# Patient Record
Sex: FEMALE | Race: WHITE | NOT HISPANIC OR LATINO | Employment: FULL TIME
[De-identification: names, ages, dates, MRNs, and addresses within clinical notes are randomized per-mention and may not be internally consistent; named-entity substitution may affect disease eponyms.]

---

## 2017-09-10 ENCOUNTER — HEALTH MAINTENANCE LETTER (OUTPATIENT)
Age: 42
End: 2017-09-10

## 2017-09-18 ENCOUNTER — RADIANT APPOINTMENT (OUTPATIENT)
Dept: MAMMOGRAPHY | Facility: CLINIC | Age: 42
End: 2017-09-18
Attending: FAMILY MEDICINE
Payer: COMMERCIAL

## 2017-09-18 DIAGNOSIS — Z12.31 VISIT FOR SCREENING MAMMOGRAM: ICD-10-CM

## 2017-09-18 PROCEDURE — 77063 BREAST TOMOSYNTHESIS BI: CPT | Performed by: STUDENT IN AN ORGANIZED HEALTH CARE EDUCATION/TRAINING PROGRAM

## 2017-09-18 PROCEDURE — G0202 SCR MAMMO BI INCL CAD: HCPCS | Performed by: STUDENT IN AN ORGANIZED HEALTH CARE EDUCATION/TRAINING PROGRAM

## 2017-10-11 ENCOUNTER — OFFICE VISIT (OUTPATIENT)
Dept: PEDIATRICS | Facility: CLINIC | Age: 42
End: 2017-10-11
Payer: COMMERCIAL

## 2017-10-11 VITALS
DIASTOLIC BLOOD PRESSURE: 60 MMHG | SYSTOLIC BLOOD PRESSURE: 94 MMHG | HEIGHT: 64 IN | HEART RATE: 70 BPM | OXYGEN SATURATION: 97 % | WEIGHT: 137 LBS | BODY MASS INDEX: 23.39 KG/M2 | TEMPERATURE: 97.9 F

## 2017-10-11 DIAGNOSIS — Z23 NEEDS FLU SHOT: ICD-10-CM

## 2017-10-11 DIAGNOSIS — Z00.00 ROUTINE GENERAL MEDICAL EXAMINATION AT A HEALTH CARE FACILITY: Primary | ICD-10-CM

## 2017-10-11 DIAGNOSIS — Z13.220 LIPID SCREENING: ICD-10-CM

## 2017-10-11 DIAGNOSIS — Z13.1 SCREENING FOR DIABETES MELLITUS: ICD-10-CM

## 2017-10-11 PROCEDURE — 90686 IIV4 VACC NO PRSV 0.5 ML IM: CPT | Performed by: INTERNAL MEDICINE

## 2017-10-11 PROCEDURE — 99396 PREV VISIT EST AGE 40-64: CPT | Mod: 25 | Performed by: INTERNAL MEDICINE

## 2017-10-11 PROCEDURE — 90471 IMMUNIZATION ADMIN: CPT | Performed by: INTERNAL MEDICINE

## 2017-10-11 NOTE — MR AVS SNAPSHOT
After Visit Summary   10/11/2017    Betty Castelan    MRN: [id number]           Patient Information     Date Of Birth          1975        Visit Information        Provider Department      10/11/2017 7:50 AM Diana Luevano MD PhD [facility]        Today's Diagnoses     Routine general medical examination at a health care facility    -  1    Needs flu shot        Lipid screening        Screening for diabetes mellitus          Care Instructions    Make appointment(s) for:   -- fasting lab appointment.           Preventive Health Recommendations  Female Ages 40 to 49    Yearly exam:     See your health care provider every year in order to  1. Review health changes.   2. Discuss preventive care.    3. Review your medicines if your doctor prescribed any.      Get a Pap test every three years (unless you have an abnormal result and your provider advises testing more often).      If you get Pap tests with HPV test, you only need to test every 5 years, unless you have an abnormal result. You do not need a Pap test if your uterus was removed (hysterectomy) and you have not had cancer.      You should be tested each year for STDs (sexually transmitted diseases), if you're at risk.       Ask your doctor if you should have a mammogram.      Have a colonoscopy (test for colon cancer) if someone in your family has had colon cancer or polyps before age 50.       Have a cholesterol test every 5 years.       Have a diabetes test (fasting glucose) after age 45. If you are at risk for diabetes, you should have this test every 3 years.    Shots: Get a flu shot each year. Get a tetanus shot every 10 years.     Nutrition:     Eat at least 5 servings of fruits and vegetables each day.    Eat whole-grain bread, whole-wheat pasta and brown rice instead of white grains and rice.    Talk to your provider about Calcium and Vitamin D.     Lifestyle    Exercise at least 150 minutes a week (an average of 30 minutes  a day, 5 days a week). This will help you control your weight and prevent disease.    Limit alcohol to one drink per day.    No smoking.     Wear sunscreen to prevent skin cancer.    See your dentist every six months for an exam and cleaning.          Follow-ups after your visit        Future tests that were ordered for you today     Open Future Orders        Priority Expected Expires Ordered    Lipid panel reflex to direct LDL Routine  10/31/2017 10/11/2017    Glucose Routine  10/31/2017 10/11/2017            Who to contact     If you have questions or need follow up information about today's clinic visit or your schedule please contact [facility] directly at [phone].  Normal or non-critical lab and imaging results will be communicated to you by [employer], letter or phone within 4 business days after the clinic has received the results. If you do not hear from us within 7 days, please contact the clinic through [employer] or phone. If you have a critical or abnormal lab result, we will notify you by phone as soon as possible.  Submit refill requests through [facility] or call your pharmacy and they will forward the refill request to us. Please allow 3 business days for your refill to be completed.          Additional Information About Your Visit        [employer] Information     [facility] gives you secure access to your electronic health record. If you see a primary care provider, you can also send messages to your care team and make appointments. If you have questions, please call your primary care clinic.  If you do not have a primary care provider, please call [phone] and they will assist you.      [facility] is an electronic gateway that provides easy, online access to your medical records. With [facility], you can request a clinic appointment, read your test results, renew a prescription or communicate with your care team.     To access your existing account, please contact your [facility]  "[facility] [facility] or call [phone] for assistance.        Care EveryWhere ID     This is your Care EveryWhere ID. This could be used by other organizations to access your [place] medical records  [id number]        Your Vitals Were     Pulse Temperature Height Last Period Pulse Oximetry BMI (Body Mass Index)    70 97.9  F (36.6  C) (Temporal) 5' 3.5\" (1.613 m) 09/27/2017 97% 23.89 kg/m2       Blood Pressure from Last 3 Encounters:   10/11/17 94/60   09/30/16 117/82   05/20/16 110/70    Weight from Last 3 Encounters:   10/11/17 137 lb (62.1 kg)   09/30/16 137 lb 6.4 oz (62.3 kg)   05/20/16 138 lb 14.4 oz (63 kg)              We Performed the Following     ADMIN 1st VACCINE     HC FLU VAC PRESRV FREE QUAD SPLIT VIR 3+YRS IM        Primary Care Provider Office Phone # Fax #    Regino Flowers [phone] [address]  [facility] [address]        Equal Access to Services     [facility]: Hadii aad ku hadasho [place], waaxda luqadaha, qaybta kaalmada adeanjel, vanesa johnson . So [facility] [phone].    ATENCIÓN: Si habla español, tiene a kern disposición servicios gratuitos de asistencia lingüística. Llame al [phone].    We comply with applicable federal civil rights laws and [place] laws. We do not discriminate on the basis of race, color, national origin, age, disability, sex, sexual orientation, or gender identity.            Thank you!     Thank you for choosing [facility]  for your care. Our goal is always to provide you with excellent care. Hearing back from our patients is one way we can continue to improve our services. Please take a few minutes to complete the written survey that you may receive in the mail after your visit with us. Thank you!             Your Updated Medication List - Protect others around you: Learn how to safely use, store and throw away your medicines at www.disposemymeds.org.      Notice  As of " 10/11/2017  8:18 AM    You have not been prescribed any medications.

## 2017-10-11 NOTE — PATIENT INSTRUCTIONS
Make appointment(s) for:   -- fasting lab appointment.           Preventive Health Recommendations  Female Ages 40 to 49    Yearly exam:     See your health care provider every year in order to  1. Review health changes.   2. Discuss preventive care.    3. Review your medicines if your doctor prescribed any.      Get a Pap test every three years (unless you have an abnormal result and your provider advises testing more often).      If you get Pap tests with HPV test, you only need to test every 5 years, unless you have an abnormal result. You do not need a Pap test if your uterus was removed (hysterectomy) and you have not had cancer.      You should be tested each year for STDs (sexually transmitted diseases), if you're at risk.       Ask your doctor if you should have a mammogram.      Have a colonoscopy (test for colon cancer) if someone in your family has had colon cancer or polyps before age 50.       Have a cholesterol test every 5 years.       Have a diabetes test (fasting glucose) after age 45. If you are at risk for diabetes, you should have this test every 3 years.    Shots: Get a flu shot each year. Get a tetanus shot every 10 years.     Nutrition:     Eat at least 5 servings of fruits and vegetables each day.    Eat whole-grain bread, whole-wheat pasta and brown rice instead of white grains and rice.    Talk to your provider about Calcium and Vitamin D.     Lifestyle    Exercise at least 150 minutes a week (an average of 30 minutes a day, 5 days a week). This will help you control your weight and prevent disease.    Limit alcohol to one drink per day.    No smoking.     Wear sunscreen to prevent skin cancer.    See your dentist every six months for an exam and cleaning.

## 2017-10-11 NOTE — NURSING NOTE
Injectable Influenza Immunization Documentation    1.  Is the person to be vaccinated sick today?  No    2. Does the person to be vaccinated have an allergy to eggs or to a component of the vaccine?  No    3. Has the person to be vaccinated today ever had a serious reaction to influenza vaccine in the past?  No    4. Has the person to be vaccinated ever had Guillain-Leland syndrome?  No     Form completed by Valentine NATHAN

## 2017-10-11 NOTE — PROGRESS NOTES
SUBJECTIVE:   CC: Betty Castelan is an 42 year old woman who presents for preventive health visit.     Healthy Habits:    Do you get at least three servings of calcium containing foods daily (dairy, green leafy vegetables, etc.)? yes    Amount of exercise or daily activities, outside of work: 5 day(s) per week    Problems taking medications regularly No    Medication side effects: No    Have you had an eye exam in the past two years? yes    Do you see a dentist twice per year? yes    Do you have sleep apnea, excessive snoring or daytime drowsiness?no          -------------------------------------    Today's PHQ-2 Score: PHQ-2 ( 1999 Pfizer) 5/20/2016 5/1/2015   Q1: Little interest or pleasure in doing things 0 0   Q2: Feeling down, depressed or hopeless 0 0   PHQ-2 Score 0 0       Abuse: Current or Past(Physical, Sexual or Emotional)- No  Do you feel safe in your environment - Yes    Social History   Substance Use Topics     Smoking status: Never Smoker     Smokeless tobacco: Never Used     Alcohol use Yes     The patient does not drink >3 drinks per day nor >7 drinks per week.    Reviewed orders with patient.  Reviewed health maintenance and updated orders accordingly - Yes  Labs reviewed in EPIC    Patient under age 50, mutual decision reflected in health maintenance.        Pertinent mammograms are reviewed under the imaging tab.  History of abnormal Pap smear: NO - age 30- 65 PAP every 3 years recommended    Reviewed and updated as needed this visit by clinical staffTobacco  Allergies  Meds  Med Hx  Surg Hx  Fam Hx  Soc Hx        Reviewed and updated as needed this visit by Provider              ROS:  C: NEGATIVE for fever, chills, change in weight  I: NEGATIVE for worrisome rashes, moles or lesions  E: NEGATIVE for vision changes or irritation  ENT: NEGATIVE for ear, mouth and throat problems  R: NEGATIVE for significant cough or SOB  B: NEGATIVE for masses, tenderness or discharge  CV: NEGATIVE for  "chest pain, palpitations or peripheral edema  GI: NEGATIVE for nausea, abdominal pain, heartburn, or change in bowel habits  : NEGATIVE for unusual urinary or vaginal symptoms. Periods are regular.  M: NEGATIVE for significant arthralgias or myalgia  N: NEGATIVE for weakness, dizziness or paresthesias  P: NEGATIVE for changes in mood or affect    OBJECTIVE:   BP 94/60  Pulse 70  Temp 97.9  F (36.6  C) (Temporal)  Ht 5' 3.5\" (1.613 m)  Wt 137 lb (62.1 kg)  LMP 09/27/2017  SpO2 97%  BMI 23.89 kg/m2  EXAM:  GENERAL: healthy, alert and no distress  EYES: Eyes grossly normal to inspection, PERRL and conjunctivae and sclerae normal  HENT: ear canals and TM's normal, nose and mouth without ulcers or lesions  NECK: no adenopathy, no asymmetry, masses, or scars and thyroid normal to palpation  RESP: lungs clear to auscultation - no rales, rhonchi or wheezes  BREAST: normal without masses, tenderness or nipple discharge and no palpable axillary masses or adenopathy  CV: regular rate and rhythm, normal S1 S2, no S3 or S4, no murmur, click or rub, no peripheral edema and peripheral pulses strong  ABDOMEN: soft, nontender, no hepatosplenomegaly, no masses and bowel sounds normal  MS: no gross musculoskeletal defects noted, no edema  SKIN: no suspicious lesions or rashes  NEURO: Normal strength and tone, mentation intact and speech normal  PSYCH: mentation appears normal, affect normal/bright    ASSESSMENT/PLAN:       ICD-10-CM    1. Routine general medical examination at a health care facility Z00.00    2. Needs flu shot Z23 HC FLU VAC PRESRV FREE QUAD SPLIT VIR 3+YRS IM     ADMIN 1st VACCINE   3. Lipid screening Z13.220 Lipid panel reflex to direct LDL   4. Screening for diabetes mellitus Z13.1 Glucose     -- return for fasting labs for biometrics required by work.     COUNSELING:   Reviewed preventive health counseling, as reflected in patient instructions         reports that she has never smoked. She has never used " "smokeless tobacco.    Estimated body mass index is 23.89 kg/(m^2) as calculated from the following:    Height as of this encounter: 5' 3.5\" (1.613 m).    Weight as of this encounter: 137 lb (62.1 kg).         Counseling Resources:  ATP IV Guidelines  Pooled Cohorts Equation Calculator  Breast Cancer Risk Calculator  FRAX Risk Assessment  ICSI Preventive Guidelines  Dietary Guidelines for Americans, 2010  USDA's MyPlate  ASA Prophylaxis  Lung CA Screening    Diana Luevano MD PhD  [facility]  "

## 2017-10-11 NOTE — NURSING NOTE
"Chief Complaint   Patient presents with     Physical       Initial BP 94/60  Pulse 70  Temp 97.9  F (36.6  C) (Temporal)  Ht 5' 3.5\" (1.613 m)  Wt 137 lb (62.1 kg)  LMP 09/27/2017  SpO2 97%  BMI 23.89 kg/m2 Estimated body mass index is 23.89 kg/(m^2) as calculated from the following:    Height as of this encounter: 5' 3.5\" (1.613 m).    Weight as of this encounter: 137 lb (62.1 kg).  Medication Reconciliation: complete    "

## 2017-10-18 ENCOUNTER — NURSE TRIAGE (OUTPATIENT)
Dept: NURSING | Facility: CLINIC | Age: 42
End: 2017-10-18

## 2017-10-18 NOTE — TELEPHONE ENCOUNTER
Reason for Disposition    Wheezing is present    Additional Information    Negative: Severe difficulty breathing (e.g., struggling for each breath, speaks in single words)    Negative: Bluish lips, tongue, or face now    Negative: [1] Difficulty breathing AND [2] exposure to flames, smoke, or fumes    Negative: [1] Stridor AND [2] difficulty breathing    Negative: Sounds like a life-threatening emergency to the triager    Negative: [1] Previous asthma attacks AND [2] this feels like asthma attack    Negative: Dry (non-productive) cough (i.e., no sputum or minimal clear sputum)    Negative: Chest pain  (Exception: MILD central chest pain, present only when coughing)    Negative: Difficulty breathing    Negative: Patient sounds very sick or weak to the triager    Negative: [1] Coughed up blood AND [2] > 1 tablespoon (15 ml) (Exception: blood-tinged sputum)    Negative: Fever > 103 F (39.4 C)    Negative: [1] Fever > 101 F (38.3 C) AND [2] age > 60    Negative: [1] Fever > 101 F (38.3 C) AND [2] bedridden (e.g., nursing home patient, CVA, chronic illness, recovering from surgery)    Negative: [1] Fever > 100.5 F (38.1 C) AND [2] diabetes mellitus or weak immune system (e.g., HIV positive, cancer chemo, splenectomy, organ transplant, chronic steroids)    Protocols used: COUGH - ACUTE PRODUCTIVE-ADULT-AH

## 2017-10-23 DIAGNOSIS — Z13.1 SCREENING FOR DIABETES MELLITUS: ICD-10-CM

## 2017-10-23 DIAGNOSIS — Z13.220 LIPID SCREENING: ICD-10-CM

## 2017-10-23 LAB
CHOLEST SERPL-MCNC: 175 MG/DL
GLUCOSE SERPL-MCNC: 87 MG/DL (ref 70–99)
HDLC SERPL-MCNC: 57 MG/DL
LDLC SERPL CALC-MCNC: 98 MG/DL
NONHDLC SERPL-MCNC: 118 MG/DL
TRIGL SERPL-MCNC: 101 MG/DL

## 2017-10-23 PROCEDURE — 82947 ASSAY GLUCOSE BLOOD QUANT: CPT | Performed by: INTERNAL MEDICINE

## 2017-10-23 PROCEDURE — 36415 COLL VENOUS BLD VENIPUNCTURE: CPT | Performed by: INTERNAL MEDICINE

## 2017-10-23 PROCEDURE — 80061 LIPID PANEL: CPT | Performed by: INTERNAL MEDICINE

## 2017-10-23 NOTE — PROGRESS NOTES
Dear Betty,   Here are your recent results which are within the expected range.  Please continue with your current plan of care.     Please call or Mychart to our office if you have further questions.     Diana Luevano MD-PhD

## 2017-12-18 ENCOUNTER — MYC MEDICAL ADVICE (OUTPATIENT)
Dept: PEDIATRICS | Facility: CLINIC | Age: 42
End: 2017-12-18

## 2017-12-18 NOTE — TELEPHONE ENCOUNTER
Routing to Dr. Luevano to advise.          To: [facility] PRIMARY [facility]ARE      From: Betty Castelan      Created: 12/18/2017 1:32 PM        *-*-*This message has not been handled.*-*-*    Hello! I believe this reached the doctor and was taken care of, as hoped. Thank you!                ----- Message -----  From: Office of Diana Luevano MD PhD  Sent: 12/18/2017 12:29 PM CST  To: Betty Castelan  Subject: RE: Question about an upcoming visit    Betty,    We appreciate your interest in using [employer] features.      When addressing a question about a family member, you will need to send the message from their [employer] System[employer]art, because the information needs to be recorded directly into their medical record.     Your family member must have a [employer] account, and you must have proxy access to it.  It this hasn't been established, go to [email or web address] for signup and proxy information, or contact [employer] services at [phone] .  He can also sign up for [employer] access at an office visit.    I am not able to find Rogre's chart.  What is his birthdate?    Thank you,  Melody SERNA RN,   [facility]              ----- Message -----     From: Betty Castelan     Sent: 12/18/2017  8:11 AM CST       To: Diana Luevano MD PhD  Subject: Question about an upcoming visit    Please find attached biometric form for Roger Castelan's labwork. Please submit on his behalf. Thank you!

## 2017-12-18 NOTE — TELEPHONE ENCOUNTER
Noted patient sent information from another person in her chart.  [employer] message sent asking patient to send in patients chart.    Please find attached biometric form for Roger Annelise's labwork. Please submit on his behalf. Thank you!    Melody Adame RN,   [facility]

## 2018-07-02 ENCOUNTER — HEALTH MAINTENANCE LETTER (OUTPATIENT)
Age: 43
End: 2018-07-02

## 2018-09-28 ENCOUNTER — RADIANT APPOINTMENT (OUTPATIENT)
Dept: MAMMOGRAPHY | Facility: CLINIC | Age: 43
End: 2018-09-28
Attending: FAMILY MEDICINE
Payer: COMMERCIAL

## 2018-09-28 DIAGNOSIS — Z12.39 SCREENING BREAST EXAMINATION: ICD-10-CM

## 2018-09-28 PROCEDURE — 77067 SCR MAMMO BI INCL CAD: CPT | Performed by: RADIOLOGY

## 2018-09-28 PROCEDURE — 77063 BREAST TOMOSYNTHESIS BI: CPT | Performed by: RADIOLOGY

## 2018-11-28 ENCOUNTER — DOCUMENTATION ONLY (OUTPATIENT)
Dept: LAB | Facility: CLINIC | Age: 43
End: 2018-11-28

## 2018-11-28 DIAGNOSIS — Z11.4 SCREENING FOR HUMAN IMMUNODEFICIENCY VIRUS: ICD-10-CM

## 2018-11-28 DIAGNOSIS — Z13.6 CARDIOVASCULAR SCREENING; LDL GOAL LESS THAN 160: Primary | ICD-10-CM

## 2018-11-28 DIAGNOSIS — Z13.1 SCREENING FOR DIABETES MELLITUS: ICD-10-CM

## 2018-11-28 NOTE — PROGRESS NOTES
Attempt 1    LM with patient to return clinics call to schedule a physical with Dr. Flowers.     Kinga Bass RN

## 2018-11-28 NOTE — PROGRESS NOTES
Pending lab orders for 11/30/18. Please review, sign, and close encounter. Thank you. Kinga Bass RN

## 2018-11-30 NOTE — PROGRESS NOTES
Patient scheduled a physical with Dr. Luevano on 12/28/18.    Routing to Dr. Luevano to please review.    Bri Harris RN, [facility]

## 2018-12-28 ENCOUNTER — OFFICE VISIT (OUTPATIENT)
Dept: PEDIATRICS | Facility: CLINIC | Age: 43
End: 2018-12-28
Payer: COMMERCIAL

## 2018-12-28 VITALS
BODY MASS INDEX: 25.52 KG/M2 | WEIGHT: 144 LBS | HEART RATE: 70 BPM | DIASTOLIC BLOOD PRESSURE: 69 MMHG | SYSTOLIC BLOOD PRESSURE: 113 MMHG | TEMPERATURE: 97.5 F | OXYGEN SATURATION: 99 % | HEIGHT: 63 IN

## 2018-12-28 DIAGNOSIS — Z00.00 ROUTINE HISTORY AND PHYSICAL EXAMINATION OF ADULT: Primary | ICD-10-CM

## 2018-12-28 DIAGNOSIS — Z23 FLU VACCINE NEED: ICD-10-CM

## 2018-12-28 DIAGNOSIS — Z80.3 FAMILY HISTORY OF BREAST CANCER: ICD-10-CM

## 2018-12-28 DIAGNOSIS — E66.3 OVERWEIGHT (BMI 25.0-29.9): ICD-10-CM

## 2018-12-28 DIAGNOSIS — Z12.4 SCREENING FOR MALIGNANT NEOPLASM OF CERVIX: ICD-10-CM

## 2018-12-28 DIAGNOSIS — Z11.4 SCREENING FOR HUMAN IMMUNODEFICIENCY VIRUS: ICD-10-CM

## 2018-12-28 DIAGNOSIS — Z11.4 ENCOUNTER FOR SCREENING FOR HIV: ICD-10-CM

## 2018-12-28 DIAGNOSIS — Z13.6 CARDIOVASCULAR SCREENING; LDL GOAL LESS THAN 160: ICD-10-CM

## 2018-12-28 LAB
CHOLEST SERPL-MCNC: 184 MG/DL
GLUCOSE SERPL-MCNC: 92 MG/DL (ref 70–99)
HDLC SERPL-MCNC: 72 MG/DL
LDLC SERPL CALC-MCNC: 101 MG/DL
NONHDLC SERPL-MCNC: 112 MG/DL
TRIGL SERPL-MCNC: 53 MG/DL

## 2018-12-28 PROCEDURE — 87389 HIV-1 AG W/HIV-1&-2 AB AG IA: CPT | Performed by: INTERNAL MEDICINE

## 2018-12-28 PROCEDURE — 99396 PREV VISIT EST AGE 40-64: CPT | Mod: 25 | Performed by: INTERNAL MEDICINE

## 2018-12-28 PROCEDURE — 87624 HPV HI-RISK TYP POOLED RSLT: CPT | Performed by: INTERNAL MEDICINE

## 2018-12-28 PROCEDURE — 90686 IIV4 VACC NO PRSV 0.5 ML IM: CPT | Performed by: INTERNAL MEDICINE

## 2018-12-28 PROCEDURE — 90471 IMMUNIZATION ADMIN: CPT | Performed by: INTERNAL MEDICINE

## 2018-12-28 PROCEDURE — G0145 SCR C/V CYTO,THINLAYER,RESCR: HCPCS | Performed by: INTERNAL MEDICINE

## 2018-12-28 PROCEDURE — 80061 LIPID PANEL: CPT | Performed by: INTERNAL MEDICINE

## 2018-12-28 PROCEDURE — 36415 COLL VENOUS BLD VENIPUNCTURE: CPT | Performed by: INTERNAL MEDICINE

## 2018-12-28 PROCEDURE — 82947 ASSAY GLUCOSE BLOOD QUANT: CPT | Performed by: INTERNAL MEDICINE

## 2018-12-28 ASSESSMENT — MIFFLIN-ST. JEOR: SCORE: 1271.27

## 2018-12-28 NOTE — PROGRESS NOTES
SUBJECTIVE:   CC: Betty Castelan is an 43 year old woman who presents for preventive health visit.     Healthy Habits:    Do you get at least three servings of calcium containing foods daily (dairy, green leafy vegetables, etc.)? yes    Amount of exercise or daily activities, outside of work: 3-4 day(s) per week    Problems taking medications regularly No    Medication side effects: No    Have you had an eye exam in the past two years? yes    Do you see a dentist twice per year? no    Do you have sleep apnea, excessive snoring or daytime drowsiness?no    HPI:  Went on a whole foods diet.  The diet did not limited protein intake.  She has used more [place], red meat and but her.    Mother and aunt had breast cancer. Pt on annual mammogram.     Today's PHQ-2 Score:   PHQ-2 ( 1999 Pfizer) 12/28/2018 5/20/2016   Q1: Little interest or pleasure in doing things 0 0   Q2: Feeling down, depressed or hopeless 0 0   PHQ-2 Score 0 0       Abuse: Current or Past(Physical, Sexual or Emotional)- No  Do you feel safe in your environment? Yes    Social History     Tobacco Use     Smoking status: Never Smoker     Smokeless tobacco: Never Used   Substance Use Topics     Alcohol use: Yes     If you drink alcohol do you typically have >3 drinks per day or >7 drinks per week? No                     Reviewed orders with patient.  Reviewed health maintenance and updated orders accordingly - Yes  Labs reviewed in [facility]    Mammogram Screening: Patient under age 50, mutual decision reflected in health maintenance.      Pertinent mammograms are reviewed under the imaging tab.  History of abnormal Pap smear: NO - age 30-65 PAP every 5 years with negative HPV co-testing recommended  PAP / HPV 5/1/2015 1/3/2012   PAP NIL NIL     Reviewed and updated as needed this visit by clinical staff  Tobacco  Allergies  Meds  Med Hx  Surg Hx  Fam Hx  Soc Hx        Reviewed and updated as needed this visit by Provider            ROS:  CONSTITUTIONAL:  "NEGATIVE for fever, chills, change in weight  INTEGUMENTARU/SKIN: NEGATIVE for worrisome rashes, moles or lesions  EYES: NEGATIVE for vision changes or irritation  ENT: NEGATIVE for ear, mouth and throat problems  RESP: NEGATIVE for significant cough or SOB  BREAST: NEGATIVE for masses, tenderness or discharge  CV: NEGATIVE for chest pain, palpitations or peripheral edema  GI: NEGATIVE for nausea, abdominal pain, heartburn, or change in bowel habits  : NEGATIVE for unusual urinary or vaginal symptoms. Periods are regular.  MUSCULOSKELETAL: NEGATIVE for significant arthralgias or myalgia  NEURO: NEGATIVE for weakness, dizziness or paresthesias  PSYCHIATRIC: NEGATIVE for changes in mood or affect    OBJECTIVE:   /69   Pulse 70   Temp 97.5  F (36.4  C) (Temporal)   Ht 1.607 m (5' 3.25\")   Wt 65.3 kg (144 lb)   SpO2 99%   BMI 25.31 kg/m    EXAM:  GENERAL: healthy, alert and no distress  EYES: Eyes grossly normal to inspection, PERRL and conjunctivae and sclerae normal  HENT: ear canals and TM's normal, nose and mouth without ulcers or lesions  NECK: no adenopathy, no asymmetry, masses, or scars and thyroid normal to palpation  RESP: lungs clear to auscultation - no rales, rhonchi or wheezes  BREAST: normal without masses, tenderness or nipple discharge and no palpable axillary masses or adenopathy  CV: regular rate and rhythm, normal S1 S2, no S3 or S4, no murmur, click or rub, no peripheral edema and peripheral pulses strong  ABDOMEN: soft, nontender, no hepatosplenomegaly, no masses and bowel sounds normal   (female): normal female external genitalia, normal urethral meatus, vaginal mucosa pink, moist, well rugated, and normal cervix/adnexa/uterus without masses or discharge  MS: no gross musculoskeletal defects noted, no edema  SKIN: no suspicious lesions or rashes  NEURO: Normal strength and tone, mentation intact and speech normal  PSYCH: mentation appears normal, affect normal/bright    Diagnostic " "Test Results:  Results for orders placed or performed in visit on 12/28/18 (from the past 24 hour(s))   Glucose   Result Value Ref Range    Glucose 92 70 - 99 mg/dL   Lipid panel reflex to direct LDL Fasting   Result Value Ref Range    Cholesterol 184 <200 mg/dL    Triglycerides 53 <150 mg/dL    HDL Cholesterol 72 >49 mg/dL    LDL Cholesterol Calculated 101 (H) <100 mg/dL    Non HDL Cholesterol 112 <130 mg/dL       ASSESSMENT/PLAN:       ICD-10-CM    1. Routine history and physical examination of adult Z00.00 Pap imaged thin layer screen with HPV - recommended age 30 - 65 years (select HPV order below)     HPV High Risk Types DNA Cervical   2. Encounter for screening for HIV Z11.4 HIV Antigen Antibody Combo   3. Flu vaccine need Z23 HC FLU VAC PRESRV FREE QUAD SPLIT VIR 3+YRS IM   4. Family history of breast cancer- mother Z80.3 CANCER RISK MGMT/CANCER GENETIC COUNSELING REFERRAL   5. Screening for malignant neoplasm of cervix Z12.4 Pap imaged thin layer screen with HPV - recommended age 30 - 65 years (select HPV order below)     HPV High Risk Types DNA Cervical   6. Overweight (BMI 25.0-29.9) E66.3      -- referred to genetics due to family history of breast cancer.   -- pt will work on weight loss.     COUNSELING:   Reviewed preventive health counseling, as reflected in patient instructions    BP Readings from Last 1 Encounters:   12/28/18 113/69     Estimated body mass index is 25.31 kg/m  as calculated from the following:    Height as of this encounter: 1.607 m (5' 3.25\").    Weight as of this encounter: 65.3 kg (144 lb).      Weight management plan: Patient is committed to increase exercise, low fat low cholesterol diet.      reports that  has never smoked. she has never used smokeless tobacco.      Counseling Resources:  ATP IV Guidelines  Pooled Cohorts Equation Calculator  Breast Cancer Risk Calculator  FRAX Risk Assessment  ICSI Preventive Guidelines  Dietary Guidelines for Americans, 2010  USDA's " MyPlate  ASA Prophylaxis  Lung CA Screening    Diana Luevano MD PhD  [facility]

## 2018-12-28 NOTE — PATIENT INSTRUCTIONS
Make appointment(s) for:   -- genetics.         Medication(s) prescribed today:    Orders Placed This Encounter   Medications     calcium carbonate-vitamin D (OS-ABEBA) 600-400 MG-UNIT chewable tablet     Sig: Take 1 chew tab by mouth 2 times daily           Preventive Health Recommendations  Female Ages 40 to 49    Yearly exam:     See your health care provider every year in order to  1. Review health changes.   2. Discuss preventive care.    3. Review your medicines if your doctor prescribed any.      Get a Pap test every three years (unless you have an abnormal result and your provider advises testing more often).      If you get Pap tests with HPV test, you only need to test every 5 years, unless you have an abnormal result. You do not need a Pap test if your uterus was removed (hysterectomy) and you have not had cancer.      You should be tested each year for STDs (sexually transmitted diseases), if you're at risk.     Ask your doctor if you should have a mammogram.      Have a colonoscopy (test for colon cancer) if someone in your family has had colon cancer or polyps before age 50.       Have a cholesterol test every 5 years.       Have a diabetes test (fasting glucose) after age 45. If you are at risk for diabetes, you should have this test every 3 years.    Shots: Get a flu shot each year. Get a tetanus shot every 10 years.     Nutrition:     Eat at least 5 servings of fruits and vegetables each day.    Eat whole-grain bread, whole-wheat pasta and brown rice instead of white grains and rice.    Get adequate Calcium and Vitamin D.      Lifestyle    Exercise at least 150 minutes a week (an average of 30 minutes a day, 5 days a week). This will help you control your weight and prevent disease.    Limit alcohol to one drink per day.    No smoking.     Wear sunscreen to prevent skin cancer.    See your dentist every six months for an exam and cleaning.

## 2018-12-29 NOTE — RESULT ENCOUNTER NOTE
Please mail result letter with the following comment:    Dear Betty,   Here is a copy of your test results that we reviewed at your recent clinic visit. Please continue the plan of care during the visit and follow up as planned.     Please call or make an appointment if you have further questions.     Diana Luevano MD-PhD

## 2018-12-31 LAB — HIV 1+2 AB+HIV1 P24 AG SERPL QL IA: NONREACTIVE

## 2019-01-02 NOTE — RESULT ENCOUNTER NOTE
Terrell Castelan,    Attached are your test results.  -HIV test is normal.   Please contact us if you have any questions.    Antoinette Oseguera, CNP

## 2019-01-08 ENCOUNTER — TELEPHONE (OUTPATIENT)
Dept: PEDIATRICS | Facility: CLINIC | Age: 44
End: 2019-01-08

## 2019-10-25 ENCOUNTER — ANCILLARY PROCEDURE (OUTPATIENT)
Dept: MAMMOGRAPHY | Facility: CLINIC | Age: 44
End: 2019-10-25
Attending: FAMILY MEDICINE
Payer: COMMERCIAL

## 2019-10-25 DIAGNOSIS — Z12.31 VISIT FOR SCREENING MAMMOGRAM: ICD-10-CM

## 2019-10-25 PROCEDURE — 77067 SCR MAMMO BI INCL CAD: CPT | Performed by: RADIOLOGY

## 2019-10-25 PROCEDURE — 77063 BREAST TOMOSYNTHESIS BI: CPT | Performed by: RADIOLOGY

## 2019-10-30 NOTE — RESULT ENCOUNTER NOTE
Dear Betty,.  Your mammogram is negative and reassuring. We will see you for your physical this December, you can call the clinic to make the appointments.   Let me know if you have any questions. Take care.  Regino Flowers MD

## 2019-11-08 ENCOUNTER — HEALTH MAINTENANCE LETTER (OUTPATIENT)
Age: 44
End: 2019-11-08

## 2019-12-16 ENCOUNTER — TELEPHONE (OUTPATIENT)
Dept: PEDIATRICS | Facility: CLINIC | Age: 44
End: 2019-12-16

## 2019-12-16 DIAGNOSIS — Z13.220 LIPID SCREENING: Primary | ICD-10-CM

## 2019-12-16 DIAGNOSIS — Z13.1 SCREENING FOR DIABETES MELLITUS: ICD-10-CM

## 2019-12-16 NOTE — TELEPHONE ENCOUNTER
[facility] Call Center    Phone Message    May a detailed message be left on voicemail: yes    Reason for Call: Order(s): Other:   Reason for requested: Fasting labs  Date needed: before the end of the year  Provider name: Ho.  Patient does not have her yearly scheduled yet, but for insurance reasons would like to have them ordered and completed by the end of this year.  Pt asking for lab order to be added in chart.  Call pt back.         Action Taken: Message routed to:  Primary Care p [phone]

## 2019-12-16 NOTE — TELEPHONE ENCOUNTER
Message routed to provider for review. Please advise Pt would like to schedule for her yearly fasting labs. Does not have appointment scheduled. Chelsea SUTTON CMA

## 2019-12-18 NOTE — TELEPHONE ENCOUNTER
I have not seen this patient since May 2016  I believe she switched care to Dr. Luevano  Please update PCP name in chart and route messages accordignly.

## 2019-12-24 DIAGNOSIS — Z13.1 SCREENING FOR DIABETES MELLITUS: ICD-10-CM

## 2019-12-24 DIAGNOSIS — Z13.220 LIPID SCREENING: ICD-10-CM

## 2019-12-24 LAB
CHOLEST SERPL-MCNC: 213 MG/DL
GLUCOSE SERPL-MCNC: 88 MG/DL (ref 70–99)
HDLC SERPL-MCNC: 78 MG/DL
LDLC SERPL CALC-MCNC: 119 MG/DL
NONHDLC SERPL-MCNC: 135 MG/DL
TRIGL SERPL-MCNC: 82 MG/DL

## 2019-12-24 PROCEDURE — 80061 LIPID PANEL: CPT | Performed by: INTERNAL MEDICINE

## 2019-12-24 PROCEDURE — 82947 ASSAY GLUCOSE BLOOD QUANT: CPT | Performed by: INTERNAL MEDICINE

## 2019-12-24 PROCEDURE — 36415 COLL VENOUS BLD VENIPUNCTURE: CPT | Performed by: INTERNAL MEDICINE

## 2019-12-26 ENCOUNTER — TELEPHONE (OUTPATIENT)
Dept: PEDIATRICS | Facility: CLINIC | Age: 44
End: 2019-12-26

## 2019-12-26 NOTE — TELEPHONE ENCOUNTER
----- Message from Regino Flowers MD sent at 12/24/2019  4:24 PM CST -----  Please inform Betty to schedule for physical with Dr. Luevano.  Last visit- 2018, is due for physical

## 2019-12-27 NOTE — TELEPHONE ENCOUNTER
Called pt Left message of provider message for pt to call and schedule a physical. Chelsea SUTTON,[facility]

## 2019-12-31 ENCOUNTER — OFFICE VISIT (OUTPATIENT)
Dept: PEDIATRICS | Facility: CLINIC | Age: 44
End: 2019-12-31
Payer: COMMERCIAL

## 2019-12-31 ENCOUNTER — TELEPHONE (OUTPATIENT)
Dept: PEDIATRICS | Facility: CLINIC | Age: 44
End: 2019-12-31

## 2019-12-31 VITALS
HEIGHT: 63 IN | WEIGHT: 144.8 LBS | BODY MASS INDEX: 25.66 KG/M2 | OXYGEN SATURATION: 100 % | HEART RATE: 73 BPM | TEMPERATURE: 98 F | DIASTOLIC BLOOD PRESSURE: 78 MMHG | SYSTOLIC BLOOD PRESSURE: 118 MMHG

## 2019-12-31 DIAGNOSIS — Z13.9 ENCOUNTER FOR HEALTH-RELATED SCREENING: Primary | ICD-10-CM

## 2019-12-31 PROCEDURE — 99213 OFFICE O/P EST LOW 20 MIN: CPT | Performed by: INTERNAL MEDICINE

## 2019-12-31 ASSESSMENT — MIFFLIN-ST. JEOR: SCORE: 1269.9

## 2019-12-31 NOTE — TELEPHONE ENCOUNTER
Pt just making sure the nurse understood that form had to be faxed today for insurance. Thank you!!

## 2019-12-31 NOTE — PROGRESS NOTES
"Subjective     Betty Castelan is a 44 year old female who presents to clinic today for the following health issues:    HPI     Form completion and review labs done today    Patient here today to consult with provider regarding signing a [employer] Results form for her employer.  Patient had labs drawn today that were ordered by Dr. Diana Luevano and Vitals taken.  Form completed and faxed to [employer] fax# [phone]. Patient advised to schedule a yearly physical with her primary provider.  Patient stated understanding.     Patient had lab performed earlier on 24th.        Reviewed and updated as needed this visit by Provider  Tobacco  Allergies  Meds  Problems  Med Hx  Surg Hx  Fam Hx         Review of Systems         Objective    /78 (BP Location: Right arm, Patient Position: Sitting, Cuff Size: Adult Regular)   Pulse 73   Temp 98  F (36.7  C) (Temporal)   Ht 1.607 m (5' 3.25\")   Wt 65.7 kg (144 lb 12.8 oz)   LMP 12/17/2019 (Approximate)   SpO2 100%   BMI 25.45 kg/m    Body mass index is 25.45 kg/m .  Physical Exam   GENERAL: healthy, alert and no distress    Diagnostic Test Results:  Labs reviewed in Epic        Assessment & Plan     1.  Encounter for health-related screening and completion of a work health assessment form.  The form required her biometrics lipid and fasting blood sugar results.  The form was completed and signed.    Patient advised to make appointment for physical examination with Dr. Luevano.     BMI:   Estimated body mass index is 25.45 kg/m  as calculated from the following:    Height as of this encounter: 1.607 m (5' 3.25\").    Weight as of this encounter: 65.7 kg (144 lb 12.8 oz).             No follow-ups on file.    Prasanna Couch MD  [facility]      "

## 2020-02-23 ENCOUNTER — HEALTH MAINTENANCE LETTER (OUTPATIENT)
Age: 45
End: 2020-02-23

## 2020-02-28 ENCOUNTER — MYC MEDICAL ADVICE (OUTPATIENT)
Dept: PEDIATRICS | Facility: CLINIC | Age: 45
End: 2020-02-28

## 2020-02-28 ENCOUNTER — TELEPHONE (OUTPATIENT)
Dept: PEDIATRICS | Facility: CLINIC | Age: 45
End: 2020-02-28

## 2020-02-28 ENCOUNTER — APPOINTMENT (OUTPATIENT)
Dept: LAB | Facility: CLINIC | Age: 45
End: 2020-02-28
Payer: COMMERCIAL

## 2020-02-28 DIAGNOSIS — E78.5 HYPERLIPIDEMIA LDL GOAL <160: Primary | ICD-10-CM

## 2020-02-28 PROCEDURE — 80061 LIPID PANEL: CPT | Performed by: INTERNAL MEDICINE

## 2020-02-28 PROCEDURE — 36415 COLL VENOUS BLD VENIPUNCTURE: CPT | Performed by: INTERNAL MEDICINE

## 2020-02-28 NOTE — TELEPHONE ENCOUNTER
----- Message from Oanh Cheng sent at 2/28/2020  8:17 AM CST -----  Regarding: LAB  Hi,     Patient was here at the lab to get her labs drawn for her appointment on the 6th of march, there was no orders in the chart. I had drawn tubes on the PT so if you can put orders in we can get those done.     Thanks Oanh Mejia

## 2020-02-28 NOTE — TELEPHONE ENCOUNTER
Pended lipids.  Patient wants cholesterol done again to see if her cholesterol improved since December. She checked the cost online for lipids and it looks to be around $55 if insurance doesn't cover it and she is willing to pay for it.  Nunu Lyles RN

## 2020-03-03 DIAGNOSIS — E78.5 HYPERLIPIDEMIA LDL GOAL <160: ICD-10-CM

## 2020-03-03 LAB
CHOLEST SERPL-MCNC: 163 MG/DL
HDLC SERPL-MCNC: 63 MG/DL
LDLC SERPL CALC-MCNC: 89 MG/DL
NONHDLC SERPL-MCNC: 100 MG/DL
TRIGL SERPL-MCNC: 56 MG/DL

## 2020-03-04 NOTE — RESULT ENCOUNTER NOTE
Dear Betty,   Your recent lab results showed the following:  -- lipid panel is now normal. This is great!    Please call or Mychart to our office if you have further questions.     Diana Luevano MD-PhD

## 2020-09-24 ENCOUNTER — OFFICE VISIT (OUTPATIENT)
Dept: PEDIATRICS | Facility: CLINIC | Age: 45
End: 2020-09-24
Payer: COMMERCIAL

## 2020-09-24 VITALS
HEART RATE: 67 BPM | WEIGHT: 140.9 LBS | TEMPERATURE: 98.3 F | BODY MASS INDEX: 24.96 KG/M2 | HEIGHT: 63 IN | DIASTOLIC BLOOD PRESSURE: 64 MMHG | SYSTOLIC BLOOD PRESSURE: 108 MMHG | OXYGEN SATURATION: 98 %

## 2020-09-24 DIAGNOSIS — Z80.3 FAMILY HISTORY OF BREAST CANCER: ICD-10-CM

## 2020-09-24 DIAGNOSIS — Z12.31 ENCOUNTER FOR SCREENING MAMMOGRAM FOR BREAST CANCER: ICD-10-CM

## 2020-09-24 DIAGNOSIS — Z23 FLU VACCINE NEED: ICD-10-CM

## 2020-09-24 DIAGNOSIS — Z00.00 ROUTINE GENERAL MEDICAL EXAMINATION AT A HEALTH CARE FACILITY: Primary | ICD-10-CM

## 2020-09-24 DIAGNOSIS — Z30.011 ENCOUNTER FOR INITIAL PRESCRIPTION OF CONTRACEPTIVE PILLS: ICD-10-CM

## 2020-09-24 PROCEDURE — 90471 IMMUNIZATION ADMIN: CPT | Performed by: INTERNAL MEDICINE

## 2020-09-24 PROCEDURE — 99396 PREV VISIT EST AGE 40-64: CPT | Mod: 25 | Performed by: INTERNAL MEDICINE

## 2020-09-24 PROCEDURE — 90686 IIV4 VACC NO PRSV 0.5 ML IM: CPT | Performed by: INTERNAL MEDICINE

## 2020-09-24 RX ORDER — NORGESTIMATE AND ETHINYL ESTRADIOL 7DAYSX3 28
1 KIT ORAL DAILY
Qty: 84 TABLET | Refills: 3 | Status: SHIPPED | OUTPATIENT
Start: 2020-09-24 | End: 2021-12-21

## 2020-09-24 ASSESSMENT — MIFFLIN-ST. JEOR: SCORE: 1247.21

## 2020-09-24 NOTE — PROGRESS NOTES
SUBJECTIVE:   CC: Betty Castelan is an 45 year old woman who presents for preventive health visit.     HPI:  Going through divorce. She moved out in July. Relationship has not been the best for sometime. Her  made up his mind and filed for divorce. Relationship has been amicable. She feels she is doing fine with this. Has good support. Her kids are doing well with this news.  She doesn't feel depressed although still gets emotional if she talks with others about this.     She would like to get a prescription of her birth control pill. Although not currently in relationship but wants to be prepared. Previously on ortho tri cyclen. Ok to stay with this.       Patient has been advised of split billing requirements and indicates understanding: Yes  Healthy Habits:    Do you get at least three servings of calcium containing foods daily (dairy, green leafy vegetables, etc.)? yes    Amount of exercise or daily activities, outside of work: 4 day(s) per week    Problems taking medications regularly not applicable    Medication side effects: No    Have you had an eye exam in the past two years? yes    Do you see a dentist twice per year? yes    Do you have sleep apnea, excessive snoring or daytime drowsiness?no      -------------------------------------    Today's PHQ-2 Score:   PHQ-2 ( 1999 [employer]) 9/24/2020 3/12/2020   Q1: Little interest or pleasure in doing things 0 0   Q2: Feeling down, depressed or hopeless 0 0   PHQ-2 Score 0 0   Q1: Little interest or pleasure in doing things - Not at all   Q2: Feeling down, depressed or hopeless - Not at all   PHQ-2 Score - 0       Abuse: Current or Past(Physical, Sexual or Emotional)- No  Do you feel safe in your environment? Yes        Social History     Tobacco Use     Smoking status: Never Smoker     Smokeless tobacco: Never Used   Substance Use Topics     Alcohol use: Yes     If you drink alcohol do you typically have >3 drinks per day or >7 drinks per week? No           "           Reviewed orders with patient.  Reviewed health maintenance and updated orders accordingly - Yes  Labs reviewed in EPIC    Mammogram Screening: Patient under age 50, mutual decision reflected in health maintenance.      Pertinent mammograms are reviewed under the imaging tab.  History of abnormal Pap smear: NO - age 30-65 PAP every 5 years with negative HPV co-testing recommended  PAP / HPV Latest Ref Rng & Units 12/28/2018 5/1/2015 1/3/2012   PAP - NIL NIL NIL   HPV 16 DNA NEG:Negative Negative - -   HPV 18 DNA NEG:Negative Negative - -   OTHER HR HPV NEG:Negative Negative - -     Reviewed and updated as needed this visit by clinical staff  Tobacco  Allergies  Meds  Med Hx  Surg Hx  Fam Hx  Soc Hx        Reviewed and updated as needed this visit by Provider            ROS:  Constitutional, HEENT, cardiovascular, pulmonary, gi and gu systems are negative, except as otherwise noted.     OBJECTIVE:   /64 (BP Location: Right arm, Patient Position: Sitting, Cuff Size: Adult Regular)   Pulse 67   Temp 98.3  F (36.8  C) (Temporal)   Ht 1.607 m (5' 3.25\")   Wt 63.9 kg (140 lb 14.4 oz)   LMP 09/14/2020 (Exact Date)   SpO2 98%   BMI 24.76 kg/m    EXAM:  GENERAL: healthy, alert and no distress  EYES: Eyes grossly normal to inspection, PERRL and conjunctivae and sclerae normal  HENT: ear canals and TM's normal, nose and mouth without ulcers or lesions  NECK: no adenopathy, no asymmetry, masses, or scars and thyroid normal to palpation  RESP: lungs clear to auscultation - no rales, rhonchi or wheezes  BREAST: normal without masses, tenderness or nipple discharge and no palpable axillary masses or adenopathy  CV: regular rate and rhythm, normal S1 S2, no S3 or S4, no murmur, click or rub, no peripheral edema and peripheral pulses strong  ABDOMEN: soft, nontender, no hepatosplenomegaly, no masses and bowel sounds normal  MS: no gross musculoskeletal defects noted, no edema  SKIN: no suspicious lesions " "or rashes  NEURO: Normal strength and tone, mentation intact and speech normal  PSYCH: mentation appears normal, affect normal/bright    Diagnostic Test Results:    Orders Only on 03/03/2020   Component Date Value Ref Range Status     Cholesterol 02/28/2020 163  <200 mg/dL Final     Triglycerides 02/28/2020 56  <150 mg/dL Final     HDL Cholesterol 02/28/2020 63  >49 mg/dL Final     LDL Cholesterol Calculated 02/28/2020 89  <100 mg/dL Final    Desirable:       <100 mg/dl     Non HDL Cholesterol 02/28/2020 100  <130 mg/dL Final          ASSESSMENT/PLAN:       ICD-10-CM    1. Routine general medical examination at a health care facility  Z00.00    2. Encounter for screening mammogram for breast cancer  Z12.31 MA Screening Digital Bilateral   3. Encounter for initial prescription of contraceptive pills  Z30.011 norgestim-eth estrad triphasic (ORTHO TRI-CYCLEN, 28,) 0.18/0.215/0.25 MG-35 MCG tablet   4. Flu vaccine need  Z23 INFLUENZA VACCINE IM > 6 MONTHS VALENT IIV4 [72298]     ADMIN 1st VACCINE   5. Family history of breast cancer- mother  Z80.3 MA Screening Digital Bilateral       Patient has been advised of split billing requirements and indicates understanding: No  COUNSELING:   Reviewed preventive health counseling, as reflected in patient instructions    Estimated body mass index is 24.76 kg/m  as calculated from the following:    Height as of this encounter: 1.607 m (5' 3.25\").    Weight as of this encounter: 63.9 kg (140 lb 14.4 oz).        She reports that she has never smoked. She has never used smokeless tobacco.      Counseling Resources:  ATP IV Guidelines  Pooled Cohorts Equation Calculator  Breast Cancer Risk Calculator  BRCA-Related Cancer Risk Assessment: FHS-7 Tool  FRAX Risk Assessment  ICSI Preventive Guidelines  Dietary Guidelines for Americans, 2010  [employer]'s MyPlate  ASA Prophylaxis  Lung CA Screening    Diana Luevano MD PhD  [facility]  "

## 2020-09-24 NOTE — PATIENT INSTRUCTIONS
Make appointment(s) for:   -- mammogram.         Medication(s) prescribed today:    No orders of the defined types were placed in this encounter.            Preventive Health Recommendations  Female Ages 40 to 49    Yearly exam:     See your health care provider every year in order to  1. Review health changes.   2. Discuss preventive care.    3. Review your medicines if your doctor prescribed any.      Get a Pap test every three years (unless you have an abnormal result and your provider advises testing more often).      If you get Pap tests with HPV test, you only need to test every 5 years, unless you have an abnormal result. You do not need a Pap test if your uterus was removed (hysterectomy) and you have not had cancer.      You should be tested each year for STDs (sexually transmitted diseases), if you're at risk.     Ask your doctor if you should have a mammogram.      Have a colonoscopy (test for colon cancer) if someone in your family has had colon cancer or polyps before age 50.       Have a cholesterol test every 5 years.       Have a diabetes test (fasting glucose) after age 45. If you are at risk for diabetes, you should have this test every 3 years.    Shots: Get a flu shot each year. Get a tetanus shot every 10 years.     Nutrition:     Eat at least 5 servings of fruits and vegetables each day.    Eat whole-grain bread, whole-wheat pasta and brown rice instead of white grains and rice.    Get adequate Calcium and Vitamin D.      Lifestyle    Exercise at least 150 minutes a week (an average of 30 minutes a day, 5 days a week). This will help you control your weight and prevent disease.    Limit alcohol to one drink per day.    No smoking.     Wear sunscreen to prevent skin cancer.    See your dentist every six months for an exam and cleaning.

## 2020-11-23 NOTE — NURSING NOTE
Injectable Influenza Immunization Documentation    1.  Is the person to be vaccinated sick today?  No    2. Does the person to be vaccinated have an allergy to eggs or to a component of the vaccine?  No    3. Has the person to be vaccinated today ever had a serious reaction to influenza vaccine in the past?  No    4. Has the person to be vaccinated ever had Guillain-[place] syndrome?  No     Form completed by Valentine NATHAN     [place] called requesting a refill of the below medication which has been pended for you:     Requested Prescriptions     Pending Prescriptions Disp Refills    LORazepam (ATIVAN) 0.5 MG tablet 15 tablet 0     Sig: Take 1 tablet by mouth every 12 hours as needed for Anxiety for up to 15 doses. Last Appointment Date: 10/24/2020  Next Appointment Date: Visit date not found    Allergies   Allergen Reactions    Atorvastatin      Other reaction(s): Muscle Pain    Codeine     Diltiazem Hcl Hives    Levofloxacin      Other reaction(s):  Intolerance-unknown    Pregabalin      lyrica    Simvastatin      Other reaction(s): Muscle Pain         Per [facility], last filled 10/26/2020, #15/8 days

## 2020-12-16 ENCOUNTER — ANCILLARY PROCEDURE (OUTPATIENT)
Dept: MAMMOGRAPHY | Facility: CLINIC | Age: 45
End: 2020-12-16
Attending: INTERNAL MEDICINE
Payer: COMMERCIAL

## 2020-12-16 DIAGNOSIS — Z12.31 VISIT FOR SCREENING MAMMOGRAM: ICD-10-CM

## 2020-12-16 PROCEDURE — 77067 SCR MAMMO BI INCL CAD: CPT | Performed by: STUDENT IN AN ORGANIZED HEALTH CARE EDUCATION/TRAINING PROGRAM

## 2020-12-16 PROCEDURE — 77063 BREAST TOMOSYNTHESIS BI: CPT | Performed by: STUDENT IN AN ORGANIZED HEALTH CARE EDUCATION/TRAINING PROGRAM

## 2020-12-22 ENCOUNTER — MYC MEDICAL ADVICE (OUTPATIENT)
Dept: PEDIATRICS | Facility: CLINIC | Age: 45
End: 2020-12-22

## 2020-12-22 DIAGNOSIS — Z11.59 ENCOUNTER FOR HEPATITIS C SCREENING TEST FOR LOW RISK PATIENT: ICD-10-CM

## 2020-12-22 DIAGNOSIS — Z11.3 SCREEN FOR STD (SEXUALLY TRANSMITTED DISEASE): Primary | ICD-10-CM

## 2020-12-22 NOTE — TELEPHONE ENCOUNTER
Routing to provider to please advise on STD panel. Patient last seen in clinic on: 09/24/2020.    Upon chart review no STD screen in labs.     Mayela Mcneil RN  [facility]

## 2020-12-29 ENCOUNTER — MYC MEDICAL ADVICE (OUTPATIENT)
Dept: PEDIATRICS | Facility: CLINIC | Age: 45
End: 2020-12-29

## 2020-12-30 NOTE — TELEPHONE ENCOUNTER
Routing to provider to please advise. Please also see 24/7 Card message from: 12/22/2020.      Mayela Mcneil RN  [facility]

## 2021-01-04 DIAGNOSIS — Z11.59 ENCOUNTER FOR HEPATITIS C SCREENING TEST FOR LOW RISK PATIENT: ICD-10-CM

## 2021-01-04 DIAGNOSIS — Z11.3 SCREEN FOR STD (SEXUALLY TRANSMITTED DISEASE): ICD-10-CM

## 2021-01-04 PROCEDURE — 86696 HERPES SIMPLEX TYPE 2 TEST: CPT | Performed by: INTERNAL MEDICINE

## 2021-01-04 PROCEDURE — 86695 HERPES SIMPLEX TYPE 1 TEST: CPT | Performed by: INTERNAL MEDICINE

## 2021-01-04 PROCEDURE — 86803 HEPATITIS C AB TEST: CPT | Performed by: INTERNAL MEDICINE

## 2021-01-04 PROCEDURE — 99000 SPECIMEN HANDLING OFFICE-LAB: CPT | Performed by: INTERNAL MEDICINE

## 2021-01-04 PROCEDURE — 36415 COLL VENOUS BLD VENIPUNCTURE: CPT | Performed by: INTERNAL MEDICINE

## 2021-01-04 PROCEDURE — 87389 HIV-1 AG W/HIV-1&-2 AB AG IA: CPT | Performed by: INTERNAL MEDICINE

## 2021-01-04 PROCEDURE — 87491 CHLMYD TRACH DNA AMP PROBE: CPT | Performed by: INTERNAL MEDICINE

## 2021-01-04 PROCEDURE — 87340 HEPATITIS B SURFACE AG IA: CPT | Performed by: INTERNAL MEDICINE

## 2021-01-04 PROCEDURE — 87591 N.GONORRHOEAE DNA AMP PROB: CPT | Performed by: INTERNAL MEDICINE

## 2021-01-04 PROCEDURE — 86780 TREPONEMA PALLIDUM: CPT | Mod: 90 | Performed by: INTERNAL MEDICINE

## 2021-01-05 ENCOUNTER — MYC MEDICAL ADVICE (OUTPATIENT)
Dept: PEDIATRICS | Facility: CLINIC | Age: 46
End: 2021-01-05

## 2021-01-05 LAB
C TRACH DNA SPEC QL NAA+PROBE: NEGATIVE
HBV SURFACE AG SERPL QL IA: NONREACTIVE
HCV AB SERPL QL IA: NONREACTIVE
HIV 1+2 AB+HIV1 P24 AG SERPL QL IA: NONREACTIVE
HSV1 IGG SERPL QL IA: <0.2 AI (ref 0–0.8)
HSV2 IGG SERPL QL IA: <0.2 AI (ref 0–0.8)
N GONORRHOEA DNA SPEC QL NAA+PROBE: NEGATIVE
SPECIMEN SOURCE: NORMAL
SPECIMEN SOURCE: NORMAL
T PALLIDUM AB SER QL: NONREACTIVE

## 2021-01-07 NOTE — RESULT ENCOUNTER NOTE
Dear Betty,   Your recent test result are within acceptable range or at baseline. Please continue with your current plan of care.       Please call or Mychart to our office if you have further questions.     Diana Luevano MD-PhD

## 2021-02-22 ENCOUNTER — OFFICE VISIT (OUTPATIENT)
Dept: URGENT CARE | Facility: URGENT CARE | Age: 46
End: 2021-02-22
Attending: NURSE PRACTITIONER
Payer: COMMERCIAL

## 2021-02-22 DIAGNOSIS — Z20.822 SUSPECTED COVID-19 VIRUS INFECTION: ICD-10-CM

## 2021-02-22 DIAGNOSIS — J02.9 SORE THROAT: ICD-10-CM

## 2021-02-22 LAB
DEPRECATED S PYO AG THROAT QL EIA: NEGATIVE
SPECIMEN SOURCE: NORMAL
SPECIMEN SOURCE: NORMAL
STREP GROUP A PCR: NOT DETECTED

## 2021-02-22 PROCEDURE — 99N1174 PR STATISTIC STREP A RAPID: Performed by: NURSE PRACTITIONER

## 2021-02-22 PROCEDURE — 87651 STREP A DNA AMP PROBE: CPT | Performed by: NURSE PRACTITIONER

## 2021-02-22 PROCEDURE — U0005 INFEC AGEN DETEC AMPLI PROBE: HCPCS | Performed by: NURSE PRACTITIONER

## 2021-02-22 PROCEDURE — U0003 INFECTIOUS AGENT DETECTION BY NUCLEIC ACID (DNA OR RNA); SEVERE ACUTE RESPIRATORY SYNDROME CORONAVIRUS 2 (SARS-COV-2) (CORONAVIRUS DISEASE [COVID-19]), AMPLIFIED PROBE TECHNIQUE, MAKING USE OF HIGH THROUGHPUT TECHNOLOGIES AS DESCRIBED BY CMS-2020-01-R: HCPCS | Performed by: NURSE PRACTITIONER

## 2021-02-22 PROCEDURE — 99421 OL DIG E/M SVC 5-10 MIN: CPT | Performed by: INTERNAL MEDICINE

## 2021-02-22 NOTE — PROGRESS NOTES
Patient son is being tested and daughter positive for strep so will place orders for her as well to get tested

## 2021-02-22 NOTE — RESULT ENCOUNTER NOTE
Terrell Castelan,    Attached are your test results.  Rapid strep is negative    Please contact us if you have any questions.    Antoinette Oseguera, CNP

## 2021-02-23 ENCOUNTER — NURSE TRIAGE (OUTPATIENT)
Dept: NURSING | Facility: CLINIC | Age: 46
End: 2021-02-23

## 2021-02-23 LAB
SARS-COV-2 RNA RESP QL NAA+PROBE: NOT DETECTED
SPECIMEN SOURCE: NORMAL

## 2021-02-23 NOTE — TELEPHONE ENCOUNTER
Coronavirus (COVID-19) Notification     Reason for call  Patient requesting results     Lab Result    Lab test 2019-nCoV rRt-PCR in process        RN Recommendations/Instructions per [facility]  Continue quarantee and following instructions until you receive the results     Please Contact your PCP clinic or return to the Emergency department if your:    Symptoms worsen or other concerning symptom's.     Patient informed that if test for COVID19 is POSITIVE,  you will receive a call typically within 48 hours from the test date (date lab collected).  If NEGATIVE result, you will receive a letter in the mail or [employer].      Yu Arriola RN      Additional Information    Negative: [1] Caller is not with the adult (patient) AND [2] reporting urgent symptoms    Negative: Lab result questions    Negative: Medication questions    Negative: Caller can't be reached by phone    Negative: Caller has already spoken to PCP or another triager    Negative: RN needs further essential information from caller in order to complete triage    Negative: Requesting regular office appointment    Negative: [1] Caller requesting NON-URGENT health information AND [2] PCP's office is the best resource    Health Information question, no triage required and triager able to answer question    Protocols used: INFORMATION ONLY CALL-A-

## 2021-02-23 NOTE — RESULT ENCOUNTER NOTE
Terrell Castelan,    Attached are your test results.  Covid is negative    Please contact us if you have any questions.    Antoinette Oseguera, CNP

## 2021-04-26 ENCOUNTER — MYC MEDICAL ADVICE (OUTPATIENT)
Dept: FAMILY MEDICINE | Facility: CLINIC | Age: 46
End: 2021-04-26

## 2021-04-26 ENCOUNTER — E-VISIT (OUTPATIENT)
Dept: FAMILY MEDICINE | Facility: CLINIC | Age: 46
End: 2021-04-26
Payer: COMMERCIAL

## 2021-04-26 DIAGNOSIS — Z71.9 ENCOUNTER FOR COUNSELING: Primary | ICD-10-CM

## 2021-04-26 PROCEDURE — 99421 OL DIG E/M SVC 5-10 MIN: CPT | Performed by: INTERNAL MEDICINE

## 2021-09-25 ENCOUNTER — HEALTH MAINTENANCE LETTER (OUTPATIENT)
Age: 46
End: 2021-09-25

## 2021-11-20 ENCOUNTER — HEALTH MAINTENANCE LETTER (OUTPATIENT)
Age: 46
End: 2021-11-20

## 2022-03-12 ENCOUNTER — HEALTH MAINTENANCE LETTER (OUTPATIENT)
Age: 47
End: 2022-03-12

## 2022-03-17 ENCOUNTER — MYC REFILL (OUTPATIENT)
Dept: FAMILY MEDICINE | Facility: CLINIC | Age: 47
End: 2022-03-17
Payer: COMMERCIAL

## 2022-03-17 DIAGNOSIS — Z30.011 ENCOUNTER FOR INITIAL PRESCRIPTION OF CONTRACEPTIVE PILLS: ICD-10-CM

## 2022-03-17 RX ORDER — NORGESTIMATE AND ETHINYL ESTRADIOL 7DAYSX3 28
1 KIT ORAL DAILY
Qty: 84 TABLET | Refills: 0 | Status: CANCELLED | OUTPATIENT
Start: 2022-03-17

## 2022-05-22 ASSESSMENT — ENCOUNTER SYMPTOMS
HEMATOCHEZIA: 0
SHORTNESS OF BREATH: 0
NAUSEA: 0
CHILLS: 0
HEARTBURN: 0
CONSTIPATION: 0
COUGH: 0
MYALGIAS: 0
BREAST MASS: 0
FREQUENCY: 0
SORE THROAT: 0
PARESTHESIAS: 0
HEADACHES: 0
ABDOMINAL PAIN: 0
EYE PAIN: 0
WEAKNESS: 0
DIZZINESS: 0
JOINT SWELLING: 0
PALPITATIONS: 0
NERVOUS/ANXIOUS: 0
HEMATURIA: 0
FEVER: 0
ARTHRALGIAS: 0
DIARRHEA: 0
DYSURIA: 0

## 2022-05-23 ENCOUNTER — ANCILLARY PROCEDURE (OUTPATIENT)
Dept: MAMMOGRAPHY | Facility: CLINIC | Age: 47
End: 2022-05-23
Attending: INTERNAL MEDICINE
Payer: COMMERCIAL

## 2022-05-23 DIAGNOSIS — Z12.31 VISIT FOR SCREENING MAMMOGRAM: ICD-10-CM

## 2022-05-23 PROCEDURE — 77067 SCR MAMMO BI INCL CAD: CPT | Mod: GC | Performed by: STUDENT IN AN ORGANIZED HEALTH CARE EDUCATION/TRAINING PROGRAM

## 2022-05-23 PROCEDURE — 77063 BREAST TOMOSYNTHESIS BI: CPT | Mod: GC | Performed by: STUDENT IN AN ORGANIZED HEALTH CARE EDUCATION/TRAINING PROGRAM

## 2022-05-25 ENCOUNTER — OFFICE VISIT (OUTPATIENT)
Dept: FAMILY MEDICINE | Facility: CLINIC | Age: 47
End: 2022-05-25
Payer: COMMERCIAL

## 2022-05-25 VITALS
DIASTOLIC BLOOD PRESSURE: 78 MMHG | HEIGHT: 63 IN | HEART RATE: 62 BPM | TEMPERATURE: 98.9 F | OXYGEN SATURATION: 99 % | WEIGHT: 136.9 LBS | RESPIRATION RATE: 18 BRPM | BODY MASS INDEX: 24.26 KG/M2 | SYSTOLIC BLOOD PRESSURE: 112 MMHG

## 2022-05-25 DIAGNOSIS — R92.8 ABNORMAL MAMMOGRAM: ICD-10-CM

## 2022-05-25 DIAGNOSIS — Z12.11 SCREEN FOR COLON CANCER: ICD-10-CM

## 2022-05-25 DIAGNOSIS — Z00.00 ROUTINE GENERAL MEDICAL EXAMINATION AT A HEALTH CARE FACILITY: Primary | ICD-10-CM

## 2022-05-25 PROCEDURE — 99213 OFFICE O/P EST LOW 20 MIN: CPT | Mod: 25 | Performed by: INTERNAL MEDICINE

## 2022-05-25 PROCEDURE — 99396 PREV VISIT EST AGE 40-64: CPT | Performed by: INTERNAL MEDICINE

## 2022-05-25 ASSESSMENT — ENCOUNTER SYMPTOMS
SHORTNESS OF BREATH: 0
FEVER: 0
CHILLS: 0
HEARTBURN: 0
JOINT SWELLING: 0
SORE THROAT: 0
HEADACHES: 0
NAUSEA: 0
CONSTIPATION: 0
DIZZINESS: 0
NERVOUS/ANXIOUS: 0
HEMATURIA: 0
DIARRHEA: 0
COUGH: 0
ARTHRALGIAS: 0
PARESTHESIAS: 0
DYSURIA: 0
WEAKNESS: 0
MYALGIAS: 0
EYE PAIN: 0
FREQUENCY: 0
PALPITATIONS: 0
ABDOMINAL PAIN: 0
BREAST MASS: 0
HEMATOCHEZIA: 0

## 2022-05-25 ASSESSMENT — PAIN SCALES - GENERAL: PAINLEVEL: NO PAIN (0)

## 2022-05-25 NOTE — PROGRESS NOTES
SUBJECTIVE:   CC: Betty Rogers is an 47 year old woman who presents for preventive health visit.       Patient has been advised of split billing requirements and indicates understanding: Yes  Pt had mammogram done yesterday. Told that she would need additional views but has not been contacted for further imaging.     Reviewed mammogram done on 5/23/2022. Extremely dense breast tissue. architectural distortion at 12 o'clock on the left breast. Per chart, diagnostic mammogram and US were ordered but pending on MD sign off.     Healthy Habits:     Getting at least 3 servings of Calcium per day:  Yes    Bi-annual eye exam:  Yes    Dental care twice a year:  Yes    Sleep apnea or symptoms of sleep apnea:  None    Diet:  Regular (no restrictions)    Frequency of exercise:  6-7 days/week    Duration of exercise:  30-45 minutes    Taking medications regularly:  No    Barriers to taking medications:  None    Medication side effects:  None    PHQ-2 Total Score: 0    Additional concerns today:  No        Today's PHQ-2 Score:   PHQ-2 ( 1999 [employer]) 5/22/2022   Q1: Little interest or pleasure in doing things 0   Q2: Feeling down, depressed or hopeless 0   PHQ-2 Score 0   PHQ-2 Total Score (12-17 Years)- Positive if 3 or more points; Administer PHQ-A if positive -   Q1: Little interest or pleasure in doing things Not at all   Q2: Feeling down, depressed or hopeless Not at all   PHQ-2 Score 0       Abuse: Current or Past (Physical, Sexual or Emotional) - No  Do you feel safe in your environment? Yes    Have you ever done Advance Care Planning? (For example, a Health Directive, POLST, or a discussion with a medical provider or your loved ones about your wishes): Yes, patient states has an Advance Care Planning document and will bring a copy to the clinic.    Social History     Tobacco Use     Smoking status: Never Smoker     Smokeless tobacco: Never Used   Substance Use Topics     Alcohol use: Yes         Alcohol Use  5/22/2022   Prescreen: >3 drinks/day or >7 drinks/week? No   Prescreen: >3 drinks/day or >7 drinks/week? -       Reviewed orders with patient.  Reviewed health maintenance and updated orders accordingly - Yes      Breast Cancer Screening:    FHS-7:   Breast CA Risk Assessment (FHS-7) 5/23/2022   Did any of your first-degree relatives have breast or ovarian cancer? No   Did any of your relatives have bilateral breast cancer? No   Did any man in your family have breast cancer? No   Did any woman in your family have breast and ovarian cancer? No   Did any woman in your family have breast cancer before age 50 y? No   Do you have 2 or more relatives with breast and/or ovarian cancer? No   Do you have 2 or more relatives with breast and/or bowel cancer? No       Mammogram Screening: Recommended annual mammography  Pertinent mammograms are reviewed under the imaging tab.    History of abnormal Pap smear: NO - age 30-65 PAP every 5 years with negative HPV co-testing recommended  PAP / HPV Latest Ref Rng & Units 12/28/2018 5/1/2015 1/3/2012   PAP (Historical) - NIL NIL NIL   HPV16 NEG:Negative Negative - -   HPV18 NEG:Negative Negative - -   HRHPV NEG:Negative Negative - -     Reviewed and updated as needed this visit by clinical staff   Tobacco  Allergies  Meds                Reviewed and updated as needed this visit by Provider                       Review of Systems   Constitutional: Negative for chills and fever.   HENT: Negative for congestion, ear pain, hearing loss and sore throat.    Eyes: Negative for pain and visual disturbance.   Respiratory: Negative for cough and shortness of breath.    Cardiovascular: Negative for chest pain, palpitations and peripheral edema.   Gastrointestinal: Negative for abdominal pain, constipation, diarrhea, heartburn, hematochezia and nausea.   Breasts:  Negative for tenderness, breast mass and discharge.   Genitourinary: Negative for dysuria, frequency, genital sores, hematuria,  "pelvic pain, urgency, vaginal bleeding and vaginal discharge.   Musculoskeletal: Negative for arthralgias, joint swelling and myalgias.   Skin: Negative for rash.   Neurological: Negative for dizziness, weakness, headaches and paresthesias.   Psychiatric/Behavioral: Negative for mood changes. The patient is not nervous/anxious.           OBJECTIVE:   /78 (BP Location: Right arm, Patient Position: Sitting, Cuff Size: Adult Regular)   Pulse 62   Temp 98.9  F (37.2  C) (Oral)   Resp 18   Ht 1.6 m (5' 2.99\")   Wt 62.1 kg (136 lb 14.4 oz)   LMP 05/16/2022   SpO2 99%   BMI 24.26 kg/m    Physical Exam  GENERAL: healthy, alert and no distress  EYES: Eyes grossly normal to inspection, PERRL and conjunctivae and sclerae normal  HENT: ear canals and TM's normal, nose and mouth without ulcers or lesions  NECK: no adenopathy, no asymmetry, masses, or scars and thyroid normal to palpation  RESP: lungs clear to auscultation - no rales, rhonchi or wheezes  BREAST: normal without masses, tenderness or nipple discharge and fibrocystic changes bilateral. The right side is lateral outer quadrants, left side at 12 o'clock position  CV: regular rate and rhythm, normal S1 S2, no S3 or S4, no murmur, click or rub, no peripheral edema and peripheral pulses strong  ABDOMEN: soft, nontender, no hepatosplenomegaly, no masses and bowel sounds normal  MS: no gross musculoskeletal defects noted, no edema  SKIN: no suspicious lesions or rashes  NEURO: Normal strength and tone, mentation intact and speech normal  PSYCH: mentation appears normal, affect normal/bright    Recent Results (from the past 744 hour(s))   MA Screen Bilateral w/Jose Luis    Narrative    BILATERAL FULL FIELD DIGITAL SCREENING MAMMOGRAM WITH TOMOSYNTHESIS    Performed on: 5/23/22    Compared to: 12/16/2020, 10/25/2019, 09/28/2018, 09/18/2017, 07/01/2016,   and 05/18/2015    Technique:  This study was evaluated with the assistance of Computer-Aided   Detection.  Breast " Tomosynthesis was used in interpretation.    Findings: The breasts are extremely dense, which lowers the sensitivity of   mammography.    Possible left breast architectural distortion near 12:00 position mid   depth.  There is no suspicious finding of the right breast.    IMPRESSION: ACR BI-RADS Category 0: Need Additional Imaging Evaluation    RECOMMENDED FOLLOW-UP: Additional mammographic images and possible   ultrasound of the left breast.    The results and recommendations of this examination will be communicated   to the patient and the imaging center will attempt to contact the patient   within 2-3 business days to schedule follow-up imaging.            ASSESSMENT/PLAN:   Betty was seen today for physical.    Diagnoses and all orders for this visit:    Routine general medical examination at a health care facility    Screen for colon cancer  -     Adult Gastro Ref - Procedure Only; Future    Abnormal mammogram  Comments:  pt will be called to get diagnostic mammogram and US done in the near future. discontinue OCP.     Other orders  -     REVIEW OF HEALTH MAINTENANCE PROTOCOL ORDERS      Discussed discontinuing oral contraception. She is near menopausal age. Although OCP is not known to cause breast cancer, with abnormal mammogram, her age, it is best to go off estrogen. She is agreeable with this. Pt reported currently not in relationships. If she becomes active,  contraception will be through rhythm method or condom use.     Discussed menopausal definition is absent of menses for 12 months. She may have irregular period after going off OCP and developing hot flashes. These can be managed through non hormonal means.     Patient has been advised of split billing requirements and indicates understanding: Yes    COUNSELING:  Reviewed preventive health counseling, as reflected in patient instructions    Estimated body mass index is 24.26 kg/m  as calculated from the following:    Height as of this encounter: 1.6  "m (5' 2.99\").    Weight as of this encounter: 62.1 kg (136 lb 14.4 oz).        She reports that she has never smoked. She has never used smokeless tobacco.      Counseling Resources:  ATP IV Guidelines  Pooled Cohorts Equation Calculator  Breast Cancer Risk Calculator  BRCA-Related Cancer Risk Assessment: FHS-7 Tool  FRAX Risk Assessment  ICSI Preventive Guidelines  Dietary Guidelines for Americans, 2010  USDA's MyPlate  ASA Prophylaxis  Lung CA Screening    Diana Luevano MD PhD  [facility]"

## 2022-06-01 ENCOUNTER — ANCILLARY PROCEDURE (OUTPATIENT)
Dept: ULTRASOUND IMAGING | Facility: CLINIC | Age: 47
End: 2022-06-01
Attending: INTERNAL MEDICINE
Payer: COMMERCIAL

## 2022-06-01 DIAGNOSIS — R92.8 ABNORMAL MAMMOGRAM: ICD-10-CM

## 2022-06-01 PROCEDURE — 76642 ULTRASOUND BREAST LIMITED: CPT | Mod: LT

## 2022-06-02 ENCOUNTER — ANCILLARY PROCEDURE (OUTPATIENT)
Dept: MAMMOGRAPHY | Facility: CLINIC | Age: 47
End: 2022-06-02
Attending: INTERNAL MEDICINE
Payer: COMMERCIAL

## 2022-06-02 DIAGNOSIS — R92.8 ABNORMAL MAMMOGRAM: ICD-10-CM

## 2022-06-02 DIAGNOSIS — N63.0 BREAST MASS: ICD-10-CM

## 2022-06-02 PROCEDURE — 77066 DX MAMMO INCL CAD BI: CPT | Performed by: RADIOLOGY

## 2022-06-02 PROCEDURE — 88377 M/PHMTRC ALYS ISHQUANT/SEMIQ: CPT | Mod: 26 | Performed by: MEDICAL GENETICS

## 2022-06-02 PROCEDURE — 19081 BX BREAST 1ST LESION STRTCTC: CPT | Mod: LT | Performed by: RADIOLOGY

## 2022-06-02 PROCEDURE — 19083 BX BREAST 1ST LESION US IMAG: CPT | Mod: LT | Performed by: RADIOLOGY

## 2022-06-02 PROCEDURE — 88305 TISSUE EXAM BY PATHOLOGIST: CPT | Mod: 26 | Performed by: PATHOLOGY

## 2022-06-02 PROCEDURE — 88377 M/PHMTRC ALYS ISHQUANT/SEMIQ: CPT | Performed by: INTERNAL MEDICINE

## 2022-06-02 PROCEDURE — G0279 TOMOSYNTHESIS, MAMMO: HCPCS | Performed by: RADIOLOGY

## 2022-06-02 PROCEDURE — 88360 TUMOR IMMUNOHISTOCHEM/MANUAL: CPT | Mod: 26 | Performed by: PATHOLOGY

## 2022-06-02 PROCEDURE — 88305 TISSUE EXAM BY PATHOLOGIST: CPT | Mod: TC | Performed by: INTERNAL MEDICINE

## 2022-06-02 RX ORDER — IOPAMIDOL 612 MG/ML
100 INJECTION, SOLUTION INTRAVASCULAR ONCE
Status: COMPLETED | OUTPATIENT
Start: 2022-06-02 | End: 2022-06-02

## 2022-06-02 RX ADMIN — LIDOCAINE HYDROCHLORIDE AND EPINEPHRINE 10 ML: 10; 10 INJECTION, SOLUTION INFILTRATION; PERINEURAL at 14:29

## 2022-06-02 RX ADMIN — IOPAMIDOL 100 ML: 612 INJECTION, SOLUTION INTRAVASCULAR at 13:12

## 2022-06-06 LAB
PATH REPORT.COMMENTS IMP SPEC: ABNORMAL
PATH REPORT.COMMENTS IMP SPEC: ABNORMAL
PATH REPORT.COMMENTS IMP SPEC: YES
PATH REPORT.FINAL DX SPEC: ABNORMAL
PATH REPORT.GROSS SPEC: ABNORMAL
PATH REPORT.MICROSCOPIC SPEC OTHER STN: ABNORMAL
PATH REPORT.RELEVANT HX SPEC: ABNORMAL
PATHOLOGY SYNOPTIC REPORT: ABNORMAL
PHOTO IMAGE: ABNORMAL

## 2022-06-07 ENCOUNTER — PATIENT OUTREACH (OUTPATIENT)
Dept: ONCOLOGY | Facility: CLINIC | Age: 47
End: 2022-06-07
Payer: COMMERCIAL

## 2022-06-07 ENCOUNTER — TELEPHONE (OUTPATIENT)
Dept: MAMMOGRAPHY | Facility: CLINIC | Age: 47
End: 2022-06-07
Payer: COMMERCIAL

## 2022-06-07 DIAGNOSIS — C50.919 INVASIVE DUCTAL CARCINOMA OF BREAST (H): Primary | ICD-10-CM

## 2022-06-07 DIAGNOSIS — D05.12 DUCTAL CARCINOMA IN SITU (DCIS) OF LEFT BREAST: ICD-10-CM

## 2022-06-07 NOTE — PROGRESS NOTES
New Patient Oncology Nurse Navigator Note     Referring provider: Diana Luevano MD PhD      Referring Clinic/Organization: [facility]     Referred to (specialty:) Medical Oncology and Cancer Surgery      Date Referral Received: June 7, 2022     Evaluation for:  Breast cancer     Clinical History (per Nurse review of records provided):      Patient had a bilateral screening mammogram on 5/23/22 and a possible left breast architectural distortion near 12:00 position mid depth was identified. A left breast diagnostic mammogram and left breast ultrasound followed on 6/1/22.  Further mammogram evaluation of possible architectural distortion in the left breast at the 12:00 position middle depth. Additional views confirm architectural distortion at the 12:00 position measuring up to at least 5 cm.  Left breast ultrasound was performed demonstrating in irregular hypoechoic mass with ill-defined margins in the left breast measuring at least 3.7 x 3.2 x 2.4 cm. Possible surrounding ill-defined hypoechogenicity/shadowing. In the left axilla there is a lymph node with a lobulated thickened cortex measuring 1.1 x 1.2 x 0.6 cm. Contrast enhanced mammogram also took place on 6/1. Standard Mammogram Findings: Again noted is a mostly obscured mass 12:00 position mid breast subtle calcifications posteromedially on the left. Contrast Findings: The solid mass at approximately the 12:00 position has a longest diameter of 3.5 cm. There is a small focus equivocal enhancement in the region of the suspicious calcifications medially and posteriorly left.      6/2/22 -   Final Diagnosis  A. 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 LEFT breast, 9:00, posterior, calcifications, stereotactic core biopsy:  -Ductal carcinoma in situ (DCIS), nuclear grade 3, cribriform and solid type(s), with comedonecrosis  -Calcifications associated with DCIS  -Invasive carcinoma is estrogen receptor positive and progesterone receptor positive by immunohistochemistry     This subsequently reported as HER2 NEGATIVE by FISH.    Records Location: See Bookmarked material     Records Needed: Breast imaging for past 5 years     Telephoned and spoke with Betty to assist in scheduling medical oncology and cancer surgery consultations.Writer received referral, reviewed for appropriate plan, and sent to New Patient Scheduling for completion. Call transferred to  schedulers to schedule with Dr. South at that location. Melody Seaman RN

## 2022-06-07 NOTE — TELEPHONE ENCOUNTER
Spoke to Betty about her new diagnosis of Invasive Ductal Carcinoma and Ductal Carcinoma In Situ found in two areas of her left breast.  We discussed the Radiologist's recommendation of surgical and oncology consultation.  A referral has been placed and someone from their offices will reach out to help coordinate the appointments.  Betty verbalized understanding and all questions and concerns were answered at this time.

## 2022-06-09 LAB — INTERPRETATION: NORMAL

## 2022-06-10 ENCOUNTER — TELEPHONE (OUTPATIENT)
Dept: GASTROENTEROLOGY | Facility: CLINIC | Age: 47
End: 2022-06-10
Payer: COMMERCIAL

## 2022-06-10 ENCOUNTER — HOSPITAL ENCOUNTER (OUTPATIENT)
Facility: AMBULATORY SURGERY CENTER | Age: 47
End: 2022-06-10
Attending: SURGERY | Admitting: SURGERY
Payer: COMMERCIAL

## 2022-06-10 NOTE — TELEPHONE ENCOUNTER
Screening Questions  BlueKIND OF PREP RedLOCATION [review exclusion criteria] GreenSEDATION TYPE      1. Are you able to give consent for your medical care? Do you have a legal guardian or medical Power of ?  y (Sedation review/consideration needed)    2. Have you had a positive covid test in the last 90 days? n  a. If yes, what date?n    3. Are you active on mychart? y    4. What insurance is in the chart? BXBS     3.   Ordering/Referring Provider: Ho    4. BMI 23.5 [BMI OVER 40-EXTENDED PREP]  If greater than 40 review exclusion criteria [PAC APPT IF @ [facility]]        5.  Respiratory Screening :  [If yes to any of the following HOSPITAL setting only]     Do you use daily home oxygen? n    Do you have mod to severe Obstructive Sleep Ap[facility]? n  [facility]]   Do you have Pulmonary Hypertension? n     Do you have UNCONTROLLED asthma? n        6.   Have you had a heart or lung transplant? n      7.   Are you currently on dialysis? n [ If yes, G-PREP & HOSPITAL setting only]     8.   Do you have chronic kidney disease? n [ If yes, G-PREP ]    9.   Have you had a stroke or Transient ischemic attack (TIA - aka  mini stroke ) within 6 months?  n (If yes, please review exclusion criteria)    10.   In the past 6 months, have you had any heart related issues including cardiomyopathy or heart attack? n           If yes, did it require cardiac stenting or other implantable device? n      11.   Do you have any implantable devices in your body (pacemaker, defib, LVAD)? n (If yes, please review exclusion criteria)    12.   Do you take nitroglycerin? n           If yes, how often? n  (if yes, HOSPITAL setting ONLY)    13.   Are you currently taking any blood thinners? n           [IF YES, INFORM PATIENT TO FOLLOW UP W/ ORDERING PROVIDER FOR BRIDGING INSTRUCTIONS]     14.   Do you have a diagnosis of diabetes? n   [ If yes, G-PREP ]    15.   [FEMALES] Are you currently pregnant? n    If yes, how many weeks?      16.   Are you taking any prescription pain medications on a routine schedule?  n  [ If yes, EXTENDED PREP.] [If yes, MAC]    17.   Do you have any chemical dependencies such as alcohol, street drugs, or methadone?  n [If yes, MAC]    18.   Do you have any history of post-traumatic stress syndrome, severe anxiety or history of psychosis?  n  [If yes, MAC]    19.   Do you transfer independently?  y    20.  On a regular basis do you go 3-5 days between bowel movements? n   [ If yes, EXTENDED PREP.]    21.   Preferred LOCAL Pharmacy for Pre Prescription      [id number] [address]  [place] [address] [place] [facility] [address] [facility] [address]      Scheduling Details      Caller : .name  (Please ask for phone number if not scheduled by patient)    Type of Procedure Scheduled: Colon  Which Colonoscopy Prep was Sent?: Mprep  DAVID CF PATIENTS & GROEN'S PATIENTS REQUIRE EXTENDED PREP  Surgeon: Nataliya  Date of Procedure: 6/27  Location:       Sedation Type: CS  Conscious Sedation- Needs  for 6 hours after the procedure  MAC/General-Needs  for 24 hours after procedure    Pre-op Required at [facility] and OR for MAC sedation: n  (advise patient they will need a pre-op prior to procedure -)      Informed patient they will need an adult  y  Cannot take any type of public or medical transportation alone    [facility]-Pro[facility] or Externally: y    Confirmed Nurse will call to complete assessment y    Additional comments:

## 2022-06-14 ENCOUNTER — OFFICE VISIT (OUTPATIENT)
Dept: SURGERY | Facility: CLINIC | Age: 47
End: 2022-06-14
Payer: COMMERCIAL

## 2022-06-14 ENCOUNTER — LAB (OUTPATIENT)
Dept: LAB | Facility: CLINIC | Age: 47
End: 2022-06-14
Payer: COMMERCIAL

## 2022-06-14 VITALS — DIASTOLIC BLOOD PRESSURE: 80 MMHG | SYSTOLIC BLOOD PRESSURE: 126 MMHG | HEART RATE: 100 BPM

## 2022-06-14 DIAGNOSIS — C50.212 MALIGNANT NEOPLASM OF UPPER-INNER QUADRANT OF LEFT BREAST IN FEMALE, ESTROGEN RECEPTOR POSITIVE (H): ICD-10-CM

## 2022-06-14 DIAGNOSIS — Z17.0 MALIGNANT NEOPLASM OF UPPER-INNER QUADRANT OF LEFT BREAST IN FEMALE, ESTROGEN RECEPTOR POSITIVE (H): Primary | ICD-10-CM

## 2022-06-14 DIAGNOSIS — C50.212 MALIGNANT NEOPLASM OF UPPER-INNER QUADRANT OF LEFT BREAST IN FEMALE, ESTROGEN RECEPTOR POSITIVE (H): Primary | ICD-10-CM

## 2022-06-14 DIAGNOSIS — Z17.0 MALIGNANT NEOPLASM OF UPPER-INNER QUADRANT OF LEFT BREAST IN FEMALE, ESTROGEN RECEPTOR POSITIVE (H): ICD-10-CM

## 2022-06-14 LAB
INTERPRETATION: NORMAL
LAB PDF RESULT: NORMAL
SIGNIFICANT RESULTS: NORMAL
SPECIMEN DESCRIPTION: NORMAL
TEST DETAILS, MDL: NORMAL

## 2022-06-14 PROCEDURE — 99205 OFFICE O/P NEW HI 60 MIN: CPT | Performed by: SURGERY

## 2022-06-14 PROCEDURE — 36415 COLL VENOUS BLD VENIPUNCTURE: CPT

## 2022-06-14 RX ORDER — DIAZEPAM 5 MG
5 TABLET ORAL PRN
Qty: 2 TABLET | Refills: 0 | Status: SHIPPED | OUTPATIENT
Start: 2022-06-14 | End: 2022-07-18

## 2022-06-14 NOTE — PATIENT INSTRUCTIONS
Your breast MRI is scheduled for 6/16/22 6:45pm at [facility], your ultrasound guided L ax LN biopsy is scheduled for 6/17/22 9:00am at the [facility] [place].  Your consultation with Dr Zamorano is scheduled for 6/27/22 8:00am

## 2022-06-14 NOTE — NURSING NOTE
Breast Patients    BREAST PATIENTS (ALL)    1-Do you have any of the following symptoms? Lump(s) or Mass(es)  2-In which breast are you having the symptoms? left  3-Have you had a Mammogram? Other Location:  [facility]    -  Date:  5/23/22  4-Have you ever had a breast cyst drained? No  5-Have you ever had a breast biopsy? Yes:  Left   -   Date:  6/2/22  6-Have you ever had a Breast Cancer? No   7-Is there a history of Breast Cancer in your family? Yes   Relationship to you:    ?  8-Have you ever had Ovarian Cancer? No  9-Is there a history of Ovarian Cancer in your family? Yes   Relationship to you:    ?  10-Summarize your caffeine intake (i.e. coffee, tea, chocolate, soda etc.): Coffee daily     BREAST PATIENTS (FEMALE)    11-What age did your periods begin? 12?  12-Date your last menstrual period began? 6/1/22  13-Number of full-term pregnancies: 2  14-Your age when your first child was born? 32  15-Did you nurse your children? Yes  16-Are you pregnant now? No  17-Have you begun menopause? No  18-Have you had either ovary removed?No  19-Do you have breast implants? No   20-Do you use hormone replacement therapy?  No  21-Have you taken oral contraceptive pills?  Yes, For how many years?  ?  22-Have you had an intrauterine device (IUD) placed?  No  23-What is your current bra size?  34E    Sonam August MA

## 2022-06-14 NOTE — NURSING NOTE
Breast Nurse Care Coordination:  I introduced self to patient, and her friend- Meagan, and explained my role of breast nurse coordinator. I accompanied Betty to her surgical consultation on 6/14/22 with Dr. Janie South at the [facility].      Betty was given the new breast cancer patient packet which includes educational material and support resources such as [employer], American Cancer Society,  Fighting Cancer through Diet and Lifestyle, [employer], [employer], Pathways  and the [facility] Breast Cancer Support Group.  I also enclosed a copy of her Left breast biopsy(X2)  pathology report(06/02/2022).     A Breast Action Panel was ordered at the visit today and patient had her blood drawn at the [facility]. Informed patient the BAP results will take about 3 weeks and either Dr. South or myself will call her with results.    Patient is scheduled for breast MRI at [facility] on 6/16/22 @ 6:15pm.  Informed patient Dr. South will call her with results within 24 hours.    Betty is also scheduled for US guided Lymph Node biopsy at [facility] on 6/17/22 @ 0900, Medical Oncology Consult with Dr. Franco on 6/21/22 @ 11:00a.m., and  Plastics consult with Dr. Zamorano on 6/27/22 @ 0800.     I gave patient Lashay educational materials regarding Exercises After Breast Surgery, Cement Lymph Node Biopsy, and Mastectomy.  Informed patient for mastectomy surgery, she will want to have two good supportive post mastectomy garments that open in the front to wear the 2 weeks following her surgery. I gave patient information on different options to purchase post mastectomy garments.     I answered her questions and encouraged patient to call me back with any future questions or concerns.  Betty has my contact information, and knows to contact me in the future with any questions or concerns.      Iraida Kingston, RN, BSN  Breast Care Nurse Coordinator  [facility] [place] [place]  [facility] [place]  [phone]

## 2022-06-14 NOTE — PROGRESS NOTES
[facility] Breast Surgery Consultation    HPI:   Betty Rogers is a 47 year old female who is seen in consultation at the request of Dr. Luevano for evaluation of newly diagnosed left breast invasive ductal carcinoma, grade 2, ER/KS positive (95%), HER 2 negative at 12:00, 6cm FN measuring at least 3.5cm with associated DCIS biopsied at 9:00, posterior.     She had a screening mammogram on 5/23/2022 which revealed a left breast architectural distortion at 12:00, middle depth. Diagnostic imaging revealed a 3.7cm irregular hypoechoic mass in the left breast. By mammography the mass is at least 5cm in size. She had a contrast enhanced mammogram which revealed the solid mass at 12:00, measuring 3.5cm as well as equivocal enhancement in the region of suspicious calcifications medially on the left breast. She had biopsy of the mass and the calcifications at 9:00. Axillary ultrasound revealed an abnormal left axillary lymph node.     She reports no breast concerns prior to her mammogram. She denies breast pain. She has not had any prior breast biopsies or surgeries. She is otherwise very healthy. She recently stopped her OCP after her diagnosis.     Hormonal history:  menarche 12, 2 children, 1st at age 32,  Pre-menopausal,  1yr OCP use, no HRT, no fertility treatment.     Family history of breast cancer: Yes - mother had DCIS at 39yo, maternal aunt with IDC  Family history of ovarian cancer:  No  Family history of colon cancer: No  Family history of prostate cancer: No      Past Medical History:   has a past medical history of No active medical problems.      Current Outpatient Medications:      calcium carbonate-vitamin D (OS-ABEBA) 600-400 MG-UNIT chewable tablet, Take 1 chew tab by mouth 2 times daily, Disp: , Rfl:     Past Surgical History:  Past Surgical History:   Procedure Laterality Date     C/SECTION, LOW TRANSVERSE      x2           Allergies   Allergen Reactions     Nuts      Other [Seasonal Allergies]       Tree Nuts-Lips swell and breathing problems        Social History:  Social History     Socioeconomic History     Marital status:      Spouse name: Not on file     Number of children: Not on file     Years of education: Not on file     Highest education level: Not on file   Occupational History     Not on file   Tobacco Use     Smoking status: Never Smoker     Smokeless tobacco: Never Used   Substance and Sexual Activity     Alcohol use: Yes     Drug use: No     Sexual activity: Not Currently     Partners: Male     Birth control/protection: Pill, None     Comment:  HAD VASECTOMY   Other Topics Concern     Parent/sibling w/ CABG, MI or angioplasty before 65F 55M? No      Service No     Blood Transfusions No     Caffeine Concern No     Occupational Exposure No     Hobby Hazards No     Sleep Concern No     Stress Concern No     Weight Concern No     Special Diet No     Back Care No     Exercise Yes     Bike Helmet No     Seat Belt Yes     Self-Exams No   Social History Narrative     Not on file     Social Determinants of Health     Financial Resource Strain: Not on file   Food Insecurity: Not on file   Transportation Needs: Not on file   Physical Activity: Not on file   Stress: Not on file   Social Connections: Not on file   Intimate Partner Violence: Not on file   Housing Stability: Not on file        ROS:  The 10 point review of systems is negative other than noted in the HPI and above.    PE:  Vitals: LMP 05/16/2022   General appearance: well-nourished, sitting comfortably, no apparent distress  Psych: normal affect, pleasant  HEENT:  Head normocephalic and atraumatic, pupils equal and round, conjunctivae clear, mucous membranes moist, external ears and nose normal  Neck: Supple without thyromegaly or masses  Lungs: Respirations unlabored  Lymphatic: No cervical, or supraclavicular lymphadenopathy  Extremities: Without edema  Musculoskeletal:  Normal station and gait  Neurologic: nonfocal,  grossly intact times four extremities, alert and oriented times three  Psychiatric: Mood and affect are appropriate  Skin: Without lesions or rashes    Breast:  A bilateral breast exam was performed in the supine position.. Bilateral breasts were palpated in a circumferential clockwise fashion including the supraclavicular and axillary areas.   Breasts are symmetric. There is mild ecchymosis on the left upper breast. Nipples are normal and everted bilaterally. In the left upper breast at 12:00 6cm FN is a 2-3cm firm area which is not a discrete mass and may represent hematoma. There are no additional areas of asymmetry or masses in either breast. Tissue is very dense.     Lymph:       No supraclavicular/infraclavicular adenopathy.   Axillary adenopathy: there is a palpable node in the mid axilla bilaterally. Both are mobile and rubbery.     Assessment:   left breast invasive ductal carcinoma, grade 2, ER/VT positive (95%), HER 2 negative at 12:00, 6cm FN measuring at least 3.5cm with associated DCIS biopsied at 9:00, posterior.     Plan:   Betty Rogers is a 47 year old female has newly diagnosed left breast cancer.  I reviewed the imaging and pathology reports with her and her friend and explained the findings.  We talked about the fact that this is invasive ductal carcinoma  that is moderate in size and was found on screening mammogram.   We discussed the receptor status. We discussed that breast cancer is treated in a multidisciplinary fashion and she will also meet with oncology as well as radiation oncology pending surgical decision making and final pathology results. I would recommend a breast MRI to further evaluate given her dense tissue. I would also recommend an US of the left axilla with biopsy of the axillary node as if this is positive it may change our plan for surgery up front versus neoadjuvant therapy. She is scheduled to meet with Dr. Franco next week. I will send this note to him to ensure we  are on the same page.     We next discussed the surgical options for treatment.  I described the procedures for lumpectomy with sentinel lymph node biopsy and mastectomy with sentinel lymph node biopsy, possible axillary node dissection including the details of the procedures, the risks, anesthesia and expected recovery.  If her MRI does not reveal any additional disease, Betty may be a candidate for two RFID tag localized partial mastectomy; however, if the tissue between the IDC and the DCIS enhances, I would then recommend mastectomy as this would encompass a large volume of breast tissue and be very deforming.     I reviewed the data regarding lumpectomy and radiation vs mastectomy that shows that the local recurrence risk is slightly higher for lumpectomy and radiation vs mastectomy (3-5% vs. 1-2%), but the survival at 20 years is the same.  I advised that lymph node biopsy is recommended whenever we are dealing with invasive breast cancer and described the procedure for sentinel lymph node biopsy.  We talked about the risk for lymphedema which is small with removal of only a few nodes, but certainly not zero.  We discussed if her lymph node is positive on biopsy this may change the option for SLNB; however, it will depend also on her MRI as if there are only 1-2 concerning nodes she would still be a candidate for SLNB at time of mastectomy.     We talked about post-lumpectomy radiation, the course and usual side effects. We discussed that with lumpectomy, radiation is typically recommended to decrease risk of recurrence. It may be necessary following mastectomy depending on final pathology and if alberto involvement is present.     We discussed the risk of margin positivity following partial mastectomy surgery and need for possible return to the operating room for additional procedures if margins are positive.     We also talked about post-mastectomy reconstruction and the stages involved. We also discussed the  various types of mastectomy, including total, skin-sparing, and nipple-sparing mastectomy.  We reviewed that the nipple-sparing technique is cosmetic; sensation and contractility will likely be lost.  Betty  is a candidate for nipple-sparing mastectomy from an oncologic perspective;however with her large breast size the risk of nipple necrosis would be higher. I would recommend a skin sparing option.  The option of having immediate versus delayed reconstruction was also discussed.   We reviewed that the advantages of immediate reconstruction includes superior cosmetics, as the skin is preserved.  She is interested in reconstruction and we will help her schedule with plastic surgery.     In addition, I have recommended genetic counseling.  She would be a candidate in that situation based on her new cancer diagnosis and young age with family history.  The natural history of BRCA mutations and breast cancer were discussed with the patient. Should a deleterious mutation be identified, she would no longer be a good candidate for breast conservation.  We also reviewed the risk reduction benefits of a prophylactic mastectomy in this situation. The stat panel was ordered as this will change our surgical plan if she does have a genetic predisposition to increased lifetime risk of breast cancer.     We discussed the role of oncotype for hormone positive, HER 2 negative cancers with negative sentinel nodes or 1-3 positive nodes. We did discuss that we may consider checking an oncotype on her biopsy or a Ki 67 (would defer to Dr. Franco) to determine if neoadjuvant chemotherapy would be beneficial pending MRI and US axillary node biopsy.     Plan:   Breast MRI  US left axilla with node biopsy  Plastic surgery referral  Oncology appt    60 minutes total time spent on the date of this encounter doing: chart review, review of test results, patient visit, physical exam, education, counseling, developing plan of care, and  documenting.    Janie South MD      Please route or send letter to:  Primary Care Provider (PCP) and Referring Provider

## 2022-06-15 ENCOUNTER — TELEPHONE (OUTPATIENT)
Dept: GASTROENTEROLOGY | Facility: CLINIC | Age: 47
End: 2022-06-15
Payer: COMMERCIAL

## 2022-06-15 LAB — INTERPRETATION: NORMAL

## 2022-06-15 NOTE — TELEPHONE ENCOUNTER
Caller: STACY     Procedure: COLON    Date, Location, and Surgeon of Procedure Cancelled: 6/27, [facility]     Ordering Provider:    Reason for cancel (please be detailed, any staff messages or encounters to note?): PATIENT JUST GOT DIAGNOSED AND HAS ALL THESE NEW APPT         Rescheduled: YES     If rescheduled:    Date: 7/25   Location:    Prep Resent: Y(changes to prep?)   Covid Test Rescheduled: Y   Note any change or update to original order/sedation: Y

## 2022-06-16 ENCOUNTER — HOSPITAL ENCOUNTER (OUTPATIENT)
Dept: MRI IMAGING | Facility: CLINIC | Age: 47
Discharge: HOME | End: 2022-06-16
Attending: SURGERY | Admitting: SURGERY
Payer: COMMERCIAL

## 2022-06-16 DIAGNOSIS — C50.212 MALIGNANT NEOPLASM OF UPPER-INNER QUADRANT OF LEFT BREAST IN FEMALE, ESTROGEN RECEPTOR POSITIVE (H): ICD-10-CM

## 2022-06-16 DIAGNOSIS — Z17.0 MALIGNANT NEOPLASM OF UPPER-INNER QUADRANT OF LEFT BREAST IN FEMALE, ESTROGEN RECEPTOR POSITIVE (H): ICD-10-CM

## 2022-06-16 PROCEDURE — A9585 GADOBUTROL INJECTION: HCPCS | Performed by: SURGERY

## 2022-06-16 PROCEDURE — 77049 MRI BREAST C-+ W/CAD BI: CPT

## 2022-06-16 PROCEDURE — 255N000002 HC RX 255 OP 636: Performed by: SURGERY

## 2022-06-16 RX ORDER — GADOBUTROL 604.72 MG/ML
6 INJECTION INTRAVENOUS ONCE
Status: COMPLETED | OUTPATIENT
Start: 2022-06-16 | End: 2022-06-16

## 2022-06-16 RX ADMIN — GADOBUTROL 6 ML: 604.72 INJECTION INTRAVENOUS at 19:44

## 2022-06-16 NOTE — PROGRESS NOTES
RECORDS STATUS - BREAST    RECORDS REQUESTED FROM:EP[facility]   DATE REQUESTED: 6/21/2022   NOTES DETAILS STATUS   OFFICE NOTE from referring provider [facility]    referral, referred by Diana Luevano MD PhD   OFFICE NOTE from surgeon Complete See Breast Biopsy in [facility]   OPERATIVE REPORT Complete See Breast Biopsy in [facility]   MEDICATION LIST Complete [facility]   CLINICAL TRIAL TREATMENTS TO DATE     LABS     REQUEST BLOCKS FOR ALL BREAST CANCER PTS     PATHOLOGY REPORTS  (Tissue diagnosis, Stage, ER/MT percentage positive and intensity of staining, HER2 IHC, FISH, and all biopsies from breast and any distant metastasis)                 Complete 6/2/2022   Breast Biopsy   A. LEFT breast, 12:00, 6 cm from nipple, mass, ultrasound guided core biopsy:  -INVASIVE BREAST CARCINOMA   GENONOMIC TESTING     TYPE:   (Next Generation Sequencing, including Foundation One testing, and Oncotype score) Complete 6/2/2022 Her 2 Autumn    IMAGING (NEED IMAGES & REPORT)     CT SCANS     MRI Complete MRI Breast (Scheduled)    MAMMO Complete 6/2/2022 more in PACS   ULTRASOUND Complete US Breast (Scheduled)     US Breast more in PACS   PET     BONE SCAN     BRAIN MRI

## 2022-06-17 ENCOUNTER — HOSPITAL ENCOUNTER (OUTPATIENT)
Dept: MAMMOGRAPHY | Facility: CLINIC | Age: 47
Discharge: HOME | End: 2022-06-17
Attending: SURGERY
Payer: COMMERCIAL

## 2022-06-17 ENCOUNTER — DOCUMENTATION ONLY (OUTPATIENT)
Dept: MAMMOGRAPHY | Facility: CLINIC | Age: 47
End: 2022-06-17
Payer: COMMERCIAL

## 2022-06-17 DIAGNOSIS — Z17.0 MALIGNANT NEOPLASM OF UPPER-INNER QUADRANT OF LEFT BREAST IN FEMALE, ESTROGEN RECEPTOR POSITIVE (H): ICD-10-CM

## 2022-06-17 DIAGNOSIS — C50.212 MALIGNANT NEOPLASM OF UPPER-INNER QUADRANT OF LEFT BREAST IN FEMALE, ESTROGEN RECEPTOR POSITIVE (H): ICD-10-CM

## 2022-06-17 PROCEDURE — 88377 M/PHMTRC ALYS ISHQUANT/SEMIQ: CPT | Performed by: SURGERY

## 2022-06-17 PROCEDURE — 250N000009 HC RX 250: Performed by: SURGERY

## 2022-06-17 PROCEDURE — 999N000065 MA POST PROCEDURE LEFT

## 2022-06-17 PROCEDURE — 99207 HER 2 NEU FISH: CPT | Performed by: MEDICAL GENETICS

## 2022-06-17 PROCEDURE — 88360 TUMOR IMMUNOHISTOCHEM/MANUAL: CPT | Mod: 26 | Performed by: PATHOLOGY

## 2022-06-17 PROCEDURE — 88305 TISSUE EXAM BY PATHOLOGIST: CPT | Mod: 26 | Performed by: PATHOLOGY

## 2022-06-17 PROCEDURE — 88305 TISSUE EXAM BY PATHOLOGIST: CPT | Mod: TC | Performed by: SURGERY

## 2022-06-17 PROCEDURE — 88377 M/PHMTRC ALYS ISHQUANT/SEMIQ: CPT | Mod: 26 | Performed by: MEDICAL GENETICS

## 2022-06-17 PROCEDURE — 38505 NEEDLE BIOPSY LYMPH NODES: CPT

## 2022-06-17 RX ADMIN — LIDOCAINE HYDROCHLORIDE 5 ML: 10 INJECTION, SOLUTION INFILTRATION; PERINEURAL at 09:35

## 2022-06-17 NOTE — DISCHARGE INSTRUCTIONS

## 2022-06-17 NOTE — TELEPHONE ENCOUNTER
Bilateral Breast MRI (6/16/22) reviewed with Dr. Janie South.  Our Breast Radiologist- Dr. Lenore Lopez and I reviewed the MRI results with patient prior to her having US guided Left Lymph Node Biopsy today.    Surgical plan to proceed with a Left Breast Mastectomy.  Patient states she is undecided if she would have Right Breast Mastectomy as well.  Patient has upcoming appointments with the Medical Oncologist and Plastic Surgeon.      Iraida Kingston RN BSN  Breast Nurse Care Coordinator  [facility]  [phone]      Study Result    Narrative & Impression   Exam: Breast MRI, with and without Contrast, and with color encoding      History/Indication: Newly diagnosed left breast cancer at the 12:00  and 9:00 positions     Comparison: Mammograms dated 6/2/2022, 6/1/2022, 5/23/2022; left  breast ultrasound on 6/1/2022     Findings:  Right Breast: There is extreme fibroglandular tissue. There is minimal  background enhancement. No suspicious mass or enhancement. There are a  few scattered benign cysts. No axillary adenopathy.     Left Breast: There is extreme fibroglandular tissue. There is minimal  background enhancement. Biopsy proven malignancy at the 12:00 position  corresponds with a 4.6 x 2.4 x 2.4 cm irregularly-shaped enhancing  mass with washout kinetics. There with nonmass enhancement associated  nonmass enhancement extending from the mass with multiple small  subcentimeter satellite masses. In total the index lesion and  surrounding abnormal enhancement and satellite masses measures  approximately 5.1 x 3.7 x 4.6 cm in AP by transverse by craniocaudal  dimensions. Biopsy-proven DCIS at the 9:00 position is identified by  the biopsy marking clip. There is minimal nonmass enhancement at this  site measuring up to 2 cm (CAD stream axial images 75-82/144). In the  central breast at the 6:00 position there is a suspicious 0.8  cm  enhancing round mass with indistinct margins and washout kinetics (CAD  stream image 100/144). There is an additional suspicious mass at the  10:00 position, middle depth measuring 0.5 cm which could represent a  more distant satellite mass (CAD stream image 67/144). Regional  enhancement in the upper outer left breast is most consistent with  benign parenchymal enhancement. There are a couple prominent level 1  axillary lymph nodes which are equivocal at the upper limits of  normal. No internal mammary chain adenopathy.                                                                       Impression: BI-RADS CATEGORY: 6 - Known Biopsy-Proven  Malignancy-Appropriate Action Should Be Taken.     1. Findings are consistent with multicentric malignancy. Specifically,  index lesion at 12:00 with multiple small satellite masses and  surrounding nonmass enhancement measuring up to 5.1 cm. In addition,  there are suspicious subcentimeter masses at the 6:00 and 10:00  positions. Minimal nonmass enhancement at the site of biopsy-proven  DCIS at 9:00.  2. No MRI evidence for malignancy on the right.     TIERA SWEET MD

## 2022-06-21 ENCOUNTER — ONCOLOGY VISIT (OUTPATIENT)
Dept: ONCOLOGY | Facility: CLINIC | Age: 47
End: 2022-06-21
Attending: INTERNAL MEDICINE
Payer: COMMERCIAL

## 2022-06-21 ENCOUNTER — PREP FOR PROCEDURE (OUTPATIENT)
Dept: SURGERY | Facility: CLINIC | Age: 47
End: 2022-06-21

## 2022-06-21 ENCOUNTER — PRE VISIT (OUTPATIENT)
Dept: ONCOLOGY | Facility: CLINIC | Age: 47
End: 2022-06-21

## 2022-06-21 ENCOUNTER — MYC MEDICAL ADVICE (OUTPATIENT)
Dept: ONCOLOGY | Facility: CLINIC | Age: 47
End: 2022-06-21

## 2022-06-21 ENCOUNTER — TELEPHONE (OUTPATIENT)
Dept: SURGERY | Facility: CLINIC | Age: 47
End: 2022-06-21

## 2022-06-21 ENCOUNTER — PATIENT OUTREACH (OUTPATIENT)
Dept: ONCOLOGY | Facility: CLINIC | Age: 47
End: 2022-06-21

## 2022-06-21 ENCOUNTER — CARE COORDINATION (OUTPATIENT)
Dept: ONCOLOGY | Facility: CLINIC | Age: 47
End: 2022-06-21

## 2022-06-21 VITALS
HEART RATE: 68 BPM | HEIGHT: 63 IN | OXYGEN SATURATION: 100 % | SYSTOLIC BLOOD PRESSURE: 125 MMHG | WEIGHT: 134.9 LBS | DIASTOLIC BLOOD PRESSURE: 77 MMHG | TEMPERATURE: 99.2 F | BODY MASS INDEX: 23.9 KG/M2

## 2022-06-21 DIAGNOSIS — Z17.0 MALIGNANT NEOPLASM OF UPPER-OUTER QUADRANT OF LEFT BREAST IN FEMALE, ESTROGEN RECEPTOR POSITIVE (H): Primary | ICD-10-CM

## 2022-06-21 DIAGNOSIS — C50.912 INFILTRATING DUCTAL CARCINOMA OF LEFT BREAST (H): ICD-10-CM

## 2022-06-21 DIAGNOSIS — D05.12 DUCTAL CARCINOMA IN SITU (DCIS) OF LEFT BREAST: ICD-10-CM

## 2022-06-21 DIAGNOSIS — C50.412 MALIGNANT NEOPLASM OF UPPER-OUTER QUADRANT OF LEFT BREAST IN FEMALE, ESTROGEN RECEPTOR POSITIVE (H): Primary | ICD-10-CM

## 2022-06-21 PROCEDURE — 99214 OFFICE O/P EST MOD 30 MIN: CPT | Performed by: INTERNAL MEDICINE

## 2022-06-21 ASSESSMENT — PAIN SCALES - GENERAL: PAINLEVEL: NO PAIN (0)

## 2022-06-21 NOTE — TELEPHONE ENCOUNTER
Routing to Dr Franco to clarify Valium dose prior to PET, and how far before scan to take it.    Marly Stoner RN on 6/21/2022 at 4:23 PM

## 2022-06-21 NOTE — TELEPHONE ENCOUNTER
I called Betty and discussed her lymph node biopsy did reveal evidence of metastatic carcinoma. She is meeting with Dr. Franco later this morning and I did discuss her care with him as well. If we decide on surgery first I would recommend a mastectomy with seed localized node excision with SLNB on the left. She is interested in reconstruction and meeting with Dr. Zamorano next week. We can work on coordinating as surgery date prior to this to hold time in the OR. Surgery could be cancelled if oncotype results from biopsy are high and she is deemed a better candidate for neoadjuvant chemotherapy.     Janie South MD  [facility]  [phone]

## 2022-06-21 NOTE — LETTER
2022         RE: Betty Rogers  [address]  [facility] [address]        Dear Colleague,    Thank you for referring your patient, Betty Rogers, to the [facility]. Please see a copy of my visit note below.    [facility] Hematology / Oncology  Initial Visit / Consultation Note 2022  Name: Betty Rogers  :  1975  MRN:  [id number]    --------------------    Assessment / Plan:  Multifocal, locally advanced, hormone positive, HER2 negative left breast ductal carcinoma:    Betty and I reviewed her evaluation to date.  She clearly has multifocal disease with node positive disease as well.  The lymph node seen on her breast MRI are all at the upper limit of normal.  I agree with Dr. South that she could consider a neoadjuvant Oncotype Dx testing to best delineate the role of systemic therapy.  She is at the age of 47 and closing in on menopause.  Her disease is also strongly hormone positive.  On the off chance that we get a low Oncotype score, she may be best suited for mastectomy with sentinel lymph node biopsy followed by axillary radiation and aggressive antiestrogen therapy.  We reviewed that we would likely embark on some form of ovarian suppression or bilateral salpingo-oophorectomy combined with an aromatase inhibitor.  Depending upon a number of pathologic features, she could be eligible for a CDK 4/6 inhibitor as well in the adjuvant setting.  Oncotype came back sufficiently elevated, we agreed to embark on neoadjuvant chemotherapy with Taxol weekly x12 weeks followed by AC dose dense for 4 cycles every 2 weeks.  We reviewed the logistics of chemotherapy administration and the need for vascular access with a port.  We reviewed the potential for alopecia, nausea, vomiting, infection, fatigue, bleeding, cardiomyopathy, peripheral neuropathy, organ toxicities, early menopause, infertility among others.  Ultimately I think we just need  to get the Oncotype score and finalize a good plan and has been discussed, sequences of the issue.  Due for recall in the genetics.  The node positive disease, would also like to see her get a PET scan for completion staging.  Case discussed with Dr. South for continuity of care.      Thank you kindly for this consultation.  Hoang Franco MD    --------------------    Interval History:  Betty present for evaluation of breast cancer.    Screening mammogram May 2022 showed extremely dense breast tissue but possible left breast architectural distortion in the 12 o'clock position, middle depth.  There were no suspicious findings in the right breast.    Diagnostic mammogram with breast ultrasound June 2022 confirmed the possible architectural distortion on the left breast at the 12 o'clock position, middle depth with additional views measuring the distortion up to at least 5 cm; ultrasound revealed an irregular hypoechoic mass with ill-defined margins measuring at least 37 x 32 x 24 mm with possible surrounding ill-defined hypoechogenicity/shadowing and a lymph node with a lobulated thickened cortex measuring 11 x 12 x 6 mm.    Contrast-enhanced mammogram June 2022 showed a solid mass at the 12 o'clock position with the longest diameter of 35 mm as well as a small focus of equivocal enhancement in the region of the suspicious calcifications medially and posteriorly left.    Breast biopsy performed June 2022 at 12 o'clock position 6 cm from the nipple revealed invasive breast carcinoma of no special type (ductal), grade 2, estrogen receptor positive, progesterone receptor positive, HER2 negative.  Breast biopsy at the 9 o'clock position, posterior depth revealed ductal carcinoma in situ, grade 3, cribriform and solid types with comedonecrosis and associated calcifications.    Breast MRI June 2022 revealed extreme fibroglandular tissue in both breasts with minimal background enhancement.  The biopsy-proven  malignancy in the left breast at the 12 o'clock position corresponds with a 46 x 24 x 24 mm irregular shaped enhancing mass with washout kinetics.  There is non-mass-like enhancement associated extending from the mass with multiple small subcentimeter satellite masses.  In total of the index lesion plus surrounding enhancement measures approximately 51 x 37 x 46 mm.  Biopsy-proven DCIS at the 9 o'clock position showed minimal non-mass enhancement measuring up to 2 cm.  In the central breast at the 6 o'clock position there is a suspicious 8 mm mass with indistinct margins and washout kinetics.  There is an additional suspicious mass at the 10 o'clock position middle depth measuring 5 mm.  Regional enhancement in the upper outer left breast most consistent with benign parenchymal enhancement.  There are a couple prominent level 1 axillary nodes which are equivocal at the upper limits of normal.  No internal mammary chain adenopathy.  No suspicious mass or enhancement in the right breast was noted.    Ultrasound-guided lymph node biopsy June 2022 confirmed metastatic breast carcinoma.    Betty is doing well.  She denies any breast complaints.  She has been on birth control.  No prior breast biopsies.  Her mother had DCIS treated with surgery.  No other ovarian, pancreatic, prostate, colorectal, uterine malignancies.      --------------------    Review of Systems:  10 point ROS negative except for that above.    Past Medical / Surgical History:  Past Medical History:   Diagnosis Date     No active medical problems      Past Surgical History:   Procedure Laterality Date     C/SECTION, LOW TRANSVERSE      x2       Family History:  Family History   Problem Relation Age of Onset     Breast Cancer Mother 40        Form of cancer     Heart Disease Maternal Grandfather         Heart Issues     Unknown/Adopted Brother         Sucide     Breast Cancer Maternal Aunt 50       Social History:  Social History     Socioeconomic  "History     Marital status:      Spouse name: None     Number of children: None     Years of education: None     Highest education level: None   Tobacco Use     Smoking status: Never Smoker     Smokeless tobacco: Never Used   Substance and Sexual Activity     Alcohol use: Yes     Drug use: No     Sexual activity: Not Currently     Partners: Male     Birth control/protection: Pill, None     Comment:  HAD VASECTOMY   Other Topics Concern     Parent/sibling w/ CABG, MI or angioplasty before 65F 55M? No      Service No     Blood Transfusions No     Caffeine Concern No     Occupational Exposure No     Hobby Hazards No     Sleep Concern No     Stress Concern No     Weight Concern No     Special Diet No     Back Care No     Exercise Yes     Bike Helmet No     Seat Belt Yes     Self-Exams No       Medications / Allergies:  Reviewed in EMR.    --------------------    Physical Exam:  VS: /77 (BP Location: Right arm, Patient Position: Sitting)   Pulse 68   Temp 99.2  F (37.3  C) (Oral)   Ht 1.6 m (5' 3\")   Wt 61.2 kg (134 lb 14.4 oz)   LMP 05/16/2022   SpO2 100%   BMI 23.90 kg/m    GEN: Well appearing.    Labs / Imaging / Path:  We personally reviewed her imaging to date (see above) as well as her pathology reports.      Again, thank you for allowing me to participate in the care of your patient.        Sincerely,        Hoang Franco MD    "

## 2022-06-21 NOTE — PROGRESS NOTES
"Visited with patient and friend, , after consult with Dr Franco today.    Patient is teary-eyed during visit.  Yet able to take notes regarding important information (ex. 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  Thus has flexibility in her job for appointments.  States financially she is doing well at this time.    Listened to patient talk about her concerns (ex. Possibility of a life-limiting disease) and provided patient with information to help get through the next week (ex. Oncology clinic contact numbers and how to prepare for a PET scan.)    Encouraged patient to call or send [place] with message with questions/concerns that arise.    Escorted patient to  to schedule soonest available PET scan.    Care coordinator, Royce, submitted Oncotype request online.    Marly Stoner RN on 6/21/2022 at 1:05 PM      "

## 2022-06-21 NOTE — NURSING NOTE
"Oncology Rooming Note    June 21, 2022 11:14 AM   Betty Rogers is a 47 year old female who presents for:    Chief Complaint   Patient presents with     Oncology Clinic Visit     Establish care     Initial Vitals: /77 (BP Location: Right arm, Patient Position: Sitting)   Pulse 68   Temp 99.2  F (37.3  C) (Oral)   Ht 1.6 m (5' 3\")   Wt 61.2 kg (134 lb 14.4 oz)   LMP 05/16/2022   SpO2 100%   BMI 23.90 kg/m   Estimated body mass index is 23.9 kg/m  as calculated from the following:    Height as of this encounter: 1.6 m (5' 3\").    Weight as of this encounter: 61.2 kg (134 lb 14.4 oz). Body surface area is 1.65 meters squared.  No Pain (0) Comment: Data Unavailable   Patient's last menstrual period was 05/16/2022.  Allergies reviewed: Yes  Medications reviewed: Yes    Medications: Medication refills not needed today.  Pharmacy name entered into [facility]:    [id number] - [facility] [address] [address] [address] [place] [facility] [id number] [place] [address] [place]    Clinical concerns: establish care      Destiny Murphy LPN              "

## 2022-06-21 NOTE — PROGRESS NOTES
[facility]:  Care Coordination Note    Order for Oncotype Dx testing was submitted via the [email or web address] Online portal today, as requested by Dr. Franco.  Testing was requested for surgical pathology case number [id number], collected on 06/02/2022 (specimen site: Breast, left, biopsy, specimen A).  A copy of patient's pathology results report, face sheet, and copies of insurance cards were faxed to [email or web address] at [phone].      [email or web address] Order Number: [id number].     Royce Tello, RN, BSN, OCN  RN Care Coordinator - Oncology  [facility]

## 2022-06-21 NOTE — PROGRESS NOTES
Malignant Path:  Pathology report reviewed with our breast radiologist Dr. Hoang Sanches, who confirmed the recent breast imaging is concordant with the final surgical pathology results below.    Dr. South and Dr. Franco informed Betty of Ultrasound Guided Left Axillary Lymph Node Biopsy results showing Metastatic Carcinoma, consistent with breast primary malignancy.    Recommended follow up is Continued Surgical and Medical Oncology management.      Patient is seeing Dr. Franco 6/21/22 to discuss known left breast IDC and metastatic lymph node, and the plan to treat.    Iraida Kingston RN, BSN  Breast Care Nurse Coordinator  [facility]- [facility] [place]  [phone]        KneBetty [id number]  F, 1975  Surgical Pathology Report (Final result) [id number]  Authorizing Provider: Janie South MD Ordering Provider: Janie South MD  Ordering Location: [facility]  Collected: 06/17/2022 09:49 AM  Pathologist: Gab Richards MD Received: 06/17/2022 11:03 AM  .  Specimens  A Lymph Node(s), Axillary, Left  .  .  Final Diagnosis  A. Left axillary lymph node biopsy:  - Metastatic carcinoma consistent with breast primary malignancy with macrometastasis (see description)  Electronically signed by Gab Richards MD on 6/20/2022 at 6:13 PM

## 2022-06-22 ENCOUNTER — MYC MEDICAL ADVICE (OUTPATIENT)
Dept: SURGERY | Facility: CLINIC | Age: 47
End: 2022-06-22

## 2022-06-23 ENCOUNTER — LAB (OUTPATIENT)
Dept: LAB | Facility: CLINIC | Age: 47
End: 2022-06-23
Payer: COMMERCIAL

## 2022-06-23 DIAGNOSIS — C50.212 MALIGNANT NEOPLASM OF UPPER-INNER QUADRANT OF LEFT BREAST IN FEMALE, ESTROGEN RECEPTOR POSITIVE (H): Primary | ICD-10-CM

## 2022-06-23 DIAGNOSIS — Z17.0 MALIGNANT NEOPLASM OF UPPER-INNER QUADRANT OF LEFT BREAST IN FEMALE, ESTROGEN RECEPTOR POSITIVE (H): Primary | ICD-10-CM

## 2022-06-23 PROCEDURE — 81162 BRCA1&2 GEN FULL SEQ DUP/DEL: CPT | Performed by: SURGERY

## 2022-06-23 PROCEDURE — G0452 MOLECULAR PATHOLOGY INTERPR: HCPCS | Mod: 26 | Performed by: PATHOLOGY

## 2022-06-25 ENCOUNTER — HOSPITAL ENCOUNTER (OUTPATIENT)
Dept: PET IMAGING | Facility: CLINIC | Age: 47
Discharge: HOME | End: 2022-06-25
Attending: INTERNAL MEDICINE | Admitting: INTERNAL MEDICINE
Payer: COMMERCIAL

## 2022-06-25 DIAGNOSIS — C50.912 INFILTRATING DUCTAL CARCINOMA OF LEFT BREAST (H): ICD-10-CM

## 2022-06-25 PROCEDURE — 78815 PET IMAGE W/CT SKULL-THIGH: CPT | Mod: PI

## 2022-06-25 PROCEDURE — A9552 F18 FDG: HCPCS | Performed by: INTERNAL MEDICINE

## 2022-06-25 PROCEDURE — 78815 PET IMAGE W/CT SKULL-THIGH: CPT | Mod: 26 | Performed by: STUDENT IN AN ORGANIZED HEALTH CARE EDUCATION/TRAINING PROGRAM

## 2022-06-25 PROCEDURE — 343N000001 HC RX 343: Performed by: INTERNAL MEDICINE

## 2022-06-25 RX ADMIN — FLUDEOXYGLUCOSE F-18 13.01 MCI.: 500 INJECTION, SOLUTION INTRAVENOUS at 08:40

## 2022-06-27 ENCOUNTER — MYC MEDICAL ADVICE (OUTPATIENT)
Dept: ONCOLOGY | Facility: CLINIC | Age: 47
End: 2022-06-27

## 2022-06-28 ENCOUNTER — TELEPHONE (OUTPATIENT)
Dept: ONCOLOGY | Facility: CLINIC | Age: 47
End: 2022-06-28

## 2022-06-28 NOTE — TELEPHONE ENCOUNTER
Received fax from [employer] regarding need for provider's office to initiate a prior authorization for Oncotype DX.    Oncotype Breast Cancer Assay Criteria Verification form completed, signed and faxed to [phone].    Requested acces to [employer] Provider Portal, to do prior auth online.  Waiting to be granted access to this portal to complete the auth process.    Marly Stoner RN on 6/28/2022 at 4:02 PM

## 2022-06-29 ENCOUNTER — TELEPHONE (OUTPATIENT)
Dept: MAMMOGRAPHY | Facility: CLINIC | Age: 47
End: 2022-06-29

## 2022-06-29 NOTE — TELEPHONE ENCOUNTER
I called Betty per Dr. Janie South's request to inform of negative Breast Action Panel/ Genetic testing results below.    We discussed the pending Oncotype results and the next steps as to whether we should proceed with surgery first or consider doing neoadjuvent chemotherapy.      Informed patient to call me back with any other questions or concerns.  Patient verbalizes understanding and agrees with the plan of care.    Iraida Kingston RN BSN  Breast Nurse Care Coordinator  [facility] [place]  [phone]          Significant Results    TEST REQUESTED:  Hereditary Cancer - BRCA1/BRCA2.  Next generation sequencing and copy number variation analysis of: BRCA1 and BRCA2     RESULTS:                                        NEGATIVE  Pathogenic/Likely Pathogenic Variant(s):                None Detected  Variant(s) of Uncertain Significance:                       None Detected     Significant Results    TEST REQUESTED:  Hereditary Cancer - Breast Actionable Panel.  Next generation sequencing and copy number variation analysis of genes listed in 'Background' section below     RESULTS:                                        NEGATIVE  Pathogenic/Likely Pathogenic Variant(s):                None Detected  Variant(s) of Uncertain Significance:                       None Detected

## 2022-07-01 ENCOUNTER — TELEPHONE (OUTPATIENT)
Dept: ONCOLOGY | Facility: CLINIC | Age: 47
End: 2022-07-01

## 2022-07-01 LAB
PATH REPORT.ADDENDUM SPEC: NORMAL
PATH REPORT.ADDENDUM SPEC: NORMAL
PATH REPORT.COMMENTS IMP SPEC: NORMAL
PATH REPORT.FINAL DX SPEC: NORMAL
PATH REPORT.GROSS SPEC: NORMAL
PATH REPORT.MICROSCOPIC SPEC OTHER STN: NORMAL
PATH REPORT.RELEVANT HX SPEC: NORMAL
PHOTO IMAGE: NORMAL

## 2022-07-01 NOTE — TELEPHONE ENCOUNTER
Per [facility] provider portal, patient's Oncotype results should be complete on 7/13/22.    Patient is scheduled for breast surgery on 7/25/22, if neoadjuvant chemo isn't indicated.    Patient does not have an appointment with oncology, Dr Franco, during this timeframe.    Routing to Dr Franco to see if patient can be double booked, to discuss Oncotype results after 7/13/22.    Marly Stoner RN on 7/1/2022 at 1:26 PM

## 2022-07-01 NOTE — TELEPHONE ENCOUNTER
Routing to schedulers to double book patient on 7/14/22.    Marly Stoner RN on 7/1/2022 at 4:03 PM

## 2022-07-01 NOTE — TELEPHONE ENCOUNTER
Called [facility] to obtain prior auth for [employer]'s Oncotype DX Assay.    Informed that collection date of specimen is 6/2/22.    Informed that DOS for Oncotype is 7/1/22.    [facility] states this request will be reviewed by [facility] doctors, and a determination will be 7/6/22.    Routing to care coordination [place], to follow up on 7/6/22, to see if additional information is needed for prior auth.    Marly Stoner RN on 7/1/2022 at 3:22 PM

## 2022-07-06 NOTE — TELEPHONE ENCOUNTER
[facility]:  Cancer Care Note    Writer placed telephone call to [employer] this morning, to follow up on the status of patient's prior authorization for Oncotype Dx testing.  Writer was informed per [employer] representative that the prior authorization is currently still in process with [employer], and they expect an answer later today.  Once the prior authorization is approved, and once the results are available, this will be updated in the online portal.    hi5 message was sent to patient to provide this update.     Royce Tello, RN, BSN, OCN  RN Care Coordinator - Oncology  [facility]

## 2022-07-07 ENCOUNTER — TELEPHONE (OUTPATIENT)
Dept: GASTROENTEROLOGY | Facility: CLINIC | Age: 47
End: 2022-07-07

## 2022-07-07 ENCOUNTER — PREP FOR PROCEDURE (OUTPATIENT)
Dept: SURGERY | Facility: CLINIC | Age: 47
End: 2022-07-07

## 2022-07-07 ENCOUNTER — MYC MEDICAL ADVICE (OUTPATIENT)
Dept: FAMILY MEDICINE | Facility: CLINIC | Age: 47
End: 2022-07-07

## 2022-07-07 DIAGNOSIS — Z12.11 COLON CANCER SCREENING: Primary | ICD-10-CM

## 2022-07-07 DIAGNOSIS — C77.3 CARCINOMA OF BREAST METASTATIC TO AXILLARY LYMPH NODE, UNSPECIFIED LATERALITY (H): ICD-10-CM

## 2022-07-07 DIAGNOSIS — C77.3 CARCINOMA OF LEFT BREAST METASTATIC TO AXILLARY LYMPH NODE (H): ICD-10-CM

## 2022-07-07 DIAGNOSIS — Z20.822 ENCOUNTER FOR LABORATORY TESTING FOR COVID-19 VIRUS: Primary | ICD-10-CM

## 2022-07-07 DIAGNOSIS — C50.919 CARCINOMA OF BREAST METASTATIC TO AXILLARY LYMPH NODE, UNSPECIFIED LATERALITY (H): ICD-10-CM

## 2022-07-07 DIAGNOSIS — C50.912 CARCINOMA OF LEFT BREAST METASTATIC TO AXILLARY LYMPH NODE (H): ICD-10-CM

## 2022-07-07 NOTE — TELEPHONE ENCOUNTER
Caller: STACY     Procedure: COLONOSCOPY     Date, Location, and Surgeon of Procedure Cancelled: 7/25MG RAMANSWAY    Ordering Provider:    Reason for cancel (please be detailed, any staff messages or encounters to note?): PATIENT WILL BE HAVING A MASTECTOMY THAT DAY INSTEAD WILL CALL BACK WHEN SHE IS HEALED         Rescheduled:      If rescheduled:    Date:    Location:    Prep Resent: (changes to prep?)   Covid Test Rescheduled:    Note any change or update to original order/sedation:

## 2022-07-07 NOTE — TELEPHONE ENCOUNTER
"[facility]:  Cancer Care Note    Patient was notified via [employer] of Dr. Franco's recommendations, based on insurance denial of Oncotype Dx testing at this time:    \"Proceed w/ surgery and plan Oncotype accordingly post-op.  2) Appeal, but potentially wait 30 days for an answer (that could still be a denial).  I would lean towards number 1 if she is in agreement.  Ccing Dr. South to be aware.\"     Patient replied to [employer] message with the following follow-up questions for Dr. Franco:    Is the thought that if we get the results and it shows chemo is needed, that we wouldn t lose any ground if we do that post-op?     I guess I am wondering if a third option is should we proactively do chemo to be on the safe side?      How much is this test if we said we d pay, get the results  and hope  to get it reimbursed?     Also, the test is run right? So if I say I ll pay (depending on how much) , how quickly could we get the results (if you think having it now vs later is impactful).     Message was forwarded to Dr. Franco, with request to advise on patient's questions.  Writer did let patient know that the Oncotype Dx test costs approximately $4,000 if not paid for by insurance.    Royce Tello, RN, BSN, OCN  RN Care Coordinator - Oncology  [facility]    "

## 2022-07-07 NOTE — TELEPHONE ENCOUNTER
This is unfortunate and a poorly thought out decision by insurance.    We will plan to send the Oncotype regardless whether pre-op or post-op.    We have 2 options:    1) Proceed w/ surgery and plan Oncotype accordingly post-op.  2) Appeal, but potentially wait 30 days for an answer (that could still be a denial).    I would lean towards number 1 if she is in agreement.  Ccing Dr. South to be aware.    Hoang Franco MD.

## 2022-07-07 NOTE — TELEPHONE ENCOUNTER
[facility]:  Cancer Care Note    Received fax from [facility] regarding patient's prior authorization for Oncotype Dx testing.  Prior authorization for Oncotype Dx testing has been denied at this time, with the following rationale:         Per letter from [facility], there is an option to file an appeal, using the following process:        Note routed to Dr. Franco to provide update, and to advise on next steps.  Patient is currently scheduled to undergo surgery on 07/25/22.    Royce Tello, RN, BSN, OCN  RN Care Coordinator - Oncology  [facility]

## 2022-07-08 DIAGNOSIS — C50.412 MALIGNANT NEOPLASM OF UPPER-OUTER QUADRANT OF LEFT BREAST IN FEMALE, ESTROGEN RECEPTOR POSITIVE (H): Primary | ICD-10-CM

## 2022-07-08 DIAGNOSIS — Z17.0 MALIGNANT NEOPLASM OF UPPER-OUTER QUADRANT OF LEFT BREAST IN FEMALE, ESTROGEN RECEPTOR POSITIVE (H): Primary | ICD-10-CM

## 2022-07-08 LAB
CYTOGENETICS ADDENDUM: NORMAL
INTERPRETATION: NORMAL

## 2022-07-08 NOTE — TELEPHONE ENCOUNTER
"[facility]:  Cancer Care Note    Received the following response from Dr. Franco today, after he reviewed patient's questions regarding the Oncotype testing:    \"Reviewed with Betty.  Planning surgery and planning to send adjuvant Oncotype (if applies).  BJT MG 3-4 weeks post-op (7/25 I believe surgery) w/ Oncotype.  Is there anyway to put an automated reminder early August for me to review path?  Hoang Franco MD.\"    Writer will continue to follow chart, and will plan to notify Dr. Franco once the final surgical pathology results come in.  Request was sent to the scheduling pool this afternoon to assist patient with setting up her follow-up visit with Dr. Franco.    Royce Tello, RN, BSN, OCN  RN Care Coordinator - Oncology  [facility]    "

## 2022-07-13 ENCOUNTER — DOCUMENTATION ONLY (OUTPATIENT)
Dept: MAMMOGRAPHY | Facility: CLINIC | Age: 47
End: 2022-07-13

## 2022-07-13 ENCOUNTER — MEDICAL CORRESPONDENCE (OUTPATIENT)
Dept: MAMMOGRAPHY | Facility: CLINIC | Age: 47
End: 2022-07-13

## 2022-07-13 ENCOUNTER — PREP FOR PROCEDURE (OUTPATIENT)
Dept: SURGERY | Facility: CLINIC | Age: 47
End: 2022-07-13

## 2022-07-13 ENCOUNTER — PATIENT OUTREACH (OUTPATIENT)
Dept: ONCOLOGY | Facility: CLINIC | Age: 47
End: 2022-07-13

## 2022-07-13 ENCOUNTER — TELEPHONE (OUTPATIENT)
Dept: SURGERY | Facility: CLINIC | Age: 47
End: 2022-07-13

## 2022-07-13 DIAGNOSIS — C50.911 MALIGNANT NEOPLASM OF RIGHT BREAST IN FEMALE, ESTROGEN RECEPTOR POSITIVE, UNSPECIFIED SITE OF BREAST (H): Primary | ICD-10-CM

## 2022-07-13 DIAGNOSIS — Z17.0 MALIGNANT NEOPLASM OF RIGHT BREAST IN FEMALE, ESTROGEN RECEPTOR POSITIVE, UNSPECIFIED SITE OF BREAST (H): Primary | ICD-10-CM

## 2022-07-13 NOTE — TELEPHONE ENCOUNTER
[employer] Leave and Disability form completed, signed by Dr. South, and faxed to [phone].  I will also email a copy to Betty for her records.      [facility] into patient's epic chart.    Iraida Kingston RN BSN  Breast Nurse Care Coordinator  [facility]- [facility] [place]  [phone]

## 2022-07-13 NOTE — TELEPHONE ENCOUNTER
Rx: Post Mastectomy garment sent to [facility] in [place].  Patient aware she needs to call them to schedule a bra fitting the week prior to her surgery date on 7/25/22.      Informed patient to call me back with any questions or concerns.  Patient verbalized understanding and agrees with the plan of care.    Iraida Kingston RN BSN  Breast Nurse Care Coordinator  [facility] [place] [place]  [phone]

## 2022-07-13 NOTE — PROGRESS NOTES
[facility]:  Cancer Care Note    Received final Oncotype Dx results report via fax this afternoon, with a score of 28.  This was despite us receiving a prior authorization denial from patient's insurance company.  Writer placed telephone call to [employer]illing Department, who informed writer that they are aware of the insurance denial, and will work on patient's behalf to continue appealing with her insurance company.  Writer was informed that despite insurance denial, [employer] still releases the result report since it was already available and in the lab.  Per [employer] representative, if insurance still denies the appeal for coverage despite their efforts, their team would then work with the patient to get financial assistance to help with covering the cost of the testing.    These updates were verbally provided to Dr. Franco, who was also provided with a copy of patient's Oncotype results.  Dr. Franco states he will call patient to discuss the results and a plan.     Royce Tello, RN, BSN, OCN  RN Care Coordinator - Oncology  [facility]

## 2022-07-13 NOTE — TELEPHONE ENCOUNTER
Type of surgery: Bilateral mastectomy with RFID localized left axillary node excision, left sentinel lymph node biopsy, possible left axillary node dissection  Location of surgery: [facility]  Date and time of surgery: 7/25/22 at 7:30am  Surgeon: Dr. Janie South  Pre-Op Appt Date: patient to schedule  Post-Op Appt Date: patient to schedule   Packet sent out: Yes  Pre-cert/Authorization completed:  Not Applicable  Date: 7/13/22

## 2022-07-14 ENCOUNTER — MYC MEDICAL ADVICE (OUTPATIENT)
Dept: ONCOLOGY | Facility: CLINIC | Age: 47
End: 2022-07-14

## 2022-07-14 LAB — SPECIMEN STATUS: NORMAL

## 2022-07-15 ENCOUNTER — MYC MEDICAL ADVICE (OUTPATIENT)
Dept: ONCOLOGY | Facility: CLINIC | Age: 47
End: 2022-07-15

## 2022-07-15 ENCOUNTER — PATIENT OUTREACH (OUTPATIENT)
Dept: ONCOLOGY | Facility: CLINIC | Age: 47
End: 2022-07-15

## 2022-07-15 ENCOUNTER — TELEPHONE (OUTPATIENT)
Dept: MAMMOGRAPHY | Facility: CLINIC | Age: 47
End: 2022-07-15

## 2022-07-15 PROBLEM — C50.919 BREAST CANCER METASTASIZED TO AXILLARY LYMPH NODE (H): Status: ACTIVE | Noted: 2022-07-15

## 2022-07-15 PROBLEM — C77.3 BREAST CANCER METASTASIZED TO AXILLARY LYMPH NODE (H): Status: ACTIVE | Noted: 2022-07-15

## 2022-07-15 RX ORDER — ONDANSETRON 2 MG/ML
8 INJECTION INTRAMUSCULAR; INTRAVENOUS ONCE
Status: CANCELLED | OUTPATIENT
Start: 2022-08-02

## 2022-07-15 RX ORDER — HEPARIN SODIUM (PORCINE) LOCK FLUSH IV SOLN 100 UNIT/ML 100 UNIT/ML
5 SOLUTION INTRAVENOUS
Status: CANCELLED | OUTPATIENT
Start: 2022-10-03

## 2022-07-15 RX ORDER — HEPARIN SODIUM (PORCINE) LOCK FLUSH IV SOLN 100 UNIT/ML 100 UNIT/ML
5 SOLUTION INTRAVENOUS
Status: CANCELLED | OUTPATIENT
Start: 2022-09-26

## 2022-07-15 RX ORDER — ALBUTEROL SULFATE 0.83 MG/ML
2.5 SOLUTION RESPIRATORY (INHALATION)
Status: CANCELLED | OUTPATIENT
Start: 2022-09-12

## 2022-07-15 RX ORDER — DIPHENHYDRAMINE HYDROCHLORIDE 50 MG/ML
50 INJECTION INTRAMUSCULAR; INTRAVENOUS
Status: CANCELLED
Start: 2022-10-03

## 2022-07-15 RX ORDER — HEPARIN SODIUM,PORCINE 10 UNIT/ML
5 VIAL (ML) INTRAVENOUS
Status: CANCELLED | OUTPATIENT
Start: 2022-08-15

## 2022-07-15 RX ORDER — HEPARIN SODIUM (PORCINE) LOCK FLUSH IV SOLN 100 UNIT/ML 100 UNIT/ML
5 SOLUTION INTRAVENOUS
Status: CANCELLED | OUTPATIENT
Start: 2022-09-19

## 2022-07-15 RX ORDER — HEPARIN SODIUM,PORCINE 10 UNIT/ML
5 VIAL (ML) INTRAVENOUS
Status: CANCELLED | OUTPATIENT
Start: 2022-08-08

## 2022-07-15 RX ORDER — HEPARIN SODIUM,PORCINE 10 UNIT/ML
5 VIAL (ML) INTRAVENOUS
Status: CANCELLED | OUTPATIENT
Start: 2022-09-12

## 2022-07-15 RX ORDER — DIPHENHYDRAMINE HCL 25 MG
50 CAPSULE ORAL
Status: CANCELLED
Start: 2022-10-03

## 2022-07-15 RX ORDER — LORAZEPAM 2 MG/ML
0.5 INJECTION INTRAMUSCULAR EVERY 4 HOURS PRN
Status: CANCELLED | OUTPATIENT
Start: 2022-09-26

## 2022-07-15 RX ORDER — DIPHENHYDRAMINE HCL 25 MG
50 CAPSULE ORAL
Status: CANCELLED
Start: 2022-08-30

## 2022-07-15 RX ORDER — DIPHENHYDRAMINE HCL 25 MG
50 CAPSULE ORAL
Status: CANCELLED
Start: 2022-09-26

## 2022-07-15 RX ORDER — HEPARIN SODIUM,PORCINE 10 UNIT/ML
5 VIAL (ML) INTRAVENOUS
Status: CANCELLED | OUTPATIENT
Start: 2022-08-30

## 2022-07-15 RX ORDER — ALBUTEROL SULFATE 90 UG/1
1-2 AEROSOL, METERED RESPIRATORY (INHALATION)
Status: CANCELLED
Start: 2022-09-26

## 2022-07-15 RX ORDER — EPINEPHRINE 1 MG/ML
0.3 INJECTION, SOLUTION, CONCENTRATE INTRAVENOUS EVERY 5 MIN PRN
Status: CANCELLED | OUTPATIENT
Start: 2022-10-03

## 2022-07-15 RX ORDER — HEPARIN SODIUM (PORCINE) LOCK FLUSH IV SOLN 100 UNIT/ML 100 UNIT/ML
5 SOLUTION INTRAVENOUS
Status: CANCELLED | OUTPATIENT
Start: 2022-07-27

## 2022-07-15 RX ORDER — MEPERIDINE HYDROCHLORIDE 25 MG/ML
25 INJECTION INTRAMUSCULAR; INTRAVENOUS; SUBCUTANEOUS EVERY 30 MIN PRN
Status: CANCELLED | OUTPATIENT
Start: 2022-07-27

## 2022-07-15 RX ORDER — ALBUTEROL SULFATE 90 UG/1
1-2 AEROSOL, METERED RESPIRATORY (INHALATION)
Status: CANCELLED
Start: 2022-08-30

## 2022-07-15 RX ORDER — ALBUTEROL SULFATE 0.83 MG/ML
2.5 SOLUTION RESPIRATORY (INHALATION)
Status: CANCELLED | OUTPATIENT
Start: 2022-09-06

## 2022-07-15 RX ORDER — ALBUTEROL SULFATE 90 UG/1
1-2 AEROSOL, METERED RESPIRATORY (INHALATION)
Status: CANCELLED
Start: 2022-09-12

## 2022-07-15 RX ORDER — DIPHENHYDRAMINE HYDROCHLORIDE 50 MG/ML
50 INJECTION INTRAMUSCULAR; INTRAVENOUS
Status: CANCELLED
Start: 2022-09-19

## 2022-07-15 RX ORDER — ALBUTEROL SULFATE 90 UG/1
1-2 AEROSOL, METERED RESPIRATORY (INHALATION)
Status: CANCELLED
Start: 2022-09-06

## 2022-07-15 RX ORDER — ALBUTEROL SULFATE 90 UG/1
1-2 AEROSOL, METERED RESPIRATORY (INHALATION)
Status: CANCELLED
Start: 2022-08-08

## 2022-07-15 RX ORDER — ONDANSETRON 2 MG/ML
8 INJECTION INTRAMUSCULAR; INTRAVENOUS ONCE
Status: CANCELLED | OUTPATIENT
Start: 2022-10-03

## 2022-07-15 RX ORDER — HEPARIN SODIUM,PORCINE 10 UNIT/ML
5 VIAL (ML) INTRAVENOUS
Status: CANCELLED | OUTPATIENT
Start: 2022-09-19

## 2022-07-15 RX ORDER — ONDANSETRON 2 MG/ML
8 INJECTION INTRAMUSCULAR; INTRAVENOUS ONCE
Status: CANCELLED | OUTPATIENT
Start: 2022-08-30

## 2022-07-15 RX ORDER — ALBUTEROL SULFATE 0.83 MG/ML
2.5 SOLUTION RESPIRATORY (INHALATION)
Status: CANCELLED | OUTPATIENT
Start: 2022-08-15

## 2022-07-15 RX ORDER — DIPHENHYDRAMINE HCL 25 MG
50 CAPSULE ORAL
Status: CANCELLED
Start: 2022-09-06

## 2022-07-15 RX ORDER — MEPERIDINE HYDROCHLORIDE 25 MG/ML
25 INJECTION INTRAMUSCULAR; INTRAVENOUS; SUBCUTANEOUS EVERY 30 MIN PRN
Status: CANCELLED | OUTPATIENT
Start: 2022-08-30

## 2022-07-15 RX ORDER — ALBUTEROL SULFATE 90 UG/1
1-2 AEROSOL, METERED RESPIRATORY (INHALATION)
Status: CANCELLED
Start: 2022-07-20

## 2022-07-15 RX ORDER — ALBUTEROL SULFATE 90 UG/1
1-2 AEROSOL, METERED RESPIRATORY (INHALATION)
Status: CANCELLED
Start: 2022-10-03

## 2022-07-15 RX ORDER — HEPARIN SODIUM,PORCINE 10 UNIT/ML
5 VIAL (ML) INTRAVENOUS
Status: CANCELLED | OUTPATIENT
Start: 2022-07-27

## 2022-07-15 RX ORDER — EPINEPHRINE 1 MG/ML
0.3 INJECTION, SOLUTION, CONCENTRATE INTRAVENOUS EVERY 5 MIN PRN
Status: CANCELLED | OUTPATIENT
Start: 2022-09-06

## 2022-07-15 RX ORDER — EPINEPHRINE 1 MG/ML
0.3 INJECTION, SOLUTION, CONCENTRATE INTRAVENOUS EVERY 5 MIN PRN
Status: CANCELLED | OUTPATIENT
Start: 2022-08-30

## 2022-07-15 RX ORDER — ALBUTEROL SULFATE 0.83 MG/ML
2.5 SOLUTION RESPIRATORY (INHALATION)
Status: CANCELLED | OUTPATIENT
Start: 2022-09-19

## 2022-07-15 RX ORDER — MEPERIDINE HYDROCHLORIDE 25 MG/ML
25 INJECTION INTRAMUSCULAR; INTRAVENOUS; SUBCUTANEOUS EVERY 30 MIN PRN
Status: CANCELLED | OUTPATIENT
Start: 2022-10-03

## 2022-07-15 RX ORDER — HEPARIN SODIUM (PORCINE) LOCK FLUSH IV SOLN 100 UNIT/ML 100 UNIT/ML
5 SOLUTION INTRAVENOUS
Status: CANCELLED | OUTPATIENT
Start: 2022-09-12

## 2022-07-15 RX ORDER — DIPHENHYDRAMINE HCL 50 MG
50 CAPSULE ORAL ONCE
Status: CANCELLED
Start: 2022-07-27

## 2022-07-15 RX ORDER — DIPHENHYDRAMINE HYDROCHLORIDE 50 MG/ML
50 INJECTION INTRAMUSCULAR; INTRAVENOUS
Status: CANCELLED
Start: 2022-09-06

## 2022-07-15 RX ORDER — LORAZEPAM 2 MG/ML
0.5 INJECTION INTRAMUSCULAR EVERY 4 HOURS PRN
Status: CANCELLED | OUTPATIENT
Start: 2022-07-27

## 2022-07-15 RX ORDER — MEPERIDINE HYDROCHLORIDE 25 MG/ML
25 INJECTION INTRAMUSCULAR; INTRAVENOUS; SUBCUTANEOUS EVERY 30 MIN PRN
Status: CANCELLED | OUTPATIENT
Start: 2022-08-23

## 2022-07-15 RX ORDER — EPINEPHRINE 1 MG/ML
0.3 INJECTION, SOLUTION, CONCENTRATE INTRAVENOUS EVERY 5 MIN PRN
Status: CANCELLED | OUTPATIENT
Start: 2022-09-19

## 2022-07-15 RX ORDER — ALBUTEROL SULFATE 90 UG/1
1-2 AEROSOL, METERED RESPIRATORY (INHALATION)
Status: CANCELLED
Start: 2022-08-15

## 2022-07-15 RX ORDER — DIPHENHYDRAMINE HCL 25 MG
50 CAPSULE ORAL
Status: CANCELLED
Start: 2022-08-08

## 2022-07-15 RX ORDER — ALBUTEROL SULFATE 90 UG/1
1-2 AEROSOL, METERED RESPIRATORY (INHALATION)
Status: CANCELLED
Start: 2022-08-23

## 2022-07-15 RX ORDER — ONDANSETRON 2 MG/ML
8 INJECTION INTRAMUSCULAR; INTRAVENOUS ONCE
Status: CANCELLED | OUTPATIENT
Start: 2022-09-26

## 2022-07-15 RX ORDER — DIPHENHYDRAMINE HYDROCHLORIDE 50 MG/ML
50 INJECTION INTRAMUSCULAR; INTRAVENOUS
Status: CANCELLED
Start: 2022-09-26

## 2022-07-15 RX ORDER — ONDANSETRON 2 MG/ML
8 INJECTION INTRAMUSCULAR; INTRAVENOUS ONCE
Status: CANCELLED | OUTPATIENT
Start: 2022-08-08

## 2022-07-15 RX ORDER — EPINEPHRINE 1 MG/ML
0.3 INJECTION, SOLUTION, CONCENTRATE INTRAVENOUS EVERY 5 MIN PRN
Status: CANCELLED | OUTPATIENT
Start: 2022-08-08

## 2022-07-15 RX ORDER — ALBUTEROL SULFATE 0.83 MG/ML
2.5 SOLUTION RESPIRATORY (INHALATION)
Status: CANCELLED | OUTPATIENT
Start: 2022-10-03

## 2022-07-15 RX ORDER — ONDANSETRON 8 MG/1
8 TABLET, FILM COATED ORAL EVERY 8 HOURS PRN
Qty: 30 TABLET | Refills: 3 | Status: SHIPPED | OUTPATIENT
Start: 2022-07-15 | End: 2022-12-06

## 2022-07-15 RX ORDER — LORAZEPAM 2 MG/ML
0.5 INJECTION INTRAMUSCULAR EVERY 4 HOURS PRN
Status: CANCELLED | OUTPATIENT
Start: 2022-08-02

## 2022-07-15 RX ORDER — LORAZEPAM 2 MG/ML
0.5 INJECTION INTRAMUSCULAR EVERY 4 HOURS PRN
Status: CANCELLED | OUTPATIENT
Start: 2022-07-20

## 2022-07-15 RX ORDER — MEPERIDINE HYDROCHLORIDE 25 MG/ML
25 INJECTION INTRAMUSCULAR; INTRAVENOUS; SUBCUTANEOUS EVERY 30 MIN PRN
Status: CANCELLED | OUTPATIENT
Start: 2022-08-15

## 2022-07-15 RX ORDER — DIPHENHYDRAMINE HYDROCHLORIDE 50 MG/ML
50 INJECTION INTRAMUSCULAR; INTRAVENOUS
Status: CANCELLED
Start: 2022-08-02

## 2022-07-15 RX ORDER — ALBUTEROL SULFATE 0.83 MG/ML
2.5 SOLUTION RESPIRATORY (INHALATION)
Status: CANCELLED | OUTPATIENT
Start: 2022-07-20

## 2022-07-15 RX ORDER — ONDANSETRON 2 MG/ML
8 INJECTION INTRAMUSCULAR; INTRAVENOUS ONCE
Status: CANCELLED | OUTPATIENT
Start: 2022-08-15

## 2022-07-15 RX ORDER — ALBUTEROL SULFATE 0.83 MG/ML
2.5 SOLUTION RESPIRATORY (INHALATION)
Status: CANCELLED | OUTPATIENT
Start: 2022-08-02

## 2022-07-15 RX ORDER — ONDANSETRON 2 MG/ML
8 INJECTION INTRAMUSCULAR; INTRAVENOUS ONCE
Status: CANCELLED | OUTPATIENT
Start: 2022-08-23

## 2022-07-15 RX ORDER — LORAZEPAM 2 MG/ML
0.5 INJECTION INTRAMUSCULAR EVERY 4 HOURS PRN
Status: CANCELLED | OUTPATIENT
Start: 2022-08-15

## 2022-07-15 RX ORDER — DIPHENHYDRAMINE HCL 25 MG
50 CAPSULE ORAL
Status: CANCELLED
Start: 2022-08-02

## 2022-07-15 RX ORDER — MEPERIDINE HYDROCHLORIDE 25 MG/ML
25 INJECTION INTRAMUSCULAR; INTRAVENOUS; SUBCUTANEOUS EVERY 30 MIN PRN
Status: CANCELLED | OUTPATIENT
Start: 2022-07-20

## 2022-07-15 RX ORDER — ONDANSETRON 2 MG/ML
8 INJECTION INTRAMUSCULAR; INTRAVENOUS ONCE
Status: CANCELLED | OUTPATIENT
Start: 2022-09-19

## 2022-07-15 RX ORDER — MEPERIDINE HYDROCHLORIDE 25 MG/ML
25 INJECTION INTRAMUSCULAR; INTRAVENOUS; SUBCUTANEOUS EVERY 30 MIN PRN
Status: CANCELLED | OUTPATIENT
Start: 2022-09-19

## 2022-07-15 RX ORDER — LORAZEPAM 2 MG/ML
0.5 INJECTION INTRAMUSCULAR EVERY 4 HOURS PRN
Status: CANCELLED | OUTPATIENT
Start: 2022-08-08

## 2022-07-15 RX ORDER — HEPARIN SODIUM (PORCINE) LOCK FLUSH IV SOLN 100 UNIT/ML 100 UNIT/ML
5 SOLUTION INTRAVENOUS
Status: CANCELLED | OUTPATIENT
Start: 2022-08-02

## 2022-07-15 RX ORDER — HEPARIN SODIUM (PORCINE) LOCK FLUSH IV SOLN 100 UNIT/ML 100 UNIT/ML
5 SOLUTION INTRAVENOUS
Status: CANCELLED | OUTPATIENT
Start: 2022-08-23

## 2022-07-15 RX ORDER — EPINEPHRINE 1 MG/ML
0.3 INJECTION, SOLUTION, CONCENTRATE INTRAVENOUS EVERY 5 MIN PRN
Status: CANCELLED | OUTPATIENT
Start: 2022-08-02

## 2022-07-15 RX ORDER — LORAZEPAM 2 MG/ML
0.5 INJECTION INTRAMUSCULAR EVERY 4 HOURS PRN
Status: CANCELLED | OUTPATIENT
Start: 2022-08-30

## 2022-07-15 RX ORDER — DIPHENHYDRAMINE HCL 25 MG
50 CAPSULE ORAL
Status: CANCELLED
Start: 2022-09-19

## 2022-07-15 RX ORDER — ALBUTEROL SULFATE 0.83 MG/ML
2.5 SOLUTION RESPIRATORY (INHALATION)
Status: CANCELLED | OUTPATIENT
Start: 2022-08-08

## 2022-07-15 RX ORDER — DIPHENHYDRAMINE HCL 25 MG
50 CAPSULE ORAL
Status: CANCELLED
Start: 2022-08-23

## 2022-07-15 RX ORDER — HEPARIN SODIUM (PORCINE) LOCK FLUSH IV SOLN 100 UNIT/ML 100 UNIT/ML
5 SOLUTION INTRAVENOUS
Status: CANCELLED | OUTPATIENT
Start: 2022-09-06

## 2022-07-15 RX ORDER — LORAZEPAM 2 MG/ML
0.5 INJECTION INTRAMUSCULAR EVERY 4 HOURS PRN
Status: CANCELLED | OUTPATIENT
Start: 2022-08-23

## 2022-07-15 RX ORDER — DIPHENHYDRAMINE HYDROCHLORIDE 50 MG/ML
50 INJECTION INTRAMUSCULAR; INTRAVENOUS
Status: CANCELLED
Start: 2022-08-15

## 2022-07-15 RX ORDER — EPINEPHRINE 1 MG/ML
0.3 INJECTION, SOLUTION, CONCENTRATE INTRAVENOUS EVERY 5 MIN PRN
Status: CANCELLED | OUTPATIENT
Start: 2022-08-23

## 2022-07-15 RX ORDER — HEPARIN SODIUM,PORCINE 10 UNIT/ML
5 VIAL (ML) INTRAVENOUS
Status: CANCELLED | OUTPATIENT
Start: 2022-07-20

## 2022-07-15 RX ORDER — MEPERIDINE HYDROCHLORIDE 25 MG/ML
25 INJECTION INTRAMUSCULAR; INTRAVENOUS; SUBCUTANEOUS EVERY 30 MIN PRN
Status: CANCELLED | OUTPATIENT
Start: 2022-08-02

## 2022-07-15 RX ORDER — EPINEPHRINE 1 MG/ML
0.3 INJECTION, SOLUTION, CONCENTRATE INTRAVENOUS EVERY 5 MIN PRN
Status: CANCELLED | OUTPATIENT
Start: 2022-09-26

## 2022-07-15 RX ORDER — HEPARIN SODIUM (PORCINE) LOCK FLUSH IV SOLN 100 UNIT/ML 100 UNIT/ML
5 SOLUTION INTRAVENOUS
Status: CANCELLED | OUTPATIENT
Start: 2022-08-08

## 2022-07-15 RX ORDER — ALBUTEROL SULFATE 0.83 MG/ML
2.5 SOLUTION RESPIRATORY (INHALATION)
Status: CANCELLED | OUTPATIENT
Start: 2022-07-27

## 2022-07-15 RX ORDER — ONDANSETRON 2 MG/ML
8 INJECTION INTRAMUSCULAR; INTRAVENOUS ONCE
Status: CANCELLED | OUTPATIENT
Start: 2022-09-12

## 2022-07-15 RX ORDER — MEPERIDINE HYDROCHLORIDE 25 MG/ML
25 INJECTION INTRAMUSCULAR; INTRAVENOUS; SUBCUTANEOUS EVERY 30 MIN PRN
Status: CANCELLED | OUTPATIENT
Start: 2022-09-26

## 2022-07-15 RX ORDER — MEPERIDINE HYDROCHLORIDE 25 MG/ML
25 INJECTION INTRAMUSCULAR; INTRAVENOUS; SUBCUTANEOUS EVERY 30 MIN PRN
Status: CANCELLED | OUTPATIENT
Start: 2022-09-06

## 2022-07-15 RX ORDER — HEPARIN SODIUM (PORCINE) LOCK FLUSH IV SOLN 100 UNIT/ML 100 UNIT/ML
5 SOLUTION INTRAVENOUS
Status: CANCELLED | OUTPATIENT
Start: 2022-07-20

## 2022-07-15 RX ORDER — DIPHENHYDRAMINE HYDROCHLORIDE 50 MG/ML
50 INJECTION INTRAMUSCULAR; INTRAVENOUS
Status: CANCELLED
Start: 2022-08-30

## 2022-07-15 RX ORDER — HEPARIN SODIUM (PORCINE) LOCK FLUSH IV SOLN 100 UNIT/ML 100 UNIT/ML
5 SOLUTION INTRAVENOUS
Status: CANCELLED | OUTPATIENT
Start: 2022-08-30

## 2022-07-15 RX ORDER — ALBUTEROL SULFATE 0.83 MG/ML
2.5 SOLUTION RESPIRATORY (INHALATION)
Status: CANCELLED | OUTPATIENT
Start: 2022-09-26

## 2022-07-15 RX ORDER — LORAZEPAM 2 MG/ML
0.5 INJECTION INTRAMUSCULAR EVERY 4 HOURS PRN
Status: CANCELLED | OUTPATIENT
Start: 2022-09-19

## 2022-07-15 RX ORDER — ALBUTEROL SULFATE 90 UG/1
1-2 AEROSOL, METERED RESPIRATORY (INHALATION)
Status: CANCELLED
Start: 2022-09-19

## 2022-07-15 RX ORDER — HEPARIN SODIUM,PORCINE 10 UNIT/ML
5 VIAL (ML) INTRAVENOUS
Status: CANCELLED | OUTPATIENT
Start: 2022-08-23

## 2022-07-15 RX ORDER — DIPHENHYDRAMINE HYDROCHLORIDE 50 MG/ML
50 INJECTION INTRAMUSCULAR; INTRAVENOUS
Status: CANCELLED
Start: 2022-08-08

## 2022-07-15 RX ORDER — ONDANSETRON 2 MG/ML
8 INJECTION INTRAMUSCULAR; INTRAVENOUS ONCE
Status: CANCELLED | OUTPATIENT
Start: 2022-09-06

## 2022-07-15 RX ORDER — MEPERIDINE HYDROCHLORIDE 25 MG/ML
25 INJECTION INTRAMUSCULAR; INTRAVENOUS; SUBCUTANEOUS EVERY 30 MIN PRN
Status: CANCELLED | OUTPATIENT
Start: 2022-08-08

## 2022-07-15 RX ORDER — DIPHENHYDRAMINE HYDROCHLORIDE 50 MG/ML
50 INJECTION INTRAMUSCULAR; INTRAVENOUS
Status: CANCELLED
Start: 2022-08-23

## 2022-07-15 RX ORDER — DIPHENHYDRAMINE HCL 25 MG
50 CAPSULE ORAL
Status: CANCELLED
Start: 2022-08-15

## 2022-07-15 RX ORDER — LORAZEPAM 2 MG/ML
0.5 INJECTION INTRAMUSCULAR EVERY 4 HOURS PRN
Status: CANCELLED | OUTPATIENT
Start: 2022-09-06

## 2022-07-15 RX ORDER — EPINEPHRINE 1 MG/ML
0.3 INJECTION, SOLUTION, CONCENTRATE INTRAVENOUS EVERY 5 MIN PRN
Status: CANCELLED | OUTPATIENT
Start: 2022-07-20

## 2022-07-15 RX ORDER — DIPHENHYDRAMINE HCL 50 MG
50 CAPSULE ORAL ONCE
Status: CANCELLED
Start: 2022-07-20

## 2022-07-15 RX ORDER — HEPARIN SODIUM (PORCINE) LOCK FLUSH IV SOLN 100 UNIT/ML 100 UNIT/ML
5 SOLUTION INTRAVENOUS
Status: CANCELLED | OUTPATIENT
Start: 2022-08-15

## 2022-07-15 RX ORDER — MEPERIDINE HYDROCHLORIDE 25 MG/ML
25 INJECTION INTRAMUSCULAR; INTRAVENOUS; SUBCUTANEOUS EVERY 30 MIN PRN
Status: CANCELLED | OUTPATIENT
Start: 2022-09-12

## 2022-07-15 RX ORDER — EPINEPHRINE 1 MG/ML
0.3 INJECTION, SOLUTION, CONCENTRATE INTRAVENOUS EVERY 5 MIN PRN
Status: CANCELLED | OUTPATIENT
Start: 2022-08-15

## 2022-07-15 RX ORDER — ALBUTEROL SULFATE 90 UG/1
1-2 AEROSOL, METERED RESPIRATORY (INHALATION)
Status: CANCELLED
Start: 2022-08-02

## 2022-07-15 RX ORDER — ALBUTEROL SULFATE 0.83 MG/ML
2.5 SOLUTION RESPIRATORY (INHALATION)
Status: CANCELLED | OUTPATIENT
Start: 2022-08-30

## 2022-07-15 RX ORDER — DIPHENHYDRAMINE HCL 25 MG
50 CAPSULE ORAL
Status: CANCELLED
Start: 2022-09-12

## 2022-07-15 RX ORDER — LORAZEPAM 2 MG/ML
0.5 INJECTION INTRAMUSCULAR EVERY 4 HOURS PRN
Status: CANCELLED | OUTPATIENT
Start: 2022-09-12

## 2022-07-15 RX ORDER — DIPHENHYDRAMINE HYDROCHLORIDE 50 MG/ML
50 INJECTION INTRAMUSCULAR; INTRAVENOUS
Status: CANCELLED
Start: 2022-09-12

## 2022-07-15 RX ORDER — DIPHENHYDRAMINE HYDROCHLORIDE 50 MG/ML
50 INJECTION INTRAMUSCULAR; INTRAVENOUS
Status: CANCELLED
Start: 2022-07-20

## 2022-07-15 RX ORDER — ALBUTEROL SULFATE 90 UG/1
1-2 AEROSOL, METERED RESPIRATORY (INHALATION)
Status: CANCELLED
Start: 2022-07-27

## 2022-07-15 RX ORDER — DIPHENHYDRAMINE HYDROCHLORIDE 50 MG/ML
50 INJECTION INTRAMUSCULAR; INTRAVENOUS
Status: CANCELLED
Start: 2022-07-27

## 2022-07-15 RX ORDER — EPINEPHRINE 1 MG/ML
0.3 INJECTION, SOLUTION, CONCENTRATE INTRAVENOUS EVERY 5 MIN PRN
Status: CANCELLED | OUTPATIENT
Start: 2022-07-27

## 2022-07-15 RX ORDER — ALBUTEROL SULFATE 0.83 MG/ML
2.5 SOLUTION RESPIRATORY (INHALATION)
Status: CANCELLED | OUTPATIENT
Start: 2022-08-23

## 2022-07-15 RX ORDER — HEPARIN SODIUM,PORCINE 10 UNIT/ML
5 VIAL (ML) INTRAVENOUS
Status: CANCELLED | OUTPATIENT
Start: 2022-08-02

## 2022-07-15 RX ORDER — EPINEPHRINE 1 MG/ML
0.3 INJECTION, SOLUTION, CONCENTRATE INTRAVENOUS EVERY 5 MIN PRN
Status: CANCELLED | OUTPATIENT
Start: 2022-09-12

## 2022-07-15 RX ORDER — HEPARIN SODIUM,PORCINE 10 UNIT/ML
5 VIAL (ML) INTRAVENOUS
Status: CANCELLED | OUTPATIENT
Start: 2022-09-06

## 2022-07-15 RX ORDER — LORAZEPAM 2 MG/ML
0.5 INJECTION INTRAMUSCULAR EVERY 4 HOURS PRN
Status: CANCELLED | OUTPATIENT
Start: 2022-10-03

## 2022-07-15 RX ORDER — HEPARIN SODIUM,PORCINE 10 UNIT/ML
5 VIAL (ML) INTRAVENOUS
Status: CANCELLED | OUTPATIENT
Start: 2022-09-26

## 2022-07-15 RX ORDER — HEPARIN SODIUM,PORCINE 10 UNIT/ML
5 VIAL (ML) INTRAVENOUS
Status: CANCELLED | OUTPATIENT
Start: 2022-10-03

## 2022-07-15 NOTE — PROGRESS NOTES
Called patient to do chemotherapy teaching.     Patient states she received the handouts sent via [employer]    Patient states she has two main questions:  1) Are visitors allowed in the infusion department?  2) When is chemo scheduled for?    Informed patient:  1) No visitors allowed at this time due to Covid precautions  2) Our schedulers and charge nurse are working on fitting patient into the infusion schedule and will call patient back early next week with chemo appt times.    Reviewed the following with the patient:      Treatment Goal/Regimen/Duration: rationale for strict adherence, specific medication names and medication delivery methods; possible chemotherapy side effects and management of side effects, including: anemia, neutropenia, thrombocytopenia, diarrhea/constipation, nausea/vomiting, hair loss, appetite changes, peripheral neuropathy, and fatigue.  Infection prevention, and monitoring of lab values,also discussed.     Also reviewed signs/symptoms that should be reported to care team or on-call provider immediately, including: temperature of 100.4 degrees or higher, shortness of breath, chest pain, unusual bruising, bleeding symptoms, extreme fatigue, >4-6 episodes of diarrhea in 24 hours, uncontrolled nausea/vomiting, symptoms of dehydration (severe dry mouth/severe thirst, rapid heart beat, dizziness, dark or decreased urination, and lethargy), signs of an allergic reaction, or symptoms of DVT (sudden onset redness, swelling, tenderness, and warmth of extremity).       General Chemotherapy Information, included when to call the provider.  Importance of Central line care (port-a-cath) or IV site care. Discussed port placement procedure and rationale for port placement.     Written Information: Provided patient with staff's contact information.     No barriers to learning identified. Patient verbalized understanding of all written and verbal information. All questions answered patient s satisfaction.   Learning barriers and method preference are documented in the patient education flowsheet.      Patient instructed to call, or send a [employer] message, with further questions or concerns.  Patient states understanding and is in agreement with this plan.      Patient states she review the MyChart handouts more this weekend and call back with questions that arise.    Marly Stoner RN on 7/15/2022 at 4:20 PM

## 2022-07-15 NOTE — TELEPHONE ENCOUNTER
Questions answered during today's telephone call.  See separate encounter.    Marly Stoner RN on 7/15/2022 at 4:16 PM

## 2022-07-15 NOTE — TELEPHONE ENCOUNTER
I spoke with patient this morning to confirm she is aware of the treatment plan to start with neoadjuvent chemotherapy.    Patient was recently diagnosed with Locally advanced Left Breast Cancer.    Patient verbalized understanding that we will proceed with cancelling her breast surgery at this time and Shai South's surgery scheduler will be reaching out to her to get her set up for port placement.      Patient verbalized understanding and agrees with the plan of care.  Advised patient to call us back with any questions or concerns.  Iraida Kingston RN BSN  Breast Nurse Care Coordinator  [facility] [place]- [facility] [place]  [phone]

## 2022-07-15 NOTE — TELEPHONE ENCOUNTER
Cancer center schedulin) Dr. South will place PORT.  2) Taxol w/ labs x 12 weeks.  3) Provider weeks /7/10 taxol.    Hoang Franco MD.

## 2022-07-15 NOTE — TELEPHONE ENCOUNTER
Routing to Dr Franco to address questions regarding prognosis.    Marly Stoner RN on 7/15/2022 at 8:22 AM

## 2022-07-18 ENCOUNTER — OFFICE VISIT (OUTPATIENT)
Dept: FAMILY MEDICINE | Facility: CLINIC | Age: 47
End: 2022-07-18
Payer: COMMERCIAL

## 2022-07-18 VITALS
HEIGHT: 63 IN | TEMPERATURE: 99 F | BODY MASS INDEX: 23.57 KG/M2 | WEIGHT: 133 LBS | DIASTOLIC BLOOD PRESSURE: 60 MMHG | HEART RATE: 82 BPM | OXYGEN SATURATION: 100 % | SYSTOLIC BLOOD PRESSURE: 124 MMHG

## 2022-07-18 DIAGNOSIS — Z01.818 PREOP GENERAL PHYSICAL EXAM: Primary | ICD-10-CM

## 2022-07-18 DIAGNOSIS — C50.912 CARCINOMA OF LEFT BREAST METASTATIC TO AXILLARY LYMPH NODE (H): ICD-10-CM

## 2022-07-18 DIAGNOSIS — C77.3 CARCINOMA OF LEFT BREAST METASTATIC TO AXILLARY LYMPH NODE (H): ICD-10-CM

## 2022-07-18 PROCEDURE — 99214 OFFICE O/P EST MOD 30 MIN: CPT | Performed by: PHYSICIAN ASSISTANT

## 2022-07-18 RX ORDER — CEFAZOLIN SODIUM/WATER 2 G/20 ML
2 SYRINGE (ML) INTRAVENOUS SEE ADMIN INSTRUCTIONS
Status: CANCELLED | OUTPATIENT
Start: 2022-07-18

## 2022-07-18 RX ORDER — CEFAZOLIN SODIUM/WATER 2 G/20 ML
2 SYRINGE (ML) INTRAVENOUS
Status: CANCELLED | OUTPATIENT
Start: 2022-07-18

## 2022-07-18 NOTE — PROGRESS NOTES
[facility]  [address] [place] [address]  [place] [address]  Phone: [phone]  Primary Provider: Diana Luevano  Pre-op Performing Provider: RIK THAKUR      PREOPERATIVE EVALUATION:  Today's date: 7/18/2022    Betty Rogers is a 47 year old female who presents for a preoperative evaluation.    Surgical Information:  Surgery/Procedure: Port Placement  Surgery Location: [facility]  Surgeon: Dr. Janie South  Surgery Date: 07/25/2022  Time of Surgery: 1:20pm  Where patient plans to recover: At home with family  Fax number for surgical facility: Note does not need to be faxed, will be available electronically in Epic.    Type of Anesthesia Anticipated: General    Assessment & Plan     The proposed surgical procedure is considered INTERMEDIATE risk.    Preop general physical exam  Carcinoma of left breast metastatic to axillary lymph node (H)      Risks and Recommendations:  The patient has the following additional risks and recommendations for perioperative complications:   - No identified additional risk factors other than previously addressed    Medication Instructions:  Patient is on no chronic medications    RECOMMENDATION:  APPROVAL GIVEN to proceed with proposed procedure, without further diagnostic evaluation.                      Subjective     HPI related to upcoming procedure: Patient diagnosed with breast cancer June 2022. Will undergo port placement for chemo.     Preop Questions 7/11/2022   1. Have you ever had a heart attack or stroke? No   2. Have you ever had surgery on your heart or blood vessels, such as a stent placement, a coronary artery bypass, or surgery on an artery in your head, neck, heart, or legs? No   3. Do you have chest pain with activity? No   4. Do you have a history of  heart failure? No   5. Do you currently have a cold, bronchitis or symptoms of other infection? No   6. Do you have a cough, shortness of breath, or wheezing? No   7. Do you or anyone  in your family have previous history of blood clots? No   8. Do you or does anyone in your family have a serious bleeding problem such as prolonged bleeding following surgeries or cuts? No   9. Have you ever had problems with anemia or been told to take iron pills? No   10. Have you had any abnormal blood loss such as black, tarry or bloody stools, or abnormal vaginal bleeding? No   11. Have you ever had a blood transfusion? No   12. Are you willing to have a blood transfusion if it is medically needed before, during, or after your surgery? Yes   13. Have you or any of your relatives ever had problems with anesthesia? No   14. Do you have sleep apnea, excessive snoring or daytime drowsiness? No   15. Do you have any artifical heart valves or other implanted medical devices like a pacemaker, defibrillator, or continuous glucose monitor? No   16. Do you have artificial joints? No   17. Are you allergic to latex? No   18. Is there any chance that you may be pregnant? No       Health Care Directive:  Patient does not have a Health Care Directive or Living Will: Discussed advance care planning with patient; however, patient declined at this time.    Preoperative Review of :   reviewed - controlled substances reflected in medication list.          Review of Systems  CONSTITUTIONAL: NEGATIVE for fever, chills, change in weight  INTEGUMENTARY/SKIN: NEGATIVE for worrisome rashes, moles or lesions  EYES: NEGATIVE for vision changes or irritation  ENT/MOUTH: NEGATIVE for ear, mouth and throat problems  RESP: NEGATIVE for significant cough or SOB  CV: NEGATIVE for chest pain, palpitations or peripheral edema  GI: NEGATIVE for nausea, abdominal pain, heartburn, or change in bowel habits  : NEGATIVE for frequency, dysuria, or hematuria  MUSCULOSKELETAL: NEGATIVE for significant arthralgias or myalgia  NEURO: NEGATIVE for weakness, dizziness or paresthesias  ENDOCRINE: NEGATIVE for temperature intolerance, skin/hair  "changes  HEME: NEGATIVE for bleeding problems  PSYCHIATRIC: NEGATIVE for changes in mood or affect    Patient Active Problem List    Diagnosis Date Noted     Breast cancer metastasized to axillary lymph node (H) 07/15/2022     Priority: Medium     CARDIOVASCULAR SCREENING; LDL GOAL LESS THAN 160 01/03/2012     Priority: Medium     Family history of breast cancer- mother 01/03/2012     Priority: Medium      Past Medical History:   Diagnosis Date     Breast cancer metastasized to axillary lymph node (H) 7/15/2022     No active medical problems      Past Surgical History:   Procedure Laterality Date     C/SECTION, LOW TRANSVERSE      x2     Current Outpatient Medications   Medication Sig Dispense Refill     calcium carbonate-vitamin D (OS-ABEBA) 600-400 MG-UNIT chewable tablet Take 1 chew tab by mouth 2 times daily       ondansetron (ZOFRAN) 8 MG tablet Take 1 tablet (8 mg) by mouth every 8 hours as needed for nausea 30 tablet 3       Allergies   Allergen Reactions     Nuts      Other [Seasonal Allergies]      Tree Nuts-Lips swell and breathing problems        Social History     Tobacco Use     Smoking status: Never Smoker     Smokeless tobacco: Never Used   Substance Use Topics     Alcohol use: Yes       History   Drug Use No         Objective     /60 (BP Location: Right arm, Patient Position: Chair, Cuff Size: Adult Regular)   Pulse 82   Temp 99  F (37.2  C) (Oral)   Ht 1.6 m (5' 3\")   Wt 60.3 kg (133 lb)   SpO2 100%   BMI 23.56 kg/m      Physical Exam    GENERAL APPEARANCE: healthy, alert and no distress     EYES: EOMI, PERRL     HENT: ear canals and TM's normal and nose and mouth without ulcers or lesions     NECK: no adenopathy, no asymmetry, masses, or scars and thyroid normal to palpation     RESP: lungs clear to auscultation - no rales, rhonchi or wheezes     CV: regular rates and rhythm, normal S1 S2, no S3 or S4 and no murmur, click or rub     ABDOMEN:  soft, nontender, no HSM or masses and bowel " sounds normal     MS: extremities normal- no gross deformities noted, no evidence of inflammation in joints, FROM in all extremities.     SKIN: no suspicious lesions or rashes     NEURO: Normal strength and tone, sensory exam grossly normal, mentation intact and speech normal     PSYCH: mentation appears normal. and affect normal/bright     LYMPHATICS: No cervical adenopathy    No results for input(s): HGB, PLT, INR, NA, POTASSIUM, CR, A1C in the last 25168 hours.     Diagnostics:  No labs were ordered during this visit.   No EKG required, no history of coronary heart disease, significant arrhythmia, peripheral arterial disease or other structural heart disease.    Revised Cardiac Risk Index (RCRI):  The patient has the following serious cardiovascular risks for perioperative complications:   - No serious cardiac risks = 0 points     RCRI Interpretation: 0 points: Class I (very low risk - 0.4% complication rate)           Signed Electronically by: Lea Lund PA-C  Copy of this evaluation report is provided to requesting physician.

## 2022-07-18 NOTE — PATIENT INSTRUCTIONS
Preparing for Your Surgery  Getting started  A nurse will call you to review your health history and instructions. They will give you an arrival time based on your scheduled surgery time. Please be ready to share:    Your doctor's clinic name and phone number    Your medical, surgical and anesthesia history    A list of allergies and sensitivities    A list of medicines, including herbal treatments and over-the-counter drugs    Whether the patient has a legal guardian (ask how to send us the papers in advance)  Please tell us if you're pregnant--or if there's any chance you might be pregnant. Some surgeries may injure a fetus (unborn baby), so they require a pregnancy test. Surgeries that are safe for a fetus don't always need a test, and you can choose whether to have one.   If you have a child who's having surgery, please ask for a copy of Preparing for Your Child's Surgery.    Preparing for surgery    Within 30 days of surgery: Have a pre-op exam (sometimes called an H&P, or History and Physical). This can be done at a clinic or pre-operative center.  ? If you're having a , you may not need this exam. Talk to your care team.    At your pre-op exam, talk to your care team about all medicines you take. If you need to stop any medicines before surgery, ask when to start taking them again.  ? We do this for your safety. Many medicines can make you bleed too much during surgery. Some change how well surgery (anesthesia) drugs work.    Call your insurance company to let them know you're having surgery. (If you don't have insurance, call [phone].)    Call your clinic if there's any change in your health. This includes signs of a cold or flu (sore throat, runny nose, cough, rash, fever). It also includes a scrape or scratch near the surgery site.    If you have questions on the day of surgery, call your hospital or surgery center.  COVID testing  You may need to be tested for COVID-19 before having  surgery. If so, we will give you instructions.  Eating and drinking guidelines  For your safety: Unless your surgeon tells you otherwise, follow the guidelines below.    Eat and drink as usual until 8 hours before surgery. After that, no food or milk.    Drink clear liquids until 2 hours before surgery. These are liquids you can see through, like water, Gatorade and Propel Water. You may also have black coffee and tea (no cream or milk).    Nothing by mouth within 2 hours of surgery. This includes gum, candy and breath mints.    If you drink alcohol: Stop drinking it the night before surgery.    If your care team tells you to take medicine on the morning of surgery, it's okay to take it with a sip of water.  Preventing infection    Shower or bathe the night before and morning of your surgery. Follow the instructions your clinic gave you. (If no instructions, use regular soap.)    Don't shave or clip hair near your surgery site. We'll remove the hair if needed.    Don't smoke or vape the morning of surgery. You may chew nicotine gum up to 2 hours before surgery. A nicotine patch is okay.  ? Note: Some surgeries require you to completely quit smoking and nicotine. Check with your surgeon.    Your care team will make every effort to keep you safe from infection. We will:  ? Clean our hands often with soap and water (or an alcohol-based hand rub).  ? Clean the skin at your surgery site with a special soap that kills germs.  ? Give you a special gown to keep you warm. (Cold raises the risk of infection.)  ? Wear special hair covers, masks, gowns and gloves during surgery.  ? Give antibiotic medicine, if prescribed. Not all surgeries need antibiotics.  What to bring on the day of surgery    Photo ID and insurance card    Copy of your health care directive, if you have one    Glasses and hearing aides (bring cases)  ? You can't wear contacts during surgery    Inhaler and eye drops, if you use them (tell us about these when  you arrive)    CPAP machine or breathing device, if you use them    A few personal items, if spending the night    If you have . . .  ? A pacemaker, ICD (cardiac defibrillator) or other implant: Bring the ID card.  ? An implanted stimulator: Bring the remote control.  ? A legal guardian: Bring a copy of the certified (court-stamped) guardianship papers.  Please remove any jewelry, including body piercings. Leave jewelry and other valuables at home.  If you're going home the day of surgery    You must have a responsible adult drive you home. They should stay with you overnight as well.    If you don't have someone to stay with you, and you aren't safe to go home alone, we may keep you overnight. Insurance often won't pay for this.  After surgery  If it's hard to control your pain or you need more pain medicine, please call your surgeon's office.  Questions?   If you have any questions for your care team, list them here: _________________________________________________________________________________________________________________________________________________________________________ ____________________________________ ____________________________________ ____________________________________  For informational purposes only. Not to replace the advice of your health care provider. Copyright   2003, 2019 [facility]. All rights reserved. Clinically reviewed by Gloria Hammond MD. [employer] [id number] - REV 07/21.

## 2022-07-18 NOTE — H&P (VIEW-ONLY)
[facility]  [address] [place] [address]  [place] [address]  Phone: [phone]  Primary Provider: Diana Luevano  Pre-op Performing Provider: RIK THAKUR      PREOPERATIVE EVALUATION:  Today's date: 7/18/2022    Betty Rogers is a 47 year old female who presents for a preoperative evaluation.    Surgical Information:  Surgery/Procedure: Port Placement  Surgery Location: [facility]  Surgeon: Dr. Janie South  Surgery Date: 07/25/2022  Time of Surgery: 1:20pm  Where patient plans to recover: At home with family  Fax number for surgical facility: Note does not need to be faxed, will be available electronically in Epic.    Type of Anesthesia Anticipated: General    Assessment & Plan     The proposed surgical procedure is considered INTERMEDIATE risk.    Preop general physical exam  Carcinoma of left breast metastatic to axillary lymph node (H)      Risks and Recommendations:  The patient has the following additional risks and recommendations for perioperative complications:   - No identified additional risk factors other than previously addressed    Medication Instructions:  Patient is on no chronic medications    RECOMMENDATION:  APPROVAL GIVEN to proceed with proposed procedure, without further diagnostic evaluation.                      Subjective     HPI related to upcoming procedure: Patient diagnosed with breast cancer June 2022. Will undergo port placement for chemo.     Preop Questions 7/11/2022   1. Have you ever had a heart attack or stroke? No   2. Have you ever had surgery on your heart or blood vessels, such as a stent placement, a coronary artery bypass, or surgery on an artery in your head, neck, heart, or legs? No   3. Do you have chest pain with activity? No   4. Do you have a history of  heart failure? No   5. Do you currently have a cold, bronchitis or symptoms of other infection? No   6. Do you have a cough, shortness of breath, or wheezing? No   7. Do you or anyone  in your family have previous history of blood clots? No   8. Do you or does anyone in your family have a serious bleeding problem such as prolonged bleeding following surgeries or cuts? No   9. Have you ever had problems with anemia or been told to take iron pills? No   10. Have you had any abnormal blood loss such as black, tarry or bloody stools, or abnormal vaginal bleeding? No   11. Have you ever had a blood transfusion? No   12. Are you willing to have a blood transfusion if it is medically needed before, during, or after your surgery? Yes   13. Have you or any of your relatives ever had problems with anesthesia? No   14. Do you have sleep apnea, excessive snoring or daytime drowsiness? No   15. Do you have any artifical heart valves or other implanted medical devices like a pacemaker, defibrillator, or continuous glucose monitor? No   16. Do you have artificial joints? No   17. Are you allergic to latex? No   18. Is there any chance that you may be pregnant? No       Health Care Directive:  Patient does not have a Health Care Directive or Living Will: Discussed advance care planning with patient; however, patient declined at this time.    Preoperative Review of :   reviewed - controlled substances reflected in medication list.          Review of Systems  CONSTITUTIONAL: NEGATIVE for fever, chills, change in weight  INTEGUMENTARY/SKIN: NEGATIVE for worrisome rashes, moles or lesions  EYES: NEGATIVE for vision changes or irritation  ENT/MOUTH: NEGATIVE for ear, mouth and throat problems  RESP: NEGATIVE for significant cough or SOB  CV: NEGATIVE for chest pain, palpitations or peripheral edema  GI: NEGATIVE for nausea, abdominal pain, heartburn, or change in bowel habits  : NEGATIVE for frequency, dysuria, or hematuria  MUSCULOSKELETAL: NEGATIVE for significant arthralgias or myalgia  NEURO: NEGATIVE for weakness, dizziness or paresthesias  ENDOCRINE: NEGATIVE for temperature intolerance, skin/hair  "changes  HEME: NEGATIVE for bleeding problems  PSYCHIATRIC: NEGATIVE for changes in mood or affect    Patient Active Problem List    Diagnosis Date Noted     Breast cancer metastasized to axillary lymph node (H) 07/15/2022     Priority: Medium     CARDIOVASCULAR SCREENING; LDL GOAL LESS THAN 160 01/03/2012     Priority: Medium     Family history of breast cancer- mother 01/03/2012     Priority: Medium      Past Medical History:   Diagnosis Date     Breast cancer metastasized to axillary lymph node (H) 7/15/2022     No active medical problems      Past Surgical History:   Procedure Laterality Date     C/SECTION, LOW TRANSVERSE      x2     Current Outpatient Medications   Medication Sig Dispense Refill     calcium carbonate-vitamin D (OS-ABEBA) 600-400 MG-UNIT chewable tablet Take 1 chew tab by mouth 2 times daily       ondansetron (ZOFRAN) 8 MG tablet Take 1 tablet (8 mg) by mouth every 8 hours as needed for nausea 30 tablet 3       Allergies   Allergen Reactions     Nuts      Other [Seasonal Allergies]      Tree Nuts-Lips swell and breathing problems        Social History     Tobacco Use     Smoking status: Never Smoker     Smokeless tobacco: Never Used   Substance Use Topics     Alcohol use: Yes       History   Drug Use No         Objective     /60 (BP Location: Right arm, Patient Position: Chair, Cuff Size: Adult Regular)   Pulse 82   Temp 99  F (37.2  C) (Oral)   Ht 1.6 m (5' 3\")   Wt 60.3 kg (133 lb)   SpO2 100%   BMI 23.56 kg/m      Physical Exam    GENERAL APPEARANCE: healthy, alert and no distress     EYES: EOMI, PERRL     HENT: ear canals and TM's normal and nose and mouth without ulcers or lesions     NECK: no adenopathy, no asymmetry, masses, or scars and thyroid normal to palpation     RESP: lungs clear to auscultation - no rales, rhonchi or wheezes     CV: regular rates and rhythm, normal S1 S2, no S3 or S4 and no murmur, click or rub     ABDOMEN:  soft, nontender, no HSM or masses and bowel " sounds normal     MS: extremities normal- no gross deformities noted, no evidence of inflammation in joints, FROM in all extremities.     SKIN: no suspicious lesions or rashes     NEURO: Normal strength and tone, sensory exam grossly normal, mentation intact and speech normal     PSYCH: mentation appears normal. and affect normal/bright     LYMPHATICS: No cervical adenopathy    No results for input(s): HGB, PLT, INR, NA, POTASSIUM, CR, A1C in the last 02170 hours.     Diagnostics:  No labs were ordered during this visit.   No EKG required, no history of coronary heart disease, significant arrhythmia, peripheral arterial disease or other structural heart disease.    Revised Cardiac Risk Index (RCRI):  The patient has the following serious cardiovascular risks for perioperative complications:   - No serious cardiac risks = 0 points     RCRI Interpretation: 0 points: Class I (very low risk - 0.4% complication rate)           Signed Electronically by: Lea Lund PA-C  Copy of this evaluation report is provided to requesting physician.

## 2022-07-19 NOTE — PROGRESS NOTES
VIDEO VISIT  Start Time: 8:02  End Time:8:37 AM  Patient location: Other  Cancer Center  Provider location:  [facility]   Platform used for Video Visit: Derrick    Hematology/Oncology Video Visit    Oncologist: Dr. Franco  Diagnosis: multifocal locally advanced left breast invasive ductal carcinoma, ER/HI+  Reason for visit: exam prior to cycle 1 weekly taxol    Cancer and Treatment Hx:  May- Jun 2022: screening mammogram with extremely dense breast tissue and possible left breast architectural distortion at 12 o'clock. Diagnostic mammo with US revealed an irregular hypoechoic mass measuring 3.7cm with surrounding ill defined shadowing/hypoechogenicity and lymph node with thickened cortex. Breast biopsy of 12 o'clock 6cm from nipple revealed invasive ductal carcinoma, grade 2, ER+/HI+ HER2-. Breast biopsy of 9 o'clock position revealed DCIS, grade 3. Breast MRI with extreme fibroglandular tissue in both breasts. Biopsy-proven malignancy left breast 12 o'clock position corresponds with a 4.6cm irregular shaped enhancing mass with washout kinetics. Non-mass-like enhancement associated extending from the mass with multiple small subcentimeter satellite masses.  In total of the index lesion plus surrounding enhancement measures approximately 5.1cm. Biopsy-proven DCIS at 9 o'clock position showed minimal non-mass enhancement measuring up to 2 cm. Central breast at the 6 o'clock position there is a suspicious 8 mm mass with indistinct margins and washout kinetics. Additional suspicious mass at the 10 o'clock position middle depth measuring 5 mm. Couple prominent level 1 axillary nodes, no internal mammary chain adenopathy. Lymph node biopsy confirmed metastasis.  Jul 2022: Oncotype score of 28, intermediate risk, chemo benefit cannot be excluded  Jul 2022: weekly neoadjuvant taxol    Interval History:  Betty presents before her first cycle of chemotherapy. We discussed oncologic course  thus far, port placement next week, cycle schedule of taxol, common side effects, and RN triage number. She has questions about Oncotype DX score. She is normally very active with gym time, running, and walking routine each week. No current pain or symptoms. She has a strong support system with friends, family, and children. She is feeling ready to proceed with cycle 1 today.    Medications, imaging, and labs:  - Reviewed pertinent medications, discussed take-home meds.  - Reviewed labs from today, CBC/hepatic panel all within normal limits.  - Reviewed read of PET from 6/25/22:  IMPRESSION: in this 47-year-old female with history of left breast  invasive ductal carcinoma:  1. Hypermetabolic mass in the midline posterior depth of the left  breast consistent with the primary malignancy.  2. Mild hypermetabolism of atypical-appearing left axillary lymph  nodes compatible with the known alberto metastases.  3. No distant metastatic disease demonstrated.    Physical Exam:  GEN: pleasantly conversant female no acute distress  SKIN: generally intact, no visible rash, bruising, or sores   ENT: eyes non-icteric, EOMI  RESP: breathing easily on room air, no cough  MSK: appears to have normal range of motion based on visualized movements  NEURO: facial movements symmetric, alert and oriented without obvious focal deficit  The rest of a comprehensive physical examination is deferred due to PHE (public health emergency) video restrictions    Assessment and Plan:  Multifocal locally advanced left breast invasive ductal carcinoma, ER/MA+  - Plan for neoadjuvant weekly taxol x12  - Discussed cycle schedule, common side effects, meds, and RN triage #  - Meeting goals and labs appropriate for cycle 1 later today  - Oncology provider every 3 weeks (cycle 4/7/10), sooner if necessary        The total time of this encounter amounted to 30 minutes today. This time includes video time spent with the patient, prep work, ordering tests, and  performing post-visit documentation.  - Mary Green, CNP

## 2022-07-20 ENCOUNTER — LAB (OUTPATIENT)
Dept: LAB | Facility: CLINIC | Age: 47
End: 2022-07-20
Payer: COMMERCIAL

## 2022-07-20 ENCOUNTER — VIRTUAL VISIT (OUTPATIENT)
Dept: ONCOLOGY | Facility: CLINIC | Age: 47
End: 2022-07-20
Payer: COMMERCIAL

## 2022-07-20 ENCOUNTER — INFUSION THERAPY VISIT (OUTPATIENT)
Dept: INFUSION THERAPY | Facility: CLINIC | Age: 47
End: 2022-07-20
Payer: COMMERCIAL

## 2022-07-20 ENCOUNTER — PATIENT OUTREACH (OUTPATIENT)
Dept: CARE COORDINATION | Facility: CLINIC | Age: 47
End: 2022-07-20

## 2022-07-20 ENCOUNTER — MYC MEDICAL ADVICE (OUTPATIENT)
Dept: SURGERY | Facility: PHYSICIAN GROUP | Age: 47
End: 2022-07-20

## 2022-07-20 VITALS — BODY MASS INDEX: 23.56 KG/M2 | WEIGHT: 133 LBS

## 2022-07-20 DIAGNOSIS — C50.912 CARCINOMA OF LEFT BREAST METASTATIC TO AXILLARY LYMPH NODE (H): ICD-10-CM

## 2022-07-20 DIAGNOSIS — C50.919 CARCINOMA OF BREAST METASTATIC TO AXILLARY LYMPH NODE, UNSPECIFIED LATERALITY (H): Primary | ICD-10-CM

## 2022-07-20 DIAGNOSIS — C77.3 CARCINOMA OF LEFT BREAST METASTATIC TO AXILLARY LYMPH NODE (H): ICD-10-CM

## 2022-07-20 DIAGNOSIS — C77.3 CARCINOMA OF BREAST METASTATIC TO AXILLARY LYMPH NODE, UNSPECIFIED LATERALITY (H): Primary | ICD-10-CM

## 2022-07-20 DIAGNOSIS — D05.12 DUCTAL CARCINOMA IN SITU (DCIS) OF LEFT BREAST: Primary | ICD-10-CM

## 2022-07-20 LAB
ALBUMIN SERPL-MCNC: 3.8 G/DL (ref 3.4–5)
ALP SERPL-CCNC: 43 U/L (ref 40–150)
ALT SERPL W P-5'-P-CCNC: 32 U/L (ref 0–50)
AST SERPL W P-5'-P-CCNC: 24 U/L (ref 0–45)
BASOPHILS # BLD AUTO: 0.1 10E3/UL (ref 0–0.2)
BASOPHILS NFR BLD AUTO: 2 %
BILIRUB DIRECT SERPL-MCNC: 0.2 MG/DL (ref 0–0.2)
BILIRUB SERPL-MCNC: 0.6 MG/DL (ref 0.2–1.3)
EOSINOPHIL # BLD AUTO: 0.2 10E3/UL (ref 0–0.7)
EOSINOPHIL NFR BLD AUTO: 6 %
ERYTHROCYTE [DISTWIDTH] IN BLOOD BY AUTOMATED COUNT: 15 % (ref 10–15)
HCT VFR BLD AUTO: 38.4 % (ref 35–47)
HGB BLD-MCNC: 12.3 G/DL (ref 11.7–15.7)
HOLD SPECIMEN: NORMAL
IMM GRANULOCYTES # BLD: 0 10E3/UL
IMM GRANULOCYTES NFR BLD: 0 %
LYMPHOCYTES # BLD AUTO: 1.3 10E3/UL (ref 0.8–5.3)
LYMPHOCYTES NFR BLD AUTO: 30 %
MCH RBC QN AUTO: 27.8 PG (ref 26.5–33)
MCHC RBC AUTO-ENTMCNC: 32 G/DL (ref 31.5–36.5)
MCV RBC AUTO: 87 FL (ref 78–100)
MONOCYTES # BLD AUTO: 0.4 10E3/UL (ref 0–1.3)
MONOCYTES NFR BLD AUTO: 9 %
NEUTROPHILS # BLD AUTO: 2.4 10E3/UL (ref 1.6–8.3)
NEUTROPHILS NFR BLD AUTO: 53 %
NRBC # BLD AUTO: 0 10E3/UL
NRBC BLD AUTO-RTO: 0 /100
PLATELET # BLD AUTO: 300 10E3/UL (ref 150–450)
PROT SERPL-MCNC: 7.1 G/DL (ref 6.8–8.8)
RBC # BLD AUTO: 4.43 10E6/UL (ref 3.8–5.2)
WBC # BLD AUTO: 4.4 10E3/UL (ref 4–11)

## 2022-07-20 PROCEDURE — 99214 OFFICE O/P EST MOD 30 MIN: CPT | Mod: 25 | Performed by: NURSE PRACTITIONER

## 2022-07-20 PROCEDURE — 96375 TX/PRO/DX INJ NEW DRUG ADDON: CPT | Performed by: NURSE PRACTITIONER

## 2022-07-20 PROCEDURE — S0028 INJECTION, FAMOTIDINE, 20 MG: HCPCS | Performed by: NURSE PRACTITIONER

## 2022-07-20 PROCEDURE — 36415 COLL VENOUS BLD VENIPUNCTURE: CPT | Performed by: INTERNAL MEDICINE

## 2022-07-20 PROCEDURE — 99207 PR NO CHARGE LOS: CPT

## 2022-07-20 PROCEDURE — 96413 CHEMO IV INFUSION 1 HR: CPT | Performed by: NURSE PRACTITIONER

## 2022-07-20 PROCEDURE — 80076 HEPATIC FUNCTION PANEL: CPT | Performed by: INTERNAL MEDICINE

## 2022-07-20 PROCEDURE — 85025 COMPLETE CBC W/AUTO DIFF WBC: CPT | Performed by: INTERNAL MEDICINE

## 2022-07-20 PROCEDURE — 96415 CHEMO IV INFUSION ADDL HR: CPT | Performed by: NURSE PRACTITIONER

## 2022-07-20 PROCEDURE — 96367 TX/PROPH/DG ADDL SEQ IV INF: CPT | Performed by: NURSE PRACTITIONER

## 2022-07-20 RX ADMIN — Medication 250 ML: at 10:03

## 2022-07-20 ASSESSMENT — PAIN SCALES - GENERAL: PAINLEVEL: NO PAIN (0)

## 2022-07-20 NOTE — PROGRESS NOTES
Infusion Nursing Note:  Betty Rogers presents today for C1 D1 Taxol.    Patient seen by provider today: Yes: Mary Green NP via video visit   present during visit today: Not Applicable.    Note: Patient new to infusion center today. Reviewed routines, chemotherapy and possible side effects.  Answered patient's question regarding alcohol consumption on while on chemotherapy. Patient verbalized understanding.    Patient verbalized IV site pain & burning approx 10 minutes post taxol starting. Blood return was noted, but upon flushing IV  It was painful. IV replaced and taxol re-started.       Intravenous Access:  Peripheral IV placed.    Treatment Conditions:  Lab Results   Component Value Date    HGB 12.3 07/20/2022    WBC 4.4 07/20/2022    ANEUTAUTO 2.4 07/20/2022     07/20/2022      Lab Results   Component Value Date    BILITOTAL 0.6 07/20/2022    ALBUMIN 3.8 07/20/2022    ALT 32 07/20/2022    AST 24 07/20/2022     Results reviewed, labs MET treatment parameters, ok to proceed with treatment.    Post Infusion Assessment:  Patient tolerated infusion without incident.  Blood return noted pre and post infusion.  Site patent and intact, free from redness, edema or discomfort.  No evidence of extravasations.  Access discontinued per protocol.     Discharge Plan:   Discharge instructions reviewed with: Patient.  Patient and/or family verbalized understanding of discharge instructions and all questions answered.  Patient discharged in stable condition accompanied by: self.  Departure Mode: Ambulatory.      Mary Samuels RN

## 2022-07-21 NOTE — RESULT ENCOUNTER NOTE
Consult Note  Physical Medicine and Rehabilitation    Patient: Shanon Conrad  [id number]  Date: 7/21/2022      Chief Complaint: Atherosclerosis of native artery of right lower extremity with ulceration, unspecified ulceration site ([facility] [I70.239]    History of Present Illness/Hospital Course: This is a 77y.o. year old male status post R femoral below knee popliteal bypass with in-situ saphenous vein secondary to atherosclerosis of native artery of RLE with ulceration 07/18 with Dr. Luis Ellison. He currently requires assistnece in therapy and is scoring 17/17 in PT/OT scores. Would require a score of >18 to be able to discharge home. He is agreeable to ARU admit to improve function before planned discharge home.      Prior Level of Function:  Independent for mobility, ADLs, and IADLs    Current Level of Function:  Min assist     Pertinent Social History:  Support: lives alone  Home set-up: 3 steps in [place]    Past Medical History:   Diagnosis Date    CAD (coronary artery disease)     CHF (congestive heart failure) ([facility])     diastolic    COPD (chronic obstructive pulmonary disease) ([facility]     Critical ischemia of lower extremity ([facility])     Depressive disorder, not elsewhere classified     GERD (gastroesophageal reflux disease)     History of colon polyps - 30 seen on colonoscopy 04/2021 04/24/2021    History of MI (myocardial infarction) 05/2013    History of skin ulcer of lower extremity     R anterior shin    History of transient ischemic attack (TIA)     Hx of blood clots     PE    Hyperlipidemia     Hypertension     Neuropathy     KAVITA (obstructive sleep apnea)     On CPAP    Personal history of DVT (deep vein thrombosis)     Prolonged emergence from general anesthesia     Pt reported being combative after surgery     PVD (peripheral vascular disease) ([facility]     TIA (transient ischemic attack) 2013    Vertebral fracture        Past Surgical History:   Procedure Laterality Date    APPENDECTOMY Please inform Betty to schedule for physical with Dr. Luevano.  Last visit- 2018, is due for physical CHOLECYSTECTOMY, LAPAROSCOPIC      COLONOSCOPY  4/23/2021    COLONOSCOPY POLYPECTOMY SNARE/COLD BIOPSY performed by Garrick Guy MD at [facility]  4/23/2021    COLONOSCOPY POLYPECTOMY ABLATION performed by Garrick Guy MD at [facility]  4/23/2021    COLONOSCOPY CONTROL HEMORRHAGE performed by Garrick Guy MD at [address]  6/2013    EYE SURGERY Left     FEMORAL BYPASS Right 7/18/2022    RIGHT FEMORAL BELOW KNEE POPLITEAL ARTERY BYPASS WITH IN SITU SAPHENOUS VEIN performed by Ksenia Ordoñez MD at [address] Right 11/17/2021    RIGHT FEMORAL ENDARTERECTOMY  RIGHT EXTERNAL ILIAC TO PROFUNDA FEMORAL BYPASS WITH 8MM PTFE GRAFT RIGHT LOWER EXTREMITY ARTERIOGRAM BALLOON ANGIOPLASTY RIGHT PROFUNDA BALLOON ANGIOPLASTY AND STENT OF RIGHT EXTERNAL ILIAC ARTERY performed by Ksenia Ordoñez MD at [address]  11/18/2015    Bilateral decompressive lumbar laminectomy L4-5   ; medial facetectomy L4-5 joints  bilaterally; foraminotomies l5   nerve roots bilaterally    LEG DEBRIDEMENT Right 7/23/2013    Dr. Edwar Nj - pretibial wound    OTHER SURGICAL HISTORY Right 6/25/2013    Dr. Edwar Nj - CFA Endarterectomy w/marsupialization; RLE wound excision & debridement     OTHER SURGICAL HISTORY Left 8/27/2013    Dr. Edwar Nj - femoral & profunda femoris endarterectomy w/marsupialization patch angioplasty using SFA    SKIN SPLIT GRAFT Right 7/25/2013    Dr. Edwar Nj - to non-healing R pretibial wound, 9 x 15 cm    TOTAL HIP ARTHROPLASTY Right 09/01/2016    Dr. Kale Betancourt Left 12/22/2015    Dr. Edwar Nj - CFA exploration, thrombectomy of ileofemoral system, stenting of RAFAL in-stent stenosis w/8 x 37 mm Palmaz        Family History   Problem Relation Age of Onset    Cancer Mother         Breast    Heart Disease Mother     Cancer Father         Bladder    Heart Disease Father     Cancer Paternal bleeding and lymphadenopathy  ALLERGIC/IMMUNOLOGIC: negative for recurrent infections, angioedema, anaphylaxis and drug reactions  ENDOCRINE: negative for weight changes and diabetic symptoms including polyuria, polydipsia and polyphagia  MUSCULOSKELETAL: negative for pain, joint swelling, decreased range of motion  NEUROLOGICAL: negative for headaches, slurred speech, unilateral weakness positive for bilateral LE weakness  PSYCHIATRIC/BEHAVIORAL: negative for hallucinations, behavioral problems, confusion and agitation. Physical Examination:  Vitals: Patient Vitals for the past 24 hrs:   BP Temp Temp src Pulse Resp SpO2 Weight   07/21/22 1126 (!) 113/52 97.9 °F (36.6 °C) Oral 58 14 92 % --   07/21/22 0853 -- -- -- 58 17 -- --   07/21/22 0851 -- -- -- -- -- 94 % --   07/21/22 0819 (!) 107/53 -- -- 58 -- -- --   07/21/22 0756 102/61 96.9 °F (36.1 °C) Temporal 57 13 97 % --   07/21/22 0600 126/69 -- -- 64 14 95 % 236 lb 3.2 oz (107.1 kg)   07/21/22 0500 121/60 -- -- 60 17 96 % --   07/21/22 0400 109/74 97.5 °F (36.4 °C) Oral 62 18 95 % --   07/21/22 0300 127/60 -- -- 67 17 96 % --   07/21/22 0200 (!) 143/60 -- -- 69 15 94 % --   07/21/22 0100 (!) 129/58 -- -- 63 24 95 % --   07/21/22 0000 126/66 97.5 °F (36.4 °C) Oral 62 12 95 % --   07/20/22 2300 135/60 -- -- 62 14 96 % --   07/20/22 2200 110/66 -- -- 70 13 94 % --   07/20/22 2054 -- -- -- 66 12 -- --   07/20/22 2022 -- -- -- -- 20 -- --   07/20/22 2000 (!) 123/91 97.5 °F (36.4 °C) Oral 72 27 95 % --   [birth year]/1952 128/68 -- -- 71 -- -- --   07/20/22 1600 -- 97.8 °F (36.6 °C) Oral -- -- -- --   07/20/22 1500 136/82 -- -- 63 18 97 % --   07/20/22 1400 (!) 107/45 -- -- 65 15 98 % --   07/20/22 1339 -- -- -- -- -- 96 % --     Const: Alert. WDWN. No distress  Eyes: Conjunctiva noninjected, no icterus noted; pupils equal, round, and reactive to light. HENT: Atraumatic, normocephalic; Oral mucosa moist  Neck: Trachea midline, neck supple. No thyromegaly noted.   CV: Regular rate and rhythm, no murmur rub or gallop noted  Resp: Lungs clear to auscultation bilaterally, no rales wheezes or ronchi, no retractions. Respirations unlabored. GI: Soft, nontender, nondistended. Normal bowel sounds. No palpable masses. Skin: Normal temperature and turgor. No rashes or breakdown noted. Ext: No significant edema appreciated. No varicosities. MSK: No joint tenderness, erythema, warmth noted. AROM intact. Neuro: Alert, oriented, appropriate. No cranial nerve deficits appreciated. Sensation intact to light touch. Motor examination reveals 4/5 strength in all four limbs diffusely. No abnormalities with finger/nose or heel/shin noted. Reflexes normal and symmetric. Psych: Stable mood, normal judgement, normal affect     Lab Results   Component Value Date    WBC 6.5 07/21/2022    HGB 9.4 (L) 07/21/2022    HCT 28.1 (L) 07/21/2022    MCV 94.8 07/21/2022     (L) 07/21/2022     Lab Results   Component Value Date    INR 1.17 (H) 07/18/2022    INR 1.07 11/18/2021    INR 1.08 11/17/2021    PROTIME 14.9 (H) 07/18/2022    PROTIME 12.1 11/18/2021    PROTIME 12.3 11/17/2021     Lab Results   Component Value Date    CREATININE 0.8 07/21/2022    BUN 13 07/21/2022     07/21/2022    K 4.2 07/21/2022     07/21/2022    CO2 26 07/21/2022     Lab Results   Component Value Date    ALT 14 11/30/2021    AST 19 11/30/2021    ALKPHOS 90 11/30/2021    BILITOT 0.4 11/30/2021         XR KNEE RIGHT (1-2 VIEWS)   Final Result      Intraoperative fluoroscopy provided as above. See operative report for details. FLUORO FOR SURGICAL PROCEDURES   Final Result      Intraoperative fluoroscopy provided as above. See operative report for details.              Assessment:    Pain control  Tylenol scheduled  Roxicodone pain panel       HTN  Hx of HTN, home meds Losartan and Metoprolol restarted  PAD status post R femoral below knee popliteal bypass  Eliquis and Plavix started 7/19        Urinary retention  - required schulte  - monitor  - schulte out       Decreased functional mobility  - PT/OT requirements - ARU    Impairments- Decreased functional mobility, Decreased ADLs    Recommendations:  Admit to ARU with precert      Patient with new functional deficits and ongoing medical complexity. Demonstrates ability to tolerate 3 hours therapy/day. He is a good candidate for acute inpatient rehab when medically appropriate. Thank you for this consult. Please contact me with any questions or concerns.      Seferino Bermudez D.O. M.P.H  PM&R  7/21/2022  1:25 PM

## 2022-07-25 ENCOUNTER — ANESTHESIA EVENT (OUTPATIENT)
Dept: SURGERY | Facility: CLINIC | Age: 47
End: 2022-07-25
Payer: COMMERCIAL

## 2022-07-25 ENCOUNTER — APPOINTMENT (OUTPATIENT)
Dept: GENERAL RADIOLOGY | Facility: CLINIC | Age: 47
End: 2022-07-25
Attending: SURGERY
Payer: COMMERCIAL

## 2022-07-25 ENCOUNTER — HOSPITAL ENCOUNTER (OUTPATIENT)
Facility: CLINIC | Age: 47
Discharge: HOME | End: 2022-07-25
Attending: SURGERY | Admitting: SURGERY
Payer: COMMERCIAL

## 2022-07-25 ENCOUNTER — ANESTHESIA (OUTPATIENT)
Dept: SURGERY | Facility: CLINIC | Age: 47
End: 2022-07-25
Payer: COMMERCIAL

## 2022-07-25 VITALS
OXYGEN SATURATION: 100 % | HEIGHT: 64 IN | HEART RATE: 73 BPM | RESPIRATION RATE: 16 BRPM | TEMPERATURE: 97.3 F | DIASTOLIC BLOOD PRESSURE: 80 MMHG | WEIGHT: 133 LBS | BODY MASS INDEX: 22.71 KG/M2 | SYSTOLIC BLOOD PRESSURE: 123 MMHG

## 2022-07-25 DIAGNOSIS — C77.3 CARCINOMA OF LEFT BREAST METASTATIC TO AXILLARY LYMPH NODE (H): Primary | ICD-10-CM

## 2022-07-25 DIAGNOSIS — C50.912 CARCINOMA OF LEFT BREAST METASTATIC TO AXILLARY LYMPH NODE (H): Primary | ICD-10-CM

## 2022-07-25 LAB — HCG UR QL: NEGATIVE

## 2022-07-25 PROCEDURE — 999N000179 XR SURGERY CARM FLUORO LESS THAN 5 MIN W STILLS

## 2022-07-25 PROCEDURE — 360N000082 HC SURGERY LEVEL 2 W/ FLUORO, PER MIN: Performed by: SURGERY

## 2022-07-25 PROCEDURE — 250N000009 HC RX 250

## 2022-07-25 PROCEDURE — 250N000011 HC RX IP 250 OP 636: Performed by: SURGERY

## 2022-07-25 PROCEDURE — 76937 US GUIDE VASCULAR ACCESS: CPT | Mod: 26 | Performed by: SURGERY

## 2022-07-25 PROCEDURE — 77001 FLUOROGUIDE FOR VEIN DEVICE: CPT | Mod: 26 | Performed by: SURGERY

## 2022-07-25 PROCEDURE — 370N000017 HC ANESTHESIA TECHNICAL FEE, PER MIN: Performed by: SURGERY

## 2022-07-25 PROCEDURE — 258N000003 HC RX IP 258 OP 636

## 2022-07-25 PROCEDURE — 250N000009 HC RX 250: Performed by: SURGERY

## 2022-07-25 PROCEDURE — 710N000012 HC RECOVERY PHASE 2, PER MINUTE: Performed by: SURGERY

## 2022-07-25 PROCEDURE — 81025 URINE PREGNANCY TEST: CPT | Performed by: ANESTHESIOLOGY

## 2022-07-25 PROCEDURE — 710N000009 HC RECOVERY PHASE 1, LEVEL 1, PER MIN: Performed by: SURGERY

## 2022-07-25 PROCEDURE — C1788 PORT, INDWELLING, IMP: HCPCS | Performed by: SURGERY

## 2022-07-25 PROCEDURE — 250N000011 HC RX IP 250 OP 636

## 2022-07-25 PROCEDURE — 272N000001 HC OR GENERAL SUPPLY STERILE: Performed by: SURGERY

## 2022-07-25 PROCEDURE — 999N000141 HC STATISTIC PRE-PROCEDURE NURSING ASSESSMENT: Performed by: SURGERY

## 2022-07-25 PROCEDURE — 258N000003 HC RX IP 258 OP 636: Performed by: SURGERY

## 2022-07-25 PROCEDURE — 36561 INSERT TUNNELED CV CATH: CPT | Performed by: SURGERY

## 2022-07-25 DEVICE — POWERPORT CLEARVUE ISP IMPLANTABLE PORT WITH ATTACHABLE 8F POLYURETHANE OPEN-ENDED SINGLE-LUMEN VENOUS CATHETER PROCEDURAL KIT
Type: IMPLANTABLE DEVICE | Site: NECK | Status: NON-FUNCTIONAL
Brand: POWERPORT CLEARVUE
Removed: 2022-12-21

## 2022-07-25 RX ORDER — DEXAMETHASONE SODIUM PHOSPHATE 4 MG/ML
INJECTION, SOLUTION INTRA-ARTICULAR; INTRALESIONAL; INTRAMUSCULAR; INTRAVENOUS; SOFT TISSUE PRN
Status: DISCONTINUED | OUTPATIENT
Start: 2022-07-25 | End: 2022-07-25

## 2022-07-25 RX ORDER — HYDROCODONE BITARTRATE AND ACETAMINOPHEN 5; 325 MG/1; MG/1
1 TABLET ORAL
Status: DISCONTINUED | OUTPATIENT
Start: 2022-07-25 | End: 2022-07-25 | Stop reason: HOSPADM

## 2022-07-25 RX ORDER — CEFAZOLIN SODIUM/WATER 2 G/20 ML
2 SYRINGE (ML) INTRAVENOUS SEE ADMIN INSTRUCTIONS
Status: DISCONTINUED | OUTPATIENT
Start: 2022-07-25 | End: 2022-07-25 | Stop reason: HOSPADM

## 2022-07-25 RX ORDER — PROPOFOL 10 MG/ML
INJECTION, EMULSION INTRAVENOUS CONTINUOUS PRN
Status: DISCONTINUED | OUTPATIENT
Start: 2022-07-25 | End: 2022-07-25

## 2022-07-25 RX ORDER — HEPARIN SODIUM 1000 [USP'U]/ML
INJECTION, SOLUTION INTRAVENOUS; SUBCUTANEOUS
Status: DISCONTINUED
Start: 2022-07-25 | End: 2022-07-25 | Stop reason: HOSPADM

## 2022-07-25 RX ORDER — ACETAMINOPHEN 325 MG/1
650 TABLET ORAL
Status: DISCONTINUED | OUTPATIENT
Start: 2022-07-25 | End: 2022-07-25 | Stop reason: HOSPADM

## 2022-07-25 RX ORDER — ONDANSETRON 2 MG/ML
INJECTION INTRAMUSCULAR; INTRAVENOUS PRN
Status: DISCONTINUED | OUTPATIENT
Start: 2022-07-25 | End: 2022-07-25

## 2022-07-25 RX ORDER — LIDOCAINE HYDROCHLORIDE 20 MG/ML
INJECTION, SOLUTION INFILTRATION; PERINEURAL PRN
Status: DISCONTINUED | OUTPATIENT
Start: 2022-07-25 | End: 2022-07-25

## 2022-07-25 RX ORDER — SODIUM CHLORIDE, SODIUM LACTATE, POTASSIUM CHLORIDE, CALCIUM CHLORIDE 600; 310; 30; 20 MG/100ML; MG/100ML; MG/100ML; MG/100ML
INJECTION, SOLUTION INTRAVENOUS CONTINUOUS PRN
Status: DISCONTINUED | OUTPATIENT
Start: 2022-07-25 | End: 2022-07-25

## 2022-07-25 RX ORDER — HYDROCODONE BITARTRATE AND ACETAMINOPHEN 5; 325 MG/1; MG/1
1-2 TABLET ORAL EVERY 4 HOURS PRN
Qty: 10 TABLET | Refills: 0 | Status: SHIPPED | OUTPATIENT
Start: 2022-07-25 | End: 2022-12-08

## 2022-07-25 RX ORDER — FENTANYL CITRATE 50 UG/ML
INJECTION, SOLUTION INTRAMUSCULAR; INTRAVENOUS PRN
Status: DISCONTINUED | OUTPATIENT
Start: 2022-07-25 | End: 2022-07-25

## 2022-07-25 RX ORDER — PROPOFOL 10 MG/ML
INJECTION, EMULSION INTRAVENOUS PRN
Status: DISCONTINUED | OUTPATIENT
Start: 2022-07-25 | End: 2022-07-25

## 2022-07-25 RX ORDER — AMOXICILLIN 250 MG
1-2 CAPSULE ORAL 2 TIMES DAILY
Qty: 30 TABLET | Refills: 0 | Status: SHIPPED | OUTPATIENT
Start: 2022-07-25 | End: 2022-12-06

## 2022-07-25 RX ORDER — MAGNESIUM HYDROXIDE 1200 MG/15ML
LIQUID ORAL PRN
Status: DISCONTINUED | OUTPATIENT
Start: 2022-07-25 | End: 2022-07-25 | Stop reason: HOSPADM

## 2022-07-25 RX ORDER — HEPARIN SODIUM (PORCINE) LOCK FLUSH IV SOLN 100 UNIT/ML 100 UNIT/ML
SOLUTION INTRAVENOUS
Status: DISCONTINUED
Start: 2022-07-25 | End: 2022-07-25 | Stop reason: HOSPADM

## 2022-07-25 RX ORDER — CEFAZOLIN SODIUM/WATER 2 G/20 ML
2 SYRINGE (ML) INTRAVENOUS
Status: DISCONTINUED | OUTPATIENT
Start: 2022-07-25 | End: 2022-07-25 | Stop reason: HOSPADM

## 2022-07-25 RX ADMIN — DEXAMETHASONE SODIUM PHOSPHATE 4 MG: 4 INJECTION, SOLUTION INTRA-ARTICULAR; INTRALESIONAL; INTRAMUSCULAR; INTRAVENOUS; SOFT TISSUE at 13:55

## 2022-07-25 RX ADMIN — PROPOFOL 30 MG: 10 INJECTION, EMULSION INTRAVENOUS at 14:07

## 2022-07-25 RX ADMIN — SODIUM CHLORIDE, POTASSIUM CHLORIDE, SODIUM LACTATE AND CALCIUM CHLORIDE: 600; 310; 30; 20 INJECTION, SOLUTION INTRAVENOUS at 13:54

## 2022-07-25 RX ADMIN — ONDANSETRON 4 MG: 2 INJECTION INTRAMUSCULAR; INTRAVENOUS at 14:14

## 2022-07-25 RX ADMIN — FENTANYL CITRATE 50 MCG: 50 INJECTION, SOLUTION INTRAMUSCULAR; INTRAVENOUS at 14:07

## 2022-07-25 RX ADMIN — MIDAZOLAM 2 MG: 1 INJECTION INTRAMUSCULAR; INTRAVENOUS at 13:50

## 2022-07-25 RX ADMIN — Medication 2 G: at 13:55

## 2022-07-25 RX ADMIN — PHENYLEPHRINE HYDROCHLORIDE 100 MCG: 10 INJECTION INTRAVENOUS at 14:19

## 2022-07-25 RX ADMIN — PROPOFOL 20 MG: 10 INJECTION, EMULSION INTRAVENOUS at 14:00

## 2022-07-25 RX ADMIN — PROPOFOL 30 MG: 10 INJECTION, EMULSION INTRAVENOUS at 13:54

## 2022-07-25 RX ADMIN — FENTANYL CITRATE 50 MCG: 50 INJECTION, SOLUTION INTRAMUSCULAR; INTRAVENOUS at 13:56

## 2022-07-25 RX ADMIN — LIDOCAINE HYDROCHLORIDE 60 MG: 20 INJECTION, SOLUTION INFILTRATION; PERINEURAL at 14:03

## 2022-07-25 RX ADMIN — PROPOFOL 20 MG: 10 INJECTION, EMULSION INTRAVENOUS at 13:57

## 2022-07-25 RX ADMIN — PROPOFOL 125 MCG/KG/MIN: 10 INJECTION, EMULSION INTRAVENOUS at 13:54

## 2022-07-25 ASSESSMENT — COPD QUESTIONNAIRES: COPD: 0

## 2022-07-25 NOTE — ANESTHESIA PREPROCEDURE EVALUATION
Anesthesia Pre-Procedure Evaluation    Patient: Betty Rogers   MRN: [id number] : 1975        Procedure : Procedure(s):  PORT PLACEMENT          Past Medical History:   Diagnosis Date     Breast cancer metastasized to axillary lymph node (H) 7/15/2022     No active medical problems       Past Surgical History:   Procedure Laterality Date     C/SECTION, LOW TRANSVERSE      x2      Allergies   Allergen Reactions     Nuts      Other [Seasonal Allergies]      Tree Nuts-Lips swell and breathing problems      Social History     Tobacco Use     Smoking status: Never Smoker     Smokeless tobacco: Never Used   Substance Use Topics     Alcohol use: Yes     Comment: socially      Wt Readings from Last 1 Encounters:   22 60.3 kg (133 lb)        Anesthesia Evaluation   Pt has had prior anesthetic.     No history of anesthetic complications       ROS/MED HX  ENT/Pulmonary:    (-) asthma, COPD and sleep apnea   Neurologic:       Cardiovascular:       METS/Exercise Tolerance:     Hematologic:       Musculoskeletal:       GI/Hepatic:    (-) GERD   Renal/Genitourinary:       Endo:    (-) Type II DM and thyroid disease   Psychiatric/Substance Use:       Infectious Disease:       Malignancy:       Other:            Physical Exam    Airway        Mallampati: II   TM distance: > 3 FB   Neck ROM: full   Mouth opening: > 3 cm    Respiratory Devices and Support         Dental  no notable dental history         Cardiovascular   cardiovascular exam normal          Pulmonary   pulmonary exam normal                OUTSIDE LABS:  CBC:   Lab Results   Component Value Date    WBC 4.4 2022    HGB 12.3 2022    HGB 10.5 (L) 2010    HCT 38.4 2022     2022     BMP:   Lab Results   Component Value Date    GLC 88 2019    GLC 92 2018     COAGS: No results found for: PTT, INR, FIBR  POC:   Lab Results   Component Value Date    HCG Negative 2022     HEPATIC:   Lab Results   Component  Value Date    ALBUMIN 3.8 07/20/2022    PROTTOTAL 7.1 07/20/2022    ALT 32 07/20/2022    AST 24 07/20/2022    ALKPHOS 43 07/20/2022    BILITOTAL 0.6 07/20/2022     OTHER: No results found for: PH, LACT, A1C, ABEBA, PHOS, MAG, LIPASE, AMYLASE, TSH, T4, T3, CRP, SED    Anesthesia Plan    ASA Status:  2      Anesthesia Type: MAC.   Induction: Intravenous.           Consents    Anesthesia Plan(s) and associated risks, benefits, and realistic alternatives discussed. Questions answered and patient/representative(s) expressed understanding.    - Discussed:     - Discussed with:  Patient         Postoperative Care    Pain management: IV analgesics, Oral pain medications.   PONV prophylaxis: Ondansetron (or other 5HT-3), Dexamethasone or Solumedrol     Comments:                Mary Pang

## 2022-07-25 NOTE — BRIEF OP NOTE
[facility]    Brief Operative Note    Pre-operative diagnosis: Malignant neoplasm of right breast in female, estrogen receptor positive, unspecified site of breast (H) [C50.911, Z17.0]  Post-operative diagnosis Same as pre-operative diagnosis    Procedure: Procedure(s):  PORT PLACEMENT  Surgeon: Surgeon(s) and Role:     * Janie South MD - Primary     * Jesenia Soria MD - Resident - Assisting  Anesthesia: Monitor Anesthesia Care   Estimated Blood Loss: 5 mL from 7/25/2022  1:49 PM to 7/25/2022  2:43 PM      Drains: None  Specimens: * No specimens in log *  Findings:   Right IJ port placement with catheter tip at SVC/RA junction.  Complications: None.  Implants:   Implant Name Type Inv. Item Serial No.  Lot No. LRB No. Used Action   CATH PORT POWERPORT CLEARVUE ISP [id number] - [id number] Catheter CATH PORT POWERPORT CLEARVUE ISP [id number]  [employer] [id number] 1 Implanted

## 2022-07-25 NOTE — OP NOTE
General Surgery Operative Note      Pre-operative diagnosis: Malignant neoplasm of right breast in female, estrogen receptor positive, unspecified site of breast (H) [C50.911, Z17.0]   Post-operative diagnosis: Same   Procedure: Right internal jugular Power Port placement    Surgeon: Janie South MD   Assistant(s): Jesenia Soria, PGY-4  The PA s assistance was medically necessary to provide adequate exposure in the operating field, maintain hemostasis, cutting suture, clamping and ligating bleeding vessels, and visualization of anatomic structures throughout the surgical procedure.    Anesthesia                                   Local with MAC    Estimated blood loss:  Findings:                                        5 cc  Tip of the catheter at the atriocaval junction           DESCRIPTION OF PROCEDURE:  The patient was taken to the operating room after obtaining informed consent.  The patient was marked in the pre operative area. The patient was placed on the table in supine position.  A roll was placed between her shoulder blades.  IV anesthetic was administered.  The bilateral neck and upper chest were prepped and draped in standard sterile fashion.  We anesthetized the skin of the upper right chest.  The patient was placed in Trendelenburg.  Local anesthetic was injected into the skin in the lower neck, upper chest for the port site and proposed track for the catheter. We made an incision in the skin.  We cannulated the internal jugular vein using ultrasound guidance with a needle.  We then passed a wire through the needle into the internal jugular vein.  Fluoroscopy was used and confirmed the wire was traveling into the SVC. We then used a #15 blade to make a skin incision in the upper chest wall. The subcutaneous tissue was divided with cautery. The fascia was identified. A pocket was created above the fascia with blunt dissection. The port fit well into the pocket. We then used a tunneling device to place  the catheter through the incision in the chest and tunneled to the neck incision. We then passed a dilator over the wire under fluoroscopy.  The catheter was then placed through the catheter introducer and threaded, using fluoroscopy until the tip of the catheter was at the atrial caval junction.  The catheter introducer was removed.  We flushed the port with injectable saline.  We attached the catheter to the port and secured it in place with the catheter hub.   The port was secured to the fascia with two 2-0 vicryl sutures on either side.  We then closed the subcutaneous tissue with 3-0 interrupted Vicryl sutures.  The skin was closed with a running 4-0 Vicryl subcuticular suture and Dermabond.  A final flush of full strength heparin (2ml) was injected into the port using a Sandra needle.  The patient tolerated the procedure well.  All sponge and instrument counts were correct.    Janie South MD

## 2022-07-25 NOTE — DISCHARGE INSTRUCTIONS
Same Day Surgery Discharge Instructions for  Sedation and General Anesthesia     It's not unusual to feel dizzy, light-headed or faint for up to 24 hours after surgery or while taking pain medication.  If you have these symptoms: sit for a few minutes before standing and have someone assist you when you get up to walk or use the bathroom.    You should rest and relax for the next 24 hours. We recommend you make arrangements to have an adult stay with you for at least 24 hours after your discharge.  Avoid hazardous and strenuous activity.    DO NOT DRIVE any vehicle or operate mechanical equipment for 24 hours following the end of your surgery.  Even though you may feel normal, your reactions may be affected by the medication you have received.    Do not drink alcoholic beverages for 24 hours following surgery.     Slowly progress to your regular diet as you feel able. It's not unusual to feel nauseated and/or vomit after receiving anesthesia.  If you develop these symptoms, drink clear liquids (apple juice, ginger ale, broth, 7-up, etc. ) until you feel better.  If your nausea and vomiting persists for 24 hours, please notify your surgeon.      All narcotic pain medications, along with inactivity and anesthesia, can cause constipation. Drinking plenty of liquids and increasing fiber intake will help.    For any questions of a medical nature, call your surgeon.    Do not make important decisions for 24 hours.    If you had general anesthesia, you may have a sore throat for a couple of days related to the breathing tube used during surgery.  You may use Cepacol lozenges to help with this discomfort.  If it worsens or if you develop a fever, contact your surgeon.     If you feel your pain is not well managed with the pain medications prescribed by your surgeon, please contact your surgeon's office to let them know so they can address your concerns.            [facility]  Discharge  Instructions: Post-Operative Port Placement     ACTIVITY  Take frequent, short walks and increase your activity gradually.    Avoid strenuous physical activity or heavy lifting greater than 15-20 lbs. for 1 week.  You may climb stairs.  You may drive without restrictions when you are not using any prescription pain medication and feel comfortable in a car.  You may return to work/school when you are comfortable without any prescription pain medication.    WOUND CARE  You may remove your outer dressing or Band-Aids and shower 48 hours after the surgery.  Pat your incisions dry and leave them open to air.  Re-apply dressing (Band-Aids or gauze/tape) as needed for comfort or drainage.  You may have steri-strips (looks like white tape) or skin glue on your incisions.  You may peel off the steri-strips 2 weeks after your surgery if they have not peeled off on their own.  If you have skin glue, it will peel up and fall off on its own.  Do not soak your incisions in a tub or pool for 2 weeks.   Do not apply any lotions, creams, or ointments to your incision(s).  A ridge under your incision(s) is normal and will gradually resolve.    DIET  Start with liquids, then gradually resume your regular diet as tolerated.   Drink plenty of liquids to stay hydrated.    PAIN  Expect some tenderness and discomfort at the incision site(s).  Use the prescribed pain medication at your discretion.  Expect gradual resolution of your pain over several days.  You may take ibuprofen with food (unless you have been told not to) or acetaminophen/Tylenol instead of or in addition to your prescribed pain medication.  However, if you are taking Norco or Percocet, do not take any additional acetaminophen/Tylenol.  Do not drink alcohol or drive while you are taking pain medications.  You may apply ice to your incisions in 20 minute intervals as needed for the next 48 hours.  After that time, consider switching to heat if you  prefer.    EXPECTATIONS  Pain medications can cause constipation.  Limit use when possible.  Take an over the counter or prescribed stool softener/stimulant, such as Colace or Senna, 1-2 times a day with plenty of water.  You may take a mild over the counter laxative, such as Miralax or a suppository, as needed.    You may discontinue these medications once you are having regular bowel movements and/or are no longer taking your narcotic pain medication.    FOLLOW UP  You do not need to follow up with our office unless you have questions or concerns.    CALL OUR OFFICE -[phone] IF YOU HAVE:   Chills or fever above 101 F.  Increased redness, warmth, or drainage at your incisions.  Significant bleeding.  Pain not relieved by your pain medication or rest.  Increasing pain after the first 48 hours.  Any other concerns or questions.           Revised December 2021           ** If you have concerns or questions about your procedure,    please contact Dr South at  [phone] **

## 2022-07-25 NOTE — ANESTHESIA CARE TRANSFER NOTE
Patient: Betty Rogers    Procedure: Procedure(s):  PORT PLACEMENT       Diagnosis: Malignant neoplasm of right breast in female, estrogen receptor positive, unspecified site of breast (H) [C50.911, Z17.0]  Diagnosis Additional Information: No value filed.    Anesthesia Type:   MAC     Note:    Oropharynx: oropharynx clear of all foreign objects and spontaneously breathing  Level of Consciousness: awake  Oxygen Supplementation: face mask  Level of Supplemental Oxygen (L/min / FiO2): 10  Independent Airway: airway patency satisfactory and stable  Dentition: dentition unchanged  Vital Signs Stable: post-procedure vital signs reviewed and stable  Report to RN Given: handoff report given  Patient transferred to: PACU  Comments: Pt awake and able to verbalize needs. All monitors on and audible. VSS. Spontaneous respiration without difficulty. o2 sats 100% on 10L o2 per simple facemask. Pt denies pain. Report given to PACU RN.  Handoff Report: Identifed the Patient, Identified the Reponsible Provider, Reviewed the pertinent medical history, Discussed the surgical course, Reviewed Intra-OP anesthesia mangement and issues during anesthesia, Set expectations for post-procedure period and Allowed opportunity for questions and acknowledgement of understanding      Vitals:  Vitals Value Taken Time   BP     Temp     Pulse     Resp     SpO2         Electronically Signed By: HETAL Zhou CRNA  July 25, 2022  2:50 PM

## 2022-07-25 NOTE — OR NURSING
Patient showed picture from her cell phone with negative result from her COVID test from 7-23-22.

## 2022-07-25 NOTE — ANESTHESIA POSTPROCEDURE EVALUATION
Patient: Betty Rogers    Procedure: Procedure(s):  PORT PLACEMENT       Anesthesia Type:  MAC    Note:  Disposition: Outpatient   Postop Pain Control: Uneventful            Sign Out: Well controlled pain   PONV: No   Neuro/Psych: Uneventful            Sign Out: Acceptable/Baseline neuro status   Airway/Respiratory: Uneventful            Sign Out: Acceptable/Baseline resp. status   CV/Hemodynamics: Uneventful            Sign Out: Acceptable CV status; No obvious hypovolemia; No obvious fluid overload   Other NRE: NONE   DID A NON-ROUTINE EVENT OCCUR?            Last vitals:  Vitals Value Taken Time   /75 07/25/22 1500   Temp 36.3  C (97.3  F) 07/25/22 1445   Pulse 83 07/25/22 1500   Resp 19 07/25/22 1500   SpO2 100 % 07/25/22 1500       Electronically Signed By: Mary Pang  July 25, 2022  3:21 PM

## 2022-07-26 ENCOUNTER — INFUSION THERAPY VISIT (OUTPATIENT)
Dept: INFUSION THERAPY | Facility: CLINIC | Age: 47
End: 2022-07-26

## 2022-07-26 ENCOUNTER — LAB (OUTPATIENT)
Dept: ONCOLOGY | Facility: CLINIC | Age: 47
End: 2022-07-26
Payer: COMMERCIAL

## 2022-07-26 VITALS
DIASTOLIC BLOOD PRESSURE: 74 MMHG | RESPIRATION RATE: 14 BRPM | OXYGEN SATURATION: 100 % | SYSTOLIC BLOOD PRESSURE: 115 MMHG | WEIGHT: 134.2 LBS | HEART RATE: 60 BPM | TEMPERATURE: 98.3 F | BODY MASS INDEX: 23.04 KG/M2

## 2022-07-26 VITALS — BODY MASS INDEX: 23 KG/M2 | WEIGHT: 134 LBS

## 2022-07-26 DIAGNOSIS — C50.919 CARCINOMA OF BREAST METASTATIC TO AXILLARY LYMPH NODE, UNSPECIFIED LATERALITY (H): Primary | ICD-10-CM

## 2022-07-26 DIAGNOSIS — C77.3 CARCINOMA OF BREAST METASTATIC TO AXILLARY LYMPH NODE, UNSPECIFIED LATERALITY (H): Primary | ICD-10-CM

## 2022-07-26 LAB
BASOPHILS # BLD AUTO: 0 10E3/UL (ref 0–0.2)
BASOPHILS NFR BLD AUTO: 1 %
EOSINOPHIL # BLD AUTO: 0 10E3/UL (ref 0–0.7)
EOSINOPHIL NFR BLD AUTO: 0 %
ERYTHROCYTE [DISTWIDTH] IN BLOOD BY AUTOMATED COUNT: 14.7 % (ref 10–15)
HCT VFR BLD AUTO: 33.4 % (ref 35–47)
HGB BLD-MCNC: 10.9 G/DL (ref 11.7–15.7)
IMM GRANULOCYTES # BLD: 0 10E3/UL
IMM GRANULOCYTES NFR BLD: 0 %
LYMPHOCYTES # BLD AUTO: 1.7 10E3/UL (ref 0.8–5.3)
LYMPHOCYTES NFR BLD AUTO: 32 %
MCH RBC QN AUTO: 28 PG (ref 26.5–33)
MCHC RBC AUTO-ENTMCNC: 32.6 G/DL (ref 31.5–36.5)
MCV RBC AUTO: 86 FL (ref 78–100)
MONOCYTES # BLD AUTO: 0.2 10E3/UL (ref 0–1.3)
MONOCYTES NFR BLD AUTO: 4 %
NEUTROPHILS # BLD AUTO: 3.4 10E3/UL (ref 1.6–8.3)
NEUTROPHILS NFR BLD AUTO: 63 %
NRBC # BLD AUTO: 0 10E3/UL
NRBC BLD AUTO-RTO: 0 /100
PLATELET # BLD AUTO: 227 10E3/UL (ref 150–450)
RBC # BLD AUTO: 3.89 10E6/UL (ref 3.8–5.2)
WBC # BLD AUTO: 5.4 10E3/UL (ref 4–11)

## 2022-07-26 PROCEDURE — 99207 PR NO CHARGE LOS: CPT

## 2022-07-26 PROCEDURE — 85025 COMPLETE CBC W/AUTO DIFF WBC: CPT | Performed by: INTERNAL MEDICINE

## 2022-07-26 PROCEDURE — S0028 INJECTION, FAMOTIDINE, 20 MG: HCPCS | Performed by: NURSE PRACTITIONER

## 2022-07-26 PROCEDURE — 96375 TX/PRO/DX INJ NEW DRUG ADDON: CPT | Performed by: NURSE PRACTITIONER

## 2022-07-26 PROCEDURE — 96413 CHEMO IV INFUSION 1 HR: CPT | Performed by: NURSE PRACTITIONER

## 2022-07-26 PROCEDURE — 96367 TX/PROPH/DG ADDL SEQ IV INF: CPT | Performed by: NURSE PRACTITIONER

## 2022-07-26 RX ORDER — DIPHENHYDRAMINE HCL 25 MG
50 CAPSULE ORAL ONCE
Status: COMPLETED | OUTPATIENT
Start: 2022-07-26 | End: 2022-07-26

## 2022-07-26 RX ORDER — HEPARIN SODIUM (PORCINE) LOCK FLUSH IV SOLN 100 UNIT/ML 100 UNIT/ML
5 SOLUTION INTRAVENOUS
Status: DISCONTINUED | OUTPATIENT
Start: 2022-07-26 | End: 2022-07-26 | Stop reason: HOSPADM

## 2022-07-26 RX ORDER — HEPARIN SODIUM (PORCINE) LOCK FLUSH IV SOLN 100 UNIT/ML 100 UNIT/ML
500 SOLUTION INTRAVENOUS ONCE
Status: COMPLETED | OUTPATIENT
Start: 2022-07-26 | End: 2022-07-26

## 2022-07-26 RX ADMIN — Medication 50 MG: at 08:14

## 2022-07-26 RX ADMIN — Medication 250 ML: at 08:12

## 2022-07-26 RX ADMIN — HEPARIN SODIUM (PORCINE) LOCK FLUSH IV SOLN 100 UNIT/ML 500 UNITS: 100 SOLUTION at 07:22

## 2022-07-26 RX ADMIN — HEPARIN SODIUM (PORCINE) LOCK FLUSH IV SOLN 100 UNIT/ML 5 ML: 100 SOLUTION at 09:53

## 2022-07-26 NOTE — PROGRESS NOTES
Port accessed with no incidient. Site cleaned with chloraprep for 30 seconds. Site dry and accessed with appropriate port needle. Sterile dressing applied. Blood return noted and lab tubes drawn. Port flushed with saline and heparin. Patient tolerated port draw well.     Sandi Wadsworth RN

## 2022-07-26 NOTE — PROGRESS NOTES
Infusion Nursing Note:  Betty Rogers presents today for C2D1 Taxol.   Patient seen by provider today: No   present during visit today: Not Applicable.    Note:   Patient states first week of chemo went better than she expected.  No changes in appetite or energy level.      Intravenous Access:  Implanted Port. Per CXR yesterday:   a right internal jugular port with tip at the superior atriocaval junction.     Treatment Conditions:  Lab Results   Component Value Date    HGB 10.9 (L) 07/26/2022    WBC 5.4 07/26/2022    ANEUTAUTO 3.4 07/26/2022     07/26/2022      Results reviewed, labs MET treatment parameters, ok to proceed with treatment.    Post Infusion Assessment:  Patient tolerated infusion without incident.  Blood return noted pre and post infusion.  Site patent and intact, free from redness, edema or discomfort.  No evidence of extravasations.  Access discontinued per protocol.     Discharge Plan:   AVS to patient via MYCHART.  Patient will return 8/2/22 for next appointment.   Patient discharged in stable condition accompanied by: self. Friends are drivers.  Departure Mode: Ambulatory.      Marly Stoner RN

## 2022-07-27 NOTE — PROGRESS NOTES
Betty is a 47 year old who is being evaluated via a billable video visit.    How would you like to obtain your AVS? MyChart  If the video visit is dropped, the invitation should be resent by: Text to cell phone: [phone]    VIDEO VISIT  Start Time: 11:09  End Time:11:29  Patient location: [facility]  Provider location:  [facility]   Platform used for Video Visit: PowerReviews    Hematology/Oncology Video Visit    Oncologist: Dr. Franco  Diagnosis: multifocal locally advanced left breast invasive ductal carcinoma, ER/RI+  Reason for visit: visit prior to cycle 3 weekly taxol    Cancer and Treatment Hx:  May- Jun 2022: screening mammogram with left breast architectural distortion at 12 o'clock. Diagnostic mammo with US revealed an irregular hypoechoic mass measuring 3.7cm with surrounding ill defined shadowing/hypoechogenicity and lymph node with thickened cortex. Breast biopsy of 12 o'clock 6cm from nipple revealed invasive ductal carcinoma, grade 2, ER+/RI+ HER2-. Breast biopsy of 9 o'clock position revealed DCIS, grade 3. Breast MRI with extreme fibroglandular tissue in both breasts. Biopsy-proven malignancy left breast 12 o'clock position corresponds with a 4.6cm irregular shaped enhancing mass with washout kinetics. Non-mass-like enhancement associated extending from the mass with multiple small subcentimeter satellite masses.  In total of the index lesion plus surrounding enhancement measures approximately 5.1cm. Biopsy-proven DCIS at 9 o'clock position showed minimal non-mass enhancement measuring up to 2 cm. Central breast at the 6 o'clock position there is a suspicious 8 mm mass with indistinct margins and washout kinetics. Additional suspicious mass at the 10 o'clock position middle depth measuring 5 mm. Couple prominent level 1 axillary nodes, no internal mammary chain adenopathy. Lymph node biopsy confirmed metastasis.  Jul 2022: Oncotype score of 28,  intermediate risk, chemo benefit cannot be excluded  Jul 2022: weekly neoadjuvant taxol    Interval History:  Betty has been tolerating treatment very well thus far. She has had no side effects what-so-ever. She is eating and drinking well. After last cycle had one day where appetite was a little lower but recovered the next day. Regular BMs. Doing lots of walking, was even feeling well enough to run with her son a few times. Wondering if there are any particular foods to avoid or recommended diets to follow. Has been receiving some unsolicited advice from well-meaning friends that has added to some anxieties. Has not lost her hair yet. No other questions or concerns today.    Medications, imaging, and labs:  - Reviewed pertinent medications, no refills needed.  - Reviewed labs from today and trend over past month:   08/02/22    WBC 3.2 (L)   Hemoglobin 11.0 (L)   Platelet Count 210   Absolute Neutrophils 1.6   - Reviewed read of PET from 6/25/22:  IMPRESSION: in this 47-year-old female with history of left breast  invasive ductal carcinoma:  1. Hypermetabolic mass in the midline posterior depth of the left  breast consistent with the primary malignancy.  2. Mild hypermetabolism of atypical-appearing left axillary lymph  nodes compatible with the known alberto metastases.  3. No distant metastatic disease demonstrated    Physical Exam:  GEN: pleasantly conversant female no acute distress  SKIN: generally intact, no visible rash, bruising, or sores   ENT: eyes non-icteric, EOMI  RESP: breathing easily on room air, no cough  MSK: appears to have normal range of motion based on visualized movements  NEURO: facial movements symmetric, alert and oriented without obvious focal deficit  The rest of a comprehensive physical examination is deferred due to PHE (public health emergency) video restrictions    Wt Readings from Last 6 Encounters:   07/26/22 60.8 kg (134 lb)   07/20/22 60.3 kg (133 lb)   06/21/22 61.2 kg (134 lb  14.4 oz)     Assessment and Plan:  Multifocal locally advanced left breast invasive ductal carcinoma, ER/MN+  - Continues on neoadjuvant weekly taxol x12  - Meeting goals and labs appropriate for cycle 3 later today  - Oncology provider every 3 weeks (cycle 4/7/10), sooner if necessary      The total time of this encounter amounted to 30 minutes today. This time includes video time spent with the patient, prep work, ordering tests, and performing post-visit documentation.  - Mary Green, CNP

## 2022-08-02 ENCOUNTER — VIRTUAL VISIT (OUTPATIENT)
Dept: ONCOLOGY | Facility: CLINIC | Age: 47
End: 2022-08-02
Payer: COMMERCIAL

## 2022-08-02 ENCOUNTER — LAB (OUTPATIENT)
Dept: ONCOLOGY | Facility: CLINIC | Age: 47
End: 2022-08-02
Payer: COMMERCIAL

## 2022-08-02 ENCOUNTER — INFUSION THERAPY VISIT (OUTPATIENT)
Dept: INFUSION THERAPY | Facility: CLINIC | Age: 47
End: 2022-08-02

## 2022-08-02 DIAGNOSIS — C50.912 CARCINOMA OF LEFT BREAST METASTATIC TO AXILLARY LYMPH NODE (H): Primary | ICD-10-CM

## 2022-08-02 DIAGNOSIS — C77.3 CARCINOMA OF BREAST METASTATIC TO AXILLARY LYMPH NODE, UNSPECIFIED LATERALITY (H): ICD-10-CM

## 2022-08-02 DIAGNOSIS — C77.3 CARCINOMA OF LEFT BREAST METASTATIC TO AXILLARY LYMPH NODE (H): Primary | ICD-10-CM

## 2022-08-02 DIAGNOSIS — C50.919 CARCINOMA OF BREAST METASTATIC TO AXILLARY LYMPH NODE, UNSPECIFIED LATERALITY (H): ICD-10-CM

## 2022-08-02 LAB
BASOPHILS # BLD AUTO: 0 10E3/UL (ref 0–0.2)
BASOPHILS NFR BLD AUTO: 1 %
EOSINOPHIL # BLD AUTO: 0.2 10E3/UL (ref 0–0.7)
EOSINOPHIL NFR BLD AUTO: 6 %
ERYTHROCYTE [DISTWIDTH] IN BLOOD BY AUTOMATED COUNT: 14.8 % (ref 10–15)
HCT VFR BLD AUTO: 34 % (ref 35–47)
HGB BLD-MCNC: 11 G/DL (ref 11.7–15.7)
IMM GRANULOCYTES # BLD: 0 10E3/UL
IMM GRANULOCYTES NFR BLD: 0 %
LYMPHOCYTES # BLD AUTO: 1.2 10E3/UL (ref 0.8–5.3)
LYMPHOCYTES NFR BLD AUTO: 38 %
MCH RBC QN AUTO: 28.3 PG (ref 26.5–33)
MCHC RBC AUTO-ENTMCNC: 32.4 G/DL (ref 31.5–36.5)
MCV RBC AUTO: 87 FL (ref 78–100)
MONOCYTES # BLD AUTO: 0.2 10E3/UL (ref 0–1.3)
MONOCYTES NFR BLD AUTO: 6 %
NEUTROPHILS # BLD AUTO: 1.6 10E3/UL (ref 1.6–8.3)
NEUTROPHILS NFR BLD AUTO: 49 %
NRBC # BLD AUTO: 0 10E3/UL
NRBC BLD AUTO-RTO: 0 /100
PLATELET # BLD AUTO: 210 10E3/UL (ref 150–450)
RBC # BLD AUTO: 3.89 10E6/UL (ref 3.8–5.2)
WBC # BLD AUTO: 3.2 10E3/UL (ref 4–11)

## 2022-08-02 PROCEDURE — 99207 PR NO CHARGE LOS: CPT

## 2022-08-02 PROCEDURE — 96413 CHEMO IV INFUSION 1 HR: CPT | Performed by: NURSE PRACTITIONER

## 2022-08-02 PROCEDURE — 96375 TX/PRO/DX INJ NEW DRUG ADDON: CPT | Performed by: NURSE PRACTITIONER

## 2022-08-02 PROCEDURE — 85025 COMPLETE CBC W/AUTO DIFF WBC: CPT | Performed by: INTERNAL MEDICINE

## 2022-08-02 PROCEDURE — 99214 OFFICE O/P EST MOD 30 MIN: CPT | Mod: 95 | Performed by: NURSE PRACTITIONER

## 2022-08-02 RX ORDER — ONDANSETRON 2 MG/ML
8 INJECTION INTRAMUSCULAR; INTRAVENOUS ONCE
Status: COMPLETED | OUTPATIENT
Start: 2022-08-02 | End: 2022-08-02

## 2022-08-02 RX ORDER — HEPARIN SODIUM (PORCINE) LOCK FLUSH IV SOLN 100 UNIT/ML 100 UNIT/ML
500 SOLUTION INTRAVENOUS ONCE
Status: COMPLETED | OUTPATIENT
Start: 2022-08-02 | End: 2022-08-02

## 2022-08-02 RX ORDER — HEPARIN SODIUM (PORCINE) LOCK FLUSH IV SOLN 100 UNIT/ML 100 UNIT/ML
5 SOLUTION INTRAVENOUS
Status: DISCONTINUED | OUTPATIENT
Start: 2022-08-02 | End: 2022-08-02 | Stop reason: HOSPADM

## 2022-08-02 RX ADMIN — HEPARIN SODIUM (PORCINE) LOCK FLUSH IV SOLN 100 UNIT/ML 5 ML: 100 SOLUTION at 13:29

## 2022-08-02 RX ADMIN — ONDANSETRON 8 MG: 2 INJECTION INTRAMUSCULAR; INTRAVENOUS at 12:14

## 2022-08-02 RX ADMIN — Medication 250 ML: at 12:14

## 2022-08-02 RX ADMIN — HEPARIN SODIUM (PORCINE) LOCK FLUSH IV SOLN 100 UNIT/ML 500 UNITS: 100 SOLUTION at 10:53

## 2022-08-02 NOTE — PROGRESS NOTES
Infusion Nursing Note:  Betty Rogers presents today for C3D1 Taxol.    Patient seen by provider today: Yes: Mary Green NP   present during visit today: Not Applicable.    Note: Patient tolerated first two doses of Taxol without any complications thus majority of premeds were not given.    Zofran is ordered as a scheduled premed.  Per pharmacistJustin, IV Zofran could be prn as patient hasn't had nausea/vomiting.  Discussed with patient.  Patient would like IV Zofran today and may decline IV Zofran next week.    Intravenous Access:  Implanted Port.    Treatment Conditions:  Lab Results   Component Value Date    HGB 11.0 (L) 08/02/2022    WBC 3.2 (L) 08/02/2022    ANEUTAUTO 1.6 08/02/2022     08/02/2022      Results reviewed, labs MET treatment parameters, ok to proceed with treatment.    Post Infusion Assessment:  Patient tolerated infusion without incident.  Blood return noted pre and post infusion.  Site patent and intact, free from redness, edema or discomfort.  No evidence of extravasations.  Access discontinued per protocol.     Discharge Plan:   [facility]  Patient will return 8/8/22 for next appointment.   Patient discharged in stable condition accompanied by: self.  Departure Mode: Ambulatory.      Marly Stoner RN

## 2022-08-02 NOTE — PROGRESS NOTES
Port accessed with no incident. Blood return noted. Port flushed with saline and heparin. Patient tolerated port flush well.  Please refer to flow sheets for more information.  Blessing Reyes RN on 8/2/2022 at 11:43 AM

## 2022-08-03 LAB — NONINV COLON CA DNA+OCC BLD SCRN STL QL: NEGATIVE

## 2022-08-08 ENCOUNTER — LAB (OUTPATIENT)
Dept: ONCOLOGY | Facility: CLINIC | Age: 47
End: 2022-08-08
Payer: COMMERCIAL

## 2022-08-08 ENCOUNTER — INFUSION THERAPY VISIT (OUTPATIENT)
Dept: INFUSION THERAPY | Facility: CLINIC | Age: 47
End: 2022-08-08
Payer: COMMERCIAL

## 2022-08-08 VITALS
DIASTOLIC BLOOD PRESSURE: 69 MMHG | HEART RATE: 61 BPM | OXYGEN SATURATION: 100 % | SYSTOLIC BLOOD PRESSURE: 107 MMHG | WEIGHT: 136 LBS | RESPIRATION RATE: 16 BRPM | TEMPERATURE: 97.2 F | BODY MASS INDEX: 23.34 KG/M2

## 2022-08-08 DIAGNOSIS — C50.912 CARCINOMA OF LEFT BREAST METASTATIC TO AXILLARY LYMPH NODE (H): Primary | ICD-10-CM

## 2022-08-08 DIAGNOSIS — C77.3 CARCINOMA OF LEFT BREAST METASTATIC TO AXILLARY LYMPH NODE (H): Primary | ICD-10-CM

## 2022-08-08 LAB
ALBUMIN SERPL-MCNC: 3.5 G/DL (ref 3.4–5)
ALP SERPL-CCNC: 34 U/L (ref 40–150)
ALT SERPL W P-5'-P-CCNC: 29 U/L (ref 0–50)
AST SERPL W P-5'-P-CCNC: 22 U/L (ref 0–45)
BASOPHILS # BLD MANUAL: 0 10E3/UL (ref 0–0.2)
BASOPHILS NFR BLD MANUAL: 1 %
BILIRUB DIRECT SERPL-MCNC: 0.1 MG/DL (ref 0–0.2)
BILIRUB SERPL-MCNC: 0.3 MG/DL (ref 0.2–1.3)
EOSINOPHIL # BLD MANUAL: 0.2 10E3/UL (ref 0–0.7)
EOSINOPHIL NFR BLD MANUAL: 8 %
ERYTHROCYTE [DISTWIDTH] IN BLOOD BY AUTOMATED COUNT: 14.8 % (ref 10–15)
HCT VFR BLD AUTO: 33.9 % (ref 35–47)
HGB BLD-MCNC: 11 G/DL (ref 11.7–15.7)
LYMPHOCYTES # BLD MANUAL: 1.3 10E3/UL (ref 0.8–5.3)
LYMPHOCYTES NFR BLD MANUAL: 60 %
MCH RBC QN AUTO: 28.6 PG (ref 26.5–33)
MCHC RBC AUTO-ENTMCNC: 32.4 G/DL (ref 31.5–36.5)
MCV RBC AUTO: 88 FL (ref 78–100)
MONOCYTES # BLD MANUAL: 0.1 10E3/UL (ref 0–1.3)
MONOCYTES NFR BLD MANUAL: 3 %
NEUTROPHILS # BLD MANUAL: 0.6 10E3/UL (ref 1.6–8.3)
NEUTROPHILS NFR BLD MANUAL: 28 %
PLAT MORPH BLD: ABNORMAL
PLATELET # BLD AUTO: 195 10E3/UL (ref 150–450)
PROT SERPL-MCNC: 6.2 G/DL (ref 6.8–8.8)
RBC # BLD AUTO: 3.84 10E6/UL (ref 3.8–5.2)
RBC MORPH BLD: ABNORMAL
VARIANT LYMPHS BLD QL SMEAR: PRESENT
WBC # BLD AUTO: 2.2 10E3/UL (ref 4–11)

## 2022-08-08 PROCEDURE — 36591 DRAW BLOOD OFF VENOUS DEVICE: CPT

## 2022-08-08 PROCEDURE — 85007 BL SMEAR W/DIFF WBC COUNT: CPT | Performed by: INTERNAL MEDICINE

## 2022-08-08 PROCEDURE — 80076 HEPATIC FUNCTION PANEL: CPT | Performed by: INTERNAL MEDICINE

## 2022-08-08 PROCEDURE — 85027 COMPLETE CBC AUTOMATED: CPT | Performed by: INTERNAL MEDICINE

## 2022-08-08 PROCEDURE — 99207 PR NO CHARGE LOS: CPT | Performed by: NURSE PRACTITIONER

## 2022-08-08 RX ORDER — HEPARIN SODIUM (PORCINE) LOCK FLUSH IV SOLN 100 UNIT/ML 100 UNIT/ML
500 SOLUTION INTRAVENOUS
Status: DISCONTINUED | OUTPATIENT
Start: 2022-08-08 | End: 2022-08-08 | Stop reason: HOSPADM

## 2022-08-08 RX ADMIN — HEPARIN SODIUM (PORCINE) LOCK FLUSH IV SOLN 100 UNIT/ML 500 UNITS: 100 SOLUTION at 09:29

## 2022-08-08 ASSESSMENT — PAIN SCALES - GENERAL: PAINLEVEL: NO PAIN (0)

## 2022-08-08 NOTE — PROGRESS NOTES
Infusion Nursing Note:  Betty Rogers presents today for C4D1 Taxol - NOT GIVEN.   Patient seen by provider today: No   present during visit today: Not Applicable.    Note: Patient reports tolerating taxol well with no concerns at this time.    ANC today was 0.6. Reviewed importance of  neutropenic precautions with patient. Pt expressed understanding. Per Dr. Franco cancel treatment for today. Will also reduce dose for next cycle.    Pt became tearful as she was really hoping to continue with the current plan. Emotional support given.     Intravenous Access:  Implanted Port.    Treatment Conditions:  Lab Results   Component Value Date    HGB 11.0 (L) 08/08/2022    WBC 2.2 (L) 08/08/2022    ANEU 0.6 (L) 08/08/2022    ANEUTAUTO 1.6 08/02/2022     08/08/2022         Post Infusion Assessment:  N/A.     Discharge Plan:   AVS to patient via MYCHART.  Patient will return 8/15 for next appointment.   Patient discharged in stable condition accompanied by: self.  Departure Mode: Ambulatory.      Sandi Wadsworth RN

## 2022-08-08 NOTE — PROGRESS NOTES
Port site scrubbed with Chloraprep for 30 seconds. Accessed port using sterile technique. Green and purple tubes drawn. Double signed by patient and RN. See documentation flowsheet. Port needle left accessed for treatment. Tolerated port access and draw without complaint. Marly Stoner RN on 8/8/2022 at 9:27 AM

## 2022-08-15 ENCOUNTER — INFUSION THERAPY VISIT (OUTPATIENT)
Dept: INFUSION THERAPY | Facility: CLINIC | Age: 47
End: 2022-08-15
Payer: COMMERCIAL

## 2022-08-15 ENCOUNTER — LAB (OUTPATIENT)
Dept: ONCOLOGY | Facility: CLINIC | Age: 47
End: 2022-08-15
Payer: COMMERCIAL

## 2022-08-15 VITALS
WEIGHT: 133.4 LBS | HEART RATE: 63 BPM | DIASTOLIC BLOOD PRESSURE: 76 MMHG | BODY MASS INDEX: 22.9 KG/M2 | OXYGEN SATURATION: 98 % | RESPIRATION RATE: 16 BRPM | SYSTOLIC BLOOD PRESSURE: 118 MMHG | TEMPERATURE: 98.7 F

## 2022-08-15 DIAGNOSIS — C50.912 CARCINOMA OF LEFT BREAST METASTATIC TO AXILLARY LYMPH NODE (H): Primary | ICD-10-CM

## 2022-08-15 DIAGNOSIS — C77.3 CARCINOMA OF LEFT BREAST METASTATIC TO AXILLARY LYMPH NODE (H): Primary | ICD-10-CM

## 2022-08-15 LAB
BASOPHILS # BLD AUTO: 0 10E3/UL (ref 0–0.2)
BASOPHILS NFR BLD AUTO: 1 %
EOSINOPHIL # BLD AUTO: 0.1 10E3/UL (ref 0–0.7)
EOSINOPHIL NFR BLD AUTO: 3 %
ERYTHROCYTE [DISTWIDTH] IN BLOOD BY AUTOMATED COUNT: 14.9 % (ref 10–15)
HCT VFR BLD AUTO: 35.5 % (ref 35–47)
HGB BLD-MCNC: 11.6 G/DL (ref 11.7–15.7)
IMM GRANULOCYTES # BLD: 0 10E3/UL
IMM GRANULOCYTES NFR BLD: 0 %
LYMPHOCYTES # BLD AUTO: 1.5 10E3/UL (ref 0.8–5.3)
LYMPHOCYTES NFR BLD AUTO: 48 %
MCH RBC QN AUTO: 28.7 PG (ref 26.5–33)
MCHC RBC AUTO-ENTMCNC: 32.7 G/DL (ref 31.5–36.5)
MCV RBC AUTO: 88 FL (ref 78–100)
MONOCYTES # BLD AUTO: 0.5 10E3/UL (ref 0–1.3)
MONOCYTES NFR BLD AUTO: 14 %
NEUTROPHILS # BLD AUTO: 1.1 10E3/UL (ref 1.6–8.3)
NEUTROPHILS NFR BLD AUTO: 34 %
NRBC # BLD AUTO: 0 10E3/UL
NRBC BLD AUTO-RTO: 0 /100
PLATELET # BLD AUTO: 231 10E3/UL (ref 150–450)
RBC # BLD AUTO: 4.04 10E6/UL (ref 3.8–5.2)
WBC # BLD AUTO: 3.2 10E3/UL (ref 4–11)

## 2022-08-15 PROCEDURE — 85025 COMPLETE CBC W/AUTO DIFF WBC: CPT | Performed by: NURSE PRACTITIONER

## 2022-08-15 PROCEDURE — 96413 CHEMO IV INFUSION 1 HR: CPT | Performed by: NURSE PRACTITIONER

## 2022-08-15 PROCEDURE — 96375 TX/PRO/DX INJ NEW DRUG ADDON: CPT | Performed by: NURSE PRACTITIONER

## 2022-08-15 PROCEDURE — 99207 PR NO CHARGE NURSE ONLY: CPT

## 2022-08-15 PROCEDURE — 99207 PR NO CHARGE LOS: CPT

## 2022-08-15 RX ORDER — HEPARIN SODIUM (PORCINE) LOCK FLUSH IV SOLN 100 UNIT/ML 100 UNIT/ML
5 SOLUTION INTRAVENOUS
Status: DISCONTINUED | OUTPATIENT
Start: 2022-08-15 | End: 2022-08-15 | Stop reason: HOSPADM

## 2022-08-15 RX ORDER — HEPARIN SODIUM (PORCINE) LOCK FLUSH IV SOLN 100 UNIT/ML 100 UNIT/ML
5 SOLUTION INTRAVENOUS EVERY 8 HOURS
Status: DISCONTINUED | OUTPATIENT
Start: 2022-08-15 | End: 2022-08-15 | Stop reason: HOSPADM

## 2022-08-15 RX ORDER — ONDANSETRON 2 MG/ML
8 INJECTION INTRAMUSCULAR; INTRAVENOUS ONCE
Status: COMPLETED | OUTPATIENT
Start: 2022-08-15 | End: 2022-08-15

## 2022-08-15 RX ADMIN — HEPARIN SODIUM (PORCINE) LOCK FLUSH IV SOLN 100 UNIT/ML 5 ML: 100 SOLUTION at 08:53

## 2022-08-15 RX ADMIN — Medication 250 ML: at 09:27

## 2022-08-15 RX ADMIN — HEPARIN SODIUM (PORCINE) LOCK FLUSH IV SOLN 100 UNIT/ML 5 ML: 100 SOLUTION at 10:51

## 2022-08-15 RX ADMIN — ONDANSETRON 8 MG: 2 INJECTION INTRAMUSCULAR; INTRAVENOUS at 09:29

## 2022-08-15 NOTE — PROGRESS NOTES
Infusion Nursing Note:  Betty Rogers presents today for C5 Taxol.    Patient seen by provider today: No   present during visit today: Not Applicable.    Note: Patient reports feeling well. Anxious to have the dose reduction of taxol today so she can stay on track.     Intravenous Access:  Implanted Port.    Treatment Conditions:  Lab Results   Component Value Date    HGB 11.6 (L) 08/15/2022    WBC 3.2 (L) 08/15/2022    ANEU 0.6 (L) 08/08/2022    ANEUTAUTO 1.1 (L) 08/15/2022     08/15/2022      Lab Results   Component Value Date    BILITOTAL 0.3 08/08/2022    ALBUMIN 3.5 08/08/2022    ALT 29 08/08/2022    AST 22 08/08/2022     Results reviewed, labs MET treatment parameters, ok to proceed with treatment.    Post Infusion Assessment:  Patient tolerated infusion without incident.  Blood return noted pre and post infusion.  Site patent and intact, free from redness, edema or discomfort.  No evidence of extravasations.  Access discontinued per protocol.     Discharge Plan:   Patient declined prescription refills.  Patient and/or family verbalized understanding of discharge instructions and all questions answered.  Patient discharged in stable condition accompanied by: self.  Departure Mode: Ambulatory.      Mary Samuels RN

## 2022-08-17 ENCOUNTER — PATIENT OUTREACH (OUTPATIENT)
Dept: ONCOLOGY | Facility: CLINIC | Age: 47
End: 2022-08-17

## 2022-08-17 NOTE — PROGRESS NOTES
Received [employer] message for request to complete disability leave paperwork for patient. Paperwork completed and faxed to [employer] [phone].  Patient notified via [employer].  Debbie Santacruz RN

## 2022-08-21 ENCOUNTER — MYC MEDICAL ADVICE (OUTPATIENT)
Dept: ONCOLOGY | Facility: CLINIC | Age: 47
End: 2022-08-21

## 2022-08-23 ENCOUNTER — INFUSION THERAPY VISIT (OUTPATIENT)
Dept: INFUSION THERAPY | Facility: CLINIC | Age: 47
End: 2022-08-23

## 2022-08-23 ENCOUNTER — MYC MEDICAL ADVICE (OUTPATIENT)
Dept: ONCOLOGY | Facility: CLINIC | Age: 47
End: 2022-08-23

## 2022-08-23 ENCOUNTER — ONCOLOGY VISIT (OUTPATIENT)
Dept: ONCOLOGY | Facility: CLINIC | Age: 47
End: 2022-08-23
Payer: COMMERCIAL

## 2022-08-23 ENCOUNTER — LAB (OUTPATIENT)
Dept: ONCOLOGY | Facility: CLINIC | Age: 47
End: 2022-08-23
Payer: COMMERCIAL

## 2022-08-23 VITALS
OXYGEN SATURATION: 100 % | WEIGHT: 134.3 LBS | BODY MASS INDEX: 23.05 KG/M2 | SYSTOLIC BLOOD PRESSURE: 110 MMHG | DIASTOLIC BLOOD PRESSURE: 70 MMHG | TEMPERATURE: 97.6 F | RESPIRATION RATE: 16 BRPM | HEART RATE: 74 BPM

## 2022-08-23 DIAGNOSIS — C50.812 MALIGNANT NEOPLASM OF OVERLAPPING SITES OF LEFT BREAST IN FEMALE, ESTROGEN RECEPTOR POSITIVE (H): Primary | ICD-10-CM

## 2022-08-23 DIAGNOSIS — C50.912 CARCINOMA OF LEFT BREAST METASTATIC TO AXILLARY LYMPH NODE (H): ICD-10-CM

## 2022-08-23 DIAGNOSIS — D61.810 ANTINEOPLASTIC CHEMOTHERAPY INDUCED PANCYTOPENIA (H): Primary | ICD-10-CM

## 2022-08-23 DIAGNOSIS — C77.3 CARCINOMA OF LEFT BREAST METASTATIC TO AXILLARY LYMPH NODE (H): ICD-10-CM

## 2022-08-23 DIAGNOSIS — T45.1X5A ANTINEOPLASTIC CHEMOTHERAPY INDUCED PANCYTOPENIA (H): Primary | ICD-10-CM

## 2022-08-23 DIAGNOSIS — T45.1X5A ANTINEOPLASTIC CHEMOTHERAPY INDUCED PANCYTOPENIA (H): ICD-10-CM

## 2022-08-23 DIAGNOSIS — Z17.0 MALIGNANT NEOPLASM OF OVERLAPPING SITES OF LEFT BREAST IN FEMALE, ESTROGEN RECEPTOR POSITIVE (H): Primary | ICD-10-CM

## 2022-08-23 DIAGNOSIS — D61.810 ANTINEOPLASTIC CHEMOTHERAPY INDUCED PANCYTOPENIA (H): ICD-10-CM

## 2022-08-23 DIAGNOSIS — C50.912 CARCINOMA OF LEFT BREAST METASTATIC TO AXILLARY LYMPH NODE (H): Primary | ICD-10-CM

## 2022-08-23 DIAGNOSIS — C77.3 CARCINOMA OF LEFT BREAST METASTATIC TO AXILLARY LYMPH NODE (H): Primary | ICD-10-CM

## 2022-08-23 LAB
BASOPHILS # BLD AUTO: 0.1 10E3/UL (ref 0–0.2)
BASOPHILS NFR BLD AUTO: 1 %
EOSINOPHIL # BLD AUTO: 0.3 10E3/UL (ref 0–0.7)
EOSINOPHIL NFR BLD AUTO: 7 %
ERYTHROCYTE [DISTWIDTH] IN BLOOD BY AUTOMATED COUNT: 14.6 % (ref 10–15)
FOLATE SERPL-MCNC: 11.6 NG/ML (ref 4.6–34.8)
HCT VFR BLD AUTO: 30.4 % (ref 35–47)
HGB BLD-MCNC: 10.1 G/DL (ref 11.7–15.7)
IMM GRANULOCYTES # BLD: 0 10E3/UL
IMM GRANULOCYTES NFR BLD: 1 %
LYMPHOCYTES # BLD AUTO: 1.5 10E3/UL (ref 0.8–5.3)
LYMPHOCYTES NFR BLD AUTO: 39 %
MCH RBC QN AUTO: 29.1 PG (ref 26.5–33)
MCHC RBC AUTO-ENTMCNC: 33.2 G/DL (ref 31.5–36.5)
MCV RBC AUTO: 88 FL (ref 78–100)
MONOCYTES # BLD AUTO: 0.3 10E3/UL (ref 0–1.3)
MONOCYTES NFR BLD AUTO: 8 %
NEUTROPHILS # BLD AUTO: 1.8 10E3/UL (ref 1.6–8.3)
NEUTROPHILS NFR BLD AUTO: 44 %
NRBC # BLD AUTO: 0 10E3/UL
NRBC BLD AUTO-RTO: 0 /100
PLATELET # BLD AUTO: 240 10E3/UL (ref 150–450)
RBC # BLD AUTO: 3.47 10E6/UL (ref 3.8–5.2)
VIT B12 SERPL-MCNC: 1653 PG/ML (ref 232–1245)
WBC # BLD AUTO: 4 10E3/UL (ref 4–11)

## 2022-08-23 PROCEDURE — 99207 PR NO CHARGE LOS: CPT | Performed by: NURSE PRACTITIONER

## 2022-08-23 PROCEDURE — 99207 PR NO CHARGE NURSE ONLY: CPT

## 2022-08-23 PROCEDURE — 99215 OFFICE O/P EST HI 40 MIN: CPT | Mod: 25 | Performed by: INTERNAL MEDICINE

## 2022-08-23 PROCEDURE — 82746 ASSAY OF FOLIC ACID SERUM: CPT | Performed by: NURSE PRACTITIONER

## 2022-08-23 PROCEDURE — 36591 DRAW BLOOD OFF VENOUS DEVICE: CPT | Performed by: INTERNAL MEDICINE

## 2022-08-23 PROCEDURE — 82607 VITAMIN B-12: CPT | Performed by: NURSE PRACTITIONER

## 2022-08-23 PROCEDURE — 85025 COMPLETE CBC W/AUTO DIFF WBC: CPT | Performed by: INTERNAL MEDICINE

## 2022-08-23 PROCEDURE — 96413 CHEMO IV INFUSION 1 HR: CPT | Performed by: NURSE PRACTITIONER

## 2022-08-23 PROCEDURE — 96375 TX/PRO/DX INJ NEW DRUG ADDON: CPT | Performed by: NURSE PRACTITIONER

## 2022-08-23 RX ORDER — HEPARIN SODIUM (PORCINE) LOCK FLUSH IV SOLN 100 UNIT/ML 100 UNIT/ML
5 SOLUTION INTRAVENOUS
Status: DISCONTINUED | OUTPATIENT
Start: 2022-08-23 | End: 2022-08-23 | Stop reason: HOSPADM

## 2022-08-23 RX ORDER — HEPARIN SODIUM (PORCINE) LOCK FLUSH IV SOLN 100 UNIT/ML 100 UNIT/ML
500 SOLUTION INTRAVENOUS ONCE
Status: COMPLETED | OUTPATIENT
Start: 2022-08-23 | End: 2022-08-23

## 2022-08-23 RX ORDER — ONDANSETRON 2 MG/ML
8 INJECTION INTRAMUSCULAR; INTRAVENOUS ONCE
Status: COMPLETED | OUTPATIENT
Start: 2022-08-23 | End: 2022-08-23

## 2022-08-23 RX ADMIN — HEPARIN SODIUM (PORCINE) LOCK FLUSH IV SOLN 100 UNIT/ML 5 ML: 100 SOLUTION at 10:36

## 2022-08-23 RX ADMIN — ONDANSETRON 8 MG: 2 INJECTION INTRAMUSCULAR; INTRAVENOUS at 08:49

## 2022-08-23 RX ADMIN — Medication 250 ML: at 08:48

## 2022-08-23 RX ADMIN — HEPARIN SODIUM (PORCINE) LOCK FLUSH IV SOLN 100 UNIT/ML 500 UNITS: 100 SOLUTION at 07:24

## 2022-08-23 ASSESSMENT — PAIN SCALES - GENERAL: PAINLEVEL: NO PAIN (0)

## 2022-08-23 NOTE — PROGRESS NOTES
[facility] Hematology / Oncology  Progress Note  Name: Betty Rogers  :  1975    MRN:  [id number]    --------------------    Assessment / Plan:  Multifocal, locally advanced, hormone positive, HER2 negative left breast ductal carcinoma:    Proceed with week 6 Taxol; planning 12 weeks Taxol; missed 1 due to neutropenia but not planning to make up at this time.  Taxol dose adjusted to 75 mg per metered squared; not planning G-CSF at this time; counseling.  Planning breast MRI conclusion of 12 weeks Taxol likely followed by dose dense Adriamycin/Cytoxan.  Handling treatment very well.    Hoang Franco MD    --------------------    Interval History:  Betty returns for follow up of breast cancer.  No nausea or vomiting.  No mouth sores.  No neuropathy.  No bowel changes.  Alopecia has set in.  Prolonged periods of starting to lighten up and resolved.    --------------------    Physical Exam:  VS: /70   Pulse 74   Temp 97.6  F (36.4  C)   Resp 16   Wt 60.9 kg (134 lb 4.8 oz)   SpO2 100%   BMI 23.05 kg/m    GEN: Well appearing.    Labs / Imaging:  We reviewed CBC, CMP.

## 2022-08-23 NOTE — LETTER
2022         RE: Betty Rogers  [address]  [place] [facility] [address]        Dear Colleague,    Thank you for referring your patient, Betty Rogers, to the [facility] [place]. Please see a copy of my visit note below.    [facility] Hematology / Oncology  Progress Note  Name: Betty Rogers  :  1975    MRN:  [id number]    --------------------    Assessment / Plan:  Multifocal, locally advanced, hormone positive, HER2 negative left breast ductal carcinoma:    Proceed with week 6 Taxol; planning 12 weeks Taxol; missed 1 due to neutropenia but not planning to make up at this time.  Taxol dose adjusted to 75 mg per metered squared; not planning G-CSF at this time; counseling.  Planning breast MRI conclusion of 12 weeks Taxol likely followed by dose dense Adriamycin/Cytoxan.  Handling treatment very well.    Hoang Franco MD    --------------------    Interval History:  Betty returns for follow up of breast cancer.  No nausea or vomiting.  No mouth sores.  No neuropathy.  No bowel changes.  Alopecia has set in.  Prolonged periods of starting to lighten up and resolved.    --------------------    Physical Exam:  VS: /70   Pulse 74   Temp 97.6  F (36.4  C)   Resp 16   Wt 60.9 kg (134 lb 4.8 oz)   SpO2 100%   BMI 23.05 kg/m    GEN: Well appearing.    Labs / Imaging:  We reviewed CBC, CMP.      Again, thank you for allowing me to participate in the care of your patient.        Sincerely,        Hoang Franco MD

## 2022-08-23 NOTE — PATIENT INSTRUCTIONS
1) Breast MRI at conclusion of chemo.  2) Please add time for BJT w/ AC and labs after breast MRI.    Hoang Franco MD.

## 2022-08-23 NOTE — PROGRESS NOTES
SPIRITUAL HEALTH SERVICES Progress Note  [facility] - Infusion    Request: Visit for emotional support, intro  Services.    Upon my introduction, Betty shared that she feels she's doing very well, that she is a  and has the training to know what she needs. She said she has a good support network and relies on her adelina. Betty named her divorce a few years ago as building her inner awareness and resilience.    I offered emotional support, invited her to reach out to  as desired, and said a blessing.     remains available.    Rev Gege Jimenez  [facility] Phone Line [phone]  [place] (Tuesdays & Thursdays) [phone]

## 2022-08-23 NOTE — NURSING NOTE
"Oncology Rooming Note    August 23, 2022 8:07 AM   Betty Rogers is a 47 year old female who presents for:    Chief Complaint   Patient presents with     Oncology Clinic Visit     Follow up prior to infusion     Initial Vitals: /70   Pulse 74   Temp 97.6  F (36.4  C)   Resp 16   Wt 60.9 kg (134 lb 4.8 oz)   SpO2 100%   BMI 23.05 kg/m   Estimated body mass index is 23.05 kg/m  as calculated from the following:    Height as of 7/25/22: 1.626 m (5' 4\").    Weight as of this encounter: 60.9 kg (134 lb 4.8 oz). Body surface area is 1.66 meters squared.  No Pain (0) Comment: Data Unavailable   No LMP recorded.  Allergies reviewed: Yes  Medications reviewed: Yes    Medications: Medication refills not needed today.  Pharmacy name entered into [facility]:    [id number] [address]  [facility] [address] [place] [facility] [id number] [place] [address]    Clinical concerns: on day 11 of period, feeling well.  MD notified.       Yu Azar RN              "

## 2022-08-23 NOTE — PROGRESS NOTES
Infusion Nursing Note:  Betty Rogers presents today for C6D1 Paclitaxel.    Patient seen by provider today: No   present during visit today: Not Applicable.    Note: Patient reports feeling well overall today. States she has been tolerating chemotherapy well.    Intravenous Access:  Implanted Port.    Treatment Conditions:  Lab Results   Component Value Date    HGB 10.1 (L) 08/23/2022    WBC 4.0 08/23/2022    ANEU 0.6 (L) 08/08/2022    ANEUTAUTO 1.8 08/23/2022     08/23/2022      Results reviewed, labs MET treatment parameters, ok to proceed with treatment.    Post Infusion Assessment:  Patient tolerated infusion without incident.  Blood return noted pre and post infusion.  Site patent and intact, free from redness, edema or discomfort.  No evidence of extravasations.  Access discontinued per protocol.     Discharge Plan:   AVS to patient via MYCHART.  Patient will return 8/29/2022 for next appointment.   Patient discharged in stable condition accompanied by: self.  Departure Mode: Ambulatory.      Josefina Hamilton RN

## 2022-08-24 DIAGNOSIS — C50.212 MALIGNANT NEOPLASM OF UPPER-INNER QUADRANT OF LEFT BREAST IN FEMALE, ESTROGEN RECEPTOR POSITIVE (H): Primary | ICD-10-CM

## 2022-08-24 DIAGNOSIS — Z17.0 MALIGNANT NEOPLASM OF UPPER-INNER QUADRANT OF LEFT BREAST IN FEMALE, ESTROGEN RECEPTOR POSITIVE (H): Primary | ICD-10-CM

## 2022-08-25 ENCOUNTER — PATIENT OUTREACH (OUTPATIENT)
Dept: ONCOLOGY | Facility: CLINIC | Age: 47
End: 2022-08-25

## 2022-08-29 ENCOUNTER — LAB (OUTPATIENT)
Dept: ONCOLOGY | Facility: CLINIC | Age: 47
End: 2022-08-29
Payer: COMMERCIAL

## 2022-08-29 ENCOUNTER — INFUSION THERAPY VISIT (OUTPATIENT)
Dept: INFUSION THERAPY | Facility: CLINIC | Age: 47
End: 2022-08-29

## 2022-08-29 VITALS
HEART RATE: 76 BPM | RESPIRATION RATE: 16 BRPM | OXYGEN SATURATION: 100 % | SYSTOLIC BLOOD PRESSURE: 99 MMHG | WEIGHT: 134.4 LBS | BODY MASS INDEX: 23.07 KG/M2 | TEMPERATURE: 98.9 F | DIASTOLIC BLOOD PRESSURE: 61 MMHG

## 2022-08-29 DIAGNOSIS — C77.3 CARCINOMA OF LEFT BREAST METASTATIC TO AXILLARY LYMPH NODE (H): Primary | ICD-10-CM

## 2022-08-29 DIAGNOSIS — C50.912 CARCINOMA OF LEFT BREAST METASTATIC TO AXILLARY LYMPH NODE (H): Primary | ICD-10-CM

## 2022-08-29 LAB
BASOPHILS # BLD AUTO: 0 10E3/UL (ref 0–0.2)
BASOPHILS NFR BLD AUTO: 1 %
EOSINOPHIL # BLD AUTO: 0.2 10E3/UL (ref 0–0.7)
EOSINOPHIL NFR BLD AUTO: 6 %
ERYTHROCYTE [DISTWIDTH] IN BLOOD BY AUTOMATED COUNT: 15 % (ref 10–15)
HCT VFR BLD AUTO: 29.7 % (ref 35–47)
HGB BLD-MCNC: 9.3 G/DL (ref 11.7–15.7)
IMM GRANULOCYTES # BLD: 0 10E3/UL
IMM GRANULOCYTES NFR BLD: 0 %
LYMPHOCYTES # BLD AUTO: 1.3 10E3/UL (ref 0.8–5.3)
LYMPHOCYTES NFR BLD AUTO: 41 %
MCH RBC QN AUTO: 28.5 PG (ref 26.5–33)
MCHC RBC AUTO-ENTMCNC: 31.3 G/DL (ref 31.5–36.5)
MCV RBC AUTO: 91 FL (ref 78–100)
MONOCYTES # BLD AUTO: 0.2 10E3/UL (ref 0–1.3)
MONOCYTES NFR BLD AUTO: 6 %
NEUTROPHILS # BLD AUTO: 1.4 10E3/UL (ref 1.6–8.3)
NEUTROPHILS NFR BLD AUTO: 46 %
NRBC # BLD AUTO: 0 10E3/UL
NRBC BLD AUTO-RTO: 0 /100
PLATELET # BLD AUTO: 222 10E3/UL (ref 150–450)
RBC # BLD AUTO: 3.26 10E6/UL (ref 3.8–5.2)
WBC # BLD AUTO: 3.1 10E3/UL (ref 4–11)

## 2022-08-29 PROCEDURE — 85025 COMPLETE CBC W/AUTO DIFF WBC: CPT | Performed by: INTERNAL MEDICINE

## 2022-08-29 PROCEDURE — 96413 CHEMO IV INFUSION 1 HR: CPT | Performed by: NURSE PRACTITIONER

## 2022-08-29 PROCEDURE — 99207 PR NO CHARGE LOS: CPT

## 2022-08-29 PROCEDURE — 96375 TX/PRO/DX INJ NEW DRUG ADDON: CPT | Performed by: NURSE PRACTITIONER

## 2022-08-29 RX ORDER — HEPARIN SODIUM (PORCINE) LOCK FLUSH IV SOLN 100 UNIT/ML 100 UNIT/ML
5 SOLUTION INTRAVENOUS EVERY 8 HOURS
Status: DISCONTINUED | OUTPATIENT
Start: 2022-08-29 | End: 2022-08-29 | Stop reason: HOSPADM

## 2022-08-29 RX ORDER — HEPARIN SODIUM (PORCINE) LOCK FLUSH IV SOLN 100 UNIT/ML 100 UNIT/ML
5 SOLUTION INTRAVENOUS
Status: DISCONTINUED | OUTPATIENT
Start: 2022-08-29 | End: 2022-08-29 | Stop reason: HOSPADM

## 2022-08-29 RX ORDER — ONDANSETRON 2 MG/ML
8 INJECTION INTRAMUSCULAR; INTRAVENOUS ONCE
Status: COMPLETED | OUTPATIENT
Start: 2022-08-29 | End: 2022-08-29

## 2022-08-29 RX ADMIN — HEPARIN SODIUM (PORCINE) LOCK FLUSH IV SOLN 100 UNIT/ML 5 ML: 100 SOLUTION at 09:33

## 2022-08-29 RX ADMIN — Medication 250 ML: at 08:17

## 2022-08-29 RX ADMIN — HEPARIN SODIUM (PORCINE) LOCK FLUSH IV SOLN 100 UNIT/ML 5 ML: 100 SOLUTION at 07:48

## 2022-08-29 RX ADMIN — ONDANSETRON 8 MG: 2 INJECTION INTRAMUSCULAR; INTRAVENOUS at 08:20

## 2022-08-29 ASSESSMENT — PAIN SCALES - GENERAL: PAINLEVEL: NO PAIN (0)

## 2022-08-29 NOTE — PROGRESS NOTES
Infusion Nursing Note:  Betty Rogers presents today for C7 Taxol.    Patient seen by provider today: No   present during visit today: Not Applicable.    Note: patient tolerating Taxol very well.  Pleasantly surprised with this.  Does have less stamina than normal when exercising, but otherwise no symptoms.    Intravenous Access:  Implanted Port.    Treatment Conditions:  Results reviewed, labs MET treatment parameters, ok to proceed with treatment.    Post Infusion Assessment:  Patient tolerated infusion without incident.     Discharge Plan:   RTC 9/6 as scheduled for next chemo.      JAMES AYALA RN

## 2022-08-29 NOTE — PROGRESS NOTES
"Patient's name and  were verified.  See Doc Flowsheet - IV assess for details.  IVAD accessed with 20G 3/4\" riggs gripper plus needle  blood return positive: YES  Site without redness, tenderness or swelling: YES  flushed with 30cc NS and 5cc 100u/ml heparin  Needle: left intact for chemotherapy  Comments: Labs drawn.  Patient tolerated procedure without incident.    Wilfred Urrutia  RN, BSN      "

## 2022-09-06 ENCOUNTER — INFUSION THERAPY VISIT (OUTPATIENT)
Dept: INFUSION THERAPY | Facility: CLINIC | Age: 47
End: 2022-09-06
Payer: COMMERCIAL

## 2022-09-06 ENCOUNTER — LAB (OUTPATIENT)
Dept: ONCOLOGY | Facility: CLINIC | Age: 47
End: 2022-09-06
Payer: COMMERCIAL

## 2022-09-06 VITALS
TEMPERATURE: 98.4 F | WEIGHT: 136 LBS | BODY MASS INDEX: 23.34 KG/M2 | HEART RATE: 62 BPM | OXYGEN SATURATION: 100 % | SYSTOLIC BLOOD PRESSURE: 112 MMHG | DIASTOLIC BLOOD PRESSURE: 49 MMHG

## 2022-09-06 DIAGNOSIS — C77.3 CARCINOMA OF LEFT BREAST METASTATIC TO AXILLARY LYMPH NODE (H): Primary | ICD-10-CM

## 2022-09-06 DIAGNOSIS — C50.912 CARCINOMA OF LEFT BREAST METASTATIC TO AXILLARY LYMPH NODE (H): Primary | ICD-10-CM

## 2022-09-06 LAB
ALBUMIN SERPL-MCNC: 3.6 G/DL (ref 3.4–5)
ALP SERPL-CCNC: 40 U/L (ref 40–150)
ALT SERPL W P-5'-P-CCNC: 31 U/L (ref 0–50)
AST SERPL W P-5'-P-CCNC: 24 U/L (ref 0–45)
BASOPHILS # BLD AUTO: 0 10E3/UL (ref 0–0.2)
BASOPHILS NFR BLD AUTO: 1 %
BILIRUB DIRECT SERPL-MCNC: 0.1 MG/DL (ref 0–0.2)
BILIRUB SERPL-MCNC: 0.4 MG/DL (ref 0.2–1.3)
EOSINOPHIL # BLD AUTO: 0.2 10E3/UL (ref 0–0.7)
EOSINOPHIL NFR BLD AUTO: 6 %
ERYTHROCYTE [DISTWIDTH] IN BLOOD BY AUTOMATED COUNT: 15.6 % (ref 10–15)
HCT VFR BLD AUTO: 32.1 % (ref 35–47)
HGB BLD-MCNC: 10.1 G/DL (ref 11.7–15.7)
IMM GRANULOCYTES # BLD: 0 10E3/UL
IMM GRANULOCYTES NFR BLD: 0 %
LYMPHOCYTES # BLD AUTO: 1.3 10E3/UL (ref 0.8–5.3)
LYMPHOCYTES NFR BLD AUTO: 37 %
MCH RBC QN AUTO: 29 PG (ref 26.5–33)
MCHC RBC AUTO-ENTMCNC: 31.5 G/DL (ref 31.5–36.5)
MCV RBC AUTO: 92 FL (ref 78–100)
MONOCYTES # BLD AUTO: 0.3 10E3/UL (ref 0–1.3)
MONOCYTES NFR BLD AUTO: 8 %
NEUTROPHILS # BLD AUTO: 1.6 10E3/UL (ref 1.6–8.3)
NEUTROPHILS NFR BLD AUTO: 48 %
NRBC # BLD AUTO: 0 10E3/UL
NRBC BLD AUTO-RTO: 0 /100
PLATELET # BLD AUTO: 269 10E3/UL (ref 150–450)
PROT SERPL-MCNC: 6.4 G/DL (ref 6.8–8.8)
RBC # BLD AUTO: 3.48 10E6/UL (ref 3.8–5.2)
WBC # BLD AUTO: 3.5 10E3/UL (ref 4–11)

## 2022-09-06 PROCEDURE — 36591 DRAW BLOOD OFF VENOUS DEVICE: CPT | Performed by: INTERNAL MEDICINE

## 2022-09-06 PROCEDURE — 96413 CHEMO IV INFUSION 1 HR: CPT | Performed by: NURSE PRACTITIONER

## 2022-09-06 PROCEDURE — 96375 TX/PRO/DX INJ NEW DRUG ADDON: CPT | Performed by: NURSE PRACTITIONER

## 2022-09-06 PROCEDURE — 99207 PR NO CHARGE NURSE ONLY: CPT

## 2022-09-06 PROCEDURE — 80076 HEPATIC FUNCTION PANEL: CPT | Performed by: INTERNAL MEDICINE

## 2022-09-06 PROCEDURE — 85025 COMPLETE CBC W/AUTO DIFF WBC: CPT | Performed by: INTERNAL MEDICINE

## 2022-09-06 PROCEDURE — 99207 PR NO CHARGE LOS: CPT

## 2022-09-06 RX ORDER — HEPARIN SODIUM (PORCINE) LOCK FLUSH IV SOLN 100 UNIT/ML 100 UNIT/ML
5 SOLUTION INTRAVENOUS
Status: DISCONTINUED | OUTPATIENT
Start: 2022-09-06 | End: 2022-09-06 | Stop reason: HOSPADM

## 2022-09-06 RX ORDER — ONDANSETRON 2 MG/ML
8 INJECTION INTRAMUSCULAR; INTRAVENOUS ONCE
Status: COMPLETED | OUTPATIENT
Start: 2022-09-06 | End: 2022-09-06

## 2022-09-06 RX ORDER — HEPARIN SODIUM (PORCINE) LOCK FLUSH IV SOLN 100 UNIT/ML 100 UNIT/ML
500 SOLUTION INTRAVENOUS
Status: DISCONTINUED | OUTPATIENT
Start: 2022-09-06 | End: 2022-09-06 | Stop reason: HOSPADM

## 2022-09-06 RX ADMIN — Medication 250 ML: at 15:20

## 2022-09-06 RX ADMIN — HEPARIN SODIUM (PORCINE) LOCK FLUSH IV SOLN 100 UNIT/ML 5 ML: 100 SOLUTION at 16:49

## 2022-09-06 RX ADMIN — ONDANSETRON 8 MG: 2 INJECTION INTRAMUSCULAR; INTRAVENOUS at 15:21

## 2022-09-06 RX ADMIN — HEPARIN SODIUM (PORCINE) LOCK FLUSH IV SOLN 100 UNIT/ML 500 UNITS: 100 SOLUTION at 14:21

## 2022-09-06 ASSESSMENT — PAIN SCALES - GENERAL: PAINLEVEL: NO PAIN (0)

## 2022-09-06 NOTE — PROGRESS NOTES
Infusion Nursing Note:  Betty Pulidoman presents today for C8D1 Paclitaxel.    Patient seen by provider today: No   present during visit today: Not Applicable.    Note: Pt denies any new medical complaints, see flow sheet for assessment.    Intravenous Access:  Implanted Port.    Treatment Conditions:  Lab Results   Component Value Date    HGB 10.1 (L) 09/06/2022    WBC 3.5 (L) 09/06/2022    ANEU 0.6 (L) 08/08/2022    ANEUTAUTO 1.6 09/06/2022     09/06/2022      Results reviewed, labs MET treatment parameters, ok to proceed with treatment.    Post Infusion Assessment:  Patient tolerated infusion without incident.  Blood return noted pre and post infusion.  Site patent and intact, free from redness, edema or discomfort.  No evidence of extravasations.  Access discontinued per protocol.     Discharge Plan:   Patient discharged in stable condition accompanied by: self.  Departure Mode: Ambulatory.  Pt will RTC 9/12/22 for C9D1 Taxol.      Wilfred Urrutia RN

## 2022-09-06 NOTE — PROGRESS NOTES
"Patient's name and  were verified.  See Doc Flowsheet - IV assess for details.  IVAD accessed with 20G 3/\" riggs gripper plus needle  blood return positive: YES  Site without redness, tenderness or swelling: YES  flushed with 20cc NS and 5cc 100u/ml heparin  Needle: left accessed for infusion appointment  Comments: Patient's port accessed using sterile procedure. Blood return noted. Lab tubes drawn, labeled and sent to lab. Patient tolerated lab draw well.     Josefina Hamilton RN, BSN  "

## 2022-09-08 ENCOUNTER — MYC MEDICAL ADVICE (OUTPATIENT)
Dept: ONCOLOGY | Facility: CLINIC | Age: 47
End: 2022-09-08

## 2022-09-08 NOTE — TELEPHONE ENCOUNTER
10/3/22 week 12 Taxol scheduled. Week 4 Taxol was cancelled due to neutropenia, so technically this will be the 11th Taxol treatment.    10/4/22 scheduled to see Dr South.    Routing to Dr Franco to assist with patient's questions:  1) Okay for MRI week 11 of Taxol (which is really 10 infusions.)  2) Should patient get an additional week of Taxol    Marly Stoner RN on 9/8/2022 at 10:48 AM

## 2022-09-09 NOTE — PROGRESS NOTES
Hematology/Oncology Visit    Oncologist: Dr. Franco  Diagnosis: multifocal locally advanced left breast invasive ductal carcinoma, ER/HI+  Reason for visit: visit prior to cycle 9 weekly taxol    Cancer and Treatment Hx:  May- Jun 2022: screening mammogram with left breast architectural distortion at 12 o'clock. Diagnostic mammo with US revealed an irregular hypoechoic mass measuring 3.7cm with surrounding ill defined shadowing/hypoechogenicity and lymph node with thickened cortex. Breast biopsy of 12 o'clock 6cm from nipple revealed invasive ductal carcinoma, grade 2, ER+/HI+ HER2-. Breast biopsy of 9 o'clock position revealed DCIS, grade 3. Breast MRI with extreme fibroglandular tissue in both breasts. Biopsy-proven malignancy left breast 12 o'clock position corresponds with a 4.6cm irregular shaped enhancing mass with washout kinetics with surrounding non-mass-like enhancement and multiple small subcentimeter satellite masses (5.1cm total area). Biopsy-proven DCIS at 9 o'clock position showed minimal non-mass enhancement measuring up to 2 cm. Central breast at the 6 o'clock position there is a suspicious 8 mm mass with indistinct margins and washout kinetics. Additional suspicious mass at the 10 o'clock position middle depth measuring 5 mm. Couple prominent level 1 axillary nodes, no internal mammary chain adenopathy. Lymph node biopsy confirmed metastasis.  Jul 2022: Oncotype score of 28, intermediate risk, chemo benefit cannot be excluded  Jul 2022: weekly neoadjuvant taxol    Interval History:  Betty continues to do very well on treatment with taxol. No specific complaints. No peripheral neuropathy. Eating and drinking well, normal appetite and no nausea. Normal bowel movements. Felt a little more winded with activity when her dose of taxol was increased again but feels her energy level is good overall. Fells the breast mass when she showers and thinks it may be slightly smaller since starting treatment,  "unsure if it is softer at all. No questions or concerns today.    Medications, imaging, and labs:  Reviewed pertinent medications, no refills needed today.    Reviewed labs from 9/12/22:   09/12/22   WBC 2.8 (L)   Hemoglobin 9.8 (L)   Platelet Count 217   Absolute Neutrophils 1.2 (L)     Reviewed read of MRI breast from 6/16/22:  Impression: BI-RADS CATEGORY: 6 - Known Biopsy-Proven  Malignancy-Appropriate Action Should Be Taken.  1. Findings are consistent with multicentric malignancy. Specifically,  index lesion at 12:00 with multiple small satellite masses and  surrounding nonmass enhancement measuring up to 5.1 cm. In addition,  there are suspicious subcentimeter masses at the 6:00 and 10:00  positions. Minimal nonmass enhancement at the site of biopsy-proven DCIS at 9:00.  2. No MRI evidence for malignancy on the right.    Physical Exam:  GEN: pleasantly conversant female no acute distress  SKIN: generally intact, no visible rash, bruising, or sores   ENT: eyes non-icteric, no notable oral sores  LYMPH: no notable cervical, supraclavicular, or notable axillary lymphadenopathy  L BREAST: 12:00 5ish cm from nipple is a slightly firm 2-3cm tall mass, difficult to discern lateral edges. No concerning skin changes.  RESP: clear to auscultation bilaterally, on room air  CARDS: regular rate and rhythm, no notable murmurs   GI: abd soft without notable hepatosplenomegaly, non-tender to palpation  MSK: no notable lower extremity edema  NEURO: alert and oriented without obvious focal deficit  /74 (BP Location: Right arm, Patient Position: Chair, Cuff Size: Adult Regular)   Pulse 61   Temp 98  F (36.7  C) (Oral)   Ht 1.626 m (5' 4\")   Wt 61.2 kg (135 lb)   SpO2 100%   BMI 23.17 kg/m       Wt Readings from Last 4 Encounters:   09/12/22 61.2 kg (135 lb)   09/06/22 61.7 kg (136 lb)   08/29/22 61 kg (134 lb 6.4 oz)   08/23/22 60.9 kg (134 lb 4.8 oz)     Assessment and Plan:  Multifocal locally advanced left breast " invasive ductal carcinoma, ER/HI+  Anemia secondary to chemotherapy  Neutropenia secondary to chemotherapy  - Continues on neoadjuvant weekly taxol x12, tolerating well aside from neutropenia   - 1 dose (C4) skipped 8/8 for neutropenia   - Dose reduced 70mg/m2 starting cycle 5 then next cycle increased to 75mg/m2  - Meeting goals and labs appropriate for cycle 9 later today  - Oncology provider every 3 weeks, sooner if necessary  - Breast MRI after completing taxol likely followed by 4 cycles AC  - Surgical follow-up with Dr. South on 10/4  - Oncology provider follow up prior to each cycle of AC        The total time of this encounter amounted to 40 minutes today. This time includes face-to-face time spent with the patient, prep work, ordering tests, and performing post-visit documentation.  - Mary Green, CNP

## 2022-09-12 ENCOUNTER — INFUSION THERAPY VISIT (OUTPATIENT)
Dept: INFUSION THERAPY | Facility: CLINIC | Age: 47
End: 2022-09-12
Payer: COMMERCIAL

## 2022-09-12 ENCOUNTER — ONCOLOGY VISIT (OUTPATIENT)
Dept: ONCOLOGY | Facility: CLINIC | Age: 47
End: 2022-09-12
Payer: COMMERCIAL

## 2022-09-12 ENCOUNTER — LAB (OUTPATIENT)
Dept: ONCOLOGY | Facility: CLINIC | Age: 47
End: 2022-09-12
Payer: COMMERCIAL

## 2022-09-12 VITALS
HEART RATE: 61 BPM | SYSTOLIC BLOOD PRESSURE: 113 MMHG | BODY MASS INDEX: 23.05 KG/M2 | HEIGHT: 64 IN | TEMPERATURE: 98 F | OXYGEN SATURATION: 100 % | WEIGHT: 135 LBS | DIASTOLIC BLOOD PRESSURE: 74 MMHG

## 2022-09-12 DIAGNOSIS — D64.81 ANEMIA ASSOCIATED WITH CHEMOTHERAPY: ICD-10-CM

## 2022-09-12 DIAGNOSIS — C50.912 CARCINOMA OF LEFT BREAST METASTATIC TO AXILLARY LYMPH NODE (H): Primary | ICD-10-CM

## 2022-09-12 DIAGNOSIS — D70.1 CHEMOTHERAPY-INDUCED NEUTROPENIA (H): ICD-10-CM

## 2022-09-12 DIAGNOSIS — T45.1X5A ANEMIA ASSOCIATED WITH CHEMOTHERAPY: ICD-10-CM

## 2022-09-12 DIAGNOSIS — C77.3 CARCINOMA OF LEFT BREAST METASTATIC TO AXILLARY LYMPH NODE (H): Primary | ICD-10-CM

## 2022-09-12 DIAGNOSIS — T45.1X5A CHEMOTHERAPY-INDUCED NEUTROPENIA (H): ICD-10-CM

## 2022-09-12 LAB
BASOPHILS # BLD AUTO: 0.1 10E3/UL (ref 0–0.2)
BASOPHILS NFR BLD AUTO: 2 %
EOSINOPHIL # BLD AUTO: 0.1 10E3/UL (ref 0–0.7)
EOSINOPHIL NFR BLD AUTO: 5 %
ERYTHROCYTE [DISTWIDTH] IN BLOOD BY AUTOMATED COUNT: 14.7 % (ref 10–15)
HCT VFR BLD AUTO: 30.9 % (ref 35–47)
HGB BLD-MCNC: 9.8 G/DL (ref 11.7–15.7)
IMM GRANULOCYTES # BLD: 0 10E3/UL
IMM GRANULOCYTES NFR BLD: 0 %
LYMPHOCYTES # BLD AUTO: 1.2 10E3/UL (ref 0.8–5.3)
LYMPHOCYTES NFR BLD AUTO: 44 %
MCH RBC QN AUTO: 29.2 PG (ref 26.5–33)
MCHC RBC AUTO-ENTMCNC: 31.7 G/DL (ref 31.5–36.5)
MCV RBC AUTO: 92 FL (ref 78–100)
MONOCYTES # BLD AUTO: 0.2 10E3/UL (ref 0–1.3)
MONOCYTES NFR BLD AUTO: 7 %
NEUTROPHILS # BLD AUTO: 1.2 10E3/UL (ref 1.6–8.3)
NEUTROPHILS NFR BLD AUTO: 42 %
NRBC # BLD AUTO: 0 10E3/UL
NRBC BLD AUTO-RTO: 0 /100
PLATELET # BLD AUTO: 217 10E3/UL (ref 150–450)
RBC # BLD AUTO: 3.36 10E6/UL (ref 3.8–5.2)
WBC # BLD AUTO: 2.8 10E3/UL (ref 4–11)

## 2022-09-12 PROCEDURE — 99207 PR NO CHARGE NURSE ONLY: CPT

## 2022-09-12 PROCEDURE — 36591 DRAW BLOOD OFF VENOUS DEVICE: CPT | Performed by: INTERNAL MEDICINE

## 2022-09-12 PROCEDURE — 96413 CHEMO IV INFUSION 1 HR: CPT | Performed by: NURSE PRACTITIONER

## 2022-09-12 PROCEDURE — 99215 OFFICE O/P EST HI 40 MIN: CPT | Mod: 25 | Performed by: NURSE PRACTITIONER

## 2022-09-12 PROCEDURE — 85025 COMPLETE CBC W/AUTO DIFF WBC: CPT | Performed by: INTERNAL MEDICINE

## 2022-09-12 PROCEDURE — 99207 PR NO CHARGE LOS: CPT

## 2022-09-12 RX ORDER — HEPARIN SODIUM (PORCINE) LOCK FLUSH IV SOLN 100 UNIT/ML 100 UNIT/ML
500 SOLUTION INTRAVENOUS
Status: DISCONTINUED | OUTPATIENT
Start: 2022-09-12 | End: 2022-09-12 | Stop reason: HOSPADM

## 2022-09-12 RX ORDER — HEPARIN SODIUM (PORCINE) LOCK FLUSH IV SOLN 100 UNIT/ML 100 UNIT/ML
5 SOLUTION INTRAVENOUS
Status: DISCONTINUED | OUTPATIENT
Start: 2022-09-12 | End: 2022-09-12 | Stop reason: HOSPADM

## 2022-09-12 RX ADMIN — HEPARIN SODIUM (PORCINE) LOCK FLUSH IV SOLN 100 UNIT/ML 500 UNITS: 100 SOLUTION at 07:41

## 2022-09-12 RX ADMIN — HEPARIN SODIUM (PORCINE) LOCK FLUSH IV SOLN 100 UNIT/ML 5 ML: 100 SOLUTION at 10:41

## 2022-09-12 RX ADMIN — Medication 250 ML: at 09:12

## 2022-09-12 ASSESSMENT — PAIN SCALES - GENERAL: PAINLEVEL: NO PAIN (0)

## 2022-09-12 NOTE — PROGRESS NOTES
Infusion Nursing Note:  Betty Rogers presents today for C9 Taxol .    Patient seen by provider today: Yes: Mary Green    present during visit today: Not Applicable.     Note: Doing treatment without the pre-med Zofran today to see how Betty tolerates the Taxol without it. Pharmacy notified of the plan for today.     Intravenous Access:  Implanted Port.    Treatment Conditions:  Lab Results   Component Value Date    HGB 9.8 (L) 09/12/2022    WBC 2.8 (L) 09/12/2022    ANEU 0.6 (L) 08/08/2022    ANEUTAUTO 1.2 (L) 09/12/2022     09/12/2022      Results reviewed, labs MET treatment parameters, ok to proceed with treatment.    Post Infusion Assessment:  Patient tolerated infusion without incident.  Blood return noted pre and post infusion.  Site patent and intact, free from redness, edema or discomfort.  No evidence of extravasations.  Access discontinued per protocol.     Discharge Plan:   Discharge instructions reviewed with: Patient.  Patient and/or family verbalized understanding of discharge instructions and all questions answered.  Patient discharged in stable condition accompanied by: self.  Departure Mode: Ambulatory.  Next apt verified 9/19.      Lea Metzger RN

## 2022-09-12 NOTE — NURSING NOTE
"Oncology Rooming Note    September 12, 2022 8:03 AM   Betty Rogers is a 47 year old female who presents for:    Chief Complaint   Patient presents with     Oncology Clinic Visit     Prior to tx     Initial Vitals: /74 (BP Location: Right arm, Patient Position: Chair, Cuff Size: Adult Regular)   Pulse 61   Temp 98  F (36.7  C) (Oral)   Ht 1.626 m (5' 4\")   Wt 61.2 kg (135 lb)   SpO2 100%   BMI 23.17 kg/m   Estimated body mass index is 23.17 kg/m  as calculated from the following:    Height as of this encounter: 1.626 m (5' 4\").    Weight as of this encounter: 61.2 kg (135 lb). Body surface area is 1.66 meters squared.  No Pain (0) Comment: Data Unavailable   No LMP recorded.  Allergies reviewed: Yes  Medications reviewed: Yes    Medications: Medication refills not needed today.  Pharmacy name entered into [facility]:    [id number] [address] [address] [address] [place] [facility] [address] [facility] [address]    Clinical concerns: NO Mary was notified.      Ailne Monahan CMA              "

## 2022-09-12 NOTE — PROGRESS NOTES
Port needle left for access for treatment. Sterile technique performed and maintained. Patient tolerated procedure well. Tubes drawn in rainbow order. Tubes labeled and double signed. Transparent dressing placed with use of tegarderm.     Rosa Isela Kate RN  [facility] Oncology/Infusion [place]

## 2022-09-19 ENCOUNTER — INFUSION THERAPY VISIT (OUTPATIENT)
Dept: INFUSION THERAPY | Facility: CLINIC | Age: 47
End: 2022-09-19

## 2022-09-19 ENCOUNTER — PATIENT OUTREACH (OUTPATIENT)
Dept: CARE COORDINATION | Facility: CLINIC | Age: 47
End: 2022-09-19

## 2022-09-19 ENCOUNTER — LAB (OUTPATIENT)
Dept: ONCOLOGY | Facility: CLINIC | Age: 47
End: 2022-09-19
Payer: COMMERCIAL

## 2022-09-19 VITALS
HEART RATE: 70 BPM | DIASTOLIC BLOOD PRESSURE: 71 MMHG | WEIGHT: 136.9 LBS | BODY MASS INDEX: 23.5 KG/M2 | TEMPERATURE: 98.6 F | SYSTOLIC BLOOD PRESSURE: 109 MMHG | OXYGEN SATURATION: 100 %

## 2022-09-19 DIAGNOSIS — C77.3 CARCINOMA OF LEFT BREAST METASTATIC TO AXILLARY LYMPH NODE (H): Primary | ICD-10-CM

## 2022-09-19 DIAGNOSIS — C50.912 CARCINOMA OF LEFT BREAST METASTATIC TO AXILLARY LYMPH NODE (H): Primary | ICD-10-CM

## 2022-09-19 LAB
BASOPHILS # BLD AUTO: 0 10E3/UL (ref 0–0.2)
BASOPHILS NFR BLD AUTO: 2 %
EOSINOPHIL # BLD AUTO: 0.2 10E3/UL (ref 0–0.7)
EOSINOPHIL NFR BLD AUTO: 7 %
ERYTHROCYTE [DISTWIDTH] IN BLOOD BY AUTOMATED COUNT: 15.5 % (ref 10–15)
HCT VFR BLD AUTO: 29.8 % (ref 35–47)
HGB BLD-MCNC: 9.7 G/DL (ref 11.7–15.7)
IMM GRANULOCYTES # BLD: 0 10E3/UL
IMM GRANULOCYTES NFR BLD: 0 %
LYMPHOCYTES # BLD AUTO: 1.3 10E3/UL (ref 0.8–5.3)
LYMPHOCYTES NFR BLD AUTO: 49 %
MCH RBC QN AUTO: 29.3 PG (ref 26.5–33)
MCHC RBC AUTO-ENTMCNC: 32.6 G/DL (ref 31.5–36.5)
MCV RBC AUTO: 90 FL (ref 78–100)
MONOCYTES # BLD AUTO: 0.2 10E3/UL (ref 0–1.3)
MONOCYTES NFR BLD AUTO: 9 %
NEUTROPHILS # BLD AUTO: 0.9 10E3/UL (ref 1.6–8.3)
NEUTROPHILS NFR BLD AUTO: 33 %
NRBC # BLD AUTO: 0 10E3/UL
NRBC BLD AUTO-RTO: 0 /100
PLATELET # BLD AUTO: 226 10E3/UL (ref 150–450)
RBC # BLD AUTO: 3.31 10E6/UL (ref 3.8–5.2)
WBC # BLD AUTO: 2.6 10E3/UL (ref 4–11)

## 2022-09-19 PROCEDURE — 99207 PR NO CHARGE LOS: CPT

## 2022-09-19 PROCEDURE — 96413 CHEMO IV INFUSION 1 HR: CPT | Performed by: NURSE PRACTITIONER

## 2022-09-19 PROCEDURE — 85025 COMPLETE CBC W/AUTO DIFF WBC: CPT | Performed by: INTERNAL MEDICINE

## 2022-09-19 RX ORDER — HEPARIN SODIUM (PORCINE) LOCK FLUSH IV SOLN 100 UNIT/ML 100 UNIT/ML
5 SOLUTION INTRAVENOUS
Status: DISCONTINUED | OUTPATIENT
Start: 2022-09-19 | End: 2022-09-19 | Stop reason: HOSPADM

## 2022-09-19 RX ORDER — HEPARIN SODIUM (PORCINE) LOCK FLUSH IV SOLN 100 UNIT/ML 100 UNIT/ML
500 SOLUTION INTRAVENOUS
Status: DISCONTINUED | OUTPATIENT
Start: 2022-09-19 | End: 2022-09-19 | Stop reason: HOSPADM

## 2022-09-19 RX ADMIN — HEPARIN SODIUM (PORCINE) LOCK FLUSH IV SOLN 100 UNIT/ML 500 UNITS: 100 SOLUTION at 07:38

## 2022-09-19 RX ADMIN — Medication 250 ML: at 08:05

## 2022-09-19 RX ADMIN — HEPARIN SODIUM (PORCINE) LOCK FLUSH IV SOLN 100 UNIT/ML 5 ML: 100 SOLUTION at 10:05

## 2022-09-19 ASSESSMENT — PAIN SCALES - GENERAL: PAINLEVEL: NO PAIN (0)

## 2022-09-19 NOTE — PROGRESS NOTES
Port site scrubbed with Chloraprep for 30 seconds. Accessed port using sterile technique. One purple tube drawn. See documentation flowsheet. Port needle left accessed for treatment. Tolerated port access and draw without complaint. Marly Stoner RN on 9/19/2022 at 7:37 AM

## 2022-09-19 NOTE — PROGRESS NOTES
Social Work Intervention  [facility]    Data/Intervention:    Patient Name:  Betty Rogers  /Age:  1975 (47 year old)    Visit Type: in person  Referral Source: n/a  Reason for Referral:  Psychosocial check in    Collaborated With:    Betty    Psychosocial Information/Concerns:  Betty is here for C10 Taxol    Intervention/Education/Resources Provided:  It has been about two months since SW previous SW introduction with Betty on C1D1. SW met with Betty in the infusion center this morning. She looked well, was working and was pleasant and engaged. GILBERTO followed up with Betty about how things have been going. She states she is doing well. Reports she and her children continue to cope well. She denies any need or interest in additional support resources at this time. SW reiterated availability and encouraged Betty to reach out at any time.         Assessment/Plan:  SW will remain available for future needs or concerns.     Provided patient/family with contact information and availability.    ANTONY Gonzalez, [facility]  Clinical , Adult Oncology  Phone: [phone]

## 2022-09-19 NOTE — PROGRESS NOTES
Infusion Nursing Note:  Betty Rogers presents today for 10D1 Taxol.    Patient seen by provider today: No   present during visit today: Not Applicable.    Note: Pt denies any new complaints, See flow sheet for assessment.  Per Jasmine Mattson NP, ok to treat pt today with .  Reminded pt of neutropenic precautions and encouraged pt to call clinic with s/s of infection.    Intravenous Access:  Implanted Port.    Treatment Conditions:  Lab Results   Component Value Date    HGB 9.7 (L) 09/19/2022    WBC 2.6 (L) 09/19/2022    ANEU 0.6 (L) 08/08/2022    ANEUTAUTO 0.9 (L) 09/19/2022     09/19/2022      Results reviewed, labs did NOT meet treatment parameters: . Per ernst Ferraro to proceed.    Post Infusion Assessment:  Patient tolerated infusion without incident.  Blood return noted pre and post infusion.  Site patent and intact, free from redness, edema or discomfort.  No evidence of extravasations.  Access discontinued per protocol.     Discharge Plan:   Patient discharged in stable condition accompanied by: self.  Departure Mode: Ambulatory.  Pt will RTC 9/26/11 for C11D1 Taxol.  Appts confirmed with plan of care and reviewed with pt.      Wilfred Urrutia RN

## 2022-09-20 ENCOUNTER — PATIENT OUTREACH (OUTPATIENT)
Dept: ONCOLOGY | Facility: CLINIC | Age: 47
End: 2022-09-20

## 2022-09-20 NOTE — PROGRESS NOTES
Ax received from [employer] requesting office visits dated 7/13/2022-9/19/2022 as well as treatment plan to show how the test results were used to determine the patient's treatment plan.  Progress notes from visit dated 7/20/2022 are faxed back with request, which outlines how the score was used to influence treatment and the plan moving forward.  Transmission confirmed in [employer].  Requested if additional information is needed to notify our office.     Initial request sent for scanning.    Della Kc RN

## 2022-09-26 ENCOUNTER — LAB (OUTPATIENT)
Dept: ONCOLOGY | Facility: CLINIC | Age: 47
End: 2022-09-26
Payer: COMMERCIAL

## 2022-09-26 ENCOUNTER — INFUSION THERAPY VISIT (OUTPATIENT)
Dept: INFUSION THERAPY | Facility: CLINIC | Age: 47
End: 2022-09-26

## 2022-09-26 VITALS
HEART RATE: 64 BPM | BODY MASS INDEX: 23.38 KG/M2 | SYSTOLIC BLOOD PRESSURE: 116 MMHG | OXYGEN SATURATION: 100 % | WEIGHT: 136.2 LBS | DIASTOLIC BLOOD PRESSURE: 74 MMHG | TEMPERATURE: 98.3 F

## 2022-09-26 DIAGNOSIS — C77.3 CARCINOMA OF LEFT BREAST METASTATIC TO AXILLARY LYMPH NODE (H): Primary | ICD-10-CM

## 2022-09-26 DIAGNOSIS — C50.912 CARCINOMA OF LEFT BREAST METASTATIC TO AXILLARY LYMPH NODE (H): Primary | ICD-10-CM

## 2022-09-26 LAB
BASOPHILS # BLD AUTO: 0 10E3/UL (ref 0–0.2)
BASOPHILS NFR BLD AUTO: 1 %
EOSINOPHIL # BLD AUTO: 0.1 10E3/UL (ref 0–0.7)
EOSINOPHIL NFR BLD AUTO: 5 %
ERYTHROCYTE [DISTWIDTH] IN BLOOD BY AUTOMATED COUNT: 15.5 % (ref 10–15)
HCT VFR BLD AUTO: 31 % (ref 35–47)
HGB BLD-MCNC: 10.1 G/DL (ref 11.7–15.7)
IMM GRANULOCYTES # BLD: 0 10E3/UL
IMM GRANULOCYTES NFR BLD: 0 %
LYMPHOCYTES # BLD AUTO: 1.3 10E3/UL (ref 0.8–5.3)
LYMPHOCYTES NFR BLD AUTO: 45 %
MCH RBC QN AUTO: 29.4 PG (ref 26.5–33)
MCHC RBC AUTO-ENTMCNC: 32.6 G/DL (ref 31.5–36.5)
MCV RBC AUTO: 90 FL (ref 78–100)
MONOCYTES # BLD AUTO: 0.2 10E3/UL (ref 0–1.3)
MONOCYTES NFR BLD AUTO: 8 %
NEUTROPHILS # BLD AUTO: 1.2 10E3/UL (ref 1.6–8.3)
NEUTROPHILS NFR BLD AUTO: 41 %
NRBC # BLD AUTO: 0 10E3/UL
NRBC BLD AUTO-RTO: 0 /100
PLATELET # BLD AUTO: 227 10E3/UL (ref 150–450)
RBC # BLD AUTO: 3.44 10E6/UL (ref 3.8–5.2)
WBC # BLD AUTO: 2.8 10E3/UL (ref 4–11)

## 2022-09-26 PROCEDURE — 96413 CHEMO IV INFUSION 1 HR: CPT | Performed by: NURSE PRACTITIONER

## 2022-09-26 PROCEDURE — 85025 COMPLETE CBC W/AUTO DIFF WBC: CPT | Performed by: INTERNAL MEDICINE

## 2022-09-26 PROCEDURE — 36591 DRAW BLOOD OFF VENOUS DEVICE: CPT | Performed by: INTERNAL MEDICINE

## 2022-09-26 PROCEDURE — 99207 PR NO CHARGE NURSE ONLY: CPT

## 2022-09-26 PROCEDURE — 99207 PR NO CHARGE LOS: CPT

## 2022-09-26 RX ORDER — HEPARIN SODIUM (PORCINE) LOCK FLUSH IV SOLN 100 UNIT/ML 100 UNIT/ML
5 SOLUTION INTRAVENOUS
Status: DISCONTINUED | OUTPATIENT
Start: 2022-09-26 | End: 2022-09-26 | Stop reason: HOSPADM

## 2022-09-26 RX ORDER — HEPARIN SODIUM (PORCINE) LOCK FLUSH IV SOLN 100 UNIT/ML 100 UNIT/ML
500 SOLUTION INTRAVENOUS DAILY PRN
Status: DISCONTINUED | OUTPATIENT
Start: 2022-09-26 | End: 2022-09-26 | Stop reason: HOSPADM

## 2022-09-26 RX ADMIN — HEPARIN SODIUM (PORCINE) LOCK FLUSH IV SOLN 100 UNIT/ML 5 ML: 100 SOLUTION at 09:50

## 2022-09-26 RX ADMIN — Medication 250 ML: at 08:24

## 2022-09-26 RX ADMIN — HEPARIN SODIUM (PORCINE) LOCK FLUSH IV SOLN 100 UNIT/ML 500 UNITS: 100 SOLUTION at 07:52

## 2022-09-26 ASSESSMENT — PAIN SCALES - GENERAL: PAINLEVEL: NO PAIN (0)

## 2022-09-26 NOTE — PROGRESS NOTES
Port accessed using 20 G 3/4 inch needle.  Labs collected from port.  Line flushed with NS and Heparin.  Pt tolerated procedure.  Port left accessed for infusion.    Ana Johnson RN-BSN, PHN, OCN  [facility]

## 2022-09-26 NOTE — PROGRESS NOTES
Infusion Nursing Note:  Betty KRAMER Ken presents today for C11D1 Taxol.    Patient seen by provider today: No   present during visit today: Not Applicable.    Note: Pt denies any new medical concerns, see flow sheet for assessment.    Intravenous Access:  Implanted Port.    Treatment Conditions:  Lab Results   Component Value Date    HGB 10.1 (L) 09/26/2022    WBC 2.8 (L) 09/26/2022    ANEU 0.6 (L) 08/08/2022    ANEUTAUTO 1.2 (L) 09/26/2022     09/26/2022      Results reviewed, labs MET treatment parameters, ok to proceed with treatment.    Post Infusion Assessment:  Patient tolerated infusion without incident.  Blood return noted pre and post infusion.  Site patent and intact, free from redness, edema or discomfort.  No evidence of extravasations.  Access discontinued per protocol.     Discharge Plan:   Patient discharged in stable condition accompanied by: self.  Departure Mode: Ambulatory.  PT will RTC 10/3/22 for C3D1 Taxol. Appts verified with plan and pt aware.      Wilfred Urrutia RN

## 2022-09-28 RX ORDER — LIDOCAINE 40 MG/G
CREAM TOPICAL
Status: CANCELLED | OUTPATIENT
Start: 2022-09-28

## 2022-09-29 ENCOUNTER — HOSPITAL ENCOUNTER (OUTPATIENT)
Facility: CLINIC | Age: 47
Discharge: HOME | End: 2022-09-29
Admitting: RADIOLOGY
Payer: COMMERCIAL

## 2022-09-29 ENCOUNTER — TELEPHONE (OUTPATIENT)
Dept: ONCOLOGY | Facility: CLINIC | Age: 47
End: 2022-09-29

## 2022-09-29 ENCOUNTER — HOSPITAL ENCOUNTER (OUTPATIENT)
Dept: MRI IMAGING | Facility: CLINIC | Age: 47
Discharge: HOME | End: 2022-09-29
Attending: INTERNAL MEDICINE
Payer: COMMERCIAL

## 2022-09-29 DIAGNOSIS — C77.3 CARCINOMA OF LEFT BREAST METASTATIC TO AXILLARY LYMPH NODE (H): ICD-10-CM

## 2022-09-29 DIAGNOSIS — C50.912 CARCINOMA OF LEFT BREAST METASTATIC TO AXILLARY LYMPH NODE (H): ICD-10-CM

## 2022-09-29 DIAGNOSIS — C50.812 MALIGNANT NEOPLASM OF OVERLAPPING SITES OF LEFT BREAST IN FEMALE, ESTROGEN RECEPTOR POSITIVE (H): ICD-10-CM

## 2022-09-29 DIAGNOSIS — Z17.0 MALIGNANT NEOPLASM OF OVERLAPPING SITES OF LEFT BREAST IN FEMALE, ESTROGEN RECEPTOR POSITIVE (H): ICD-10-CM

## 2022-09-29 PROCEDURE — 96374 THER/PROPH/DIAG INJ IV PUSH: CPT

## 2022-09-29 PROCEDURE — 999N000154 HC STATISTIC RADIOLOGY XRAY, US, CT, MAR, NM

## 2022-09-29 PROCEDURE — 250N000011 HC RX IP 250 OP 636: Performed by: PHYSICIAN ASSISTANT

## 2022-09-29 PROCEDURE — A9585 GADOBUTROL INJECTION: HCPCS | Performed by: INTERNAL MEDICINE

## 2022-09-29 PROCEDURE — 255N000002 HC RX 255 OP 636: Performed by: INTERNAL MEDICINE

## 2022-09-29 PROCEDURE — 77049 MRI BREAST C-+ W/CAD BI: CPT

## 2022-09-29 RX ORDER — HEPARIN SODIUM,PORCINE 10 UNIT/ML
5-10 VIAL (ML) INTRAVENOUS EVERY 24 HOURS
Status: DISCONTINUED | OUTPATIENT
Start: 2022-09-29 | End: 2022-09-29 | Stop reason: HOSPADM

## 2022-09-29 RX ORDER — HEPARIN SODIUM (PORCINE) LOCK FLUSH IV SOLN 100 UNIT/ML 100 UNIT/ML
5-10 SOLUTION INTRAVENOUS
Status: DISCONTINUED | OUTPATIENT
Start: 2022-09-29 | End: 2022-09-29 | Stop reason: HOSPADM

## 2022-09-29 RX ORDER — GADOBUTROL 604.72 MG/ML
6 INJECTION INTRAVENOUS ONCE
Status: COMPLETED | OUTPATIENT
Start: 2022-09-29 | End: 2022-09-29

## 2022-09-29 RX ORDER — HEPARIN SODIUM,PORCINE 10 UNIT/ML
5-10 VIAL (ML) INTRAVENOUS
Status: DISCONTINUED | OUTPATIENT
Start: 2022-09-29 | End: 2022-09-29 | Stop reason: HOSPADM

## 2022-09-29 RX ADMIN — GADOBUTROL 6 ML: 604.72 INJECTION INTRAVENOUS at 09:13

## 2022-09-29 RX ADMIN — Medication 5 ML: at 09:50

## 2022-09-29 ASSESSMENT — ACTIVITIES OF DAILY LIVING (ADL): ADLS_ACUITY_SCORE: 35

## 2022-09-29 NOTE — TELEPHONE ENCOUNTER
----- Message from Hoang Franco MD sent at 9/29/2022  1:45 PM CDT -----  Left voicemail.    Breast mass markedly reduced in size volumetrically.  Non mass like enhancement also improved.  Axilla unchanged.    Follow-up as planned.    Hoang Franco MD.

## 2022-10-03 ENCOUNTER — LAB (OUTPATIENT)
Dept: ONCOLOGY | Facility: CLINIC | Age: 47
End: 2022-10-03
Payer: COMMERCIAL

## 2022-10-03 ENCOUNTER — INFUSION THERAPY VISIT (OUTPATIENT)
Dept: INFUSION THERAPY | Facility: CLINIC | Age: 47
End: 2022-10-03

## 2022-10-03 VITALS
HEART RATE: 63 BPM | OXYGEN SATURATION: 99 % | WEIGHT: 136.7 LBS | BODY MASS INDEX: 23.46 KG/M2 | TEMPERATURE: 98.6 F | SYSTOLIC BLOOD PRESSURE: 109 MMHG | DIASTOLIC BLOOD PRESSURE: 76 MMHG

## 2022-10-03 DIAGNOSIS — C77.3 CARCINOMA OF LEFT BREAST METASTATIC TO AXILLARY LYMPH NODE (H): Primary | ICD-10-CM

## 2022-10-03 DIAGNOSIS — C50.912 CARCINOMA OF LEFT BREAST METASTATIC TO AXILLARY LYMPH NODE (H): Primary | ICD-10-CM

## 2022-10-03 LAB
BASOPHILS # BLD AUTO: 0.1 10E3/UL (ref 0–0.2)
BASOPHILS NFR BLD AUTO: 2 %
EOSINOPHIL # BLD AUTO: 0.2 10E3/UL (ref 0–0.7)
EOSINOPHIL NFR BLD AUTO: 5 %
ERYTHROCYTE [DISTWIDTH] IN BLOOD BY AUTOMATED COUNT: 15.7 % (ref 10–15)
HCT VFR BLD AUTO: 30.1 % (ref 35–47)
HGB BLD-MCNC: 10 G/DL (ref 11.7–15.7)
IMM GRANULOCYTES # BLD: 0 10E3/UL
IMM GRANULOCYTES NFR BLD: 0 %
LYMPHOCYTES # BLD AUTO: 1.3 10E3/UL (ref 0.8–5.3)
LYMPHOCYTES NFR BLD AUTO: 44 %
MCH RBC QN AUTO: 29.3 PG (ref 26.5–33)
MCHC RBC AUTO-ENTMCNC: 33.2 G/DL (ref 31.5–36.5)
MCV RBC AUTO: 88 FL (ref 78–100)
MONOCYTES # BLD AUTO: 0.3 10E3/UL (ref 0–1.3)
MONOCYTES NFR BLD AUTO: 8 %
NEUTROPHILS # BLD AUTO: 1.2 10E3/UL (ref 1.6–8.3)
NEUTROPHILS NFR BLD AUTO: 41 %
NRBC # BLD AUTO: 0 10E3/UL
NRBC BLD AUTO-RTO: 0 /100
PLATELET # BLD AUTO: 221 10E3/UL (ref 150–450)
RBC # BLD AUTO: 3.41 10E6/UL (ref 3.8–5.2)
WBC # BLD AUTO: 3 10E3/UL (ref 4–11)

## 2022-10-03 PROCEDURE — 99207 PR NO CHARGE LOS: CPT

## 2022-10-03 PROCEDURE — 96413 CHEMO IV INFUSION 1 HR: CPT | Performed by: NURSE PRACTITIONER

## 2022-10-03 PROCEDURE — 85025 COMPLETE CBC W/AUTO DIFF WBC: CPT | Performed by: INTERNAL MEDICINE

## 2022-10-03 RX ORDER — HEPARIN SODIUM (PORCINE) LOCK FLUSH IV SOLN 100 UNIT/ML 100 UNIT/ML
5 SOLUTION INTRAVENOUS
Status: DISCONTINUED | OUTPATIENT
Start: 2022-10-03 | End: 2022-10-03 | Stop reason: HOSPADM

## 2022-10-03 RX ADMIN — Medication 250 ML: at 08:18

## 2022-10-03 RX ADMIN — HEPARIN SODIUM (PORCINE) LOCK FLUSH IV SOLN 100 UNIT/ML 5 ML: 100 SOLUTION at 07:22

## 2022-10-03 RX ADMIN — HEPARIN SODIUM (PORCINE) LOCK FLUSH IV SOLN 100 UNIT/ML 5 ML: 100 SOLUTION at 09:36

## 2022-10-03 ASSESSMENT — PAIN SCALES - GENERAL: PAINLEVEL: NO PAIN (0)

## 2022-10-03 NOTE — PROGRESS NOTES
Infusion Nursing Note:  Betty KRAMER Ken presents today for C12D1 .    Patient seen by provider today: No   present during visit today: Not Applicable.    Note: Pt denies any medical concerns, see flow sheet for assessment.    Intravenous Access:  Implanted Port.    Treatment Conditions:  Lab Results   Component Value Date    HGB 10.0 (L) 10/03/2022    WBC 3.0 (L) 10/03/2022    ANEU 0.6 (L) 08/08/2022    ANEUTAUTO 1.2 (L) 10/03/2022     10/03/2022      Results reviewed, labs MET treatment parameters, ok to proceed with treatment.    Post Infusion Assessment:  Patient tolerated infusion without incident.  Blood return noted pre and post infusion.  Site patent and intact, free from redness, edema or discomfort.  No evidence of extravasations.  Access discontinued per protocol.     Discharge Plan:   Patient discharged in stable condition accompanied by: self.  Departure Mode: Ambulatory.  Pt will RTC 10/10/22 for C1D1 Adriamycin/Cytoxan.      Wilfred Urrutia RN

## 2022-10-03 NOTE — PROGRESS NOTES
Port site scrubbed with Chloraprep for 30 seconds. Accessed port using sterile technique. See documentation flowsheet. Tolerated port access and draw without complaint.     Lea Metzger RN

## 2022-10-04 ENCOUNTER — MYC MEDICAL ADVICE (OUTPATIENT)
Dept: SURGERY | Facility: CLINIC | Age: 47
End: 2022-10-04

## 2022-10-04 ENCOUNTER — OFFICE VISIT (OUTPATIENT)
Dept: SURGERY | Facility: CLINIC | Age: 47
End: 2022-10-04
Payer: COMMERCIAL

## 2022-10-04 VITALS
BODY MASS INDEX: 23.05 KG/M2 | HEART RATE: 68 BPM | HEIGHT: 64 IN | WEIGHT: 135 LBS | RESPIRATION RATE: 14 BRPM | SYSTOLIC BLOOD PRESSURE: 102 MMHG | DIASTOLIC BLOOD PRESSURE: 74 MMHG | OXYGEN SATURATION: 100 %

## 2022-10-04 DIAGNOSIS — C50.412 MALIGNANT NEOPLASM OF UPPER-OUTER QUADRANT OF LEFT BREAST IN FEMALE, ESTROGEN RECEPTOR POSITIVE (H): Primary | ICD-10-CM

## 2022-10-04 DIAGNOSIS — Z17.0 MALIGNANT NEOPLASM OF UPPER-OUTER QUADRANT OF LEFT BREAST IN FEMALE, ESTROGEN RECEPTOR POSITIVE (H): Primary | ICD-10-CM

## 2022-10-04 PROCEDURE — 99215 OFFICE O/P EST HI 40 MIN: CPT | Performed by: SURGERY

## 2022-10-04 NOTE — LETTER
October 4, 2022          Diana Luevano MD PhD  [address] [place] [address]      RE:   Betty Rogers 1975      Dear Colleague,    Thank you for referring your patient, Betty Rogers, to [facility] Please see a copy of my visit note below.    Betty Rogers is a 47 year old female who is seen in follow up for left breast cancer.      I originally met Betty on 6/14/2022 when she was diagnosed with left breast invasive ductal carcinoma, grade 2, ER/NC positive (95%), HER 2 negative at 12:00, 6cm FN measuring at least 3.5cm with associated DCIS biopsied at 9:00, posterior. She had biopsy of an axillary lymph node which was positive.      She has since underwent neoadjuvant chemotherapy under the care of Dr. Franco. She will complete chemotherapy 11/21/2022. She is here to discuss her surgical options. She had a breast MRI on 9/29/2022 which revealed partial response in the left breast mass. The nonmass enhancement continues to measure 4.7cm and prominent axillary nodes are unchanged. The right breast is benign.      She has been tolerating chemotherapy well. She has had minimal side effects with taxol.      REVIEW OF SYSTEMS:  Constitutional:  Negative for chills, fatigue, fever and weight change.  Eyes:  Negative for blurred vision, eye pain and photophobia.  ENT:  Negative for hearing problems, ENT pain, congestion, rhinorrhea, epistaxis, hoarseness and dental problems.  Cardiovascular:  Negative for chest pain, palpitations, tachycardia, orthopnea and edema.  Respiratory:  Negative for cough, dyspnea and hemoptysis.  Gastrointestinal:  Negative for abdominal pain, heartburn, constipation, diarrhea and stool changes.  Musculoskeletal:  Negative for arthralgias, back pain and myalgias.  Integumentary/Breast:  See HPI.          Past Medical History:   Diagnosis Date     Breast cancer metastasized to axillary lymph node (H) 7/15/2022     No active medical  problems                 Past Surgical History:   Procedure Laterality Date     C/SECTION, LOW TRANSVERSE         x2     INSERT PORT VASCULAR ACCESS Right 7/25/2022     Procedure: PORT PLACEMENT;  Surgeon: Janie South MD;  Location:  OR               Family History   Problem Relation Age of Onset     Breast Cancer Mother 40         atypical lobular hyperplasia breast cancer     Heart Disease Maternal Grandfather           Heart Issues     Unknown/Adopted Brother           [place]     Breast Cancer Maternal Aunt 50         Social History            Tobacco Use     Smoking status: Never Smoker     Smokeless tobacco: Never Used   Substance Use Topics     Alcohol use: Yes       Comment: socially             Patient Active Problem List   Diagnosis     CARDIOVASCULAR SCREENING; LDL GOAL LESS THAN 160     Family history of breast cancer- mother     Carcinoma of left breast metastatic to axillary lymph node (H)     Ductal carcinoma in situ (DCIS) of left breast     Anemia associated with chemotherapy     Chemotherapy-induced neutropenia (H)            Allergies   Allergen Reactions     Nuts       Other [Seasonal Allergies]         Tree Nuts-Lips swell and breathing problems             Current Outpatient Medications   Medication Sig Dispense Refill     calcium carbonate-vitamin D (OS-ABEBA) 600-400 MG-UNIT chewable tablet Take 1 chew tab by mouth 2 times daily         HYDROcodone-acetaminophen (NORCO) 5-325 MG tablet Take 1-2 tablets by mouth every 4 hours as needed for moderate to severe pain (Patient not taking: Reported on 9/19/2022) 10 tablet 0     Multiple Vitamin (MULTIVITAMIN ADULT PO)  (Patient not taking: Reported on 9/19/2022)         ondansetron (ZOFRAN) 8 MG tablet Take 1 tablet (8 mg) by mouth every 8 hours as needed for nausea (Patient not taking: Reported on 9/19/2022) 30 tablet 3     senna-docusate (SENOKOT-S/PERICOLACE) 8.6-50 MG tablet Take 1-2 tablets by mouth 2 times daily (Patient not taking:  "Reported on 9/19/2022) 30 tablet 0      Vitals: /74   Pulse 68   Resp 14   Ht 1.626 m (5' 4\")   Wt 61.2 kg (135 lb)   SpO2 100%   Breastfeeding No   BMI 23.17 kg/m    BMI= Body mass index is 23.17 kg/m .     EXAM:  GENERAL: healthy, alert and no distress   BREAST:  The breasts appear symmetric with no overlying skin changes.  The nipples are normal bilaterally.  There is no dimpling or thickening of the skin.  There is a 4-5cm area in the left upper breast which is more firm than surrounding tissue but no discrete mass.   I am able to feel nodes in bilateral axilla which are soft and mobile.   CARDIOVASCULAR:  RRR  RESPIRATORY: nonlabored breathing  NECK: Neck supple. No adenopathy. Thyroid symmetric, normal size  SKIN: No suspicious lesions or rashes  LYMPH: Normal cervical lymph nodes        ASSESSMENT/PLAN:  Betty Rogers is nearing completion of neoadjuvant chemotherapy and here to discuss her surgical options. Given the extent of nonmass enhancement ongoing on the MRI I would recommend mastectomy for her. She had met with Dr. Zamorano prior to chemotherapy and is interested in reconstruction. I would recommend a tag localized node excision as well as sentinel lymph node biopsy and plan to send these nodes for frozen section. If positive, I would remove additional nodes with a limited node dissection and all grossly abnormal nodes would be removed. I do recommend adjuvant radiation as well. We discussed the risks, benefits, indications and alternatives to surgery. She is interested in bilateral mastectomies. We discussed removal of her right breast has no impact on her survival related to the left sided cancer but can reduce risk going forward. Plan for her to follow up with Dr. Zamorano and then we will coordinate surgery for mid-December 2022. I will check with Dr. Franco regarding removal of port at the time of surgery.    Again, thank you for allowing me to participate in the care of your " patient.      Sincerely,      Janie South MD

## 2022-10-04 NOTE — PROGRESS NOTES
[facility] Follow Up Note    CHIEF COMPLAINT:  Left breast cancer    HISTORY OF PRESENT ILLNESS:  Betty Rogers is a 47 year old female who is seen in follow up for left breast cancer.     I originally met Betty on 6/14/2022 when she was diagnosed with left breast invasive ductal carcinoma, grade 2, ER/IA positive (95%), HER 2 negative at 12:00, 6cm FN measuring at least 3.5cm with associated DCIS biopsied at 9:00, posterior. She had biopsy of an axillary lymph node which was positive.     She has since underwent neoadjuvant chemotherapy under the care of Dr. Franco. She will complete chemotherapy 11/21/2022. She is here to discuss her surgical options. She had a breast MRI on 9/29/2022 which revealed partial response in the left breast mass. The nonmass enhancement continues to measure 4.7cm and prominent axillary nodes are unchanged. The right breast is benign.     She has been tolerating chemotherapy well. She has had minimal side effects with taxol.     REVIEW OF SYSTEMS:  Constitutional:  Negative for chills, fatigue, fever and weight change.  Eyes:  Negative for blurred vision, eye pain and photophobia.  ENT:  Negative for hearing problems, ENT pain, congestion, rhinorrhea, epistaxis, hoarseness and dental problems.  Cardiovascular:  Negative for chest pain, palpitations, tachycardia, orthopnea and edema.  Respiratory:  Negative for cough, dyspnea and hemoptysis.  Gastrointestinal:  Negative for abdominal pain, heartburn, constipation, diarrhea and stool changes.  Musculoskeletal:  Negative for arthralgias, back pain and myalgias.  Integumentary/Breast:  See HPI.    Past Medical History:   Diagnosis Date     Breast cancer metastasized to axillary lymph node (H) 7/15/2022     No active medical problems        Past Surgical History:   Procedure Laterality Date     C/SECTION, LOW TRANSVERSE      x2     INSERT PORT VASCULAR ACCESS Right 7/25/2022    Procedure: PORT PLACEMENT;  Surgeon:  "Janie South MD;  Location:  OR       Family History   Problem Relation Age of Onset     Breast Cancer Mother 40        atypical lobular hyperplasia breast cancer     Heart Disease Maternal Grandfather         Heart Issues     Unknown/Adopted Brother         Al[place]so     Breast Cancer Maternal Aunt 50       Social History     Tobacco Use     Smoking status: Never Smoker     Smokeless tobacco: Never Used   Substance Use Topics     Alcohol use: Yes     Comment: socially       Patient Active Problem List   Diagnosis     CARDIOVASCULAR SCREENING; LDL GOAL LESS THAN 160     Family history of breast cancer- mother     Carcinoma of left breast metastatic to axillary lymph node (H)     Ductal carcinoma in situ (DCIS) of left breast     Anemia associated with chemotherapy     Chemotherapy-induced neutropenia (H)     Allergies   Allergen Reactions     Nuts      Other [Seasonal Allergies]      Tree Nuts-Lips swell and breathing problems     Current Outpatient Medications   Medication Sig Dispense Refill     calcium carbonate-vitamin D (OS-ABEBA) 600-400 MG-UNIT chewable tablet Take 1 chew tab by mouth 2 times daily       HYDROcodone-acetaminophen (NORCO) 5-325 MG tablet Take 1-2 tablets by mouth every 4 hours as needed for moderate to severe pain (Patient not taking: Reported on 9/19/2022) 10 tablet 0     Multiple Vitamin (MULTIVITAMIN ADULT PO)  (Patient not taking: Reported on 9/19/2022)       ondansetron (ZOFRAN) 8 MG tablet Take 1 tablet (8 mg) by mouth every 8 hours as needed for nausea (Patient not taking: Reported on 9/19/2022) 30 tablet 3     senna-docusate (SENOKOT-S/PERICOLACE) 8.6-50 MG tablet Take 1-2 tablets by mouth 2 times daily (Patient not taking: Reported on 9/19/2022) 30 tablet 0     Vitals: /74   Pulse 68   Resp 14   Ht 1.626 m (5' 4\")   Wt 61.2 kg (135 lb)   SpO2 100%   Breastfeeding No   BMI 23.17 kg/m    BMI= Body mass index is 23.17 kg/m .    EXAM:  GENERAL: healthy, alert and no " distress   BREAST:  The breasts appear symmetric with no overlying skin changes.  The nipples are normal bilaterally.  There is no dimpling or thickening of the skin.  There is a 4-5cm area in the left upper breast which is more firm than surrounding tissue but no discrete mass.   I am able to feel nodes in bilateral axilla which are soft and mobile.   CARDIOVASCULAR:  RRR  RESPIRATORY: nonlabored breathing  NECK: Neck supple. No adenopathy. Thyroid symmetric, normal size  SKIN: No suspicious lesions or rashes  LYMPH: Normal cervical lymph nodes      ASSESSMENT/PLAN:  Betty Rogers is nearing completion of neoadjuvant chemotherapy and here to discuss her surgical options. Given the extent of nonmass enhancement ongoing on the MRI I would recommend mastectomy for her. She had met with Dr. Zamorano prior to chemotherapy and is interested in reconstruction. I would recommend a tag localized node excision as well as sentinel lymph node biopsy and plan to send these nodes for frozen section. If positive, I would remove additional nodes with a limited node dissection and all grossly abnormal nodes would be removed. I do recommend adjuvant radiation as well. We discussed the risks, benefits, indications and alternatives to surgery. She is interested in bilateral mastectomies. We discussed removal of her right breast has no impact on her survival related to the left sided cancer but can reduce risk going forward. Plan for her to follow up with Dr. Zamorano and then we will coordinate surgery for mid-December 2022. I will check with Dr. Franco regarding removal of port at the time of surgery.     Janie South MD  [facility]  [phone]        Please route or send letter to:  Primary Care Provider (PCP) and Referring Provider

## 2022-10-04 NOTE — NURSING NOTE
Breast Nurse Care Coordination:  I re- introduced self to patient and explained my role of breast nurse coordinator. I accompanied Betty to her surgical consultation on 10/4/22 with Dr. Janie South at the [facility].      Betty was given a copy of her most recent breast MRI done on 9/29/22.  She states her last chemotherapy treatment is scheduled for 11/21/22 and we will plan for her surgery to be the end of December, 2022.  Dr. South will check with Dr. Franco if he would give okay to remove patient's port a cath during breast surgery, or wait until final surgical pathology results are available.      At the end of the consultation, we reviewed Betty's plan of care and education.  The plan is for patient to revisit with Plastic Surgeon - Dr. Rc Zamorano on 10/12/22 to discuss reconstruction planning.     Patient is requesting to have a bilateral mastectomy, left RFID Tag localized sentinel lymph node biopsy with immediate placement of tissue expanders at the [facility].      I gave patient [employer] educational materials regarding Exercises After Breast Surgery, [place] Lymph Node Biopsy, and Mastectomy.  Informed patient for mastectomy surgery, she will want to have two post mastectomy garments that open in the front to wear the 2 weeks following her surgery. I gave patient information on different options to purchase post mastectomy garments.    Also informed patient she will need a preop exam with her primary provider within 30 days of her surgery, as well as a PCR Covid test done 2-4 days before surgery.  Mary will assist patient in getting scheduled for the covid test.     I answered her questions and encouraged patient to call me back with any future questions or concerns.  Betty has my contact information, and knows to contact me in the future with any questions or concerns.     Iraida Kingston, RN, BSN  Breast Care Nurse  Coordinator  [facility] [place]  [phone]

## 2022-10-07 DIAGNOSIS — T45.1X5S ADVERSE EFFECT OF ANTINEOPLASTIC AND IMMUNOSUPPRESSIVE DRUGS, SEQUELA: ICD-10-CM

## 2022-10-07 NOTE — TELEPHONE ENCOUNTER
----- Message from Letsdecco sent at 1/8/2019  2:29 PM CST -----      Topic: Procedural Question      Hello, I had a form faxed to [employer] by 12/31/2018.  However, the date of the test collection was not included.  Can it please be re-faxed asa? Its past the deadline and that missing date may cause a problem in getting the needed reimbursement.       
Date was added and refaxed, pt called and informed. Valentine PECKA    
Message received via [employer].  Forwarding to Primary Care.     Carol Westholter      
No forms scanned to chart. Patient seen by Dr. Diana Luevano for physical on 12/28/18. No notes regarding for found in chart.   Brea LOZA CMA    
30 second Sit to Stand Test: reps:  12  adaptation: no

## 2022-10-10 ENCOUNTER — PATIENT OUTREACH (OUTPATIENT)
Dept: ONCOLOGY | Facility: CLINIC | Age: 47
End: 2022-10-10

## 2022-10-10 ENCOUNTER — INFUSION THERAPY VISIT (OUTPATIENT)
Dept: INFUSION THERAPY | Facility: CLINIC | Age: 47
End: 2022-10-10
Payer: COMMERCIAL

## 2022-10-10 ENCOUNTER — LAB (OUTPATIENT)
Dept: ONCOLOGY | Facility: CLINIC | Age: 47
End: 2022-10-10
Payer: COMMERCIAL

## 2022-10-10 ENCOUNTER — ONCOLOGY VISIT (OUTPATIENT)
Dept: ONCOLOGY | Facility: CLINIC | Age: 47
End: 2022-10-10
Payer: COMMERCIAL

## 2022-10-10 VITALS
SYSTOLIC BLOOD PRESSURE: 113 MMHG | OXYGEN SATURATION: 100 % | HEIGHT: 64 IN | WEIGHT: 136.9 LBS | TEMPERATURE: 98.3 F | DIASTOLIC BLOOD PRESSURE: 78 MMHG | RESPIRATION RATE: 16 BRPM | HEART RATE: 60 BPM | BODY MASS INDEX: 23.37 KG/M2

## 2022-10-10 VITALS — BODY MASS INDEX: 23.96 KG/M2 | HEIGHT: 63 IN

## 2022-10-10 DIAGNOSIS — C77.3 CARCINOMA OF LEFT BREAST METASTATIC TO AXILLARY LYMPH NODE (H): Primary | ICD-10-CM

## 2022-10-10 DIAGNOSIS — C50.912 CARCINOMA OF LEFT BREAST METASTATIC TO AXILLARY LYMPH NODE (H): Primary | ICD-10-CM

## 2022-10-10 DIAGNOSIS — D61.810 ANTINEOPLASTIC CHEMOTHERAPY INDUCED PANCYTOPENIA (CODE) (H): ICD-10-CM

## 2022-10-10 DIAGNOSIS — T45.1X5S ADVERSE EFFECT OF ANTINEOPLASTIC AND IMMUNOSUPPRESSIVE DRUGS, SEQUELA: ICD-10-CM

## 2022-10-10 LAB
ALBUMIN SERPL-MCNC: 3.4 G/DL (ref 3.4–5)
ALP SERPL-CCNC: 40 U/L (ref 40–150)
ALT SERPL W P-5'-P-CCNC: 20 U/L (ref 0–50)
ANION GAP SERPL CALCULATED.3IONS-SCNC: 4 MMOL/L (ref 3–14)
AST SERPL W P-5'-P-CCNC: 15 U/L (ref 0–45)
BASOPHILS # BLD AUTO: 0 10E3/UL (ref 0–0.2)
BASOPHILS NFR BLD AUTO: 1 %
BILIRUB SERPL-MCNC: 0.2 MG/DL (ref 0.2–1.3)
BUN SERPL-MCNC: 13 MG/DL (ref 7–30)
CALCIUM SERPL-MCNC: 8.7 MG/DL (ref 8.5–10.1)
CHLORIDE BLD-SCNC: 111 MMOL/L (ref 94–109)
CO2 SERPL-SCNC: 26 MMOL/L (ref 20–32)
CREAT SERPL-MCNC: 0.72 MG/DL (ref 0.52–1.04)
EOSINOPHIL # BLD AUTO: 0.1 10E3/UL (ref 0–0.7)
EOSINOPHIL NFR BLD AUTO: 5 %
ERYTHROCYTE [DISTWIDTH] IN BLOOD BY AUTOMATED COUNT: 15.3 % (ref 10–15)
GFR SERPL CREATININE-BSD FRML MDRD: >90 ML/MIN/1.73M2
GLUCOSE BLD-MCNC: 103 MG/DL (ref 70–99)
HCT VFR BLD AUTO: 29.5 % (ref 35–47)
HGB BLD-MCNC: 9.9 G/DL (ref 11.7–15.7)
IMM GRANULOCYTES # BLD: 0 10E3/UL
IMM GRANULOCYTES NFR BLD: 0 %
LYMPHOCYTES # BLD AUTO: 1.2 10E3/UL (ref 0.8–5.3)
LYMPHOCYTES NFR BLD AUTO: 49 %
MCH RBC QN AUTO: 29.3 PG (ref 26.5–33)
MCHC RBC AUTO-ENTMCNC: 33.6 G/DL (ref 31.5–36.5)
MCV RBC AUTO: 87 FL (ref 78–100)
MONOCYTES # BLD AUTO: 0.3 10E3/UL (ref 0–1.3)
MONOCYTES NFR BLD AUTO: 12 %
NEUTROPHILS # BLD AUTO: 0.8 10E3/UL (ref 1.6–8.3)
NEUTROPHILS NFR BLD AUTO: 33 %
NRBC # BLD AUTO: 0 10E3/UL
NRBC BLD AUTO-RTO: 0 /100
PLATELET # BLD AUTO: 212 10E3/UL (ref 150–450)
POTASSIUM BLD-SCNC: 4.3 MMOL/L (ref 3.4–5.3)
PROT SERPL-MCNC: 5.9 G/DL (ref 6.8–8.8)
RBC # BLD AUTO: 3.38 10E6/UL (ref 3.8–5.2)
SODIUM SERPL-SCNC: 141 MMOL/L (ref 133–144)
WBC # BLD AUTO: 2.4 10E3/UL (ref 4–11)

## 2022-10-10 PROCEDURE — 96413 CHEMO IV INFUSION 1 HR: CPT | Performed by: NURSE PRACTITIONER

## 2022-10-10 PROCEDURE — 99207 PR NO CHARGE LOS: CPT

## 2022-10-10 PROCEDURE — 96375 TX/PRO/DX INJ NEW DRUG ADDON: CPT | Performed by: NURSE PRACTITIONER

## 2022-10-10 PROCEDURE — 85025 COMPLETE CBC W/AUTO DIFF WBC: CPT | Performed by: INTERNAL MEDICINE

## 2022-10-10 PROCEDURE — 80053 COMPREHEN METABOLIC PANEL: CPT | Performed by: INTERNAL MEDICINE

## 2022-10-10 PROCEDURE — 99214 OFFICE O/P EST MOD 30 MIN: CPT | Mod: 25 | Performed by: INTERNAL MEDICINE

## 2022-10-10 PROCEDURE — 96367 TX/PROPH/DG ADDL SEQ IV INF: CPT | Performed by: NURSE PRACTITIONER

## 2022-10-10 PROCEDURE — 99411 PREVENTIVE COUNSELING GROUP: CPT | Performed by: NURSE PRACTITIONER

## 2022-10-10 RX ORDER — DEXAMETHASONE 4 MG/1
8 TABLET ORAL DAILY
Qty: 24 TABLET | Refills: 0 | Status: SHIPPED | OUTPATIENT
Start: 2022-10-10 | End: 2022-12-06

## 2022-10-10 RX ORDER — MEPERIDINE HYDROCHLORIDE 25 MG/ML
25 INJECTION INTRAMUSCULAR; INTRAVENOUS; SUBCUTANEOUS EVERY 30 MIN PRN
Status: CANCELLED | OUTPATIENT
Start: 2022-11-21

## 2022-10-10 RX ORDER — HEPARIN SODIUM,PORCINE 10 UNIT/ML
5 VIAL (ML) INTRAVENOUS
Status: CANCELLED | OUTPATIENT
Start: 2022-10-24

## 2022-10-10 RX ORDER — DOXORUBICIN HYDROCHLORIDE 2 MG/ML
60 INJECTION, SOLUTION INTRAVENOUS ONCE
Status: CANCELLED | OUTPATIENT
Start: 2022-10-10

## 2022-10-10 RX ORDER — EPINEPHRINE 1 MG/ML
0.3 INJECTION, SOLUTION INTRAMUSCULAR; SUBCUTANEOUS EVERY 5 MIN PRN
Status: CANCELLED | OUTPATIENT
Start: 2022-10-24

## 2022-10-10 RX ORDER — HEPARIN SODIUM (PORCINE) LOCK FLUSH IV SOLN 100 UNIT/ML 100 UNIT/ML
5 SOLUTION INTRAVENOUS
Status: CANCELLED | OUTPATIENT
Start: 2022-10-10

## 2022-10-10 RX ORDER — HEPARIN SODIUM,PORCINE 10 UNIT/ML
5 VIAL (ML) INTRAVENOUS
Status: CANCELLED | OUTPATIENT
Start: 2022-10-10

## 2022-10-10 RX ORDER — ALBUTEROL SULFATE 0.83 MG/ML
2.5 SOLUTION RESPIRATORY (INHALATION)
Status: CANCELLED | OUTPATIENT
Start: 2022-10-10

## 2022-10-10 RX ORDER — PALONOSETRON 0.05 MG/ML
0.25 INJECTION, SOLUTION INTRAVENOUS ONCE
Status: CANCELLED | OUTPATIENT
Start: 2022-11-21

## 2022-10-10 RX ORDER — HEPARIN SODIUM (PORCINE) LOCK FLUSH IV SOLN 100 UNIT/ML 100 UNIT/ML
500 SOLUTION INTRAVENOUS
Status: DISCONTINUED | OUTPATIENT
Start: 2022-10-10 | End: 2022-10-10 | Stop reason: HOSPADM

## 2022-10-10 RX ORDER — DIPHENHYDRAMINE HYDROCHLORIDE 50 MG/ML
50 INJECTION INTRAMUSCULAR; INTRAVENOUS
Status: CANCELLED
Start: 2022-11-07

## 2022-10-10 RX ORDER — PALONOSETRON 0.05 MG/ML
0.25 INJECTION, SOLUTION INTRAVENOUS ONCE
Status: CANCELLED | OUTPATIENT
Start: 2022-10-10

## 2022-10-10 RX ORDER — DEXAMETHASONE 4 MG/1
8 TABLET ORAL DAILY
Qty: 24 TABLET | Refills: 0 | Status: CANCELLED | OUTPATIENT
Start: 2022-10-10 | End: 2022-10-13

## 2022-10-10 RX ORDER — ALBUTEROL SULFATE 0.83 MG/ML
2.5 SOLUTION RESPIRATORY (INHALATION)
Status: CANCELLED | OUTPATIENT
Start: 2022-11-07

## 2022-10-10 RX ORDER — ALBUTEROL SULFATE 90 UG/1
1-2 AEROSOL, METERED RESPIRATORY (INHALATION)
Status: CANCELLED
Start: 2022-11-07

## 2022-10-10 RX ORDER — PALONOSETRON 0.05 MG/ML
0.25 INJECTION, SOLUTION INTRAVENOUS ONCE
Status: CANCELLED | OUTPATIENT
Start: 2022-11-07

## 2022-10-10 RX ORDER — ALBUTEROL SULFATE 0.83 MG/ML
2.5 SOLUTION RESPIRATORY (INHALATION)
Status: CANCELLED | OUTPATIENT
Start: 2022-11-21

## 2022-10-10 RX ORDER — EPINEPHRINE 1 MG/ML
0.3 INJECTION, SOLUTION INTRAMUSCULAR; SUBCUTANEOUS EVERY 5 MIN PRN
Status: CANCELLED | OUTPATIENT
Start: 2022-11-21

## 2022-10-10 RX ORDER — METHYLPREDNISOLONE SODIUM SUCCINATE 125 MG/2ML
125 INJECTION, POWDER, LYOPHILIZED, FOR SOLUTION INTRAMUSCULAR; INTRAVENOUS
Status: CANCELLED
Start: 2022-10-10

## 2022-10-10 RX ORDER — PALONOSETRON 0.05 MG/ML
0.25 INJECTION, SOLUTION INTRAVENOUS ONCE
Status: COMPLETED | OUTPATIENT
Start: 2022-10-10 | End: 2022-10-10

## 2022-10-10 RX ORDER — PALONOSETRON 0.05 MG/ML
0.25 INJECTION, SOLUTION INTRAVENOUS ONCE
Status: CANCELLED | OUTPATIENT
Start: 2022-10-24

## 2022-10-10 RX ORDER — METHYLPREDNISOLONE SODIUM SUCCINATE 125 MG/2ML
125 INJECTION, POWDER, LYOPHILIZED, FOR SOLUTION INTRAMUSCULAR; INTRAVENOUS
Status: CANCELLED
Start: 2022-11-07

## 2022-10-10 RX ORDER — DIPHENHYDRAMINE HYDROCHLORIDE 50 MG/ML
50 INJECTION INTRAMUSCULAR; INTRAVENOUS
Status: CANCELLED
Start: 2022-10-10

## 2022-10-10 RX ORDER — METHYLPREDNISOLONE SODIUM SUCCINATE 125 MG/2ML
125 INJECTION, POWDER, LYOPHILIZED, FOR SOLUTION INTRAMUSCULAR; INTRAVENOUS
Status: CANCELLED
Start: 2022-11-21

## 2022-10-10 RX ORDER — DIPHENHYDRAMINE HYDROCHLORIDE 50 MG/ML
50 INJECTION INTRAMUSCULAR; INTRAVENOUS
Status: CANCELLED
Start: 2022-11-21

## 2022-10-10 RX ORDER — HEPARIN SODIUM (PORCINE) LOCK FLUSH IV SOLN 100 UNIT/ML 100 UNIT/ML
5 SOLUTION INTRAVENOUS
Status: CANCELLED | OUTPATIENT
Start: 2022-10-24

## 2022-10-10 RX ORDER — ALBUTEROL SULFATE 90 UG/1
1-2 AEROSOL, METERED RESPIRATORY (INHALATION)
Status: CANCELLED
Start: 2022-11-21

## 2022-10-10 RX ORDER — ALBUTEROL SULFATE 0.83 MG/ML
2.5 SOLUTION RESPIRATORY (INHALATION)
Status: CANCELLED | OUTPATIENT
Start: 2022-10-24

## 2022-10-10 RX ORDER — DOXORUBICIN HYDROCHLORIDE 2 MG/ML
60 INJECTION, SOLUTION INTRAVENOUS ONCE
Status: CANCELLED | OUTPATIENT
Start: 2022-11-21

## 2022-10-10 RX ORDER — MEPERIDINE HYDROCHLORIDE 25 MG/ML
25 INJECTION INTRAMUSCULAR; INTRAVENOUS; SUBCUTANEOUS EVERY 30 MIN PRN
Status: CANCELLED | OUTPATIENT
Start: 2022-11-07

## 2022-10-10 RX ORDER — EPINEPHRINE 1 MG/ML
0.3 INJECTION, SOLUTION INTRAMUSCULAR; SUBCUTANEOUS EVERY 5 MIN PRN
Status: CANCELLED | OUTPATIENT
Start: 2022-10-10

## 2022-10-10 RX ORDER — DOXORUBICIN HYDROCHLORIDE 2 MG/ML
60 INJECTION, SOLUTION INTRAVENOUS ONCE
Status: CANCELLED | OUTPATIENT
Start: 2022-11-07

## 2022-10-10 RX ORDER — HEPARIN SODIUM,PORCINE 10 UNIT/ML
5 VIAL (ML) INTRAVENOUS
Status: CANCELLED | OUTPATIENT
Start: 2022-11-21

## 2022-10-10 RX ORDER — ALBUTEROL SULFATE 90 UG/1
1-2 AEROSOL, METERED RESPIRATORY (INHALATION)
Status: CANCELLED
Start: 2022-10-24

## 2022-10-10 RX ORDER — DIPHENHYDRAMINE HYDROCHLORIDE 50 MG/ML
50 INJECTION INTRAMUSCULAR; INTRAVENOUS
Status: CANCELLED
Start: 2022-10-24

## 2022-10-10 RX ORDER — HEPARIN SODIUM,PORCINE 10 UNIT/ML
5 VIAL (ML) INTRAVENOUS
Status: CANCELLED | OUTPATIENT
Start: 2022-11-07

## 2022-10-10 RX ORDER — EPINEPHRINE 1 MG/ML
0.3 INJECTION, SOLUTION INTRAMUSCULAR; SUBCUTANEOUS EVERY 5 MIN PRN
Status: CANCELLED | OUTPATIENT
Start: 2022-11-07

## 2022-10-10 RX ORDER — MEPERIDINE HYDROCHLORIDE 25 MG/ML
25 INJECTION INTRAMUSCULAR; INTRAVENOUS; SUBCUTANEOUS EVERY 30 MIN PRN
Status: CANCELLED | OUTPATIENT
Start: 2022-10-24

## 2022-10-10 RX ORDER — PROCHLORPERAZINE MALEATE 10 MG
10 TABLET ORAL EVERY 6 HOURS PRN
Qty: 30 TABLET | Refills: 2 | Status: SHIPPED | OUTPATIENT
Start: 2022-10-10 | End: 2022-12-06

## 2022-10-10 RX ORDER — DOXORUBICIN HYDROCHLORIDE 2 MG/ML
60 INJECTION, SOLUTION INTRAVENOUS ONCE
Status: COMPLETED | OUTPATIENT
Start: 2022-10-10 | End: 2022-10-10

## 2022-10-10 RX ORDER — ALBUTEROL SULFATE 90 UG/1
1-2 AEROSOL, METERED RESPIRATORY (INHALATION)
Status: CANCELLED
Start: 2022-10-10

## 2022-10-10 RX ORDER — DOXORUBICIN HYDROCHLORIDE 2 MG/ML
60 INJECTION, SOLUTION INTRAVENOUS ONCE
Status: CANCELLED | OUTPATIENT
Start: 2022-10-24

## 2022-10-10 RX ORDER — HEPARIN SODIUM (PORCINE) LOCK FLUSH IV SOLN 100 UNIT/ML 100 UNIT/ML
5 SOLUTION INTRAVENOUS
Status: CANCELLED | OUTPATIENT
Start: 2022-11-21

## 2022-10-10 RX ORDER — HEPARIN SODIUM (PORCINE) LOCK FLUSH IV SOLN 100 UNIT/ML 100 UNIT/ML
5 SOLUTION INTRAVENOUS
Status: DISCONTINUED | OUTPATIENT
Start: 2022-10-10 | End: 2022-10-10 | Stop reason: HOSPADM

## 2022-10-10 RX ORDER — HEPARIN SODIUM (PORCINE) LOCK FLUSH IV SOLN 100 UNIT/ML 100 UNIT/ML
5 SOLUTION INTRAVENOUS
Status: CANCELLED | OUTPATIENT
Start: 2022-11-07

## 2022-10-10 RX ORDER — PROCHLORPERAZINE MALEATE 10 MG
10 TABLET ORAL EVERY 6 HOURS PRN
Qty: 30 TABLET | Refills: 2 | Status: CANCELLED | OUTPATIENT
Start: 2022-10-10

## 2022-10-10 RX ORDER — MEPERIDINE HYDROCHLORIDE 25 MG/ML
25 INJECTION INTRAMUSCULAR; INTRAVENOUS; SUBCUTANEOUS EVERY 30 MIN PRN
Status: CANCELLED | OUTPATIENT
Start: 2022-10-10

## 2022-10-10 RX ORDER — METHYLPREDNISOLONE SODIUM SUCCINATE 125 MG/2ML
125 INJECTION, POWDER, LYOPHILIZED, FOR SOLUTION INTRAMUSCULAR; INTRAVENOUS
Status: CANCELLED
Start: 2022-10-24

## 2022-10-10 RX ADMIN — Medication 250 ML: at 09:45

## 2022-10-10 RX ADMIN — HEPARIN SODIUM (PORCINE) LOCK FLUSH IV SOLN 100 UNIT/ML 500 UNITS: 100 SOLUTION at 07:49

## 2022-10-10 RX ADMIN — HEPARIN SODIUM (PORCINE) LOCK FLUSH IV SOLN 100 UNIT/ML 5 ML: 100 SOLUTION at 11:55

## 2022-10-10 RX ADMIN — PALONOSETRON 0.25 MG: 0.05 INJECTION, SOLUTION INTRAVENOUS at 10:13

## 2022-10-10 RX ADMIN — DOXORUBICIN HYDROCHLORIDE 100 MG: 2 INJECTION, SOLUTION INTRAVENOUS at 10:59

## 2022-10-10 ASSESSMENT — PAIN SCALES - GENERAL: PAINLEVEL: NO PAIN (0)

## 2022-10-10 NOTE — NURSING NOTE
"Oncology Rooming Note    October 10, 2022 8:15 AM   Betty Rogers is a 47 year old female who presents for:    Chief Complaint   Patient presents with     Oncology Clinic Visit     Prior to treatment       Initial Vitals: /78 (BP Location: Left arm)   Pulse 60   Temp 98.3  F (36.8  C) (Oral)   Resp 16   Ht 1.626 m (5' 4.02\")   Wt 62.1 kg (136 lb 14.4 oz)   SpO2 100%   BMI 23.49 kg/m   Estimated body mass index is 23.49 kg/m  as calculated from the following:    Height as of this encounter: 1.626 m (5' 4.02\").    Weight as of this encounter: 62.1 kg (136 lb 14.4 oz). Body surface area is 1.67 meters squared.  No Pain (0) Comment: Data Unavailable   No LMP recorded. (Menstrual status: Chemotherapy).  Allergies reviewed: Yes  Medications reviewed: Yes    Medications: Medication refills not needed today.  Pharmacy name entered into [facility]:    [id number] - [facility] [address] [address] [address] [place] [facility] [address] [facility]    Clinical concerns: Has several questions to ask.       Sarina Hughes LPN            "

## 2022-10-10 NOTE — NURSING NOTE
Contacted by Infusion Therapy nurse inquiring if ok to proceed without a baseline echocardiogram.  Per Dr. Franco, ok to proceed, echocardiogram order will be placed.  Infusion therapy nurse notified.    Della Kc RN

## 2022-10-10 NOTE — PROGRESS NOTES
Provided patient with the following information regarding current chemotherapy regimen:    Chemotherapy Information    Chemotherapy Regimen: Dose dense doxorubicin/cyclophosphamide/  Fulphila injections to boost white blood cell production.    Infusion/Treatment Schedule: Infusions every 2 weeks    Number of Cycles Planned:  Start date today, 10/10/2022    Patient informed that echocardiogram order will be placed to monitor cardiac function, ideally to be completed within the next two weeks prior to next cycle.  Reviewed potential side effects of nausea/vomiting, hair loss, diarrhea, low blood counts, as well as possible bone pain related to Fulphila injection.  Patient has ondansetron on hand from prior therapies, patient informed that pharmacy will meet with patient today to discuss sending another nausea medicine called Compazine to use as needed, as well as a prescription for dexamethasone, to be taken starting day 2 of therapy.       Literature provided to patient to review signs/symptoms to reach out to triage.      Patient denies further questions at this time.      Della Kc RN

## 2022-10-10 NOTE — PROGRESS NOTES
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    Review of breast MRI shows definitive improvement in primary breast malignancy and satellite lesions; lymph nodes have not changed much in size but appear to be less enhanced.  Proceed with neoadjuvant dose dense Adriamycin/Cytoxan with Neulasta growth factor support.  Reviewed logistics of administration as well as potential side effects including but not limited to nausea, vomiting, fatigue, menopause, cardiomyopathy, secondary malignancy.  Planning 4 cycles followed by surgical resection with weight suspect will be generous axillary investigation followed by adjuvant radiotherapy.  Tentatively planning adjuvant hormonal therapy; will await pathology report and request Ki-67 the choice of adjuvant hormonal therapy.  Creatinine baseline echocardiogram.    Hoang Franco MD    --------------------    Interval History:  Betty returns for follow up of breast cancer.  Beyond alopecia, tolerated Taxol quite well.  No peripheral neuropathy.  Periods have not returned.  Good mood.  Good spirits.  Good energy.    --------------------    Physical Exam:  VS: /78 (BP Location: Left arm)   Pulse 60   Temp 98.3  F (36.8  C) (Oral)   Resp 16   Ht 1.626 m (5' 4.02\")   Wt 62.1 kg (136 lb 14.4 oz)   SpO2 100%   BMI 23.49 kg/m    GEN: Well appearing.  KELI: 1-1.5 cm left axillary node still palpable.    Labs / Imaging:  We reviewed CBC, CMP.  We personally reviewed / compared her baseline and recent breast MR.  "

## 2022-10-10 NOTE — LETTER
"    10/10/2022         RE: Betty Rogers  [address]  [facility] [address]        Dear Colleague,    Thank you for referring your patient, Betty Rogers, to the [facility]. Please see a copy of my visit note below.    [facility] Hematology / Oncology  Progress Note  Name: Betty Rogers  :  1975    MRN:  [id number]    --------------------    Assessment / Plan:  Multifocal, locally advanced, hormone positive, HER2 negative left breast ductal carcinoma:  # Neoadjuvant taxol x 12 weeks; partial response.    Review of breast MRI shows definitive improvement in primary breast malignancy and satellite lesions; lymph nodes have not changed much in size but appear to be less enhanced.  Proceed with neoadjuvant dose dense Adriamycin/Cytoxan with Neulasta growth factor support.  Reviewed logistics of administration as well as potential side effects including but not limited to nausea, vomiting, fatigue, menopause, cardiomyopathy, secondary malignancy.  Planning 4 cycles followed by surgical resection with weight suspect will be generous axillary investigation followed by adjuvant radiotherapy.  Tentatively planning adjuvant hormonal therapy; will await pathology report and request Ki-67 the choice of adjuvant hormonal therapy.  Creatinine baseline echocardiogram.    Hoang Franco MD    --------------------    Interval History:  Betty returns for follow up of breast cancer.  Beyond alopecia, tolerated Taxol quite well.  No peripheral neuropathy.  Periods have not returned.  Good mood.  Good spirits.  Good energy.    --------------------    Physical Exam:  VS: /78 (BP Location: Left arm)   Pulse 60   Temp 98.3  F (36.8  C) (Oral)   Resp 16   Ht 1.626 m (5' 4.02\")   Wt 62.1 kg (136 lb 14.4 oz)   SpO2 100%   BMI 23.49 kg/m    GEN: Well appearing.  KELI: 1-1.5 cm left axillary node still palpable.    Labs / Imaging:  We reviewed CBC, CMP.  We " personally reviewed / compared her baseline and recent breast MR.      Again, thank you for allowing me to participate in the care of your patient.        Sincerely,        Hoang Franco MD     no

## 2022-10-10 NOTE — PROGRESS NOTES
Port needle left for access for treatment. Sterile technique performed and maintained. Patient tolerated procedure well. Tubes drawn in rainbow order. Tubes labeled and double signed. Transparent dressing placed with use of tegarderm.     Rosa Isela Kate RN  [facility] Oncology/Infusion [place]

## 2022-10-10 NOTE — PROGRESS NOTES
Infusion Nursing Note:  Betty Rogers presents today for C1D1 Adriamycin/Cytoxan.    Patient seen by provider today: Yes: Dr. Franco   present during visit today: Not Applicable.    Note: Patient here for start of AC- per patient tolerated Taxol well with minimal side effects. Care coordination team came down and did chemo teaching with patient. Patient had no further questions. Overview of medications given to patient and what new medications there will be. Instructed on daily Decadron. Instructions placed in patient instructions on AVS.     Intravenous Access:  Implanted Port.    Treatment Conditions:  Lab Results   Component Value Date    HGB 9.9 (L) 10/10/2022    WBC 2.4 (L) 10/10/2022    ANEU 0.6 (L) 08/08/2022    ANEUTAUTO 0.8 (L) 10/10/2022     10/10/2022      Lab Results   Component Value Date     10/10/2022    POTASSIUM 4.3 10/10/2022    CR 0.72 10/10/2022    ABEBA 8.7 10/10/2022    BILITOTAL 0.2 10/10/2022    ALBUMIN 3.4 10/10/2022    ALT 20 10/10/2022    AST 15 10/10/2022     Results reviewed, labs did NOT meet treatment parameters: ANC 0.8-- discussed with Dr. Franco and ok to proceed with chemo as patient will be coming in for neulasta tomorrow.    Echo not completed prior to start of treatment- Dr. Franco ok with proceeding as long as patient has this completed in a couple weeks. Pt notified and scheduled for 10/18/22 for echo.    Post Infusion Assessment:  Patient tolerated infusion without incident.  Blood return noted pre and post infusion.  Blood return noted during administration every 3-5 cc.  Site patent and intact, free from redness, edema or discomfort.  No evidence of extravasations.  Access discontinued per protocol.     Discharge Plan:   AVS to patient via [employer].  Patient will return 10/11 (neulasta) & 10/24 for next appointment.   Patient discharged in stable condition accompanied by: self.  Departure Mode: Ambulatory.      Jade Rogers  RN

## 2022-10-10 NOTE — PATIENT INSTRUCTIONS
Zofran (Ondansetron) Take as needed for nausea-- avoid taking for 3 days post chemo  Compazine (prochlorperazine) Take as needed for nausea- can take the first 3 days post chemo  Decadron (dexamethasone) Take daily for 3 days post chemo (10/11, 10/12, 10/13)

## 2022-10-11 ENCOUNTER — INFUSION THERAPY VISIT (OUTPATIENT)
Dept: INFUSION THERAPY | Facility: CLINIC | Age: 47
End: 2022-10-11
Payer: COMMERCIAL

## 2022-10-11 VITALS
BODY MASS INDEX: 23.37 KG/M2 | HEART RATE: 68 BPM | RESPIRATION RATE: 16 BRPM | TEMPERATURE: 97.5 F | HEIGHT: 64 IN | WEIGHT: 136.91 LBS | SYSTOLIC BLOOD PRESSURE: 98 MMHG | DIASTOLIC BLOOD PRESSURE: 55 MMHG | OXYGEN SATURATION: 97 %

## 2022-10-11 DIAGNOSIS — C50.912 CARCINOMA OF LEFT BREAST METASTATIC TO AXILLARY LYMPH NODE (H): Primary | ICD-10-CM

## 2022-10-11 DIAGNOSIS — T45.1X5S ADVERSE EFFECT OF ANTINEOPLASTIC AND IMMUNOSUPPRESSIVE DRUGS, SEQUELA: ICD-10-CM

## 2022-10-11 DIAGNOSIS — C77.3 CARCINOMA OF LEFT BREAST METASTATIC TO AXILLARY LYMPH NODE (H): Primary | ICD-10-CM

## 2022-10-11 PROCEDURE — 96372 THER/PROPH/DIAG INJ SC/IM: CPT | Performed by: NURSE PRACTITIONER

## 2022-10-11 PROCEDURE — 99207 PR NO CHARGE LOS: CPT

## 2022-10-11 ASSESSMENT — PAIN SCALES - GENERAL: PAINLEVEL: NO PAIN (0)

## 2022-10-11 NOTE — PROGRESS NOTES
Infusion Nursing Note:  Betty Rogers presents today for Fulphila.    Patient seen by provider today: No   present during visit today: Not Applicable.    Note: Assessment performed by Kelsea VALENTINE RN prior to injection today. Patient denies symptoms/concerns following previous injection.    Intravenous Access:  No Intravenous access/labs at this visit.    Treatment Conditions:  Not Applicable.    Post Infusion Assessment:  Patient tolerated injection without incident.  Site patent and intact, free from redness, edema or discomfort.     Discharge Plan:   Patient discharged in stable condition accompanied by: self.  Departure Mode: Ambulatory.      Sarina Hughes LPN

## 2022-10-18 ENCOUNTER — HOSPITAL ENCOUNTER (EMERGENCY)
Facility: CLINIC | Age: 47
Discharge: HOME | End: 2022-10-18
Attending: PHYSICIAN ASSISTANT | Admitting: PHYSICIAN ASSISTANT
Payer: COMMERCIAL

## 2022-10-18 ENCOUNTER — ANCILLARY PROCEDURE (OUTPATIENT)
Dept: CARDIOLOGY | Facility: CLINIC | Age: 47
End: 2022-10-18
Attending: INTERNAL MEDICINE
Payer: COMMERCIAL

## 2022-10-18 ENCOUNTER — APPOINTMENT (OUTPATIENT)
Dept: GENERAL RADIOLOGY | Facility: CLINIC | Age: 47
End: 2022-10-18
Attending: INTERNAL MEDICINE
Payer: COMMERCIAL

## 2022-10-18 ENCOUNTER — NURSE TRIAGE (OUTPATIENT)
Dept: NURSING | Facility: CLINIC | Age: 47
End: 2022-10-18

## 2022-10-18 VITALS
WEIGHT: 136 LBS | RESPIRATION RATE: 16 BRPM | DIASTOLIC BLOOD PRESSURE: 64 MMHG | TEMPERATURE: 98.9 F | BODY MASS INDEX: 23.22 KG/M2 | SYSTOLIC BLOOD PRESSURE: 124 MMHG | HEART RATE: 95 BPM | HEIGHT: 64 IN | OXYGEN SATURATION: 99 %

## 2022-10-18 DIAGNOSIS — C77.3 CARCINOMA OF LEFT BREAST METASTATIC TO AXILLARY LYMPH NODE (H): ICD-10-CM

## 2022-10-18 DIAGNOSIS — C50.912 CARCINOMA OF LEFT BREAST METASTATIC TO AXILLARY LYMPH NODE (H): ICD-10-CM

## 2022-10-18 DIAGNOSIS — R50.9 FEBRILE ILLNESS: ICD-10-CM

## 2022-10-18 DIAGNOSIS — D70.9 NEUTROPENIA, UNSPECIFIED TYPE (H): ICD-10-CM

## 2022-10-18 LAB
ALBUMIN UR-MCNC: NEGATIVE MG/DL
ANION GAP SERPL CALCULATED.3IONS-SCNC: 6 MMOL/L (ref 3–14)
APPEARANCE UR: CLEAR
BASOPHILS # BLD MANUAL: 0.1 10E3/UL (ref 0–0.2)
BASOPHILS NFR BLD MANUAL: 3 %
BILIRUB UR QL STRIP: NEGATIVE
BUN SERPL-MCNC: 8 MG/DL (ref 7–30)
CALCIUM SERPL-MCNC: 8.8 MG/DL (ref 8.5–10.1)
CHLORIDE BLD-SCNC: 105 MMOL/L (ref 94–109)
CO2 SERPL-SCNC: 24 MMOL/L (ref 20–32)
COLOR UR AUTO: NORMAL
CREAT SERPL-MCNC: 0.73 MG/DL (ref 0.52–1.04)
EOSINOPHIL # BLD MANUAL: 0 10E3/UL (ref 0–0.7)
EOSINOPHIL NFR BLD MANUAL: 1 %
ERYTHROCYTE [DISTWIDTH] IN BLOOD BY AUTOMATED COUNT: 14.9 % (ref 10–15)
FLUAV RNA SPEC QL NAA+PROBE: NEGATIVE
FLUBV RNA RESP QL NAA+PROBE: NEGATIVE
GFR SERPL CREATININE-BSD FRML MDRD: >90 ML/MIN/1.73M2
GLUCOSE BLD-MCNC: 98 MG/DL (ref 70–99)
GLUCOSE UR STRIP-MCNC: NEGATIVE MG/DL
HCT VFR BLD AUTO: 28.8 % (ref 35–47)
HGB BLD-MCNC: 9.5 G/DL (ref 11.7–15.7)
HGB UR QL STRIP: NEGATIVE
HOLD SPECIMEN: NORMAL
KETONES UR STRIP-MCNC: NEGATIVE MG/DL
LEUKOCYTE ESTERASE UR QL STRIP: NEGATIVE
LVEF ECHO: NORMAL
LYMPHOCYTES # BLD MANUAL: 0.8 10E3/UL (ref 0.8–5.3)
LYMPHOCYTES NFR BLD MANUAL: 30 %
MCH RBC QN AUTO: 29.1 PG (ref 26.5–33)
MCHC RBC AUTO-ENTMCNC: 33 G/DL (ref 31.5–36.5)
MCV RBC AUTO: 88 FL (ref 78–100)
MONOCYTES # BLD MANUAL: 0.3 10E3/UL (ref 0–1.3)
MONOCYTES NFR BLD MANUAL: 13 %
NEUTROPHILS # BLD MANUAL: 1.3 10E3/UL (ref 1.6–8.3)
NEUTROPHILS NFR BLD MANUAL: 53 %
NITRATE UR QL: NEGATIVE
PH UR STRIP: 7 [PH] (ref 5–7)
PLAT MORPH BLD: ABNORMAL
PLATELET # BLD AUTO: 184 10E3/UL (ref 150–450)
POTASSIUM BLD-SCNC: 3.5 MMOL/L (ref 3.4–5.3)
RBC # BLD AUTO: 3.26 10E6/UL (ref 3.8–5.2)
RBC MORPH BLD: ABNORMAL
RBC URINE: 0 /HPF
RSV RNA SPEC NAA+PROBE: NEGATIVE
SARS-COV-2 RNA RESP QL NAA+PROBE: NEGATIVE
SODIUM SERPL-SCNC: 135 MMOL/L (ref 133–144)
SP GR UR STRIP: 1.01 (ref 1–1.03)
SQUAMOUS EPITHELIAL: <1 /HPF
UROBILINOGEN UR STRIP-MCNC: NORMAL MG/DL
WBC # BLD AUTO: 2.5 10E3/UL (ref 4–11)
WBC URINE: 3 /HPF

## 2022-10-18 PROCEDURE — 80048 BASIC METABOLIC PNL TOTAL CA: CPT | Performed by: EMERGENCY MEDICINE

## 2022-10-18 PROCEDURE — 85007 BL SMEAR W/DIFF WBC COUNT: CPT | Performed by: PHYSICIAN ASSISTANT

## 2022-10-18 PROCEDURE — 93306 TTE W/DOPPLER COMPLETE: CPT | Performed by: INTERNAL MEDICINE

## 2022-10-18 PROCEDURE — 96360 HYDRATION IV INFUSION INIT: CPT

## 2022-10-18 PROCEDURE — 36415 COLL VENOUS BLD VENIPUNCTURE: CPT | Performed by: PHYSICIAN ASSISTANT

## 2022-10-18 PROCEDURE — 81001 URINALYSIS AUTO W/SCOPE: CPT | Performed by: PHYSICIAN ASSISTANT

## 2022-10-18 PROCEDURE — 250N000011 HC RX IP 250 OP 636: Performed by: PHYSICIAN ASSISTANT

## 2022-10-18 PROCEDURE — C9803 HOPD COVID-19 SPEC COLLECT: HCPCS

## 2022-10-18 PROCEDURE — 87637 SARSCOV2&INF A&B&RSV AMP PRB: CPT | Performed by: PHYSICIAN ASSISTANT

## 2022-10-18 PROCEDURE — 87040 BLOOD CULTURE FOR BACTERIA: CPT | Performed by: PHYSICIAN ASSISTANT

## 2022-10-18 PROCEDURE — 82803 BLOOD GASES ANY COMBINATION: CPT

## 2022-10-18 PROCEDURE — 83605 ASSAY OF LACTIC ACID: CPT

## 2022-10-18 PROCEDURE — 36415 COLL VENOUS BLD VENIPUNCTURE: CPT | Performed by: EMERGENCY MEDICINE

## 2022-10-18 PROCEDURE — 80048 BASIC METABOLIC PNL TOTAL CA: CPT | Performed by: PHYSICIAN ASSISTANT

## 2022-10-18 PROCEDURE — 258N000003 HC RX IP 258 OP 636: Performed by: EMERGENCY MEDICINE

## 2022-10-18 PROCEDURE — 99283 EMERGENCY DEPT VISIT LOW MDM: CPT | Mod: CS,25

## 2022-10-18 PROCEDURE — 85027 COMPLETE CBC AUTOMATED: CPT | Performed by: PHYSICIAN ASSISTANT

## 2022-10-18 PROCEDURE — 250N000013 HC RX MED GY IP 250 OP 250 PS 637: Performed by: PHYSICIAN ASSISTANT

## 2022-10-18 PROCEDURE — 96361 HYDRATE IV INFUSION ADD-ON: CPT

## 2022-10-18 RX ORDER — LEVOFLOXACIN 750 MG/1
750 TABLET, FILM COATED ORAL ONCE
Status: COMPLETED | OUTPATIENT
Start: 2022-10-18 | End: 2022-10-18

## 2022-10-18 RX ORDER — LEVOFLOXACIN 750 MG/1
750 TABLET, FILM COATED ORAL DAILY
Qty: 7 TABLET | Refills: 0 | Status: SHIPPED | OUTPATIENT
Start: 2022-10-18 | End: 2022-10-25

## 2022-10-18 RX ORDER — HEPARIN SODIUM (PORCINE) LOCK FLUSH IV SOLN 100 UNIT/ML 100 UNIT/ML
5-10 SOLUTION INTRAVENOUS
Status: DISCONTINUED | OUTPATIENT
Start: 2022-10-18 | End: 2022-10-19 | Stop reason: HOSPADM

## 2022-10-18 RX ORDER — HEPARIN SODIUM,PORCINE 10 UNIT/ML
5-10 VIAL (ML) INTRAVENOUS
Status: DISCONTINUED | OUTPATIENT
Start: 2022-10-18 | End: 2022-10-19 | Stop reason: HOSPADM

## 2022-10-18 RX ORDER — ACETAMINOPHEN 500 MG
500 TABLET ORAL ONCE
Status: COMPLETED | OUTPATIENT
Start: 2022-10-18 | End: 2022-10-18

## 2022-10-18 RX ORDER — HEPARIN SODIUM,PORCINE 10 UNIT/ML
5-10 VIAL (ML) INTRAVENOUS EVERY 24 HOURS
Status: DISCONTINUED | OUTPATIENT
Start: 2022-10-18 | End: 2022-10-19 | Stop reason: HOSPADM

## 2022-10-18 RX ADMIN — SODIUM CHLORIDE 1000 ML: 9 INJECTION, SOLUTION INTRAVENOUS at 21:02

## 2022-10-18 RX ADMIN — ACETAMINOPHEN 500 MG: 500 TABLET ORAL at 21:24

## 2022-10-18 RX ADMIN — Medication 5 ML: at 23:45

## 2022-10-18 RX ADMIN — Medication 5 ML: at 22:20

## 2022-10-18 RX ADMIN — LEVOFLOXACIN 750 MG: 750 TABLET, FILM COATED ORAL at 23:45

## 2022-10-18 ASSESSMENT — ENCOUNTER SYMPTOMS
SHORTNESS OF BREATH: 0
MYALGIAS: 1
DIFFICULTY URINATING: 0
DIZZINESS: 0
DIARRHEA: 0
HEADACHES: 0
LIGHT-HEADEDNESS: 0
NECK PAIN: 0
SORE THROAT: 0
BACK PAIN: 0
FEVER: 1
VOMITING: 0
ABDOMINAL PAIN: 0
NUMBNESS: 0
NAUSEA: 0
WEAKNESS: 0
COUGH: 0

## 2022-10-18 ASSESSMENT — ACTIVITIES OF DAILY LIVING (ADL)
ADLS_ACUITY_SCORE: 35
ADLS_ACUITY_SCORE: 35

## 2022-10-18 NOTE — TELEPHONE ENCOUNTER
Nurse Triage SBAR    Situation:   Fever, on Chemo    Background:   -Patient calling.  -Started on AC Chemo    Assessment:   -She has felt feverious today  -100.6F(average), 101.8F(Tmax), 100.9F (Now)  -No cough, runny nose, sore throat, earache, abdominal pain, vomiting or diarrhea.    Recommendation:   Go to ED    JOSE ROMERO RN on 10/18/2022 at 6:26 PM      Reason for Disposition    [1] Neutropenia known or suspected (e.g., recent cancer chemotherapy) AND [2] fever > 100.4 F (38.0 C)    Additional Information    Negative: Fever > 103 F (39.4 C)    Negative: Difficult to awaken or acting confused (e.g., disoriented, slurred speech)    Negative: Shock suspected (e.g., cold/pale/clammy skin, too weak to stand, low BP, rapid pulse)    Negative: Bluish (or gray) lips or face now    Negative: New-onset rash with many purple (or blood-colored) spots or dots    Negative: Sounds like a life-threatening emergency to the triager    Negative: Other symptom is present, see that guideline (e.g., symptoms of cough, runny nose, sore throat, earache, abdominal pain, diarrhea, vomiting)    Protocols used: CANCER - FEVER-A-

## 2022-10-19 ENCOUNTER — TELEPHONE (OUTPATIENT)
Dept: ONCOLOGY | Facility: CLINIC | Age: 47
End: 2022-10-19

## 2022-10-19 ENCOUNTER — TELEPHONE (OUTPATIENT)
Dept: SURGERY | Facility: CLINIC | Age: 47
End: 2022-10-19

## 2022-10-19 ENCOUNTER — MYC MEDICAL ADVICE (OUTPATIENT)
Dept: ONCOLOGY | Facility: CLINIC | Age: 47
End: 2022-10-19

## 2022-10-19 DIAGNOSIS — C50.412 MALIGNANT NEOPLASM OF UPPER-OUTER QUADRANT OF LEFT BREAST IN FEMALE, ESTROGEN RECEPTOR POSITIVE (H): Primary | ICD-10-CM

## 2022-10-19 DIAGNOSIS — Z11.59 ENCOUNTER FOR SCREENING FOR OTHER VIRAL DISEASES: ICD-10-CM

## 2022-10-19 DIAGNOSIS — Z17.0 MALIGNANT NEOPLASM OF UPPER-OUTER QUADRANT OF LEFT BREAST IN FEMALE, ESTROGEN RECEPTOR POSITIVE (H): Primary | ICD-10-CM

## 2022-10-19 NOTE — TELEPHONE ENCOUNTER
Type of surgery: Bilateral skin sparing mastectomy with left sentinel lymph node biopsy, RFID localized left axillary node excision, possible left axillary node dissection and port removal  Location of surgery: [facility]  Date and time of surgery: 12/21/22 at 2pm  Surgeon: Dr. Janie South  Pre-Op Appt Date: patient to schedule  Post-Op Appt Date: patient to schedule   Packet sent out: Yes  Pre-cert/Authorization completed:  Not Applicable  Date: 10/19/22

## 2022-10-19 NOTE — ED TRIAGE NOTES
Hx L breast Ca Dx 6/22 currently on chemo.  Absolute neutraphils 0.8 last Monday before chemo, (given WBC booster day after chemo).  Fever 101 at home     Triage Assessment     Row Name 10/18/22 2010       Triage Assessment (Adult)    Airway WDL WDL       Respiratory WDL    Respiratory WDL WDL       Skin Circulation/Temperature WDL    Skin Circulation/Temperature WDL WDL       Cardiac WDL    Cardiac WDL WDL       Peripheral/Neurovascular WDL    Peripheral Neurovascular WDL WDL       Cognitive/Neuro/Behavioral WDL    Cognitive/Neuro/Behavioral WDL WDL

## 2022-10-19 NOTE — ED PROVIDER NOTES
"  History     Chief Complaint:  Fever (Hx L breast Ca Dx 6/22 currently on chemo.  Absolute neutraphils 0.8 last Monday before chemo, (given WBC booster day after chemo). Fever 101 at home)     Very pleasant 46 y/o female who is under current chemo treatment for breast CA (followed by [facility] oncology) presents for evaluation of fever.  Felt off/not well on Saturday (today is Tuesday) but was unable to check temp, unsure if had fever.  [place] better Sunday and yesterday but did not feel will this evening, checked temp and 101 at home.  Called advice RN line and directed in for eval. Started new chemo treatment last week (\"AC\") received \"WBC booster\" after treatment.  Recently finished 11 rounds of Taxol.     Denies HA  No ST or congestion  Denies cough  Denies CP/dsypnea  Port not painful  Denies abd pain  No N/V/D  Denies urinary irritative complaints  Denies N/T/W to extremities  No neck or back pain. Mild body aches today.   Denies calf pain/swelling   Denies feeling dizzy  Notes small area of redness on skin, \"like a pimple\" noticed a day or so ago    Breast CA, chemo  Denies Hx of HTN or DM  COVID vaccinated   No prior PE/DVT    [facility]  Local resident  Friend at [facility]  Denies sick contacts   No recent long travel        The history is provided by the patient and medical records. No  was used.        ROS:  Review of Systems   Constitutional: Positive for fever.   HENT: Negative for congestion, ear pain and sore throat.    Respiratory: Negative for cough and shortness of breath.    Cardiovascular: Negative for chest pain.   Gastrointestinal: Negative for abdominal pain, diarrhea, nausea and vomiting.   Genitourinary: Negative for difficulty urinating.   Musculoskeletal: Positive for myalgias. Negative for back pain and neck pain.   Skin:        Denies generalized skin rash.  Small \"pimple\" to right low back.    Neurological: Negative for dizziness, weakness, light-headedness, numbness and " "headaches.   All other systems reviewed and are negative.    Allergies:  Nuts  Other [Seasonal Allergies]     Medications:    calcium carbonate-vitamin D (OS-ABEBA) 600-400 MG-UNIT chewable tablet  dexamethasone (DECADRON) 4 MG tablet  HYDROcodone-acetaminophen (NORCO) 5-325 MG tablet  Multiple Vitamin (MULTIVITAMIN ADULT PO)  ondansetron (ZOFRAN) 8 MG tablet  prochlorperazine (COMPAZINE) 10 MG tablet  senna-docusate (SENOKOT-S/PERICOLACE) 8.6-50 MG tablet      Past Medical History:    Past Medical History:   Diagnosis Date     Breast cancer metastasized to axillary lymph node (H) 7/15/2022     No active medical problems      Past Surgical History:    Past Surgical History:   Procedure Laterality Date     C/SECTION, LOW TRANSVERSE      x2     INSERT PORT VASCULAR ACCESS Right 7/25/2022    Procedure: PORT PLACEMENT;  Surgeon: Janie South MD;  Location:  OR      Family History:    family history includes Breast Cancer (age of onset: 40) in her mother; Breast Cancer (age of onset: 50) in her maternal aunt; Heart Disease in her maternal grandfather; Unknown/Adopted in her brother.    Social History:   reports that she has never smoked. She has never used smokeless tobacco. She reports current alcohol use. She reports that she does not use drugs.  PCP: Diana Luevano     Physical Exam     Patient Vitals for the past 24 hrs:   BP Temp Temp src Pulse Resp SpO2 Height Weight   10/18/22 2010 112/71 100.4  F (38  C) Oral 98 16 99 % 1.626 m (5' 4\") 61.7 kg (136 lb)      Physical Exam  Vitals and nursing note reviewed.   Constitutional:       General: She is not in acute distress.     Appearance: Normal appearance. She is not ill-appearing, toxic-appearing or diaphoretic.   HENT:      Head: Normocephalic.      Right Ear: Tympanic membrane, ear canal and external ear normal.      Left Ear: Tympanic membrane, ear canal and external ear normal.      Mouth/Throat:      Mouth: Mucous membranes are moist.      Pharynx: Oropharynx is " clear. No oropharyngeal exudate or posterior oropharyngeal erythema.   Eyes:      General:         Right eye: No discharge.         Left eye: No discharge.      Conjunctiva/sclera: Conjunctivae normal.   Cardiovascular:      Rate and Rhythm: Normal rate and regular rhythm.      Pulses: Normal pulses.      Heart sounds: Normal heart sounds.   Pulmonary:      Effort: Pulmonary effort is normal. No respiratory distress.      Breath sounds: Normal breath sounds.   Abdominal:      Palpations: Abdomen is soft.      Tenderness: There is no abdominal tenderness.   Musculoskeletal:         General: Normal range of motion.      Cervical back: Normal range of motion and neck supple. No rigidity.   Skin:     General: Skin is warm.      Capillary Refill: Capillary refill takes less than 2 seconds.      Findings: No rash.      Comments: ~1cm shallow pimple to right low back without associated surrounding cellulitis, induration or fluctuance. Not vesicular.    Neurological:      General: No focal deficit present.      Mental Status: She is alert.   Psychiatric:         Mood and Affect: Mood normal.         Behavior: Behavior normal.         Thought Content: Thought content normal.         Judgment: Judgment normal.       Emergency Department Course     Laboratory:  Labs Ordered and Resulted from Time of ED Arrival to Time of ED Departure   CBC WITH PLATELETS AND DIFFERENTIAL - Abnormal       Result Value    WBC Count 2.5 (*)     RBC Count 3.26 (*)     Hemoglobin 9.5 (*)     Hematocrit 28.8 (*)     MCV 88      MCH 29.1      MCHC 33.0      RDW 14.9      Platelet Count 184     DIFFERENTIAL - Abnormal    % Neutrophils 53      % Lymphocytes 30      % Monocytes 13      % Eosinophils 1      % Basophils 3      Absolute Neutrophils 1.3 (*)     Absolute Lymphocytes 0.8      Absolute Monocytes 0.3      Absolute Eosinophils 0.0      Absolute Basophils 0.1      RBC Morphology Confirmed RBC Indices      Platelet Assessment        Value:  Automated Count Confirmed. Platelet morphology is normal.   BASIC METABOLIC PANEL - Normal    Sodium 135      Potassium 3.5      Chloride 105      Carbon Dioxide (CO2) 24      Anion Gap 6      Urea Nitrogen 8      Creatinine 0.73      Calcium 8.8      Glucose 98      GFR Estimate >90     INFLUENZA A/B & SARS-COV2 PCR MULTIPLEX - Normal    Influenza A PCR Negative      Influenza B PCR Negative      RSV PCR Negative      SARS CoV2 PCR Negative     ROUTINE UA WITH MICROSCOPIC REFLEX TO CULTURE - Normal    Color Urine Straw      Appearance Urine Clear      Glucose Urine Negative      Bilirubin Urine Negative      Ketones Urine Negative      Specific Gravity Urine 1.006      Blood Urine Negative      pH Urine 7.0      Protein Albumin Urine Negative      Urobilinogen Urine Normal      Nitrite Urine Negative      Leukocyte Esterase Urine Negative      RBC Urine 0      WBC Urine 3      Squamous Epithelials Urine <1     ISTAT GASES LACTATE VENOUS POCT   BLOOD CULTURE      Emergency Department Course:       Reviewed:  I reviewed nursing notes, vitals and past medical history    Assessments/Consults:  2139: I obtained history and examined the patient as noted above.   2238: Labs noted.  Re-eval.  Pt doing well, no new complaints.  Remains not acutely ill appearing. Discussed results and that I will discussed with [facility].  EM attending (Dr. Brown) made aware of pt as well and agrees to this working plan.   2310: Spoke with Dr. Laura, on-call for pts primary oncologist (Dr. Franco).  Agrees feel reasonable to discharge with strict return precautions but recommends empiric oral ABX with levoquin and close F/U in office tomorrow. Pt evaluated by EM attending as well and pt is happy with plan.     Interventions:  Medications   sodium chloride (PF) 0.9% PF flush 10-20 mL (has no administration in time range)   sodium chloride (PF) 0.9% PF flush 10-20 mL (has no administration in time range)   sodium chloride (PF) 0.9% PF  "flush 10-20 mL (has no administration in time range)   heparin lock flush 10 UNIT/ML injection 5-10 mL (has no administration in time range)   heparin lock flush 10 UNIT/ML injection 5-10 mL (has no administration in time range)   heparin 100 UNIT/ML injection 5-10 mL (has no administration in time range)   0.9% sodium chloride BOLUS (1,000 mLs Intravenous New Bag 10/18/22 2102)   acetaminophen (TYLENOL) tablet 500 mg (500 mg Oral Given 10/18/22 2124)      Disposition:  The patient was discharged to home.     Impression & Plan    Medical Decision Making:  Very pleasant 48 y/o female who is under current chemo treatment for breast CA (followed by [facility] onc[facility]) presents for evaluation of fever.  Felt off/not well on Saturday (today is Tuesday) but was unable to check temp, unsure if had fever.  [place] better Sunday and yesterday but did not feel will this evening, checked temp and 101 at home.  Called advice RN line and directed in for eval. Started new chemo treatment last week (\"AC\") received \"WBC booster\" after treatment.  Recently finished 11 rounds of Taxol.     Exam as above.  Vitals noted, febrile here but is otherwise not acutely ill appearing.  No specific symptoms to direct etiology of fever. No OM/OE, pharyngitis. No cough/dyspnea to suggest pneumonia.  No abd pain, NT exam or associated N/V/D to suggest GI etiology.  Normal mentation, no HA, has supple neck, no midline back pain or neuro complaint, not suspected this is CNS/spinal infectious etiology.  Consider COVID.  Consider bacteremia from port.  Consider other viral illness.  Consider related to chemo/neurotropenia.  Consider UTI.  Has small pimple to right low back, this is small in size and without surrounding cellulitis, not suspected this is etiology of fever. This is not suspected to be PE with no associated CP/dyspnea, tachycardia, hypoxia, long travel or leg pain/swelling, etc.  Per RN POC lactate 1.  Await CBC to assess leukocytosis/penia, " anemia, and abnl platelets. BMP to assess electrolytes and renal function.  COVID and UA.  Blood Cx sent off port.  She is happy with plan.     Update: Labs noted for WBC 2.5 and ABS neutrophils of 1.3, eval otherwise unremarkable.  Blood Cx from port pending.  Pt doing well here. No new complaints. Remains not acutely ill appearing and currently afebrile on vitals recheck.  Spoke with pts on-call oncology coverage and asked EM attending to eval as well due to clinical picture.  Pt prefers to discharge if able and all in agreement that she is felt stable to do so. Oncology would like pt to be treated empirically with oral levoquin.  Pt did recently complete decadron taper and was educated on potential tendonopathy in regards to recent decadron and levoquin admin.  She will F/U with oncology tomorrow and educated at length on S/S that should prompt ED re-eval.  Questions answered. Verbalized understanding. Comfortable with plan and appreciative.     Diagnosis:    ICD-10-CM    1. Febrile illness  R50.9       2. Neutropenia, unspecified type (H)  D70.9          Discharge Medications:  New Prescriptions    LEVOFLOXACIN (LEVAQUIN) 750 MG TABLET    Take 1 tablet (750 mg) by mouth daily for 7 days      10/18/2022   Regina Thompson PA-C [employer], Leah M, PA-C  10/18/22 9333

## 2022-10-19 NOTE — ED PROVIDER NOTES
"Emergency Department Attending Supervision Note  10/18/2022  10:34 PM      I evaluated this patient in conjunction with Regina Thompson PA-C      Briefly, the patient presented with fever.  History of breast cancer and is currently undergoing chemotherapy with last chemo 8 days ago.  She reports feeling generally unwell for the last 3 days and is unsure if she had a fever during that time but did check her temperature tonight and found to be 101 Fahrenheit at home.  Also notes body aches.  Advised to follow-up with the ED by the nurse line.  Denies chest pain, cough, shortness of breath, or urinary symptoms.      On my exam:  /64   Pulse 95   Temp 98.9  F (37.2  C) (Oral)   Resp 16   Ht 1.626 m (5' 4\")   Wt 61.7 kg (136 lb)   SpO2 99%   BMI 23.34 kg/m    General: Alert and cooperative with exam. Patient in mild distress. Normal mentation.  Nontoxic appearance  Head:  Scalp is NC/AT  Eyes:  No scleral icterus, PERRL  ENT:  The external nose and ears are normal.  Neck:  Normal range of motion without rigidity.  CV:  Regular rate and rhythm.  PowerPort present without evidence of surrounding infection  Resp:  Breath sounds are clear bilaterally    Non-labored, no retractions or accessory muscle use  GI:  Abdomen is soft, no distension, no tenderness. No peritoneal signs  MS:  No lower extremity edema   Skin:  Warm and dry, No rash or lesions noted.  Neuro: Oriented x 3. No gross motor deficits.    Labs Ordered and Resulted from Time of ED Arrival to Time of ED Departure   CBC WITH PLATELETS AND DIFFERENTIAL - Abnormal       Result Value    WBC Count 2.5 (*)     RBC Count 3.26 (*)     Hemoglobin 9.5 (*)     Hematocrit 28.8 (*)     MCV 88      MCH 29.1      MCHC 33.0      RDW 14.9      Platelet Count 184     DIFFERENTIAL - Abnormal    % Neutrophils 53      % Lymphocytes 30      % Monocytes 13      % Eosinophils 1      % Basophils 3      Absolute Neutrophils 1.3 (*)     Absolute Lymphocytes 0.8      Absolute " Monocytes 0.3      Absolute Eosinophils 0.0      Absolute Basophils 0.1      RBC Morphology Confirmed RBC Indices      Platelet Assessment        Value: Automated Count Confirmed. Platelet morphology is normal.   BASIC METABOLIC PANEL - Normal    Sodium 135      Potassium 3.5      Chloride 105      Carbon Dioxide (CO2) 24      Anion Gap 6      Urea Nitrogen 8      Creatinine 0.73      Calcium 8.8      Glucose 98      GFR Estimate >90     INFLUENZA A/B & SARS-COV2 PCR MULTIPLEX - Normal    Influenza A PCR Negative      Influenza B PCR Negative      RSV PCR Negative      SARS CoV2 PCR Negative     ROUTINE UA WITH MICROSCOPIC REFLEX TO CULTURE - Normal    Color Urine Straw      Appearance Urine Clear      Glucose Urine Negative      Bilirubin Urine Negative      Ketones Urine Negative      Specific Gravity Urine 1.006      Blood Urine Negative      pH Urine 7.0      Protein Albumin Urine Negative      Urobilinogen Urine Normal      Nitrite Urine Negative      Leukocyte Esterase Urine Negative      RBC Urine 0      WBC Urine 3      Squamous Epithelials Urine <1     ISTAT GASES LACTATE VENOUS POCT   BLOOD CULTURE         My impression:  Patient is a 47-year-old female with history of breast cancer, currently undergoing chemotherapy, who presents with fever and body aches.  Patient's medical history and records were reviewed.  I was asked to evaluate the patient by Regina Thompson PA-C.  On my evaluation patient is well-appearing and currently afebrile with normal vital signs.  Work-up demonstrates neutropenia as above.  There is no determined source of infection at this time and blood cultures were obtained and pending.  Regina Thompson discussed the patient's care with on-call oncology who recommended prophylactic treatment with Levaquin; provided first dose in the ED.  Patient will follow-up closely in oncology clinic and return precautions were discussed.  Additionally discussed rare risk of spontaneous tendon rupture  associated with fluoroquinolones.  At this time I feel patient is safe and appropriate for continued outpatient management.  Discharged home.      Diagnosis    ICD-10-CM    1. Febrile illness  R50.9       2. Neutropenia, unspecified type (H)  D70.9             Isaías Neville, Isaías Madrid DO  10/19/22 0711

## 2022-10-19 NOTE — TELEPHONE ENCOUNTER
----- Message from Valentín French MD sent at 10/18/2022 11:09 PM CDT -----  Regarding: Fevers  Hi Betty Bolton was referred to ED with fevers. She was febrile but not neutropenic. They have drawn cultures and are discharging her on levaquin 750 mg daily x 1 week.     I wanted to keep her on your radar and have your nurse call her tomorrow to check on her health. She does have an appointment with Mary in a week - next Monday.    Valentín

## 2022-10-21 ENCOUNTER — MYC MEDICAL ADVICE (OUTPATIENT)
Dept: SURGERY | Facility: CLINIC | Age: 47
End: 2022-10-21

## 2022-10-21 NOTE — TELEPHONE ENCOUNTER
Patient is scheduled for Bilateral Skin Sparing Mastectomy, left sentinel lymph node biopsy with reconstruction with Dr. South and Dr. Zamorano on 12/21/22.  Iraida Kingston, RN, BSN

## 2022-10-24 ENCOUNTER — ONCOLOGY VISIT (OUTPATIENT)
Dept: ONCOLOGY | Facility: CLINIC | Age: 47
End: 2022-10-24
Payer: COMMERCIAL

## 2022-10-24 ENCOUNTER — INFUSION THERAPY VISIT (OUTPATIENT)
Dept: INFUSION THERAPY | Facility: CLINIC | Age: 47
End: 2022-10-24
Payer: COMMERCIAL

## 2022-10-24 ENCOUNTER — LAB (OUTPATIENT)
Dept: ONCOLOGY | Facility: CLINIC | Age: 47
End: 2022-10-24
Payer: COMMERCIAL

## 2022-10-24 VITALS
HEIGHT: 65 IN | OXYGEN SATURATION: 98 % | HEART RATE: 89 BPM | BODY MASS INDEX: 21.99 KG/M2 | TEMPERATURE: 98.4 F | DIASTOLIC BLOOD PRESSURE: 73 MMHG | WEIGHT: 132 LBS | SYSTOLIC BLOOD PRESSURE: 107 MMHG

## 2022-10-24 DIAGNOSIS — D70.1 CHEMOTHERAPY-INDUCED NEUTROPENIA (H): ICD-10-CM

## 2022-10-24 DIAGNOSIS — T45.1X5A CHEMOTHERAPY-INDUCED NEUTROPENIA (H): ICD-10-CM

## 2022-10-24 DIAGNOSIS — T45.1X5S ADVERSE EFFECT OF ANTINEOPLASTIC AND IMMUNOSUPPRESSIVE DRUGS, SEQUELA: Primary | ICD-10-CM

## 2022-10-24 DIAGNOSIS — C50.912 CARCINOMA OF LEFT BREAST METASTATIC TO AXILLARY LYMPH NODE (H): Primary | ICD-10-CM

## 2022-10-24 DIAGNOSIS — C77.3 CARCINOMA OF LEFT BREAST METASTATIC TO AXILLARY LYMPH NODE (H): Primary | ICD-10-CM

## 2022-10-24 DIAGNOSIS — T45.1X5A ANEMIA ASSOCIATED WITH CHEMOTHERAPY: ICD-10-CM

## 2022-10-24 DIAGNOSIS — C50.912 CARCINOMA OF LEFT BREAST METASTATIC TO AXILLARY LYMPH NODE (H): ICD-10-CM

## 2022-10-24 DIAGNOSIS — C77.3 CARCINOMA OF LEFT BREAST METASTATIC TO AXILLARY LYMPH NODE (H): ICD-10-CM

## 2022-10-24 DIAGNOSIS — D64.81 ANEMIA ASSOCIATED WITH CHEMOTHERAPY: ICD-10-CM

## 2022-10-24 DIAGNOSIS — T45.1X5S ADVERSE EFFECT OF ANTINEOPLASTIC AND IMMUNOSUPPRESSIVE DRUGS, SEQUELA: ICD-10-CM

## 2022-10-24 LAB
BACTERIA BLD CULT: NO GROWTH
BASOPHILS # BLD MANUAL: 0.1 10E3/UL (ref 0–0.2)
BASOPHILS NFR BLD MANUAL: 1 %
EOSINOPHIL # BLD MANUAL: 0 10E3/UL (ref 0–0.7)
EOSINOPHIL NFR BLD MANUAL: 0 %
ERYTHROCYTE [DISTWIDTH] IN BLOOD BY AUTOMATED COUNT: 15.9 % (ref 10–15)
HCT VFR BLD AUTO: 31.4 % (ref 35–47)
HGB BLD-MCNC: 10.4 G/DL (ref 11.7–15.7)
LYMPHOCYTES # BLD MANUAL: 0.8 10E3/UL (ref 0.8–5.3)
LYMPHOCYTES NFR BLD MANUAL: 11 %
MCH RBC QN AUTO: 29.6 PG (ref 26.5–33)
MCHC RBC AUTO-ENTMCNC: 33.1 G/DL (ref 31.5–36.5)
MCV RBC AUTO: 90 FL (ref 78–100)
METAMYELOCYTES # BLD MANUAL: 0.2 10E3/UL
METAMYELOCYTES NFR BLD MANUAL: 2 %
MONOCYTES # BLD MANUAL: 0.5 10E3/UL (ref 0–1.3)
MONOCYTES NFR BLD MANUAL: 7 %
MYELOCYTES # BLD MANUAL: 0.2 10E3/UL
MYELOCYTES NFR BLD MANUAL: 2 %
NEUTROPHILS # BLD MANUAL: 5.9 10E3/UL (ref 1.6–8.3)
NEUTROPHILS NFR BLD MANUAL: 77 %
PLAT MORPH BLD: ABNORMAL
PLATELET # BLD AUTO: 219 10E3/UL (ref 150–450)
POLYCHROMASIA BLD QL SMEAR: SLIGHT
RBC # BLD AUTO: 3.51 10E6/UL (ref 3.8–5.2)
RBC MORPH BLD: ABNORMAL
WBC # BLD AUTO: 7.6 10E3/UL (ref 4–11)

## 2022-10-24 PROCEDURE — 96411 CHEMO IV PUSH ADDL DRUG: CPT | Performed by: NURSE PRACTITIONER

## 2022-10-24 PROCEDURE — 99207 PR NO CHARGE LOS: CPT

## 2022-10-24 PROCEDURE — 96413 CHEMO IV INFUSION 1 HR: CPT | Performed by: NURSE PRACTITIONER

## 2022-10-24 PROCEDURE — 85007 BL SMEAR W/DIFF WBC COUNT: CPT | Performed by: INTERNAL MEDICINE

## 2022-10-24 PROCEDURE — 85027 COMPLETE CBC AUTOMATED: CPT | Performed by: INTERNAL MEDICINE

## 2022-10-24 PROCEDURE — 96367 TX/PROPH/DG ADDL SEQ IV INF: CPT | Performed by: NURSE PRACTITIONER

## 2022-10-24 PROCEDURE — 99214 OFFICE O/P EST MOD 30 MIN: CPT | Mod: 25 | Performed by: NURSE PRACTITIONER

## 2022-10-24 PROCEDURE — 96375 TX/PRO/DX INJ NEW DRUG ADDON: CPT | Performed by: NURSE PRACTITIONER

## 2022-10-24 RX ORDER — PALONOSETRON 0.05 MG/ML
0.25 INJECTION, SOLUTION INTRAVENOUS ONCE
Status: COMPLETED | OUTPATIENT
Start: 2022-10-24 | End: 2022-10-24

## 2022-10-24 RX ORDER — DOXORUBICIN HYDROCHLORIDE 2 MG/ML
60 INJECTION, SOLUTION INTRAVENOUS ONCE
Status: COMPLETED | OUTPATIENT
Start: 2022-10-24 | End: 2022-10-24

## 2022-10-24 RX ORDER — HEPARIN SODIUM (PORCINE) LOCK FLUSH IV SOLN 100 UNIT/ML 100 UNIT/ML
5 SOLUTION INTRAVENOUS
Status: DISCONTINUED | OUTPATIENT
Start: 2022-10-24 | End: 2022-10-24 | Stop reason: HOSPADM

## 2022-10-24 RX ORDER — HEPARIN SODIUM (PORCINE) LOCK FLUSH IV SOLN 100 UNIT/ML 100 UNIT/ML
5 SOLUTION INTRAVENOUS EVERY 8 HOURS
Status: DISCONTINUED | OUTPATIENT
Start: 2022-10-24 | End: 2022-10-24 | Stop reason: HOSPADM

## 2022-10-24 RX ADMIN — HEPARIN SODIUM (PORCINE) LOCK FLUSH IV SOLN 100 UNIT/ML 5 ML: 100 SOLUTION at 09:57

## 2022-10-24 RX ADMIN — PALONOSETRON 0.25 MG: 0.05 INJECTION, SOLUTION INTRAVENOUS at 08:46

## 2022-10-24 RX ADMIN — HEPARIN SODIUM (PORCINE) LOCK FLUSH IV SOLN 100 UNIT/ML 5 ML: 100 SOLUTION at 07:55

## 2022-10-24 RX ADMIN — DOXORUBICIN HYDROCHLORIDE 100 MG: 2 INJECTION, SOLUTION INTRAVENOUS at 09:11

## 2022-10-24 RX ADMIN — Medication 250 ML: at 08:43

## 2022-10-24 ASSESSMENT — PAIN SCALES - GENERAL: PAINLEVEL: NO PAIN (0)

## 2022-10-24 NOTE — PROGRESS NOTES
Infusion Nursing Note:  Betty Rogers presents today for Adriamycin and Cytoxan.    Patient seen by provider today: Yes: Mary Green CNP   present during visit today: Not Applicable.    Note: The patient reports experiencing some fatigue. She did report going to the ED on 10/18/22 for a fever and was prophylacticlly given Levaquin. She discussed this with Mary Green CNP. Betty denies any other concerns at this time and reports feeling well today.     Intravenous Access:  Implanted Port.    Treatment Conditions:  Lab Results   Component Value Date    HGB 10.4 (L) 10/24/2022    WBC 7.6 10/24/2022    ANEU 5.9 10/24/2022    ANEUTAUTO 0.8 (L) 10/10/2022     10/24/2022      Lab Results   Component Value Date     10/18/2022    POTASSIUM 3.5 10/18/2022    CR 0.73 10/18/2022    ABEBA 8.8 10/18/2022    BILITOTAL 0.2 10/10/2022    ALBUMIN 3.4 10/10/2022    ALT 20 10/10/2022    AST 15 10/10/2022     Results reviewed, labs MET treatment parameters, ok to proceed with treatment.    Post Infusion Assessment:  Patient tolerated infusion without incident.  Blood return noted pre and post infusion.  Site patent and intact, free from redness, edema or discomfort.  No evidence of extravasations.  Access discontinued per protocol.     Discharge Plan:   AVS to patient via MYCHART.  Patient will return 10/25/22 for next appointment of Fulphila injection. Future appts have been reviewed and crosschecked with appt note and plan.   Patient discharged in stable condition accompanied by: self.  Departure Mode: Ambulatory.      Rosa Isela Kate RN

## 2022-10-24 NOTE — NURSING NOTE
"Oncology Rooming Note    October 24, 2022 8:01 AM   Betty Rogers is a 47 year old female who presents for:    Chief Complaint   Patient presents with     Oncology Clinic Visit     Prior to tx     Initial Vitals: /73 (BP Location: Left arm, Patient Position: Chair, Cuff Size: Adult Regular)   Pulse 89   Temp 98.4  F (36.9  C) (Oral)   Ht 1.651 m (5' 5\")   Wt 59.9 kg (132 lb)   SpO2 98%   BMI 21.97 kg/m   Estimated body mass index is 21.97 kg/m  as calculated from the following:    Height as of this encounter: 1.651 m (5' 5\").    Weight as of this encounter: 59.9 kg (132 lb). Body surface area is 1.66 meters squared.  No Pain (0) Comment: Data Unavailable   No LMP recorded. (Menstrual status: Chemotherapy).  Allergies reviewed: Yes  Medications reviewed: Yes    Medications: Medication refills not needed today.  Pharmacy name entered into [facility]:    [id number] [address] [address] [address] [place] [facility] [address] [facility]    Clinical concerns: discuss fever     Mary was notified.      Aline Monahan CMA                "

## 2022-10-25 ENCOUNTER — INFUSION THERAPY VISIT (OUTPATIENT)
Dept: INFUSION THERAPY | Facility: CLINIC | Age: 47
End: 2022-10-25
Payer: COMMERCIAL

## 2022-10-25 VITALS
HEART RATE: 72 BPM | RESPIRATION RATE: 16 BRPM | OXYGEN SATURATION: 98 % | DIASTOLIC BLOOD PRESSURE: 59 MMHG | TEMPERATURE: 97.2 F | SYSTOLIC BLOOD PRESSURE: 112 MMHG

## 2022-10-25 DIAGNOSIS — T45.1X5S ADVERSE EFFECT OF ANTINEOPLASTIC AND IMMUNOSUPPRESSIVE DRUGS, SEQUELA: Primary | ICD-10-CM

## 2022-10-25 DIAGNOSIS — C50.912 CARCINOMA OF LEFT BREAST METASTATIC TO AXILLARY LYMPH NODE (H): ICD-10-CM

## 2022-10-25 DIAGNOSIS — C77.3 CARCINOMA OF LEFT BREAST METASTATIC TO AXILLARY LYMPH NODE (H): ICD-10-CM

## 2022-10-25 PROCEDURE — 99207 PR NO CHARGE LOS: CPT

## 2022-10-25 PROCEDURE — 96372 THER/PROPH/DIAG INJ SC/IM: CPT | Performed by: NURSE PRACTITIONER

## 2022-10-25 NOTE — PROGRESS NOTES
Infusion Nursing Note:  Scanning issues. Addend note 10/26/22 to reflect this administration done on 10/25/22.  Betty Rogers presents today for Fulphila.    Patient seen by provider today: No   present during visit today: Not Applicable.    Note: assessed by Wilfred LOPEZ RN.    Intravenous Access:  No Intravenous access/labs at this visit.    Treatment Conditions:  Not Applicable.    Post Infusion Assessment:  Patient tolerated injection without incident.     Discharge Plan:   Patient discharged in stable condition accompanied by: self.  Departure Mode: Ambulatory.      Destiny Murphy LPN

## 2022-10-28 ENCOUNTER — MYC MEDICAL ADVICE (OUTPATIENT)
Dept: FAMILY MEDICINE | Facility: CLINIC | Age: 47
End: 2022-10-28

## 2022-10-28 DIAGNOSIS — Z13.1 DIABETES MELLITUS SCREENING: ICD-10-CM

## 2022-10-28 DIAGNOSIS — Z13.220 LIPID SCREENING: Primary | ICD-10-CM

## 2022-10-28 NOTE — TELEPHONE ENCOUNTER
Provider:  I looks like the patient would need some labs drawn to fill out this form.  You had last seen her 5/22.  1. Would you need a visit to place these orders?  2. If not, are you willing to sign the form when the labs are completed? Thank you. Charlotte Florian R.N.

## 2022-11-02 LAB
HCO3 BLDV-SCNC: 22 MMOL/L (ref 21–28)
LACTATE BLD-SCNC: 1.1 MMOL/L
PCO2 BLDV: 32 MM HG (ref 40–50)
PH BLDV: 7.45 [PH] (ref 7.32–7.43)
PO2 BLDV: 44 MM HG (ref 25–47)
SAO2 % BLDV: 82 % (ref 94–100)

## 2022-11-07 ENCOUNTER — INFUSION THERAPY VISIT (OUTPATIENT)
Dept: INFUSION THERAPY | Facility: CLINIC | Age: 47
End: 2022-11-07
Payer: COMMERCIAL

## 2022-11-07 ENCOUNTER — ONCOLOGY VISIT (OUTPATIENT)
Dept: ONCOLOGY | Facility: CLINIC | Age: 47
End: 2022-11-07
Payer: COMMERCIAL

## 2022-11-07 ENCOUNTER — MYC MEDICAL ADVICE (OUTPATIENT)
Dept: FAMILY MEDICINE | Facility: CLINIC | Age: 47
End: 2022-11-07

## 2022-11-07 ENCOUNTER — VIRTUAL VISIT (OUTPATIENT)
Dept: ONCOLOGY | Facility: CLINIC | Age: 47
End: 2022-11-07
Attending: SURGERY
Payer: COMMERCIAL

## 2022-11-07 ENCOUNTER — LAB (OUTPATIENT)
Dept: ONCOLOGY | Facility: CLINIC | Age: 47
End: 2022-11-07
Payer: COMMERCIAL

## 2022-11-07 VITALS
HEART RATE: 68 BPM | OXYGEN SATURATION: 98 % | SYSTOLIC BLOOD PRESSURE: 115 MMHG | HEIGHT: 65 IN | WEIGHT: 133 LBS | BODY MASS INDEX: 22.16 KG/M2 | DIASTOLIC BLOOD PRESSURE: 74 MMHG | TEMPERATURE: 98.3 F

## 2022-11-07 DIAGNOSIS — Z80.3 FAMILY HISTORY OF MALIGNANT NEOPLASM OF BREAST: ICD-10-CM

## 2022-11-07 DIAGNOSIS — C50.912 CARCINOMA OF LEFT BREAST METASTATIC TO AXILLARY LYMPH NODE (H): Primary | ICD-10-CM

## 2022-11-07 DIAGNOSIS — C77.3 CARCINOMA OF LEFT BREAST METASTATIC TO AXILLARY LYMPH NODE (H): ICD-10-CM

## 2022-11-07 DIAGNOSIS — Z13.1 DIABETES MELLITUS SCREENING: ICD-10-CM

## 2022-11-07 DIAGNOSIS — T45.1X5S ADVERSE EFFECT OF ANTINEOPLASTIC AND IMMUNOSUPPRESSIVE DRUGS, SEQUELA: ICD-10-CM

## 2022-11-07 DIAGNOSIS — T45.1X5S ADVERSE EFFECT OF ANTINEOPLASTIC AND IMMUNOSUPPRESSIVE DRUGS, SEQUELA: Primary | ICD-10-CM

## 2022-11-07 DIAGNOSIS — C77.3 CARCINOMA OF LEFT BREAST METASTATIC TO AXILLARY LYMPH NODE (H): Primary | ICD-10-CM

## 2022-11-07 DIAGNOSIS — C50.912 CARCINOMA OF LEFT BREAST METASTATIC TO AXILLARY LYMPH NODE (H): ICD-10-CM

## 2022-11-07 DIAGNOSIS — Z80.42 FAMILY HISTORY OF PROSTATE CANCER: ICD-10-CM

## 2022-11-07 DIAGNOSIS — Z13.220 LIPID SCREENING: ICD-10-CM

## 2022-11-07 DIAGNOSIS — Z80.0 FAMILY HISTORY OF COLON CANCER: ICD-10-CM

## 2022-11-07 DIAGNOSIS — Z80.8 FAMILY HISTORY OF MELANOMA: ICD-10-CM

## 2022-11-07 LAB
ALBUMIN SERPL-MCNC: 3.5 G/DL (ref 3.4–5)
ALP SERPL-CCNC: 64 U/L (ref 40–150)
ALT SERPL W P-5'-P-CCNC: 21 U/L (ref 0–50)
ANION GAP SERPL CALCULATED.3IONS-SCNC: 6 MMOL/L (ref 3–14)
AST SERPL W P-5'-P-CCNC: 13 U/L (ref 0–45)
BASOPHILS # BLD AUTO: 0.1 10E3/UL (ref 0–0.2)
BASOPHILS NFR BLD AUTO: 1 %
BILIRUB SERPL-MCNC: 0.2 MG/DL (ref 0.2–1.3)
BUN SERPL-MCNC: 13 MG/DL (ref 7–30)
CALCIUM SERPL-MCNC: 8.9 MG/DL (ref 8.5–10.1)
CHLORIDE BLD-SCNC: 109 MMOL/L (ref 94–109)
CHOLEST SERPL-MCNC: 187 MG/DL
CO2 SERPL-SCNC: 26 MMOL/L (ref 20–32)
CREAT SERPL-MCNC: 0.66 MG/DL (ref 0.52–1.04)
EOSINOPHIL # BLD AUTO: 0 10E3/UL (ref 0–0.7)
EOSINOPHIL NFR BLD AUTO: 0 %
ERYTHROCYTE [DISTWIDTH] IN BLOOD BY AUTOMATED COUNT: 15.9 % (ref 10–15)
FASTING STATUS PATIENT QL REPORTED: YES
FASTING STATUS PATIENT QL REPORTED: YES
GFR SERPL CREATININE-BSD FRML MDRD: >90 ML/MIN/1.73M2
GLUCOSE BLD-MCNC: 100 MG/DL (ref 70–99)
GLUCOSE BLD-MCNC: 100 MG/DL (ref 70–99)
HCT VFR BLD AUTO: 30.4 % (ref 35–47)
HDLC SERPL-MCNC: 58 MG/DL
HGB BLD-MCNC: 10.2 G/DL (ref 11.7–15.7)
IMM GRANULOCYTES # BLD: 0.2 10E3/UL
IMM GRANULOCYTES NFR BLD: 3 %
LDLC SERPL CALC-MCNC: 113 MG/DL
LYMPHOCYTES # BLD AUTO: 1.1 10E3/UL (ref 0.8–5.3)
LYMPHOCYTES NFR BLD AUTO: 17 %
MCH RBC QN AUTO: 29.5 PG (ref 26.5–33)
MCHC RBC AUTO-ENTMCNC: 33.6 G/DL (ref 31.5–36.5)
MCV RBC AUTO: 88 FL (ref 78–100)
MONOCYTES # BLD AUTO: 0.7 10E3/UL (ref 0–1.3)
MONOCYTES NFR BLD AUTO: 10 %
NEUTROPHILS # BLD AUTO: 4.4 10E3/UL (ref 1.6–8.3)
NEUTROPHILS NFR BLD AUTO: 69 %
NONHDLC SERPL-MCNC: 129 MG/DL
NRBC # BLD AUTO: 0 10E3/UL
NRBC BLD AUTO-RTO: 0 /100
PLATELET # BLD AUTO: 164 10E3/UL (ref 150–450)
POTASSIUM BLD-SCNC: 4.2 MMOL/L (ref 3.4–5.3)
PROT SERPL-MCNC: 6.3 G/DL (ref 6.8–8.8)
RBC # BLD AUTO: 3.46 10E6/UL (ref 3.8–5.2)
SODIUM SERPL-SCNC: 141 MMOL/L (ref 133–144)
TRIGL SERPL-MCNC: 81 MG/DL
WBC # BLD AUTO: 6.4 10E3/UL (ref 4–11)

## 2022-11-07 PROCEDURE — 80053 COMPREHEN METABOLIC PANEL: CPT | Performed by: INTERNAL MEDICINE

## 2022-11-07 PROCEDURE — 96413 CHEMO IV INFUSION 1 HR: CPT | Performed by: NURSE PRACTITIONER

## 2022-11-07 PROCEDURE — 96367 TX/PROPH/DG ADDL SEQ IV INF: CPT | Performed by: NURSE PRACTITIONER

## 2022-11-07 PROCEDURE — 99207 PR NO CHARGE LOS: CPT

## 2022-11-07 PROCEDURE — 96040 HC GENETIC COUNSELING, EACH 30 MINUTES: CPT | Mod: GT,95 | Performed by: GENETIC COUNSELOR, MS

## 2022-11-07 PROCEDURE — 85025 COMPLETE CBC W/AUTO DIFF WBC: CPT | Performed by: INTERNAL MEDICINE

## 2022-11-07 PROCEDURE — 99214 OFFICE O/P EST MOD 30 MIN: CPT | Mod: 25 | Performed by: NURSE PRACTITIONER

## 2022-11-07 PROCEDURE — 96375 TX/PRO/DX INJ NEW DRUG ADDON: CPT | Performed by: NURSE PRACTITIONER

## 2022-11-07 PROCEDURE — 96411 CHEMO IV PUSH ADDL DRUG: CPT | Performed by: NURSE PRACTITIONER

## 2022-11-07 PROCEDURE — 80061 LIPID PANEL: CPT

## 2022-11-07 RX ORDER — DOXORUBICIN HYDROCHLORIDE 2 MG/ML
60 INJECTION, SOLUTION INTRAVENOUS ONCE
Status: COMPLETED | OUTPATIENT
Start: 2022-11-07 | End: 2022-11-07

## 2022-11-07 RX ORDER — PALONOSETRON 0.05 MG/ML
0.25 INJECTION, SOLUTION INTRAVENOUS ONCE
Status: COMPLETED | OUTPATIENT
Start: 2022-11-07 | End: 2022-11-07

## 2022-11-07 RX ORDER — HEPARIN SODIUM (PORCINE) LOCK FLUSH IV SOLN 100 UNIT/ML 100 UNIT/ML
5 SOLUTION INTRAVENOUS
Status: DISCONTINUED | OUTPATIENT
Start: 2022-11-07 | End: 2022-11-07 | Stop reason: HOSPADM

## 2022-11-07 RX ORDER — HEPARIN SODIUM (PORCINE) LOCK FLUSH IV SOLN 100 UNIT/ML 100 UNIT/ML
500 SOLUTION INTRAVENOUS
Status: DISCONTINUED | OUTPATIENT
Start: 2022-11-07 | End: 2022-11-07 | Stop reason: HOSPADM

## 2022-11-07 RX ADMIN — PALONOSETRON 0.25 MG: 0.05 INJECTION, SOLUTION INTRAVENOUS at 08:44

## 2022-11-07 RX ADMIN — DOXORUBICIN HYDROCHLORIDE 100 MG: 2 INJECTION, SOLUTION INTRAVENOUS at 09:11

## 2022-11-07 RX ADMIN — Medication 250 ML: at 08:40

## 2022-11-07 RX ADMIN — HEPARIN SODIUM (PORCINE) LOCK FLUSH IV SOLN 100 UNIT/ML 500 UNITS: 100 SOLUTION at 09:54

## 2022-11-07 RX ADMIN — HEPARIN SODIUM (PORCINE) LOCK FLUSH IV SOLN 100 UNIT/ML 5 ML: 100 SOLUTION at 07:48

## 2022-11-07 ASSESSMENT — PAIN SCALES - GENERAL: PAINLEVEL: NO PAIN (0)

## 2022-11-07 NOTE — PROGRESS NOTES
Oncology Follow Up Visit: November 7, 2022    Oncologist: Dr Hoang Franco  PCP: Diana Luevano    Diagnosis:  Left breast invasive ductal carcinoma-multifocal, locally advanced, hormone positive  Betty Rogers is a 48 yo female who completed screening mammogram in 5/2022 and was noted to have left breast architectural distortion at 12 o'clock.   Diagnostic mammo with US revealed an irregular hypoechoic mass measuring 3.7cm with surrounding ill defined shadowing/hypoechogenicity and lymph node with thickened cortex. Breast biopsy of 12 o'clock 6cm from nipple revealed invasive ductal carcinoma, grade 2, ER+/KS+ HER2-. Breast biopsy of 9 o'clock position revealed DCIS, grade 3. MRI breast with multifocal disease. Lymph node biopsy confirmed metastasis.  * Oncotype score of 28, intermediate risk, chemo benefit cannot be excluded  Treatment:   7/2022-10/2022- weekly neoadjuvant taxol x 12 weeks  10/10/2022 began dose dense AC    Interval History: Ms. Rogers is here today for continuation of treatment with cycle 3 of AC. Pt reports she is feeling that she is tolerating treatment well with no new symptoms. Denies nausea, fevers, illness, SOB or increased fatigue - is still able to keep up with kids activities. She is eating well though taste and appetite are not as usual and no nausea. Bowel and bladder are working well.   Rest of comprehensive and complete ROS is reviewed and is negative.   Past Medical History:   Diagnosis Date     Breast cancer metastasized to axillary lymph node (H) 7/15/2022     No active medical problems      Current Outpatient Medications   Medication     calcium carbonate-vitamin D (OS-ABEBA) 600-400 MG-UNIT chewable tablet     HYDROcodone-acetaminophen (NORCO) 5-325 MG tablet     Multiple Vitamin (MULTIVITAMIN ADULT PO)     ondansetron (ZOFRAN) 8 MG tablet     prochlorperazine (COMPAZINE) 10 MG tablet     senna-docusate (SENOKOT-S/PERICOLACE) 8.6-50 MG tablet     dexamethasone (DECADRON) 4 MG  "tablet     No current facility-administered medications for this visit.     Allergies   Allergen Reactions     Nuts      Other [Seasonal Allergies]      Tree Nuts-Lips swell and breathing problems       Physical Exam:/74 (BP Location: Left arm, Patient Position: Chair, Cuff Size: Adult Regular)   Pulse 68   Temp 98.3  F (36.8  C) (Oral)   Ht 1.651 m (5' 5\")   Wt 60.3 kg (133 lb)   SpO2 98%   BMI 22.13 kg/m     ECOG PS- 0  Constitutional: Alert, healthy, and in no distress.   ENT: Eyes bright , No mouth sores  Neck: Supple, No adenopathy.  Cardiac: Heart rate and rhythm is regular and strong without murmur  Respiratory: Breathing easy. Lung sounds clear to auscultation  Breasts:not checked today.  GI: Abdomen is soft, non-tender, BS normal. No masses or organomegaly  MS: Muscle tone normal, extremities normal with no edema.   Skin: No suspicious lesions or rashes  Neuro: Sensory grossly WNL, gait normal.   Lymph: Normal ant/post cervical, axillary, supraclavicular nodes  Psych: Mentation appears normal and affect normal/bright and smiling    Laboratory/Imaging Results:   Results for orders placed or performed in visit on 11/07/22   Comprehensive metabolic panel     Status: Abnormal   Result Value Ref Range    Sodium 141 133 - 144 mmol/L    Potassium 4.2 3.4 - 5.3 mmol/L    Chloride 109 94 - 109 mmol/L    Carbon Dioxide (CO2) 26 20 - 32 mmol/L    Anion Gap 6 3 - 14 mmol/L    Urea Nitrogen 13 7 - 30 mg/dL    Creatinine 0.66 0.52 - 1.04 mg/dL    Calcium 8.9 8.5 - 10.1 mg/dL    Glucose 100 (H) 70 - 99 mg/dL    Alkaline Phosphatase 64 40 - 150 U/L    AST 13 0 - 45 U/L    ALT 21 0 - 50 U/L    Protein Total 6.3 (L) 6.8 - 8.8 g/dL    Albumin 3.5 3.4 - 5.0 g/dL    Bilirubin Total 0.2 0.2 - 1.3 mg/dL    GFR Estimate >90 >60 mL/min/1.73m2   Glucose     Status: Abnormal   Result Value Ref Range    Glucose 100 (H) 70 - 99 mg/dL    Patient Fasting > 8hrs? Yes    Lipid panel reflex to direct LDL Fasting     Status: " Abnormal   Result Value Ref Range    Cholesterol 187 <200 mg/dL    Triglycerides 81 <150 mg/dL    Direct Measure HDL 58 >=50 mg/dL    LDL Cholesterol Calculated 113 (H) <=100 mg/dL    Non HDL Cholesterol 129 <130 mg/dL    Patient Fasting > 8hrs? Yes     Narrative    Cholesterol  Desirable:  <200 mg/dL    Triglycerides  Normal:  Less than 150 mg/dL  Borderline High:  150-199 mg/dL  High:  200-499 mg/dL  Very High:  Greater than or equal to 500 mg/dL    Direct Measure HDL  Female:  Greater than or equal to 50 mg/dL   Male:  Greater than or equal to 40 mg/dL    LDL Cholesterol  Desirable:  <100mg/dL  Above Desirable:  100-129 mg/dL   Borderline High:  130-159 mg/dL   High:  160-189 mg/dL   Very High:  >= 190 mg/dL    Non HDL Cholesterol  Desirable:  130 mg/dL  Above Desirable:  130-159 mg/dL  Borderline High:  160-189 mg/dL  High:  190-219 mg/dL  Very High:  Greater than or equal to 220 mg/dL   CBC with platelets and differential     Status: Abnormal   Result Value Ref Range    WBC Count 6.4 4.0 - 11.0 10e3/uL    RBC Count 3.46 (L) 3.80 - 5.20 10e6/uL    Hemoglobin 10.2 (L) 11.7 - 15.7 g/dL    Hematocrit 30.4 (L) 35.0 - 47.0 %    MCV 88 78 - 100 fL    MCH 29.5 26.5 - 33.0 pg    MCHC 33.6 31.5 - 36.5 g/dL    RDW 15.9 (H) 10.0 - 15.0 %    Platelet Count 164 150 - 450 10e3/uL    % Neutrophils 69 %    % Lymphocytes 17 %    % Monocytes 10 %    % Eosinophils 0 %    % Basophils 1 %    % Immature Granulocytes 3 %    NRBCs per 100 WBC 0 <1 /100    Absolute Neutrophils 4.4 1.6 - 8.3 10e3/uL    Absolute Lymphocytes 1.1 0.8 - 5.3 10e3/uL    Absolute Monocytes 0.7 0.0 - 1.3 10e3/uL    Absolute Eosinophils 0.0 0.0 - 0.7 10e3/uL    Absolute Basophils 0.1 0.0 - 0.2 10e3/uL    Absolute Immature Granulocytes 0.2 <=0.4 10e3/uL    Absolute NRBCs 0.0 10e3/uL   CBC with platelets differential     Status: Abnormal    Narrative    The following orders were created for panel order CBC with platelets differential.  Procedure                                Abnormality         Status                     ---------                               -----------         ------                     CBC with platelets and d...[[id number]]  Abnormal            Final result                 Please view results for these tests on the individual orders.     Assessment and Plan:   Ganesh Breast Cancer- multifocal, locally advanced, hormone positive- Pt has completed 12 weeks of adjuvant Taxol and is now due to start cycle 3 of AC today. Some anemia noted but no need for intervention.   Will continue with cycle 3 of AC without changes in plan necessary.   - return in 2 weeks for review prior to final AC to see Dr Franco. .   - has surgery set for 12/21/2022.   The total time of this encounter amounted to  30 minutes. This time included face to facetime spent with the patient, prep work, ordering tests, and performing post visit documentation.  Jasmine Mattson,Cnp

## 2022-11-07 NOTE — PATIENT INSTRUCTIONS
BREAST ACTIONABLE PANEL    The Breast Actionable cancer panel is a genetic test which analyzes 11 genes known to be associated with an increased lifetime risk of breast and other cancers. Published medical guidelines exist for detection, prevention, risk reduction, and/or treatment for individuals with mutations in these genes.     Of note, the Breast Actionable cancer panel is not considered to be comprehensive. Individuals with a family history of other cancers should be seen by a genetic counselor to discuss the family history and the possibility of expanded genetic testing.     GENES (11) ASSOCIATED CANCER(S) ASSOCIATED CANCER SYNDROME(S)   DEMETRA breast, pancreas    BARD1 breast    BRCA1 breast, ovary, pancreas, melanoma, prostate, male breast Hereditary breast /ovarian cancer syndrome (HBOC)   BRCA2 breast, ovary, pancreas, melanoma, prostate, male breast Hereditary breast /ovarian cancer syndrome (HBOC)   CDH1 breast, stomach, colon Hereditary diffuse gastric cancer   CHEK2 breast, colon, prostate    NF1 breast, brain, peripheral nervous system Neurofibromatosis 1   PALB2 breast, pancreas    PTEN breast, thyroid, uterus, colon, kidney Cowden syndrome, Hgqjrgeo-Ixgjl-Imgkgmazj syndrome (BRRS), PTEN-related Proteus syndrome (PS), Proteus-like syndrome   STK11 breast, ovary, colon, pancreas, stomach, uterus, cervix Peutz-Jeghers syndrome   TP53 breast, bone, brain, soft tissues, adrenal cortex Li-Fraumeni syndrome     Meeting with a Genetic Counselor  After you receive the results of the Breast Actionable Panel testing, providers will often refer you to see a genetic counselor. This is because patients can have a family history of cancer other than breast cancer and may benefit from a discussion about comprehensive, expanded genetic testing.     Additionally, you and the genetic counselor will discuss the impact of genetic testing on you and your family members, go over your family health history, and talk  about potential screening and interventions.     Assessing Cancer Risk  Only about 5-10% of cancers are thought to be due to an inherited cancer susceptibility gene.  These families often have:  Several people with the same or related types of cancer  Cancers diagnosed at a young age (before age 50)  Individuals with more than one primary cancer  Multiple generations of the family affected with cancer    Some people may be candidates for genetic testing of more than one gene.  For these families, genetic testing using a multi-gene cancer panel may be offered.  These panels may test genes known to increase the risk for breast (and other) cancers: DEMETRA, BRCA1, BRCA2, CDH1, CHEK2, PALB2, PTEN, and TP53.  The purpose of this handout is to serve as a brief summary of the high/moderate-risk breast cancer genes which have published clinical management guidelines for individuals who are found to carry a mutation.    BRCA1 and BRCA2-Hereditary Breast and Ovarian Cancer Syndrome (HBOC)  A single mutation in one of the copies of BRCA1 or BRCA2 increases the risk for breast and ovarian cancer, among others.  The risk for pancreatic cancer and melanoma may also be slightly increased in some families.  The tables below list the chance that someone with a BRCA mutation would develop cancer in his or her lifetime1,2,3,4.          Women   Men     General Population BRCA+    General Population BRCA+   Breast  12% 40-80%  Breast <1% ~7%   Ovarian  1-2% 10-40%  Prostate 16% 20%       A person s ethnic background is also important to consider, as individuals of Ashkenazi Rastafari ancestry have a higher chance of having a BRCA gene mutation.  There are three BRCA mutations that occur more frequently in this population.     Individuals who inherit two BRCA2 mutations--one from their mother and one from their father--have a condition called Fanconi Anemia.  This rare autosomal recessive condition is associated with short stature,  developmental delay, bone marrow failure, and increased risk for childhood cancers.    TP53-Li-Fraumeni Syndrome (LFS)  LFS is a cancer predisposition syndrome. Individuals with LFS are at an increased risk for developing cancer at a young age. The general lifetime risk for development of cancer is 50% by age 30 and 90% by age 60.  LFS is caused by a mutation in the TP53 gene.  A single mutation in one of the copies of TP53 increases the risk for multiple cancers.     Core Cancers: Sarcomas, Breast, Brain, Lung, Leukemias/Lymphomas, Adrenocortical carcinomas  Other Cancers: Gastrointestinal, Thyroid, Skin, Genitourinary    PTEN-Cowden Syndrome  Cowden syndrome is a hereditary condition that increases the risk for breast, thyroid, endometrial, and kidney cancer.  Cowden syndrome is caused by a mutation in the PTEN gene.  A single mutation in one of the copies of PTEN causes Cowden syndrome and increases cancer risk.  The table below shows the chance that someone with a PTEN mutation would develop cancer in their lifetime5,6.  Other benign features seen in some individuals with Cowden syndrome include benign skin lesions (facial papules, keratoses, lipomas), learning disability, autism, thyroid nodules, colon polyps, and larger head size.      Lifetime Cancer Risk   Cancer Type General Population Cowden Syndrome   Breast  12% 25-50%*   Thyroid  1% 35%   Renal 1-2% 35%   Endometrial  2-3% 28%   Colon 5% 9%   Melanoma 2-3% >5%     *One recent study found breast cancer risk to be increased to 85%    CDH1-Hereditary Diffuse Gastric Cancer (HDGC)  Currently, one gene is known to cause hereditary diffuse gastric cancer: CDH1.  Individuals with HDGC are at increased risk for diffuse gastric cancer and lobular breast cancer. Of people diagnosed with HDGC, 30-50% have a mutation in the CDH1 gene.  This suggests there are likely other genes that may cause HDGC that have not been identified yet.      Lifetime Cancer Risks     General Pop HDGC    Diffuse Gastric  <1% ~80%   Breast 12% 39-52%       Additional Genes Associated with Increased Breast Cancer Risk  PALB2  Mutations in PALB2 have been shown to increase the risk of breast cancer up to 33-58% in some families; where individuals fall within this risk range is dependent upon family history.9 PALB2 mutations have also been associated with increased risk for pancreatic cancer, although this risk has not been quantified yet.  Individuals who inherit two PALB2 mutations--one from their mother and one from their father--have a condition called Fanconi Anemia.  This rare autosomal recessive condition is associated with short stature, developmental delay, bone marrow failure, and increased risk for childhood cancers.    DEMETRA  DEMETRA is a moderate-risk breast cancer gene. Women who have a mutation in DEMETRA can have between a 2-4 fold increased risk for breast cancer compared to the general population.10 DEMETRA mutations have also been associated with increased risk for pancreatic cancer, however an estimate of this cancer risk is not well understood.11  Individuals who inherit two DEMETRA mutations have a condition called ataxia-telangiectasia (AT).  This rare autosomal recessive condition affects the nervous system and immune system, and is associated with progressive cerebellar ataxia beginning in childhood.  Individuals with ataxia-telangiectasia often have a weakened immune system and have an increased risk for childhood cancers.         CHEK2   CHEK2 is a moderate-risk breast cancer gene.  Women who have a mutation in CHEK2 have around a 2-fold increased risk for breast cancer compared to the general population, and this risk may be higher depending upon family history.12,13,14 Mutations in CHEK2 have also been shown to increase the risk of a number of other cancers, including colon and prostate, however these cancer risks are currently not well understood.      Inheritance   All of the genes  reviewed above are inherited in an autosomal dominant pattern. This means that if a parent has a mutation, each of his or her children will have a 50% chance of inheriting that same mutation.  Therefore, each child--male or female--would have a 50% chance of being at increased risk for developing cancer.        Image obtained from Genetics Home Reference, 2013     In rare cases, there can be mutations in both copies of these genes (one from each parent), leading to different autosomal recessive conditions.  Mutations in some genes can occur de vicky, which means that a person s mutation occurred for the first time in them and was not inherited from a parent.  Now that they have the mutation, however, it can be passed on to future generations.     Genetic Testing  Genetic testing involves a blood test and will look for any harmful mutations within genes that are associated with increased cancer risk.  If possible, it is recommended that the person(s) who has had cancer be tested before other family members.  That person will give us the most useful information about whether or not a specific gene is associated with the cancer in the family.    Results  There are three possible results of genetic testing:  Positive--a harmful mutation was identified in one or more of the genes  Negative--no mutation was identified in any of the genes on this panel  Variant of unknown significance--a variation in one of the genes was identified, but it is unclear how this impacts cancer risk in the family    Advantages and Disadvantages  There are advantages and disadvantages to genetic testing.  Advantages  May clarify your cancer risk  Can help you make medical decisions  May explain the cancers in your family  May give useful information to your family members (if you share your results)    Disadvantages  Possible negative emotional impact of learning about inherited cancer risk  Uncertainty in interpreting a negative test result in  some situations  Possible genetic discrimination concerns (see below)    Genetic Information Nondiscrimination Act (SHRUTHI)  SHRUTHI is a federal law that protects individuals from health insurance or employment discrimination based on a genetic test result alone.  Although rare, there are currently no legal discrimination protections in terms of life insurance, long term care, or disability insurances. Visit the National Human Genome Research [place] genome.gov/10417129 to learn more.    Reducing Cancer Risk  Each of the genes listed within this handout have nationally recognized cancer screening guidelines that would be recommended for individuals who test positive.  In addition to increased cancer screening, surgeries may be offered or recommended to reduce cancer risk.  Recommendations are based upon an individual s genetic test result as well as their personal and family history of cancer.    Questions to Think About Regarding Genetic Testing  What effect will the test result have on me and my relationship with my family members if I have an inherited gene mutation?  If I don t have a gene mutation?  Should I share my test results, and how will my family react to this news, which may also affect them?  Are my children ready to learn new information that may one day affect their own health?    Resources  FORCE: Facing Our Risk of Cancer Empowered facingourrisk.org   Bright Pink bebrightpink.org   Li-Fraumeni Syndrome Association lfsassociation.org   PTEN World [employer].Health Market Science   No stomach for cancer, Inc. nostomachforcancer.org   Stomach cancer relief network scrnet.org   Collaborative Group of the Americas on Inherited Colorectal Cancer (CGA) cgaicc.com    Cancer Care cancercare.org   American Cancer Society (ACS) cancer.org   National Cancer [place] (NCI) cancer.gov     Cancer Risk Management Program [phone] ([phone])  Shahram Jennings, MS [facility] [place], [place]  [phone]  Clara Lazcano, MS, [facility] [place] [place] [phone]  Clary  Fermín, MS, [facility] [place]  [phone]  Kandis Rios, MS, [facility] [place]  [phone]  Marcella London, MS, [facility] [place]  [phone]  Nayla Villarreal, MS, [facility] [place]  [phone]  Sandi Pinto, MS, [facility] [place]  [phone]    References  Elia AZUL, Juan Manuel PDP, Narod S, Riskim GARDUNO, Beena JE, Elmer JL, Sammy N, Nura H, Marlene O, Nick A, Dylan B, Radigustavo P, Mancamden S, Bob DM, Fitzpatrick N, Marita E, Stephanie H, Kolby E, Gladys J, Sundeep J, Rashida B, Raad H, Pio S, Eerola H, Manuel H, Gopi K, Catalina OP. Average risks of breast and ovarian cancer associated with BRCA1 or BRCA2 mutations detected in case series unselected for family history: a combined analysis of 222 studies. Am J Hum Nelda. 2003;72:1117-30.  Chantell N, Rebekah M, Kiran G.  BRCA1 and BRCA2 Hereditary Breast and Ovarian Cancer. Gene Reviews online. 2013.  Girish YC, Reyes S, Mukul G, Lopez S. Breast cancer risk among male BRCA1 and BRCA2 mutation carriers. J Natl Cancer Inst. 2007;99:1811-4.  Adiel DG, Rach I, Vernon J, Chen E, Karis ER, Lamark F. Risk of breast cancer in male BRCA2 carriers. J Med Nelda. 2010;47:710-1.  Galo MH, Shabnam J, Anabelle J, Louis RODRIGUEZ, Rosemarie MS, Eng C. Lifetime cancer risks in individuals with germline PTEN mutations. Clin Cancer Res. 2012;18:400-7.  Pildayne TORRES. Cowden Syndrome: A Critical Review of the Clinical Literature. J Nelda . 2009:18:13-27.  National Comprehensive Cancer Network. Clinical practice guidelines in oncology, colorectal cancer screening. Available online (registration required). 2013.  National Cancer [place]. SEER Cancer Stat Fact Sheets.  December 2013.  Elia BOYER, et al. Breast-Cancer Risk in Families with Mutations in PALB2. NE. 2014; 371(6):497-506.  Reji A, Sincere D, Niurka S, Maricarmen P, Barrington T, Julia M, Isreal B, Helena H, Librado R, Rivas K, Pam L, Adiel DG, Bob D, Efren DF, Bipin MR, The Breast Cancer Susceptibility Collaboration (UK) & Jessica SOLORIO. DEMETRA  mutations that cause ataxia-telangiectasia are breast cancer susceptibility alleles. Nature Genetics. 2006;38:873-875  Ziyad N , Amber Y, Alyson J, Sincere L, Gerald GM , Jessica ML, Tiffanie S, Naqvi AG, Edvin S, Surya ML, Azar J , Lea R, Norbert SZ, Suman JR, Fidel VE, Luis M, Voazaliastein B, Nathanael N, Yuliya RH, Mary KW, and Oly AP. DEMETRA mutations in patients with hereditary pancreatic cancer. Cancer Discover. 2012;2:41-46  CHEK2 Breast Cancer Case-Control Consortium. CHEK2*1100delC and susceptibility to breast cancer: A collaborative analysis involving 10,860 breast cancer cases and 9,065 controls from 10 studies. Am J Hum Nelda, 74 (2004), pp. 9583-7745  Rios T, Juan S, Nay K, et al. Spectrum of Mutations in BRCA1, BRCA2, CHEK2, and TP53 in Families at High Risk of Breast Cancer. NEISHA, 2006;295(12):8734-9481.   Toño C, Hossein D, Fernanda A, et al. Risk of breast cancer in women with a CHEK2 mutation with and without a family history of breast cancer. JClin Oncol. 2011;29:5867-2493.      TURNAROUND TIME  4 - 6 weeks    INSURANCE COVERAGE   A prior authorization will be submitted when your provider submits the order for this panel. Testing will be held until prior authorization is obtained. You will be contacted once prior authorization is completed. For details regarding coverage and out-of-pocket estimates, please contact a  at the [facility] at [phone].    About the [facility]), [facility]  In collaborative effort with the [facility] and the [place] [employer], the [facility] offers a full range of diagnostic testing services, with a strong focus on quality, accuracy, and timeliness.

## 2022-11-07 NOTE — PROGRESS NOTES
Betty is a 47 year old who is being evaluated via a billable video visit.      How would you like to obtain your AVS? MyChart  If the video visit is dropped, the invitation should be resent by: Text to cell phone: [phone]  Will anyone else be joining your video visit? Carina Monterrosoa FARIDA [facility]    Video-Visit Details    Video Start Time: 2:08 pm    Type of service:  Video Visit    Video End Time:2:39 pm    Originating Location (pt. Location): Home    Distant Location (provider location):  Off-site    Platform used for Video Visit: Wiztango    11/7/2022    Referring Provider: Janie South MD    Presenting Information:   I spoke with Betty Rogers over video today for genetic counseling to discuss her personal and family history of cancer. With her permission, this appointment was conducted virtually due to COVID-19 precautions. We talked today to review this history, cancer screening recommendations, and available genetic testing options.    Personal History:  Betty is a 47 year old female. She was recently diagnosed with a left breast cancer at age 47 in June 2022 (IDC and DCIS, ER positive, OK positive, HER2 negative). She had a left axillary lymph node biopsy on 6/17/22 which showed: Metastatic carcinoma consistent with breast primary malignancy with macrometastasis. She is currently having neoadjuvant chemotherapy. She is planning for bilateral mastectomies.    She has already had some genetic testing via the Breast Actionable Panel ordered by Dr. South. Results were negative for mutations in the 11 genes analyzed: DEMETRA, BARD1, BRCA1, BRCA2, CDH1, CHEK2, NF1, PALB2, PTEN, STK11, TP53. Testing was performed by [facility] at [facility]. We reviewed these results in detail today.    She had her first menstrual period at age 12, her first child at age 32, and is premenopausal (has stopped having her period with chemotherapy). Betty has her ovaries, fallopian  tubes and uterus in place. She reports that she has not used hormone replacement therapy. She has used oral contraceptives. Betty has not had a colonoscopy. She had a negative Cologuard test in July 2022. She reports that she has regular skin exams with no concerns. Betty reported no history of tobacco use and alcohol use of 6 drinks per week.    Family History: (Please see scanned pedigree for detailed family history information)  Maternal:    Her mother is 72 years old and has a history of melanoma at age 42. Treatment included removal only. She was found to have atypical lobular hyperplasia at age 48 and had a lumpectomy follow by 5 years of tamoxifen. She has also had between 2-5 colon polyps.     Her uncle was diagnosed with prostate cancer at an age unknown to Betty. This was reported to be detected at an early stage. He passed away at age 78 due to dementia.    His son (Betty's cousin) is in his early 50s and has a history of rectal cancer at age 40.    His daughter is also in her early 50s and has a history of breast cancer at age 39.    Betty is not aware of any genetic testing in either of these cousins.     Another uncle is 67 years old with no known history of cancer.     He has three daughters who have all reportedly had BRCA testing due to their mother's history of cancer. One of them has tested positive for a BRCA mutation and the other two have tested negative.    Her grandfather was diagnosed with colon cancer at age 63. He passed away at age 77.  Paternal:    Her father is 72 years old and has had 1 colon polyp. He has no known history of cancer.     She is not aware of any cancers in her paternal relatives.     Her maternal ethnicity is [place]ian. Her paternal ethnicity is [place]. There is no known Ashkenazi Uatsdin ancestry on either side of her family.     Discussion:    Betty's personal and family history of cancer is suggestive of a hereditary cancer syndrome.    We reviewed the features of  sporadic, familial, and hereditary cancers. In looking at Betty's family history, it is possible that a cancer susceptibility gene is present due to her recent diagnosis of breast cancer at age 47, as well as her maternal family history of breast, colorectal, melanoma, and prostate cancer.    We discussed the natural history and genetics of hereditary cancer. As noted above, Betty has already had negative genetic testing via the Breast Actionable Panel. Today we discussed that there are additional genes that could cause increased risk for breast cancer (lower risk, would not impact surgical decisions) and other cancers. As many of these genes present with overlapping features in a family and accurate cancer risk cannot always be established based upon the pedigree analysis alone, it would be reasonable for Betty to consider additional reflex panel genetic testing to analyze multiple genes at once.    A detailed handout regarding the Breast Actionable Panel and the information we discussed will be provided to Betty via [employer] and can be found in the after visit summary. Topics included: inheritance pattern, cancer risks, cancer screening recommendations, and also risks, benefits and limitations of testing.    We reviewed additional reflex genetic testing options for Betty based on her personal and family history: a panel of genes associated with an increased risk for certain cancers (such as the Breast Expanded Panel), or larger panel options to include genes associated with increased risk for multiple different cancer types. She expressed an interest in more broad testing. We spent some time discussing the Hereditary Cancer Comprehensive 40-Gene Panel and the Hereditary Cancer Comprehensive 85-Gene Panel.  We discussed that many genes on these panels are associated with specific hereditary cancer syndromes and have published management guidelines. Other genes have medical management guidelines available to  screen for certain cancers. The remaining genes are associated with increased cancer risk and may allow us to make medical recommendations when mutations are identified. We discussed that some of the genes on the larger 85-gene panel may have limited information available about specific cancer risks and therefore, there may be limited screening guidelines available.     She is interested in additional reflex testing, but she would like to take some additional time to consider her options and decide which comprehensive panel she would like to proceed with. She will contact me when she has made her decision and we will coordinate testing at that time.     Medical Management: For Betty, we reviewed that the information from genetic testing may determine:    additional cancer screening for which Betty may qualify (i.e. more frequent colonoscopies, more frequent dermatologic exams, etc.),    options for risk reducing surgeries Betty could consider (i.e. surgery to remove her ovaries and/or uterus, etc.),      and targeted chemotherapies if she were to develop certain cancers in the future (i.e. immunotherapy for individuals with Oconnell syndrome, PARP inhibitors, etc.).     These recommendations will be discussed in detail once genetic testing is completed.     Plan:  1) Betty is interested in additional reflex genetic testing, although she would like to take some additional time to decide between the comprehensive panel options we discussed today.  2) She will let me know when she has made her decision and testing will be coordinated. She was also encouraged to contact me with any additional questions or concerns.     Marcella London MS, [facility] [place]  Licensed, Certified Genetic Counselor  Office: [phone]  Email: [email or web address]

## 2022-11-07 NOTE — LETTER
11/7/2022         RE: Betty Rogers  [address]  [facility] [address]        Dear Colleague,    Thank you for referring your patient, Betty Rogers, to the [facility]. Please see a copy of my visit note below.    Oncology Follow Up Visit: November 7, 2022    Oncologist: Dr Hoang Franco  PCP: Diana Luevano    Diagnosis:  Left breast invasive ductal carcinoma-multifocal, locally advanced, hormone positive  Betty Rogers is a 48 yo female who completed screening mammogram in 5/2022 and was noted to have left breast architectural distortion at 12 o'clock.   Diagnostic mammo with US revealed an irregular hypoechoic mass measuring 3.7cm with surrounding ill defined shadowing/hypoechogenicity and lymph node with thickened cortex. Breast biopsy of 12 o'clock 6cm from nipple revealed invasive ductal carcinoma, grade 2, ER+/VT+ HER2-. Breast biopsy of 9 o'clock position revealed DCIS, grade 3. MRI breast with multifocal disease. Lymph node biopsy confirmed metastasis.  * Oncotype score of 28, intermediate risk, chemo benefit cannot be excluded  Treatment:   7/2022-10/2022- weekly neoadjuvant taxol x 12 weeks  10/10/2022 began dose dense AC    Interval History: Ms. Rogers is here today for continuation of treatment with cycle 3 of AC. Pt reports she is feeling that she is tolerating treatment well with no new symptoms. Denies nausea, fevers, illness, SOB or increased fatigue - is still able to keep up with kids activities. She is eating well though taste and appetite are not as usual and no nausea. Bowel and bladder are working well.   Rest of comprehensive and complete ROS is reviewed and is negative.   Past Medical History:   Diagnosis Date     Breast cancer metastasized to axillary lymph node (H) 7/15/2022     No active medical problems      Current Outpatient Medications   Medication     calcium carbonate-vitamin D (OS-ABEBA) 600-400 MG-UNIT chewable tablet      "HYDROcodone-acetaminophen (NORCO) 5-325 MG tablet     Multiple Vitamin (MULTIVITAMIN ADULT PO)     ondansetron (ZOFRAN) 8 MG tablet     prochlorperazine (COMPAZINE) 10 MG tablet     senna-docusate (SENOKOT-S/PERICOLACE) 8.6-50 MG tablet     dexamethasone (DECADRON) 4 MG tablet     No current facility-administered medications for this visit.     Allergies   Allergen Reactions     Nuts      Other [Seasonal Allergies]      Tree Nuts-Lips swell and breathing problems       Physical Exam:/74 (BP Location: Left arm, Patient Position: Chair, Cuff Size: Adult Regular)   Pulse 68   Temp 98.3  F (36.8  C) (Oral)   Ht 1.651 m (5' 5\")   Wt 60.3 kg (133 lb)   SpO2 98%   BMI 22.13 kg/m     ECOG PS- 0  Constitutional: Alert, healthy, and in no distress.   ENT: Eyes bright , No mouth sores  Neck: Supple, No adenopathy.  Cardiac: Heart rate and rhythm is regular and strong without murmur  Respiratory: Breathing easy. Lung sounds clear to auscultation  Breasts:not checked today.  GI: Abdomen is soft, non-tender, BS normal. No masses or organomegaly  MS: Muscle tone normal, extremities normal with no edema.   Skin: No suspicious lesions or rashes  Neuro: Sensory grossly WNL, gait normal.   Lymph: Normal ant/post cervical, axillary, supraclavicular nodes  Psych: Mentation appears normal and affect normal/bright and smiling    Laboratory/Imaging Results:   Results for orders placed or performed in visit on 11/07/22   Comprehensive metabolic panel     Status: Abnormal   Result Value Ref Range    Sodium 141 133 - 144 mmol/L    Potassium 4.2 3.4 - 5.3 mmol/L    Chloride 109 94 - 109 mmol/L    Carbon Dioxide (CO2) 26 20 - 32 mmol/L    Anion Gap 6 3 - 14 mmol/L    Urea Nitrogen 13 7 - 30 mg/dL    Creatinine 0.66 0.52 - 1.04 mg/dL    Calcium 8.9 8.5 - 10.1 mg/dL    Glucose 100 (H) 70 - 99 mg/dL    Alkaline Phosphatase 64 40 - 150 U/L    AST 13 0 - 45 U/L    ALT 21 0 - 50 U/L    Protein Total 6.3 (L) 6.8 - 8.8 g/dL    Albumin 3.5 " 3.4 - 5.0 g/dL    Bilirubin Total 0.2 0.2 - 1.3 mg/dL    GFR Estimate >90 >60 mL/min/1.73m2   Glucose     Status: Abnormal   Result Value Ref Range    Glucose 100 (H) 70 - 99 mg/dL    Patient Fasting > 8hrs? Yes    Lipid panel reflex to direct LDL Fasting     Status: Abnormal   Result Value Ref Range    Cholesterol 187 <200 mg/dL    Triglycerides 81 <150 mg/dL    Direct Measure HDL 58 >=50 mg/dL    LDL Cholesterol Calculated 113 (H) <=100 mg/dL    Non HDL Cholesterol 129 <130 mg/dL    Patient Fasting > 8hrs? Yes     Narrative    Cholesterol  Desirable:  <200 mg/dL    Triglycerides  Normal:  Less than 150 mg/dL  Borderline High:  150-199 mg/dL  High:  200-499 mg/dL  Very High:  Greater than or equal to 500 mg/dL    Direct Measure HDL  Female:  Greater than or equal to 50 mg/dL   Male:  Greater than or equal to 40 mg/dL    LDL Cholesterol  Desirable:  <100mg/dL  Above Desirable:  100-129 mg/dL   Borderline High:  130-159 mg/dL   High:  160-189 mg/dL   Very High:  >= 190 mg/dL    Non HDL Cholesterol  Desirable:  130 mg/dL  Above Desirable:  130-159 mg/dL  Borderline High:  160-189 mg/dL  High:  190-219 mg/dL  Very High:  Greater than or equal to 220 mg/dL   CBC with platelets and differential     Status: Abnormal   Result Value Ref Range    WBC Count 6.4 4.0 - 11.0 10e3/uL    RBC Count 3.46 (L) 3.80 - 5.20 10e6/uL    Hemoglobin 10.2 (L) 11.7 - 15.7 g/dL    Hematocrit 30.4 (L) 35.0 - 47.0 %    MCV 88 78 - 100 fL    MCH 29.5 26.5 - 33.0 pg    MCHC 33.6 31.5 - 36.5 g/dL    RDW 15.9 (H) 10.0 - 15.0 %    Platelet Count 164 150 - 450 10e3/uL    % Neutrophils 69 %    % Lymphocytes 17 %    % Monocytes 10 %    % Eosinophils 0 %    % Basophils 1 %    % Immature Granulocytes 3 %    NRBCs per 100 WBC 0 <1 /100    Absolute Neutrophils 4.4 1.6 - 8.3 10e3/uL    Absolute Lymphocytes 1.1 0.8 - 5.3 10e3/uL    Absolute Monocytes 0.7 0.0 - 1.3 10e3/uL    Absolute Eosinophils 0.0 0.0 - 0.7 10e3/uL    Absolute Basophils 0.1 0.0 - 0.2  10e3/uL    Absolute Immature Granulocytes 0.2 <=0.4 10e3/uL    Absolute NRBCs 0.0 10e3/uL   CBC with platelets differential     Status: Abnormal    Narrative    The following orders were created for panel order CBC with platelets differential.  Procedure                               Abnormality         Status                     ---------                               -----------         ------                     CBC with platelets and d...[[id number]]  Abnormal            Final result                 Please view results for these tests on the individual orders.     Assessment and Plan:   Ganesh Breast Cancer- multifocal, locally advanced, hormone positive- Pt has completed 12 weeks of adjuvant Taxol and is now due to start cycle 3 of AC today. Some anemia noted but no need for intervention.   Will continue with cycle 3 of AC without changes in plan necessary.   - return in 2 weeks for review prior to final AC to see Dr Franco. .   - has surgery set for 12/21/2022.   The total time of this encounter amounted to  30 minutes. This time included face to facetime spent with the patient, prep work, ordering tests, and performing post visit documentation.  Jasmine Mattson Cnp        Again, thank you for allowing me to participate in the care of your patient.        Sincerely,        Jasmine Mattson, BILLY, APRN CNP

## 2022-11-07 NOTE — PROGRESS NOTES
"Patient's name and  were verified.  See Doc Flowsheet - IV assess for details.  IVAD accessed with 20G 3/4\" riggs gripper plus needle  blood return positive: YES  Site without redness, tenderness or swelling: YES  flushed with 30cc NS and 5cc 100u/ml heparin  Needle: left accessed  Comments: Labs drawn.  Patient tolerated procedure without incident.    Wilfred Urrutia  RN, BSN      "

## 2022-11-07 NOTE — NURSING NOTE
"Oncology Rooming Note    November 7, 2022 8:01 AM   Betty Rogers is a 47 year old female who presents for:    Chief Complaint   Patient presents with     Oncology Clinic Visit     Prior to tx     Initial Vitals: /74 (BP Location: Left arm, Patient Position: Chair, Cuff Size: Adult Regular)   Pulse 68   Temp 98.3  F (36.8  C) (Oral)   Ht 1.651 m (5' 5\")   Wt 60.3 kg (133 lb)   SpO2 98%   BMI 22.13 kg/m   Estimated body mass index is 22.13 kg/m  as calculated from the following:    Height as of this encounter: 1.651 m (5' 5\").    Weight as of this encounter: 60.3 kg (133 lb). Body surface area is 1.66 meters squared.  No Pain (0) Comment: Data Unavailable   No LMP recorded. (Menstrual status: Chemotherapy).  Allergies reviewed: Yes  Medications reviewed: Yes    Medications: Medication refills not needed today.  Pharmacy name entered into [facility]:    [id number] - [facility] [address] [address] [address] [place] [facility] [address] [facility]    Clinical concerns: NO Jasmine was notified.      Aline Monahan CMA              "

## 2022-11-07 NOTE — LETTER
Cancer Risk Management  Program Locations    [facility]  [facility]  [facility]  Mailing Address  Cancer Risk Management Program  [facility]  [address]  [place] [address]    New patient appointments  [phone]  November 7, 2022    Betty Rogers  [address]  [facility] [address]      Dear Betty,    It was a pleasure speaking with you over video for genetic counseling on 11/7/2022. Here is a copy of the progress note from our discussion. If you have any additional questions, please feel free to call.    Referring Provider: Janie South MD    Presenting Information:   I spoke with Betty Rogers over video today for genetic counseling to discuss her personal and family history of cancer. With her permission, this appointment was conducted virtually due to COVID-19 precautions. We talked today to review this history, cancer screening recommendations, and available genetic testing options.    Personal History:  Betty is a 47 year old female. She was recently diagnosed with a left breast cancer at age 47 in June 2022 (IDC and DCIS, ER positive, MA positive, HER2 negative). She had a left axillary lymph node biopsy on 6/17/22 which showed: Metastatic carcinoma consistent with breast primary malignancy with macrometastasis. She is currently having neoadjuvant chemotherapy. She is planning for bilateral mastectomies.    She has already had some genetic testing via the Breast Actionable Panel ordered by Dr. South. Results were negative for mutations in the 11 genes analyzed: DEMETRA, BARD1, BRCA1, BRCA2, CDH1, CHEK2, NF1, PALB2, PTEN, STK11, TP53. Testing was performed by the [facility] at [facility]. We reviewed these results in detail today.    She had her first menstrual period at age 12,  her first child at age 32, and is premenopausal (has stopped having her period with chemotherapy). Betty has her ovaries, fallopian tubes and uterus in place. She reports that she has not used hormone replacement therapy. She has used oral contraceptives. Betty has not had a colonoscopy. She had a negative Cologuard test in July 2022. She reports that she has regular skin exams with no concerns. Betty reported no history of tobacco use and alcohol use of 6 drinks per week.    Family History: (Please see scanned pedigree for detailed family history information)  Maternal:    Her mother is 72 years old and has a history of melanoma at age 42. Treatment included removal only. She was found to have atypical lobular hyperplasia at age 48 and had a lumpectomy follow by 5 years of tamoxifen. She has also had between 2-5 colon polyps.     Her uncle was diagnosed with prostate cancer at an age unknown to Betty. This was reported to be detected at an early stage. He passed away at age 78 due to dementia.    His son (Betty's cousin) is in his early 50s and has a history of rectal cancer at age 40.    His daughter is also in her early 50s and has a history of breast cancer at age 39.    Betty is not aware of any genetic testing in either of these cousins.     Another uncle is 67 years old with no known history of cancer.     He has three daughters who have all reportedly had BRCA testing due to their mother's history of cancer. One of them has tested positive for a BRCA mutation and the other two have tested negative.    Her grandfather was diagnosed with colon cancer at age 63. He passed away at age 77.  Paternal:    Her father is 72 years old and has had 1 colon polyp. He has no known history of cancer.     She is not aware of any cancers in her paternal relatives.     Her maternal ethnicity is [place]. Her paternal ethnicity is [place]. There is no known Ashkenazi Druze ancestry on either side of her family.      Discussion:    Betty's personal and family history of cancer is suggestive of a hereditary cancer syndrome.    We reviewed the features of sporadic, familial, and hereditary cancers. In looking at Betty's family history, it is possible that a cancer susceptibility gene is present due to her recent diagnosis of breast cancer at age 47, as well as her maternal family history of breast, colorectal, melanoma, and prostate cancer.    We discussed the natural history and genetics of hereditary cancer. As noted above, Betty has already had negative genetic testing via the Breast Actionable Panel. Today we discussed that there are additional genes that could cause increased risk for breast cancer (lower risk, would not impact surgical decisions) and other cancers. As many of these genes present with overlapping features in a family and accurate cancer risk cannot always be established based upon the pedigree analysis alone, it would be reasonable for Betty to consider additional reflex panel genetic testing to analyze multiple genes at once.    A detailed handout regarding the Breast Actionable Panel and the information we discussed will be provided to Betty via [employer] and can be found in the after visit summary. Topics included: inheritance pattern, cancer risks, cancer screening recommendations, and also risks, benefits and limitations of testing.    We reviewed additional reflex genetic testing options for Betty based on her personal and family history: a panel of genes associated with an increased risk for certain cancers (such as the Breast Expanded Panel), or larger panel options to include genes associated with increased risk for multiple different cancer types. She expressed an interest in more broad testing. We spent some time discussing the Hereditary Cancer Comprehensive 40-Gene Panel and the Hereditary Cancer Comprehensive 85-Gene Panel.  We discussed that many genes on these panels are associated  with specific hereditary cancer syndromes and have published management guidelines. Other genes have medical management guidelines available to screen for certain cancers. The remaining genes are associated with increased cancer risk and may allow us to make medical recommendations when mutations are identified. We discussed that some of the genes on the larger 85-gene panel may have limited information available about specific cancer risks and therefore, there may be limited screening guidelines available.     She is interested in additional reflex testing, but she would like to take some additional time to consider her options and decide which comprehensive panel she would like to proceed with. She will contact me when she has made her decision and we will coordinate testing at that time.     Medical Management: For Betty, we reviewed that the information from genetic testing may determine:    additional cancer screening for which Betty may qualify (i.e. more frequent colonoscopies, more frequent dermatologic exams, etc.),    options for risk reducing surgeries Betty could consider (i.e. surgery to remove her ovaries and/or uterus, etc.),      and targeted chemotherapies if she were to develop certain cancers in the future (i.e. immunotherapy for individuals with Oconnell syndrome, PARP inhibitors, etc.).     These recommendations will be discussed in detail once genetic testing is completed.     Plan:  1) Betty is interested in additional reflex genetic testing, although she would like to take some additional time to decide between the comprehensive panel options we discussed today.  2) She will let me know when she has made her decision and testing will be coordinated. She was also encouraged to contact me with any additional questions or concerns.     Marcella London MS, [facility]  Licensed, Certified Genetic Counselor  Office: [phone]  Email: [email or web address]

## 2022-11-07 NOTE — PROGRESS NOTES
Infusion Nursing Note:  Betty Rogers presents today for C3D1 A/C.    Patient seen by provider today: Yes: Jasmine Mattson NP    present during visit today: Not Applicable.    Note: The patient reports some slight numbness and tingling to her fingers. She otherwise denies any concerns at this time and reports feeling well today.    Intravenous Access:  Implanted Port.    Treatment Conditions:  Lab Results   Component Value Date    HGB 10.2 (L) 11/07/2022    WBC 6.4 11/07/2022    ANEU 5.9 10/24/2022    ANEUTAUTO 4.4 11/07/2022     11/07/2022      Lab Results   Component Value Date     11/07/2022    POTASSIUM 4.2 11/07/2022    CR 0.66 11/07/2022    ABEBA 8.9 11/07/2022    BILITOTAL 0.2 11/07/2022    ALBUMIN 3.5 11/07/2022    ALT 21 11/07/2022    AST 13 11/07/2022     Results reviewed, labs MET treatment parameters, ok to proceed with treatment.  ECHO/MUGA completed 10/18/22  EF 60-65%.    Post Infusion Assessment:  Patient tolerated infusion without incident.  Blood return noted pre and post infusion.  Site patent and intact, free from redness, edema or discomfort.  No evidence of extravasations.  Access discontinued per protocol.     Discharge Plan:   AVS to patient via MYCHART.  Patient will return 11/8/22 for next appointment. Future appts have been reviewed and crosschecked with appt note and plan.   Patient discharged in stable condition accompanied by: self.  Departure Mode: Ambulatory.      Rosa Isela Kate RN

## 2022-11-08 ENCOUNTER — INFUSION THERAPY VISIT (OUTPATIENT)
Dept: INFUSION THERAPY | Facility: CLINIC | Age: 47
End: 2022-11-08
Payer: COMMERCIAL

## 2022-11-08 VITALS — HEART RATE: 71 BPM | OXYGEN SATURATION: 99 % | DIASTOLIC BLOOD PRESSURE: 72 MMHG | SYSTOLIC BLOOD PRESSURE: 116 MMHG

## 2022-11-08 DIAGNOSIS — C50.912 CARCINOMA OF LEFT BREAST METASTATIC TO AXILLARY LYMPH NODE (H): ICD-10-CM

## 2022-11-08 DIAGNOSIS — C77.3 CARCINOMA OF LEFT BREAST METASTATIC TO AXILLARY LYMPH NODE (H): ICD-10-CM

## 2022-11-08 DIAGNOSIS — T45.1X5S ADVERSE EFFECT OF ANTINEOPLASTIC AND IMMUNOSUPPRESSIVE DRUGS, SEQUELA: Primary | ICD-10-CM

## 2022-11-08 PROCEDURE — 99207 PR NO CHARGE LOS: CPT

## 2022-11-08 PROCEDURE — 96372 THER/PROPH/DIAG INJ SC/IM: CPT | Performed by: NURSE PRACTITIONER

## 2022-11-08 ASSESSMENT — PAIN SCALES - GENERAL: PAINLEVEL: NO PAIN (0)

## 2022-11-08 NOTE — PROGRESS NOTES
Infusion Nursing Note:  Betty Rogers presents today for   Chief Complaint   Patient presents with     Allied Health Visit     Fulphila     Patient seen by provider today: No   present during visit today: Not Applicable.    Note: Patient was assessed by Debbie Santacruz RN prior to injection.    Intravenous Access:  No Intravenous access/labs at this visit.    Treatment Conditions:  Not Applicable.    Post Infusion Assessment:  Patient tolerated injection without incident.  Site patent and intact, free from redness, edema or discomfort.     Discharge Plan:   Patient discharged in stable condition accompanied by: self.  Departure Mode: Ambulatory.      Aline Monahan CMA

## 2022-11-11 NOTE — TELEPHONE ENCOUNTER
Forms/Letter Request    Type of form/letter: health screening form    Have you been seen for this request: Yes 11/7/22    Do we have the form/letter: Yes: sent through [employer]    When is form/letter needed by: asap, 11/30 is cut off date    How would you like the form/letter returned: N/A asked pt what she would like us to do

## 2022-11-14 NOTE — RESULT ENCOUNTER NOTE
Dear Betty,   Your recent test results showed the following:  -- Lipid panel and glucose are borderline elevated.  Prescription medication is not needed.  Low-fat low-cholesterol diet, monitor carb intake.  We will recheck next year.    Please call or Mychart to our office if you have further questions.     Diana Luevano MD-PhD

## 2022-11-21 ENCOUNTER — LAB (OUTPATIENT)
Dept: ONCOLOGY | Facility: CLINIC | Age: 47
End: 2022-11-21
Payer: COMMERCIAL

## 2022-11-21 ENCOUNTER — ONCOLOGY VISIT (OUTPATIENT)
Dept: ONCOLOGY | Facility: CLINIC | Age: 47
End: 2022-11-21
Payer: COMMERCIAL

## 2022-11-21 ENCOUNTER — INFUSION THERAPY VISIT (OUTPATIENT)
Dept: INFUSION THERAPY | Facility: CLINIC | Age: 47
End: 2022-11-21
Payer: COMMERCIAL

## 2022-11-21 VITALS
HEART RATE: 83 BPM | TEMPERATURE: 98 F | SYSTOLIC BLOOD PRESSURE: 124 MMHG | WEIGHT: 132 LBS | DIASTOLIC BLOOD PRESSURE: 81 MMHG | OXYGEN SATURATION: 100 % | HEIGHT: 65 IN | BODY MASS INDEX: 21.99 KG/M2

## 2022-11-21 DIAGNOSIS — C50.912 BREAST CANCER METASTASIZED TO AXILLARY LYMPH NODE, LEFT (H): ICD-10-CM

## 2022-11-21 DIAGNOSIS — C50.912 CARCINOMA OF LEFT BREAST METASTATIC TO AXILLARY LYMPH NODE (H): ICD-10-CM

## 2022-11-21 DIAGNOSIS — C77.3 BREAST CANCER METASTASIZED TO AXILLARY LYMPH NODE, LEFT (H): ICD-10-CM

## 2022-11-21 DIAGNOSIS — T45.1X5A ANTINEOPLASTIC CHEMOTHERAPY INDUCED ANEMIA: ICD-10-CM

## 2022-11-21 DIAGNOSIS — G62.0 CHEMOTHERAPY-INDUCED NEUROPATHY (H): ICD-10-CM

## 2022-11-21 DIAGNOSIS — C50.912 CARCINOMA OF LEFT BREAST METASTATIC TO AXILLARY LYMPH NODE (H): Primary | ICD-10-CM

## 2022-11-21 DIAGNOSIS — T45.1X5S ADVERSE EFFECT OF ANTINEOPLASTIC AND IMMUNOSUPPRESSIVE DRUGS, SEQUELA: ICD-10-CM

## 2022-11-21 DIAGNOSIS — C77.3 CARCINOMA OF LEFT BREAST METASTATIC TO AXILLARY LYMPH NODE (H): ICD-10-CM

## 2022-11-21 DIAGNOSIS — T45.1X5A CHEMOTHERAPY-INDUCED NEUROPATHY (H): ICD-10-CM

## 2022-11-21 DIAGNOSIS — C50.812 MALIGNANT NEOPLASM OF OVERLAPPING SITES OF LEFT BREAST IN FEMALE, ESTROGEN RECEPTOR POSITIVE (H): Primary | ICD-10-CM

## 2022-11-21 DIAGNOSIS — D64.81 ANTINEOPLASTIC CHEMOTHERAPY INDUCED ANEMIA: ICD-10-CM

## 2022-11-21 DIAGNOSIS — Z17.0 MALIGNANT NEOPLASM OF OVERLAPPING SITES OF LEFT BREAST IN FEMALE, ESTROGEN RECEPTOR POSITIVE (H): Primary | ICD-10-CM

## 2022-11-21 DIAGNOSIS — T45.1X5S ADVERSE EFFECT OF ANTINEOPLASTIC AND IMMUNOSUPPRESSIVE DRUGS, SEQUELA: Primary | ICD-10-CM

## 2022-11-21 DIAGNOSIS — C77.3 CARCINOMA OF LEFT BREAST METASTATIC TO AXILLARY LYMPH NODE (H): Primary | ICD-10-CM

## 2022-11-21 LAB
BASOPHILS # BLD AUTO: 0.1 10E3/UL (ref 0–0.2)
BASOPHILS NFR BLD AUTO: 1 %
EOSINOPHIL # BLD AUTO: 0 10E3/UL (ref 0–0.7)
EOSINOPHIL NFR BLD AUTO: 0 %
ERYTHROCYTE [DISTWIDTH] IN BLOOD BY AUTOMATED COUNT: 17.5 % (ref 10–15)
HCT VFR BLD AUTO: 30 % (ref 35–47)
HGB BLD-MCNC: 10.2 G/DL (ref 11.7–15.7)
IMM GRANULOCYTES # BLD: 0.3 10E3/UL
IMM GRANULOCYTES NFR BLD: 4 %
LYMPHOCYTES # BLD AUTO: 1.1 10E3/UL (ref 0.8–5.3)
LYMPHOCYTES NFR BLD AUTO: 15 %
MCH RBC QN AUTO: 30 PG (ref 26.5–33)
MCHC RBC AUTO-ENTMCNC: 34 G/DL (ref 31.5–36.5)
MCV RBC AUTO: 88 FL (ref 78–100)
MONOCYTES # BLD AUTO: 0.9 10E3/UL (ref 0–1.3)
MONOCYTES NFR BLD AUTO: 12 %
NEUTROPHILS # BLD AUTO: 5.1 10E3/UL (ref 1.6–8.3)
NEUTROPHILS NFR BLD AUTO: 68 %
NRBC # BLD AUTO: 0 10E3/UL
NRBC BLD AUTO-RTO: 0 /100
PLATELET # BLD AUTO: 195 10E3/UL (ref 150–450)
RBC # BLD AUTO: 3.4 10E6/UL (ref 3.8–5.2)
WBC # BLD AUTO: 7.4 10E3/UL (ref 4–11)

## 2022-11-21 PROCEDURE — 99214 OFFICE O/P EST MOD 30 MIN: CPT | Mod: 25 | Performed by: INTERNAL MEDICINE

## 2022-11-21 PROCEDURE — 96367 TX/PROPH/DG ADDL SEQ IV INF: CPT | Performed by: NURSE PRACTITIONER

## 2022-11-21 PROCEDURE — 85025 COMPLETE CBC W/AUTO DIFF WBC: CPT | Performed by: INTERNAL MEDICINE

## 2022-11-21 PROCEDURE — 96413 CHEMO IV INFUSION 1 HR: CPT | Performed by: NURSE PRACTITIONER

## 2022-11-21 PROCEDURE — 96375 TX/PRO/DX INJ NEW DRUG ADDON: CPT | Performed by: NURSE PRACTITIONER

## 2022-11-21 PROCEDURE — 99207 PR NO CHARGE LOS: CPT

## 2022-11-21 RX ORDER — HEPARIN SODIUM (PORCINE) LOCK FLUSH IV SOLN 100 UNIT/ML 100 UNIT/ML
5 SOLUTION INTRAVENOUS
Status: DISCONTINUED | OUTPATIENT
Start: 2022-11-21 | End: 2022-11-21 | Stop reason: HOSPADM

## 2022-11-21 RX ORDER — DOXORUBICIN HYDROCHLORIDE 2 MG/ML
60 INJECTION, SOLUTION INTRAVENOUS ONCE
Status: COMPLETED | OUTPATIENT
Start: 2022-11-21 | End: 2022-11-21

## 2022-11-21 RX ORDER — PALONOSETRON 0.05 MG/ML
0.25 INJECTION, SOLUTION INTRAVENOUS ONCE
Status: COMPLETED | OUTPATIENT
Start: 2022-11-21 | End: 2022-11-21

## 2022-11-21 RX ADMIN — PALONOSETRON 0.25 MG: 0.05 INJECTION, SOLUTION INTRAVENOUS at 08:32

## 2022-11-21 RX ADMIN — DOXORUBICIN HYDROCHLORIDE 100 MG: 2 INJECTION, SOLUTION INTRAVENOUS at 09:11

## 2022-11-21 RX ADMIN — HEPARIN SODIUM (PORCINE) LOCK FLUSH IV SOLN 100 UNIT/ML 5 ML: 100 SOLUTION at 07:38

## 2022-11-21 RX ADMIN — Medication 250 ML: at 08:32

## 2022-11-21 RX ADMIN — HEPARIN SODIUM (PORCINE) LOCK FLUSH IV SOLN 100 UNIT/ML 5 ML: 100 SOLUTION at 09:58

## 2022-11-21 ASSESSMENT — PAIN SCALES - GENERAL: PAINLEVEL: NO PAIN (0)

## 2022-11-21 NOTE — PROGRESS NOTES
Infusion Nursing Note:  Betty Rogers presents today for C4D1 AC.    Patient seen by provider today: Yes: Hoang Franco MD   present during visit today: Not Applicable.    Note: Patient is very excited to be done with chemo and looking forward to the next stage in her treatment.    Intravenous Access:  Implanted Port.    Treatment Conditions:  Lab Results   Component Value Date    HGB 10.2 (L) 11/21/2022    WBC 7.4 11/21/2022    ANEU 5.9 10/24/2022    ANEUTAUTO 5.1 11/21/2022     11/21/2022      Results reviewed, labs MET treatment parameters, ok to proceed with treatment.    Post Infusion Assessment:  Patient tolerated infusion without incident.  Blood return noted pre and post infusion.  Blood return noted during administration every 5 cc.  Site patent and intact, free from redness, edema or discomfort.  No evidence of extravasations.  Access discontinued per protocol.     Discharge Plan:   Patient will return 11/22/2022 for fulphilia for next appointment.   Future appts have been reviewed and crosschecked with appt note and plan.  Patient discharged in stable condition accompanied by: boyfriend.  Departure Mode: Ambulatory.    Ana Johnson RN-BSN, PHN, OCN  [facility]

## 2022-11-21 NOTE — PROGRESS NOTES
[facility] Hematology / Oncology  Progress Note  Name: Betty Rogers  :  1975    MRN:  [id number]    --------------------    Assessment / Plan:  Multifocal, locally advanced, hormone positive, HER2 negative left breast ductal carcinoma:  # Neoadjuvant Oncotype Dx score 28.  # Neoadjuvant BRCA testing negative.   # Neoadjuvant taxol x 12 weeks; partial response.  # Neoadjuvant dose dense AC x 4 cycle; response TBD.    Betty will proceed with her fourth and final cycle of neoadjuvant dose AC with growth factor support.  She is tolerating treatment well and without major toxicities.  She does have a very mild to modest anemia that will likely improve once chemotherapy stops.  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  She is planning on a double mastectomy with immediate reconstruction with Dr. South right before the holidays in December.  We are planning on adjuvant radiotherapy as well; preoperative consult has been placed to discuss adjuvant treatment.  We are tentatively planning adjuvant tamoxifen given her recent premenopausal status and will await pathology report and have requested Ki-67 as well for adjuvant abemaciclib.  We will also plan on discussing ovarian suppression/bilateral salpingo-oophorectomy as well as in the adjuvant setting.  Bone health will be a big focus of our visit as well.    Hoang Franco MD    --------------------    Interval History:  Betty returns for follow up of breast cancer.  All in all, she continues to do quite well.  No nausea or vomiting.  Moving her bowels without issue.  No mouth sores.  Neuropathy persistent fingertips and toes with some very trace discomfort; present most of the day; using a vitamin B12 supplement.  No periods since September.  Spirits and mood appeared good.    --------------------    Physical Exam:  VS: /81 (BP Location: Left arm, Patient Position: Chair, Cuff Size: Adult Regular)    "Pulse 83   Temp 98  F (36.7  C) (Oral)   Ht 1.651 m (5' 5\")   Wt 59.9 kg (132 lb)   SpO2 100%   BMI 21.97 kg/m    GEN: Well appearing.  KELI: No palpable adenopathy.    Labs / Imaging:  We reviewed CBC, CMP, genetic testing.  "

## 2022-11-21 NOTE — LETTER
2022         RE: Betty Rogers  [address]  [place] [address]        Dear Colleague,    Thank you for referring your patient, Betty Rogers, to the [facility] [place]. Please see a copy of my visit note below.    [facility] Hematology / Oncology  Progress Note  Name: Betty Rogers  :  1975    MRN:  [id number]    --------------------    Assessment / Plan:  Multifocal, locally advanced, hormone positive, HER2 negative left breast ductal carcinoma:  # Neoadjuvant Oncotype Dx score 28.  # Neoadjuvant BRCA testing negative.   # Neoadjuvant taxol x 12 weeks; partial response.  # Neoadjuvant dose dense AC x 4 cycle; response TBD.    Betty will proceed with her fourth and final cycle of neoadjuvant dose AC with growth factor support.  She is tolerating treatment well and without major toxicities.  She does have a very mild to modest anemia that will likely improve once chemotherapy stops.  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  She is planning on a double mastectomy with immediate reconstruction with Dr. South right before the holidays in December.  We are planning on adjuvant radiotherapy as well; preoperative consult has been placed to discuss adjuvant treatment.  We are tentatively planning adjuvant tamoxifen given her recent premenopausal status and will await pathology report and have requested Ki-67 as well for adjuvant abemaciclib.  We will also plan on discussing ovarian suppression/bilateral salpingo-oophorectomy as well as in the adjuvant setting.  Bone health will be a big focus of our visit as well.    Hoang Franco MD    --------------------    Interval History:  Betty returns for follow up of breast cancer.  All in all, she continues to do quite well.  No nausea or vomiting.  Moving her bowels without issue.  No mouth sores.  Neuropathy persistent fingertips and toes with some very trace  "discomfort; present most of the day; using a vitamin B12 supplement.  No periods since September.  Spirits and mood appeared good.    --------------------    Physical Exam:  VS: /81 (BP Location: Left arm, Patient Position: Chair, Cuff Size: Adult Regular)   Pulse 83   Temp 98  F (36.7  C) (Oral)   Ht 1.651 m (5' 5\")   Wt 59.9 kg (132 lb)   SpO2 100%   BMI 21.97 kg/m    GEN: Well appearing.  KELI: No palpable adenopathy.    Labs / Imaging:  We reviewed CBC, CMP, genetic testing.      Again, thank you for allowing me to participate in the care of your patient.        Sincerely,        Hoang Franco MD    "

## 2022-11-21 NOTE — NURSING NOTE
"Oncology Rooming Note    November 21, 2022 7:58 AM   Betty Rogers is a 47 year old female who presents for:    Chief Complaint   Patient presents with     Oncology Clinic Visit     Prior to tx     Initial Vitals: /81 (BP Location: Left arm, Patient Position: Chair, Cuff Size: Adult Regular)   Pulse 83   Temp 98  F (36.7  C) (Oral)   Ht 1.651 m (5' 5\")   Wt 59.9 kg (132 lb)   SpO2 100%   BMI 21.97 kg/m   Estimated body mass index is 21.97 kg/m  as calculated from the following:    Height as of this encounter: 1.651 m (5' 5\").    Weight as of this encounter: 59.9 kg (132 lb). Body surface area is 1.66 meters squared.  No Pain (0) Comment: Data Unavailable   No LMP recorded. (Menstrual status: Chemotherapy).  Allergies reviewed: Yes  Medications reviewed: Yes    Medications: Medication refills not needed today.  Pharmacy name entered into [facility]:    [id number] [address] [address] [address] [place] [facility] [address] [facility]    Clinical concerns: NO Dr. Franco was notified.      Aline Monahan CMA              "

## 2022-11-21 NOTE — PATIENT INSTRUCTIONS
1) Rad onc consult pre-operatively.  2) BJT 2-3 weeks post-op (surgery is 12/21).  3) Please request Ki67 be performed on surgery pathology when available.    Hoang Franco MD.

## 2022-11-22 ENCOUNTER — INFUSION THERAPY VISIT (OUTPATIENT)
Dept: INFUSION THERAPY | Facility: CLINIC | Age: 47
End: 2022-11-22
Payer: COMMERCIAL

## 2022-11-22 ENCOUNTER — ANCILLARY PROCEDURE (OUTPATIENT)
Dept: ULTRASOUND IMAGING | Facility: CLINIC | Age: 47
End: 2022-11-22
Attending: SURGERY
Payer: COMMERCIAL

## 2022-11-22 VITALS
TEMPERATURE: 98 F | OXYGEN SATURATION: 99 % | SYSTOLIC BLOOD PRESSURE: 112 MMHG | HEART RATE: 68 BPM | DIASTOLIC BLOOD PRESSURE: 74 MMHG | RESPIRATION RATE: 16 BRPM

## 2022-11-22 DIAGNOSIS — T45.1X5S ADVERSE EFFECT OF ANTINEOPLASTIC AND IMMUNOSUPPRESSIVE DRUGS, SEQUELA: Primary | ICD-10-CM

## 2022-11-22 DIAGNOSIS — C77.3 CARCINOMA OF LEFT BREAST METASTATIC TO AXILLARY LYMPH NODE (H): ICD-10-CM

## 2022-11-22 DIAGNOSIS — C50.412 MALIGNANT NEOPLASM OF UPPER-OUTER QUADRANT OF LEFT BREAST IN FEMALE, ESTROGEN RECEPTOR POSITIVE (H): ICD-10-CM

## 2022-11-22 DIAGNOSIS — Z17.0 MALIGNANT NEOPLASM OF UPPER-OUTER QUADRANT OF LEFT BREAST IN FEMALE, ESTROGEN RECEPTOR POSITIVE (H): ICD-10-CM

## 2022-11-22 DIAGNOSIS — C50.912 CARCINOMA OF LEFT BREAST METASTATIC TO AXILLARY LYMPH NODE (H): ICD-10-CM

## 2022-11-22 PROCEDURE — 99207 PR NO CHARGE LOS: CPT

## 2022-11-22 PROCEDURE — 19285 PERQ DEV BREAST 1ST US IMAG: CPT | Mod: LT | Performed by: RADIOLOGY

## 2022-11-22 PROCEDURE — 96372 THER/PROPH/DIAG INJ SC/IM: CPT | Performed by: NURSE PRACTITIONER

## 2022-11-22 ASSESSMENT — PAIN SCALES - GENERAL: PAINLEVEL: NO PAIN (0)

## 2022-11-22 NOTE — PROGRESS NOTES
Infusion Nursing Note:  Betty Rogers presents today for Fulphila.    Patient seen by provider today: No   present during visit today: Not Applicable.    Note: Assessment performed by Della SOLORIO RN prior to injection today. Patient denies symptoms/concerns following previous injection.    Intravenous Access:  No Intravenous access/labs at this visit.    Treatment Conditions:  Not Applicable.    Post Infusion Assessment:  Patient tolerated injection without incident.  Site patent and intact, free from redness, edema or discomfort.     Discharge Plan:   Patient discharged in stable condition accompanied by: self.  Departure Mode: Ambulatory.      Sarina Hughes LPN

## 2022-11-29 ENCOUNTER — MYC MEDICAL ADVICE (OUTPATIENT)
Dept: SURGERY | Facility: CLINIC | Age: 47
End: 2022-11-29

## 2022-12-02 NOTE — TELEPHONE ENCOUNTER
Action 2022 2:02 PM ABT   Action Taken Called and unable to speak to patient, VM full cannot LVM.     MEDICAL RECORDS REQUEST   Radiation Oncology  [address] [place] [address]  PHONE: [phone]  Fax: [phone]        FUTURE VISIT INFORMATION                                                   Betty Rogers, : 1975 scheduled for future visit at [facility] Radiation Oncology    RECORDS REQUESTED FOR VISIT                                                     NOTES STATUS DETAILS   OFFICE NOTE from referring provider Epic 22: Dr. Hoang Franco   OFFICE NOTE from medical oncologist Epic 22: Dr. Hoang Franco   OFFICE NOTE from surgeon Epic 10/04/22: Dr. Janie South   OPERATIVE REPORT     MEDICATION LIST [facility]    LABS     PATHOLOGY REPORTS Reports in [facility]:  22: [id number]  22: [id number]   ANYTHING RELATED TO DIAGNOSIS Epic Most recent 22   IMAGING (NEED IMAGES & REPORT)     MRI PACS 22, 22:  MR Breast   MAMMO PACS 22-09   ULTRASOUND PACS 22-22: US Breast   PET PACS 22: PET Onc

## 2022-12-05 NOTE — PROGRESS NOTES
Dear Colleagues,  Today Betty Rogers was seen in consultation for her diagnosis of breast cancer.     IDENTIFICATION: Betty Rogers is a 47 year old woman with a recent diagnosis of left breast cancer, [id number], ER/DC positive HER2/percy negative s/p neoadjuvant chemo with mixed response now being seen for pre-operative consultation.    HISTORY OF PRESENT ILLNESS: Betty Rogers is a 47 year old woman with a recent diagnosis of breast cancer.  On May 23, 2022 bilateral screening mammogram showed within the left breast 12 o'clock position architectural distortion.  There is no suspicious finding in the right breast.  On June 1, 2022 left diagnostic mammogram confirmed the left breast 12:00 architectural distortion and by same-day ultrasound there was a hypoechoic mass measuring 3.7 cm in greatest dimension.  There was also a left axillary suspicious lymph node.  Contrast-enhanced mammogram on June 2, 2022 confirmed a left breast 3.5 cm solid mass.  Same-day biopsy was completed revealing within the left breast 12 o'clock position grade 2 IDC ER/DC positive HER2/percy negative.  At the 9 o'clock position there is grade 3 DCIS with invasive carcinoma which was also ER DC positive.    On June 16, 2022 bilateral breast MRI confirmed the left breast index lesion with a satellite mass measuring up to 5.1 cm. In addition there is suspicious subcentimeter masses at the 6:00 and 10 o'clock position.  Minimal non-mass enhancement at the site of biopsy-proven DCIS at 9:00.  There is no MRI evidence of malignancy on the right.    On June 17, 2022 ultrasound-guided biopsy of the left axilla was c/c metastatic carcinoma consistent with breast primary.    On June 25, 2022 PET/CT once again showed the left breast mass is hypermetabolic with associated atypical appearing left axillary hypermetabolic lymph nodes.  There is no distant metastatic disease identified.    From July 20, 2022 to November 21, 2022 she received Taxol x 11  cycles (1 cycle skipped due to low blood ct) followed by AC x 4.  Repeat breast MRI in September showed improvement within the breast with relative stability within the axilla.  Once again there is no concerning areas of enhancement within the right.  Of note in 2022 she had an ED visit for febrile neutropenia.    Currently the patient is doing well postchemotherapy.  She has some alopecia associated with her prior chemotherapy treatment but otherwise has good energy level and no complaints.  She is being seen here today for preoperative consultation.    REVIEW OF SYSTEMS: As per HPI, a 14-point review of system is otherwise negative.     PAST RADIATION THERAPY:  Denies.     No CTD/Pacemaker: Denies     BREAST RISK FACTORS:  Mother had breast cancer age 40, (m) aunt age 50, (m) cousin also had breast. No h/o ovarian cancer.  .  1st at 32 yo.  Menses at age 11. She is premenopausal.   14 months. OCP x 10 years. No h/o HRT. No history of prior breast biopsies.     Past Medical History:   Diagnosis Date     Breast cancer metastasized to axillary lymph node (H) 7/15/2022     No active medical problems        Past Surgical History:   Procedure Laterality Date     C/SECTION, LOW TRANSVERSE      2007     INSERT PORT VASCULAR ACCESS Right 2022    Procedure: PORT PLACEMENT;  Surgeon: Jnaie South MD;  Location:  OR       Family History   Problem Relation Age of Onset     Breast Cancer Mother         atypical lobular hyperplasia breast cancer     Unknown/Adopted Brother         Sucide     Heart Disease Maternal Grandfather         Heart Issues     Breast Cancer Maternal Aunt 50     Breast Cancer Maternal Cousin      Mental Illness Brother        Social History     Tobacco Use     Smoking status: Never     Smokeless tobacco: Never   Substance Use Topics     Alcohol use: Yes     Comment: socially, approx 6-7 drinks per week     Current Outpatient Medications   Medication     calcium  carbonate-vitamin D (OS-ABEBA) 600-400 MG-UNIT chewable tablet     vitamin B complex with vitamin C (STRESS TAB) tablet     dexamethasone (DECADRON) 4 MG tablet     prochlorperazine (COMPAZINE) 10 MG tablet     Tetrahydrozoline HCl (VISINE OP)     No current facility-administered medications for this visit.        Allergies   Allergen Reactions     Nuts      Other [Seasonal Allergies]      Tree Nuts-Lips swell and breathing problems     PHYSICAL EXAMINATION:  /78   Pulse 82   Temp 98.2  F (36.8  C) (Oral)   Resp 16   Wt 62.2 kg (137 lb 3.2 oz)   SpO2 100%   BMI 22.83 kg/m    GENERAL Well appearing woman in no acute distress.  HEENT Normocephalic, atraumatic.  Sclerae anicteric.   CVR  Regular rate and rhythm.  No murmurs, rubs, or gallops.  LUNGS Clear to auscultation bilaterally.  BREASTS Breasts are fairly symmetric.    LYMPH No supraclavicular, infraclavicular, or axillary lymphadenopathy appreciated bilaterally.  ABDOMEN Soft.    EXT  No CCE     NEURO No focal deficits  MSK  Good ROM.   SKIN  Warm and well perfused.  Diffuse alopecia.  PSYCH Orientation: Alert, attentive, and oriented to time, place, and person                           Affect: Affect was appropriate to the situation and showed normal range band stability. No anxious mood                           Speech: Speech was clear with normal fluency, rate, tone, and volume.                           Memory: Although not formally assessed, immediate, recent, and remote memory appeared to be intact.                            Insight and Judgement:  demonstrates adequate awareness of the issues discussed and was able to come to reasonable conclusions.                           Thought: Thought patterns were coherent and logical.    ECOG PERFORMANCE STATUS: 1    IMPRESSION/PLAN:  Betty Rogers is a 47 year old woman with a recent diagnosis of left breast cancer, [id number], ER/[place] positive HER2/percy negative s/p neoadjuvant chemo with mixed  response now being seen for pre-operative consultation.    Today I discussed adjuvant radiation therapy in general terms.  Seeing that there was initial alberto involvement I would recommend it whether she receives a lumpectomy or mastectomy.  Plan is to treat the affected breast/chest wall and regional nodes based on multiple randomized prospective data which show decrease risk of local recurrence and improvement in overall survival (MA 20 and EORTC 43655).      The risks, benefits, treatment rationale and regimen of radiation therapy to the breast/chest wall and regional nodes were discussed in great detail today with the patient.  Risks include but are not limited to skin erythema, desquamation, hyperpigmentation, breast and lymphedema, fibrosis, telengectasia, pneumonitis, cardiac toxicity, rib fracture and secondary malignancy. The patient consented to therapy and will return for CT simulation after being cleared by Medical Oncology and Surgical Oncology.      There was ample time for questions and all were answered to the patient's satisfaction. Thank you for allowing me to participate in the care of this pleasant patient. If you have any questions, please do not hesitate to contact my office.       Sincerely,    Latrell Foster MD  Adjunct   Dept of Radiation Oncology  [facility]

## 2022-12-06 ENCOUNTER — PRE VISIT (OUTPATIENT)
Dept: RADIATION ONCOLOGY | Facility: CLINIC | Age: 47
End: 2022-12-06

## 2022-12-06 ENCOUNTER — OFFICE VISIT (OUTPATIENT)
Dept: RADIATION ONCOLOGY | Facility: CLINIC | Age: 47
End: 2022-12-06
Attending: INTERNAL MEDICINE
Payer: COMMERCIAL

## 2022-12-06 VITALS
WEIGHT: 137.2 LBS | SYSTOLIC BLOOD PRESSURE: 127 MMHG | BODY MASS INDEX: 22.83 KG/M2 | HEART RATE: 82 BPM | TEMPERATURE: 98.2 F | RESPIRATION RATE: 16 BRPM | OXYGEN SATURATION: 100 % | DIASTOLIC BLOOD PRESSURE: 78 MMHG

## 2022-12-06 DIAGNOSIS — C50.912 BREAST CANCER METASTASIZED TO AXILLARY LYMPH NODE, LEFT (H): ICD-10-CM

## 2022-12-06 DIAGNOSIS — C77.3 BREAST CANCER METASTASIZED TO AXILLARY LYMPH NODE, LEFT (H): ICD-10-CM

## 2022-12-06 DIAGNOSIS — Z17.0 MALIGNANT NEOPLASM OF OVERLAPPING SITES OF LEFT BREAST IN FEMALE, ESTROGEN RECEPTOR POSITIVE (H): ICD-10-CM

## 2022-12-06 DIAGNOSIS — C50.812 MALIGNANT NEOPLASM OF OVERLAPPING SITES OF LEFT BREAST IN FEMALE, ESTROGEN RECEPTOR POSITIVE (H): ICD-10-CM

## 2022-12-06 PROCEDURE — 99204 OFFICE O/P NEW MOD 45 MIN: CPT | Performed by: RADIOLOGY

## 2022-12-06 ASSESSMENT — PAIN SCALES - GENERAL: PAINLEVEL: NO PAIN (0)

## 2022-12-06 NOTE — LETTER
12/6/2022         RE: Betty Rogers  [address]  [facility] [address]        Dear Colleague,    Thank you for referring your patient, Betty Rogers, to the [facility] [place]. Please see a copy of my visit note below.    Dear Colleagues,  Today Betty Rogers was seen in consultation for her diagnosis of breast cancer.     IDENTIFICATION: Betty Rogers is a 47 year old woman with a recent diagnosis of left breast cancer, [id number], ER/LA positive HER2/percy negative s/p neoadjuvant chemo with mixed response now being seen for pre-operative consultation.    HISTORY OF PRESENT ILLNESS: Betty Rogers is a 47 year old woman with a recent diagnosis of breast cancer.  On May 23, 2022 bilateral screening mammogram showed within the left breast 12 o'clock position architectural distortion.  There is no suspicious finding in the right breast.  On June 1, 2022 left diagnostic mammogram confirmed the left breast 12:00 architectural distortion and by same-day ultrasound there was a hypoechoic mass measuring 3.7 cm in greatest dimension.  There was also a left axillary suspicious lymph node.  Contrast-enhanced mammogram on June 2, 2022 confirmed a left breast 3.5 cm solid mass.  Same-day biopsy was completed revealing within the left breast 12 o'clock position grade 2 IDC ER/LA positive HER2/percy negative.  At the 9 o'clock position there is grade 3 DCIS with invasive carcinoma which was also ER LA positive.    On June 16, 2022 bilateral breast MRI confirmed the left breast index lesion with a satellite mass measuring up to 5.1 cm. In addition there is suspicious subcentimeter masses at the 6:00 and 10 o'clock position.  Minimal non-mass enhancement at the site of biopsy-proven DCIS at 9:00.  There is no MRI evidence of malignancy on the right.    On June 17, 2022 ultrasound-guided biopsy of the left axilla was c/c metastatic carcinoma consistent with breast primary.    On June  2022 PET/CT once again showed the left breast mass is hypermetabolic with associated atypical appearing left axillary hypermetabolic lymph nodes.  There is no distant metastatic disease identified.    From 2022 to 2022 she received Taxol x 11 cycles (1 cycle skipped due to low blood ct) followed by AC x 4.  Repeat breast MRI in September showed improvement within the breast with relative stability within the axilla.  Once again there is no concerning areas of enhancement within the right.  Of note in 2022 she had an ED visit for febrile neutropenia.    Currently the patient is doing well postchemotherapy.  She has some alopecia associated with her prior chemotherapy treatment but otherwise has good energy level and no complaints.  She is being seen here today for preoperative consultation.    REVIEW OF SYSTEMS: As per HPI, a 14-point review of system is otherwise negative.     PAST RADIATION THERAPY:  Denies.     No CTD/Pacemaker: Denies     BREAST RISK FACTORS:  Mother had breast cancer age 40, (m) aunt age 50, (m) cousin also had breast. No h/o ovarian cancer.  .  1st at 32 yo.  Menses at age 11. She is premenopausal.   14 months. OCP x 10 years. No h/o HRT. No history of prior breast biopsies.     Past Medical History:   Diagnosis Date     Breast cancer metastasized to axillary lymph node (H) 7/15/2022     No active medical problems        Past Surgical History:   Procedure Laterality Date     C/SECTION, LOW TRANSVERSE      2007     INSERT PORT VASCULAR ACCESS Right 2022    Procedure: PORT PLACEMENT;  Surgeon: Janie South MD;  Location:  OR       Family History   Problem Relation Age of Onset     Breast Cancer Mother         atypical lobular hyperplasia breast cancer     Unknown/Adopted Brother         Sucide     Heart Disease Maternal Grandfather         Heart Issues     Breast Cancer Maternal Aunt 50     Breast Cancer Maternal Cousin       Mental Illness Brother        Social History     Tobacco Use     Smoking status: Never     Smokeless tobacco: Never   Substance Use Topics     Alcohol use: Yes     Comment: socially, approx 6-7 drinks per week     Current Outpatient Medications   Medication     calcium carbonate-vitamin D (OS-ABEBA) 600-400 MG-UNIT chewable tablet     vitamin B complex with vitamin C (STRESS TAB) tablet     dexamethasone (DECADRON) 4 MG tablet     prochlorperazine (COMPAZINE) 10 MG tablet     Tetrahydrozoline HCl (VISINE OP)     No current facility-administered medications for this visit.        Allergies   Allergen Reactions     Nuts      Other [Seasonal Allergies]      Tree Nuts-Lips swell and breathing problems     PHYSICAL EXAMINATION:  /78   Pulse 82   Temp 98.2  F (36.8  C) (Oral)   Resp 16   Wt 62.2 kg (137 lb 3.2 oz)   SpO2 100%   BMI 22.83 kg/m    GENERAL Well appearing woman in no acute distress.  HEENT Normocephalic, atraumatic.  Sclerae anicteric.   CVR  Regular rate and rhythm.  No murmurs, rubs, or gallops.  LUNGS Clear to auscultation bilaterally.  BREASTS Breasts are fairly symmetric.    LYMPH No supraclavicular, infraclavicular, or axillary lymphadenopathy appreciated bilaterally.  ABDOMEN Soft.    EXT  No CCE     NEURO No focal deficits  MSK  Good ROM.   SKIN  Warm and well perfused.  Diffuse alopecia.  PSYCH Orientation: Alert, attentive, and oriented to time, place, and person                           Affect: Affect was appropriate to the situation and showed normal range band stability. No anxious mood                           Speech: Speech was clear with normal fluency, rate, tone, and volume.                           Memory: Although not formally assessed, immediate, recent, and remote memory appeared to be intact.                            Insight and Judgement:  demonstrates adequate awareness of the issues discussed and was able to come to reasonable conclusions.                            Thought: Thought patterns were coherent and logical.    ECOG PERFORMANCE STATUS: 1    IMPRESSION/PLAN:  Betty Rogers is a 47 year old woman with a recent diagnosis of left breast cancer, [id number], ER/[place] positive HER2/percy negative s/p neoadjuvant chemo with mixed response now being seen for pre-operative consultation.    Today I discussed adjuvant radiation therapy in general terms.  Seeing that there was initial alberto involvement I would recommend it whether she receives a lumpectomy or mastectomy.  Plan is to treat the affected breast/chest wall and regional nodes based on multiple randomized prospective data which show decrease risk of local recurrence and improvement in overall survival (MA 20 and EORTC 15392).      The risks, benefits, treatment rationale and regimen of radiation therapy to the breast/chest wall and regional nodes were discussed in great detail today with the patient.  Risks include but are not limited to skin erythema, desquamation, hyperpigmentation, breast and lymphedema, fibrosis, telengectasia, pneumonitis, cardiac toxicity, rib fracture and secondary malignancy. The patient consented to therapy and will return for CT simulation after being cleared by Medical Oncology and Surgical Oncology.      There was ample time for questions and all were answered to the patient's satisfaction. Thank you for allowing me to participate in the care of this pleasant patient. If you have any questions, please do not hesitate to contact my office.       Sincerely,    Latrell Foster MD  Adjunct   Dept of Radiation Oncology  [facility]                      Again, thank you for allowing me to participate in the care of your patient.        Sincerely,        LEXIE Foster MD

## 2022-12-06 NOTE — NURSING NOTE
REASON FOR APPOINTMENT   Type of Cancer: L breast IDC ER/WA+ HER2-  Location: L breast  Date of Symptom Onset: Bilateral screening mammogram 22    TREATMENT TO-DATE FOR THIS CANCER  Surgery ? Pre-surgical consult, scheduled for surgery on 22 with Dr. South   Chemotherapy ? 22-10/3/22 Taxol x 11 cycles, per patient she missed 1 cycle due to low blood counts, AC x 4 cycles from 10/10/22- 22   Other Treatments for this Cancer ? none    PERSONAL HISTORY OF CANCER   Previous Cancer ? no   Prior Radiation ? no   Prior Chemotherapy ? no   Prior Hormonal Therapy ? no     REFERRALS NEEDED  no    VITALS  /78   Pulse 82   Temp 98.2  F (36.8  C) (Oral)   Resp 16   Wt 62.2 kg (137 lb 3.2 oz)   SpO2 100%   BMI 22.83 kg/m      PACEMAKER/IMPLANTED CARDIAC DEVICE no    PAIN  Denies    PSYCHOSOCIAL  Marital Status:   Patient lives in home alone.  Number of children: 2  Working status: Patient works from home  Do you feel safe in your home? Yes    REVIEW OF SYSTEMS  Skin: negative  Eyes: positive for glasses  Ears/Nose/Throat: negative  Respiratory: No shortness of breath, dyspnea on exertion, cough, or hemoptysis  Cardiovascular: negative  Gastrointestinal: negative  Genitourinary: negative  Musculoskeletal: negative  Neurologic: positive for numbness or tingling of hands  Psychiatric: negative  Hematologic/Lymphatic/Immunologic: negative  Endocrine: negative    WOMEN ONLY  Any chance you may be pregnant: Yes  Age at first period: 13-14  Date of last period: stopped from chemo  Number of pregnancies: 2  Live Births: 2  Age at 1st delivery: 33  Breastfeedin months total  Birth Control pills: Yes  If yes, for how long: 10 years  HRT: No    Radiation Oncology Patient Teaching    Current Concern: Breast cancer    Person involved with teaching: Patient  Patient asked Questions: Yes  Patient was cooperative: Yes  Patient was receptive (willing to accept information given):  Yes    Education Assessment  Comprehension ability: High  Knowledge level: Medium  Factors affecting teaching: None    Education Materials Given  Caring for Your Skin During Radiation, Aquaphor or Vanicream, Fatigue    Educational Topics Discussed  Side effects- see above    Response To Teaching  Verbalizes understanding    Do you have an advanced directive or living will? No  Are you DNR/DNI? No    Melva Bishop RN

## 2022-12-08 ENCOUNTER — OFFICE VISIT (OUTPATIENT)
Dept: FAMILY MEDICINE | Facility: CLINIC | Age: 47
End: 2022-12-08
Payer: COMMERCIAL

## 2022-12-08 VITALS
WEIGHT: 135 LBS | OXYGEN SATURATION: 99 % | DIASTOLIC BLOOD PRESSURE: 62 MMHG | HEIGHT: 64 IN | SYSTOLIC BLOOD PRESSURE: 118 MMHG | HEART RATE: 71 BPM | BODY MASS INDEX: 23.05 KG/M2 | TEMPERATURE: 98.3 F | RESPIRATION RATE: 21 BRPM

## 2022-12-08 DIAGNOSIS — Z01.818 PREOP GENERAL PHYSICAL EXAM: Primary | ICD-10-CM

## 2022-12-08 DIAGNOSIS — C50.912 CARCINOMA OF LEFT BREAST METASTATIC TO AXILLARY LYMPH NODE (H): ICD-10-CM

## 2022-12-08 DIAGNOSIS — C77.3 CARCINOMA OF LEFT BREAST METASTATIC TO AXILLARY LYMPH NODE (H): ICD-10-CM

## 2022-12-08 LAB — HCG UR QL: NEGATIVE

## 2022-12-08 PROCEDURE — 99214 OFFICE O/P EST MOD 30 MIN: CPT | Performed by: FAMILY MEDICINE

## 2022-12-08 PROCEDURE — 81025 URINE PREGNANCY TEST: CPT | Performed by: FAMILY MEDICINE

## 2022-12-08 ASSESSMENT — ENCOUNTER SYMPTOMS
RHINORRHEA: 0
UNEXPECTED WEIGHT CHANGE: 0
SEIZURES: 0
ADENOPATHY: 0
WHEEZING: 0
EYE ITCHING: 0
DIZZINESS: 0
NERVOUS/ANXIOUS: 0
VOICE CHANGE: 0
WEAKNESS: 0
POLYDIPSIA: 0
EYE REDNESS: 0
TROUBLE SWALLOWING: 0
DYSPHORIC MOOD: 0
FEVER: 0
APPETITE CHANGE: 0
HEADACHES: 0
SPEECH DIFFICULTY: 0
COUGH: 0
VOMITING: 0
NUMBNESS: 0
ARTHRALGIAS: 0
CHILLS: 0
DIFFICULTY URINATING: 0
POLYPHAGIA: 0
HEMATURIA: 0
LIGHT-HEADEDNESS: 0
SHORTNESS OF BREATH: 0
CONSTIPATION: 0
WOUND: 0
JOINT SWELLING: 0
DIARRHEA: 0
SORE THROAT: 0
BRUISES/BLEEDS EASILY: 0
EYE PAIN: 0
CHEST TIGHTNESS: 0
PALPITATIONS: 0
NAUSEA: 0
ABDOMINAL PAIN: 0

## 2022-12-08 ASSESSMENT — PAIN SCALES - GENERAL: PAINLEVEL: NO PAIN (0)

## 2022-12-08 NOTE — RESULT ENCOUNTER NOTE
Betty,  It was nice to meet you in the office today.  Your urine pregnancy test was negative as anticipated.  Please MyChart or call if you have any concerns or questions.   Sincerely,  Mary Francisco MD

## 2022-12-08 NOTE — PATIENT INSTRUCTIONS
"Hold supplements the week prior to surgery.     Avoid ibuprofen (motrin, advil) and naproxen (aleve). These are \"NSAID\" medications and can be hard on the kidneys, cause irritation to the stomach including bleeding from the GI track and elevated blood pressure.   You CAN use acetaminophen (tylenol) safely up to 1,000 mg three times a day (max 3,000 mg per 24 hours).      Please bring us a copy of your health directive.       Preparing for Your Surgery  Getting started  A nurse will call you to review your health history and instructions. They will give you an arrival time based on your scheduled surgery time. Please be ready to share:    Your doctor's clinic name and phone number    Your medical, surgical, and anesthesia history    A list of allergies and sensitivities    A list of medicines, including herbal treatments and over-the-counter drugs    Whether the patient has a legal guardian (ask how to send us the papers in advance)  Please tell us if you're pregnant--or if there's any chance you might be pregnant. Some surgeries may injure a fetus (unborn baby), so they require a pregnancy test. Surgeries that are safe for a fetus don't always need a test, and you can choose whether to have one.   If you have a child who's having surgery, please ask for a copy of Preparing for Your Child's Surgery.    Preparing for surgery    Within 10 to 30 days of surgery: Have a pre-op exam (sometimes called an H&P, or History and Physical). This can be done at a clinic or pre-operative center.  ? If you're having a , you may not need this exam. Talk to your care team.    At your pre-op exam, talk to your care team about all medicines you take. If you need to stop any medicines before surgery, ask when to start taking them again.  ? We do this for your safety. Many medicines can make you bleed too much during surgery. Some change how well surgery (anesthesia) drugs work.    Call your insurance company to let them know " you're having surgery. (If you don't have insurance, call [phone].)    Call your clinic if there's any change in your health. This includes signs of a cold or flu (sore throat, runny nose, cough, rash, fever). It also includes a scrape or scratch near the surgery site.    If you have questions on the day of surgery, call your hospital or surgery center.  COVID testing  You may need to be tested for COVID-19 before having surgery. If so, we will give you instructions (or click here).  Eating and drinking guidelines  For your safety: Unless your surgeon tells you otherwise, follow the guidelines below.    Eat and drink as usual until 8 hours before you arrive for surgery. After that, no food or milk.    Drink clear liquids until 2 hours before you arrive. These are liquids you can see through, like water, Gatorade, and Propel Water. They also include plain black coffee and tea (no cream or milk), candy, and breath mints. You can spit out gum when you arrive.    If you drink alcohol: Stop drinking it the night before surgery.    If your care team tells you to take medicine on the morning of surgery, it's okay to take it with a sip of water.  Preventing infection    Shower or bathe the night before and morning of your surgery. Follow the instructions your clinic gave you. (If no instructions, use regular soap.)    Don't shave or clip hair near your surgery site. We'll remove the hair if needed.    Don't smoke or vape the morning of surgery. You may chew nicotine gum up to 2 hours before surgery. A nicotine patch is okay.  ? Note: Some surgeries require you to completely quit smoking and nicotine. Check with your surgeon.    Your care team will make every effort to keep you safe from infection. We will:  ? Clean our hands often with soap and water (or an alcohol-based hand rub).  ? Clean the skin at your surgery site with a special soap that kills germs.  ? Give you a special gown to keep you warm. (Cold raises the  risk of infection.)  ? Wear special hair covers, masks, gowns and gloves during surgery.  ? Give antibiotic medicine, if prescribed. Not all surgeries need antibiotics.  What to bring on the day of surgery    Photo ID and insurance card    Copy of your health care directive, if you have one    Glasses and hearing aids (bring cases)  ? You can't wear contacts during surgery    Inhaler and eye drops, if you use them (tell us about these when you arrive)    CPAP machine or breathing device, if you use them    A few personal items, if spending the night    If you have . . .  ? A pacemaker, ICD (cardiac defibrillator) or other implant: Bring the ID card.  ? An implanted stimulator: Bring the remote control.  ? A legal guardian: Bring a copy of the certified (court-stamped) guardianship papers.  Please remove any jewelry, including body piercings. Leave jewelry and other valuables at home.  If you're going home the day of surgery    You must have a responsible adult drive you home. They should stay with you overnight as well.    If you don't have someone to stay with you, and you aren't safe to go home alone, we may keep you overnight. Insurance often won't pay for this.  After surgery  If it's hard to control your pain or you need more pain medicine, please call your surgeon's office.  Questions?   If you have any questions for your care team, list them here: _________________________________________________________________________________________________________________________________________________________________________ ____________________________________ ____________________________________ ____________________________________  For informational purposes only. Not to replace the advice of your health care provider. Copyright   2003, 2019 [facility]. All rights reserved. Clinically reviewed by Gloria Hammond MD. SMART[employer] 635333 - REV 10/22.

## 2022-12-08 NOTE — PROGRESS NOTES
"[address] [address]  Phone: [phone]  Primary Provider: Diana Luevano  Pre-op Performing Provider: LENORA AL      PREOPERATIVE EVALUATION:  Today's date: 12/8/2022    Betty Rogers is a 47 year old female who presents for a preoperative evaluation.    Surgical Information:  Surgery/Procedure: bilateral skin sparing mastectomies with left sentinel lymph node biopsy, tag localized left axillary node excision, possible left axillary node dissection, possible port removal, BILATERAL TISSUE EXPANDER BREAST RECONSTRUCTION  Surgery Location: [facility]  Surgeon: Dr. Janie South and Dr. Rc Zamorano  Surgery Date: 12/21/2022  Time of Surgery: 2:00 PM  Where patient plans to recover: At home with family  Fax number for surgical facility: Note does not need to be faxed, will be available electronically in Epic.    Type of Anesthesia Anticipated: General & general with block    Assessment & Plan     The proposed surgical procedure is considered INTERMEDIATE risk.    Preop general physical exam  Carcinoma of left breast metastatic to axillary lymph node (H)  - HCG qualitative urine; Future  - HCG qualitative urine      Risks and Recommendations:  The patient has the following additional risks and recommendations for perioperative complications:   - No identified additional risk factors other than previously addressed    Medication Instructions:  Hold supplements the week prior to surgery.     Avoid ibuprofen (motrin, advil) and naproxen (aleve). These are \"NSAID\" medications and can be hard on the kidneys, cause irritation to the stomach including bleeding from the GI track and elevated blood pressure.   You CAN use acetaminophen (tylenol) safely up to 1,000 mg three times a day (max 3,000 mg per 24 hours).      RECOMMENDATION:  APPROVAL GIVEN to proceed with proposed procedure, without further diagnostic " evaluation.        Subjective     HPI related to upcoming procedure: 48 yo female with hx of left breast cancer. S/p chemo (last chemo11/21/22) and now will be undergoing bilateral mastectomy with sentinel LN bx and placement of tissue expanders.     Preop Questions 12/1/2022   1. Have you ever had a heart attack or stroke? No   2. Have you ever had surgery on your heart or blood vessels, such as a stent placement, a coronary artery bypass, or surgery on an artery in your head, neck, heart, or legs? No   3. Do you have chest pain with activity? No   4. Do you have a history of  heart failure? No   5. Do you currently have a cold, bronchitis or symptoms of other infection? No   6. Do you have a cough, shortness of breath, or wheezing? No   7. Do you or anyone in your family have previous history of blood clots? No   8. Do you or does anyone in your family have a serious bleeding problem such as prolonged bleeding following surgeries or cuts? No   9. Have you ever had problems with anemia or been told to take iron pills? No   10. Have you had any abnormal blood loss such as black, tarry or bloody stools, or abnormal vaginal bleeding? No   11. Have you ever had a blood transfusion? No   12. Are you willing to have a blood transfusion if it is medically needed before, during, or after your surgery? Yes   13. Have you or any of your relatives ever had problems with anesthesia? No   14. Do you have sleep apnea, excessive snoring or daytime drowsiness? No   15. Do you have any artifical heart valves or other implanted medical devices like a pacemaker, defibrillator, or continuous glucose monitor? No   16. Do you have artificial joints? No   17. Are you allergic to latex? No   18. Is there any chance that you may be pregnant? No       Health Care Directive:  Patient does not have a Health Care Directive or Living Will: Patient states has Advance Directive and will bring in a copy to clinic.    Preoperative Review of  :   reviewed - one norco rx 7/25/22, one diazepam Rx (2 tabs) 6/14/22        Review of Systems   Constitutional: Negative for appetite change, chills, fever and unexpected weight change.   HENT: Negative for dental problem, ear pain, hearing loss, rhinorrhea, sore throat, trouble swallowing and voice change.    Eyes: Negative for pain, redness, itching and visual disturbance.        Lost eyelashes with chemo. Occ discharge   Respiratory: Negative for cough, chest tightness, shortness of breath and wheezing.    Cardiovascular: Negative for chest pain, palpitations and leg swelling.   Gastrointestinal: Negative for abdominal pain, constipation, diarrhea, nausea and vomiting.   Endocrine: Negative for cold intolerance, polydipsia, polyphagia and polyuria.        Hot flashes intermittenly   Genitourinary: Negative for difficulty urinating, hematuria, pelvic pain and vaginal discharge.        Menses stopped with chemo since Sept 2022.    Musculoskeletal: Negative for arthralgias and joint swelling.   Skin: Negative for rash and wound.   Neurological: Negative for dizziness, seizures, syncope, speech difficulty, weakness, light-headedness, numbness and headaches.   Hematological: Negative for adenopathy. Does not bruise/bleed easily.   Psychiatric/Behavioral: Negative for dysphoric mood. The patient is not nervous/anxious.          Patient Active Problem List    Diagnosis Date Noted     Adverse effect of antineoplastic and immunosuppressive drugs, sequela 10/07/2022     Priority: Medium     Anemia associated with chemotherapy 09/12/2022     Priority: Medium     Chemotherapy-induced neutropenia (H) 09/12/2022     Priority: Medium     Ductal carcinoma in situ (DCIS) of left breast 07/20/2022     Priority: Medium     Carcinoma of left breast metastatic to axillary lymph node (H) 07/15/2022     Priority: Medium     CARDIOVASCULAR SCREENING; LDL GOAL LESS THAN 160 01/03/2012     Priority: Medium     Family history of  "breast cancer- mother 01/03/2012     Priority: Medium      Past Medical History:   Diagnosis Date     Breast cancer metastasized to axillary lymph node (H) 7/15/2022     No active medical problems      Past Surgical History:   Procedure Laterality Date     C/SECTION, LOW TRANSVERSE      2010, 2007     INSERT PORT VASCULAR ACCESS Right 07/25/2022    Procedure: PORT PLACEMENT;  Surgeon: Janie South MD;  Location:  OR     Current Outpatient Medications   Medication Sig Dispense Refill     calcium carbonate-vitamin D (OS-ABEBA) 600-400 MG-UNIT chewable tablet Take 1 chew tab by mouth 2 times daily       vitamin B complex with vitamin C (STRESS TAB) tablet Take 1 tablet by mouth daily         Allergies   Allergen Reactions     Nuts      Other [Seasonal Allergies]      Tree Nuts-Lips swell and breathing problems        Social History     Tobacco Use     Smoking status: Never     Smokeless tobacco: Never   Substance Use Topics     Alcohol use: Yes     Comment: socially, approx 6-7 drinks per week     Family History   Problem Relation Age of Onset     Breast Cancer Mother         atypical lobular hyperplasia breast cancer     Unknown/Adopted Brother         Sucide     Heart Disease Maternal Grandfather         Heart Issues     Breast Cancer Maternal Aunt 50     Breast Cancer Maternal Cousin      Mental Illness Brother      History   Drug Use No         Objective     /62   Pulse 71   Temp 98.3  F (36.8  C) (Tympanic)   Resp 21   Ht 1.613 m (5' 3.5\")   Wt 61.2 kg (135 lb)   SpO2 99%   BMI 23.54 kg/m      Physical Exam    GENERAL APPEARANCE: healthy, alert and no distress     EYES: EOMI, PERRL     HENT: ear canals and TM's normal and nose and mouth without ulcers or lesions     NECK: no adenopathy, no asymmetry, masses, or scars and thyroid normal to palpation     RESP: lungs clear to auscultation - no rales, rhonchi or wheezes     CV: regular rates and rhythm, normal S1 S2, no S3 or S4 and no murmur, click " or rub     ABDOMEN:  soft, nontender, no HSM or masses and bowel sounds normal     MS: extremities normal- no gross deformities noted, no evidence of inflammation in joints, FROM in all extremities.     SKIN: no suspicious lesions or rashes     NEURO: Normal strength and tone, sensory exam grossly normal, mentation intact and speech normal     PSYCH: mentation appears normal. and affect normal/bright     LYMPHATICS: No cervical adenopathy    Recent Labs   Lab Test 11/21/22  0739 11/07/22  0746 10/24/22  0756 10/18/22  2048   HGB 10.2* 10.2*   < > 9.5*    164   < > 184   NA  --  141  --  135   POTASSIUM  --  4.2  --  3.5   CR  --  0.66  --  0.73    < > = values in this interval not displayed.        Diagnostics:  Recent Results (from the past 24 hour(s))   HCG qualitative urine    Collection Time: 12/08/22  9:53 AM   Result Value Ref Range    hCG Urine Qualitative Negative Negative          Revised Cardiac Risk Index (RCRI):  The patient has the following serious cardiovascular risks for perioperative complications:   - No serious cardiac risks = 0 points     RCRI Interpretation: 0 points: Class I (very low risk - 0.4% complication rate)           Signed Electronically by: Mary Francisco MD  Copy of this evaluation report is provided to requesting physician.

## 2022-12-20 ENCOUNTER — MEDICAL CORRESPONDENCE (OUTPATIENT)
Dept: HEALTH INFORMATION MANAGEMENT | Facility: CLINIC | Age: 47
End: 2022-12-20

## 2022-12-20 ENCOUNTER — ANESTHESIA EVENT (OUTPATIENT)
Dept: SURGERY | Facility: CLINIC | Age: 47
End: 2022-12-20
Payer: COMMERCIAL

## 2022-12-20 RX ORDER — PROCHLORPERAZINE MALEATE 10 MG
10 TABLET ORAL EVERY 6 HOURS PRN
COMMUNITY
End: 2023-02-07

## 2022-12-20 RX ORDER — DEXAMETHASONE 4 MG/1
4 TABLET ORAL PRN
COMMUNITY
End: 2023-01-13

## 2022-12-20 NOTE — PROGRESS NOTES
PTA medications updated by Medication Scribe prior to surgery via phone call with patient (last doses completed by Nurse)     Medication history sources: Patient, Surescripts and H&P  In the past week, patient estimated taking medication this percent of the time: Greater than 90%  Adherence assessment: N/A Not Observed    Significant changes made to the medication list:  None      Additional medication history information:   Patient was advised to bring: Visine Eye Drops    Medication reconciliation completed by provider prior to medication history? No    Time spent in this activity: 30 minutes    The information provided in this note is only as accurate as the sources available at the time of update(s)    Prior to Admission medications    Medication Sig Last Dose Taking? Auth Provider Long Term End Date   calcium carbonate-vitamin D (OS-ABEBA) 600-400 MG-UNIT chewable tablet Take 1 chew tab by mouth 2 times daily 12/13/2022 at am Yes Diana Luevano MD PhD     dexamethasone (DECADRON) 4 MG tablet Take 4 mg by mouth as needed Unknown at prn Yes Reported, Patient No    prochlorperazine (COMPAZINE) 10 MG tablet Take 10 mg by mouth every 6 hours as needed for nausea or vomiting Unknown at prn Yes Reported, Patient     Tetrahydrozoline HCl (VISINE OP) Place 1 drop into both eyes as needed Unknown at prn Yes Reported, Patient     vitamin B complex with vitamin C (STRESS TAB) tablet Take 1 tablet by mouth daily 12/13/2022 at am Yes Reported, Patient       Medication history completed by:    Chele Valentin CPhT  Medication Scribe  [facility]

## 2022-12-21 ENCOUNTER — HOSPITAL ENCOUNTER (OUTPATIENT)
Facility: CLINIC | Age: 47
Discharge: HOME | End: 2022-12-22
Attending: SURGERY | Admitting: PLASTIC SURGERY
Payer: COMMERCIAL

## 2022-12-21 ENCOUNTER — APPOINTMENT (OUTPATIENT)
Dept: SURGERY | Facility: PHYSICIAN GROUP | Age: 47
End: 2022-12-21
Payer: COMMERCIAL

## 2022-12-21 ENCOUNTER — HOSPITAL ENCOUNTER (OUTPATIENT)
Dept: NUCLEAR MEDICINE | Facility: CLINIC | Age: 47
Setting detail: NUCLEAR MEDICINE
Discharge: HOME | End: 2022-12-21
Attending: SURGERY | Admitting: SURGERY
Payer: COMMERCIAL

## 2022-12-21 ENCOUNTER — HOSPITAL ENCOUNTER (OUTPATIENT)
Dept: MAMMOGRAPHY | Facility: CLINIC | Age: 47
Discharge: HOME | End: 2022-12-21
Attending: SURGERY | Admitting: SURGERY
Payer: COMMERCIAL

## 2022-12-21 ENCOUNTER — ANESTHESIA (OUTPATIENT)
Dept: SURGERY | Facility: CLINIC | Age: 47
End: 2022-12-21
Payer: COMMERCIAL

## 2022-12-21 DIAGNOSIS — C77.3 CARCINOMA OF LEFT BREAST METASTATIC TO AXILLARY LYMPH NODE (H): Primary | ICD-10-CM

## 2022-12-21 DIAGNOSIS — C50.412 MALIGNANT NEOPLASM OF UPPER-OUTER QUADRANT OF LEFT BREAST IN FEMALE, ESTROGEN RECEPTOR POSITIVE (H): ICD-10-CM

## 2022-12-21 DIAGNOSIS — Z17.0 MALIGNANT NEOPLASM OF UPPER-OUTER QUADRANT OF LEFT BREAST IN FEMALE, ESTROGEN RECEPTOR POSITIVE (H): ICD-10-CM

## 2022-12-21 DIAGNOSIS — C50.912 CARCINOMA OF LEFT BREAST METASTATIC TO AXILLARY LYMPH NODE (H): Primary | ICD-10-CM

## 2022-12-21 LAB
HCG UR QL: NEGATIVE
HGB BLD-MCNC: 10.2 G/DL (ref 11.7–15.7)

## 2022-12-21 PROCEDURE — 360N000076 HC SURGERY LEVEL 3, PER MIN: Performed by: SURGERY

## 2022-12-21 PROCEDURE — 250N000011 HC RX IP 250 OP 636: Performed by: ANESTHESIOLOGY

## 2022-12-21 PROCEDURE — 250N000013 HC RX MED GY IP 250 OP 250 PS 637

## 2022-12-21 PROCEDURE — 36415 COLL VENOUS BLD VENIPUNCTURE: CPT | Performed by: ANESTHESIOLOGY

## 2022-12-21 PROCEDURE — 19303 MAST SIMPLE COMPLETE: CPT | Mod: 50 | Performed by: PHYSICIAN ASSISTANT

## 2022-12-21 PROCEDURE — 258N000001 HC RX 258: Performed by: SURGERY

## 2022-12-21 PROCEDURE — 250N000009 HC RX 250: Performed by: ANESTHESIOLOGY

## 2022-12-21 PROCEDURE — L8699 PROSTHETIC IMPLANT NOS: HCPCS | Performed by: SURGERY

## 2022-12-21 PROCEDURE — 250N000025 HC SEVOFLURANE, PER MIN: Performed by: SURGERY

## 2022-12-21 PROCEDURE — 88331 PATH CONSLTJ SURG 1 BLK 1SPC: CPT | Mod: TC | Performed by: SURGERY

## 2022-12-21 PROCEDURE — 88307 TISSUE EXAM BY PATHOLOGIST: CPT | Mod: 26 | Performed by: PATHOLOGY

## 2022-12-21 PROCEDURE — 88331 PATH CONSLTJ SURG 1 BLK 1SPC: CPT | Mod: 26 | Performed by: PATHOLOGY

## 2022-12-21 PROCEDURE — 250N000013 HC RX MED GY IP 250 OP 250 PS 637: Performed by: PHYSICIAN ASSISTANT

## 2022-12-21 PROCEDURE — 710N000009 HC RECOVERY PHASE 1, LEVEL 1, PER MIN: Performed by: SURGERY

## 2022-12-21 PROCEDURE — 38792 RA TRACER ID OF SENTINL NODE: CPT

## 2022-12-21 PROCEDURE — 19303 MAST SIMPLE COMPLETE: CPT | Mod: 50 | Performed by: SURGERY

## 2022-12-21 PROCEDURE — 81025 URINE PREGNANCY TEST: CPT | Performed by: ANESTHESIOLOGY

## 2022-12-21 PROCEDURE — 272N000001 HC OR GENERAL SUPPLY STERILE: Performed by: SURGERY

## 2022-12-21 PROCEDURE — A9520 TC99 TILMANOCEPT DIAG 0.5MCI: HCPCS | Performed by: SURGERY

## 2022-12-21 PROCEDURE — 85018 HEMOGLOBIN: CPT | Performed by: ANESTHESIOLOGY

## 2022-12-21 PROCEDURE — 999N000141 HC STATISTIC PRE-PROCEDURE NURSING ASSESSMENT: Performed by: SURGERY

## 2022-12-21 PROCEDURE — 250N000009 HC RX 250: Performed by: SURGERY

## 2022-12-21 PROCEDURE — 36590 REMOVAL TUNNELED CV CATH: CPT | Mod: 51 | Performed by: SURGERY

## 2022-12-21 PROCEDURE — 258N000003 HC RX IP 258 OP 636: Performed by: ANESTHESIOLOGY

## 2022-12-21 PROCEDURE — 88332 PATH CONSLTJ SURG EA ADD BLK: CPT | Mod: 26 | Performed by: PATHOLOGY

## 2022-12-21 PROCEDURE — 88305 TISSUE EXAM BY PATHOLOGIST: CPT | Mod: 26 | Performed by: PATHOLOGY

## 2022-12-21 PROCEDURE — 999N000104 MA BREAST SPECIMEN LEFT OR

## 2022-12-21 PROCEDURE — 343N000001 HC RX 343: Performed by: SURGERY

## 2022-12-21 PROCEDURE — 258N000001 HC RX 258

## 2022-12-21 PROCEDURE — 370N000017 HC ANESTHESIA TECHNICAL FEE, PER MIN: Performed by: SURGERY

## 2022-12-21 PROCEDURE — 38525 BIOPSY/REMOVAL LYMPH NODES: CPT | Mod: LT | Performed by: SURGERY

## 2022-12-21 DEVICE — NATRELLE TE SMOOTH 133S-FX-14-T (US)
Type: IMPLANTABLE DEVICE | Site: BREAST | Status: FUNCTIONAL
Brand: NATRELLE 133S TISSUE EXPANDERS

## 2022-12-21 RX ORDER — PROPOFOL 10 MG/ML
INJECTION, EMULSION INTRAVENOUS CONTINUOUS PRN
Status: DISCONTINUED | OUTPATIENT
Start: 2022-12-21 | End: 2022-12-21

## 2022-12-21 RX ORDER — CEFAZOLIN SODIUM/WATER 2 G/20 ML
2 SYRINGE (ML) INTRAVENOUS SEE ADMIN INSTRUCTIONS
Status: DISCONTINUED | OUTPATIENT
Start: 2022-12-21 | End: 2022-12-21

## 2022-12-21 RX ORDER — NALOXONE HYDROCHLORIDE 0.4 MG/ML
0.4 INJECTION, SOLUTION INTRAMUSCULAR; INTRAVENOUS; SUBCUTANEOUS
Status: DISCONTINUED | OUTPATIENT
Start: 2022-12-21 | End: 2022-12-22 | Stop reason: HOSPADM

## 2022-12-21 RX ORDER — HYDROMORPHONE HCL IN WATER/PF 6 MG/30 ML
0.2 PATIENT CONTROLLED ANALGESIA SYRINGE INTRAVENOUS
Status: DISCONTINUED | OUTPATIENT
Start: 2022-12-21 | End: 2022-12-22 | Stop reason: HOSPADM

## 2022-12-21 RX ORDER — IBUPROFEN 600 MG/1
600 TABLET, FILM COATED ORAL EVERY 6 HOURS PRN
Status: DISCONTINUED | OUTPATIENT
Start: 2022-12-21 | End: 2022-12-22 | Stop reason: HOSPADM

## 2022-12-21 RX ORDER — ACETAMINOPHEN 325 MG/1
975 TABLET ORAL EVERY 6 HOURS
Status: DISCONTINUED | OUTPATIENT
Start: 2022-12-21 | End: 2022-12-22 | Stop reason: HOSPADM

## 2022-12-21 RX ORDER — ACETAMINOPHEN 325 MG/1
650 TABLET ORAL EVERY 4 HOURS PRN
Status: DISCONTINUED | OUTPATIENT
Start: 2022-12-24 | End: 2022-12-22 | Stop reason: HOSPADM

## 2022-12-21 RX ORDER — ONDANSETRON 4 MG/1
4 TABLET, ORALLY DISINTEGRATING ORAL EVERY 30 MIN PRN
Status: DISCONTINUED | OUTPATIENT
Start: 2022-12-21 | End: 2022-12-21 | Stop reason: HOSPADM

## 2022-12-21 RX ORDER — METHOCARBAMOL 750 MG/1
750 TABLET, FILM COATED ORAL EVERY 6 HOURS
Status: DISCONTINUED | OUTPATIENT
Start: 2022-12-21 | End: 2022-12-22 | Stop reason: HOSPADM

## 2022-12-21 RX ORDER — LIDOCAINE 40 MG/G
CREAM TOPICAL
Status: DISCONTINUED | OUTPATIENT
Start: 2022-12-21 | End: 2022-12-22 | Stop reason: HOSPADM

## 2022-12-21 RX ORDER — NALOXONE HYDROCHLORIDE 0.4 MG/ML
0.2 INJECTION, SOLUTION INTRAMUSCULAR; INTRAVENOUS; SUBCUTANEOUS
Status: DISCONTINUED | OUTPATIENT
Start: 2022-12-21 | End: 2022-12-22 | Stop reason: HOSPADM

## 2022-12-21 RX ORDER — AMOXICILLIN 250 MG
1 CAPSULE ORAL 2 TIMES DAILY
Status: DISCONTINUED | OUTPATIENT
Start: 2022-12-21 | End: 2022-12-22 | Stop reason: HOSPADM

## 2022-12-21 RX ORDER — HYDROMORPHONE HYDROCHLORIDE 1 MG/ML
INJECTION, SOLUTION INTRAMUSCULAR; INTRAVENOUS; SUBCUTANEOUS PRN
Status: DISCONTINUED | OUTPATIENT
Start: 2022-12-21 | End: 2022-12-21

## 2022-12-21 RX ORDER — MAGNESIUM HYDROXIDE 1200 MG/15ML
LIQUID ORAL PRN
Status: DISCONTINUED | OUTPATIENT
Start: 2022-12-21 | End: 2022-12-21 | Stop reason: HOSPADM

## 2022-12-21 RX ORDER — DEXMEDETOMIDINE HYDROCHLORIDE 4 UG/ML
INJECTION, SOLUTION INTRAVENOUS PRN
Status: DISCONTINUED | OUTPATIENT
Start: 2022-12-21 | End: 2022-12-21

## 2022-12-21 RX ORDER — HYDROMORPHONE HCL IN WATER/PF 6 MG/30 ML
0.4 PATIENT CONTROLLED ANALGESIA SYRINGE INTRAVENOUS EVERY 5 MIN PRN
Status: DISCONTINUED | OUTPATIENT
Start: 2022-12-21 | End: 2022-12-21 | Stop reason: HOSPADM

## 2022-12-21 RX ORDER — PROCHLORPERAZINE MALEATE 10 MG
10 TABLET ORAL EVERY 6 HOURS PRN
Status: DISCONTINUED | OUTPATIENT
Start: 2022-12-21 | End: 2022-12-22 | Stop reason: HOSPADM

## 2022-12-21 RX ORDER — OXYCODONE HYDROCHLORIDE 5 MG/1
10 TABLET ORAL EVERY 4 HOURS PRN
Status: DISCONTINUED | OUTPATIENT
Start: 2022-12-21 | End: 2022-12-22 | Stop reason: HOSPADM

## 2022-12-21 RX ORDER — NEOSTIGMINE METHYLSULFATE 1 MG/ML
VIAL (ML) INJECTION PRN
Status: DISCONTINUED | OUTPATIENT
Start: 2022-12-21 | End: 2022-12-21

## 2022-12-21 RX ORDER — ONDANSETRON 2 MG/ML
4 INJECTION INTRAMUSCULAR; INTRAVENOUS EVERY 6 HOURS PRN
Status: DISCONTINUED | OUTPATIENT
Start: 2022-12-21 | End: 2022-12-22 | Stop reason: HOSPADM

## 2022-12-21 RX ORDER — FENTANYL CITRATE 0.05 MG/ML
25 INJECTION, SOLUTION INTRAMUSCULAR; INTRAVENOUS EVERY 5 MIN PRN
Status: DISCONTINUED | OUTPATIENT
Start: 2022-12-21 | End: 2022-12-21 | Stop reason: HOSPADM

## 2022-12-21 RX ORDER — LIDOCAINE HYDROCHLORIDE 20 MG/ML
INJECTION, SOLUTION INFILTRATION; PERINEURAL PRN
Status: DISCONTINUED | OUTPATIENT
Start: 2022-12-21 | End: 2022-12-21

## 2022-12-21 RX ORDER — CEFAZOLIN SODIUM 1 G/3ML
1 INJECTION, POWDER, FOR SOLUTION INTRAMUSCULAR; INTRAVENOUS EVERY 8 HOURS
Status: COMPLETED | OUTPATIENT
Start: 2022-12-22 | End: 2022-12-22

## 2022-12-21 RX ORDER — POLYETHYLENE GLYCOL 3350 17 G/17G
17 POWDER, FOR SOLUTION ORAL DAILY
Status: DISCONTINUED | OUTPATIENT
Start: 2022-12-22 | End: 2022-12-22 | Stop reason: HOSPADM

## 2022-12-21 RX ORDER — CEFAZOLIN SODIUM/WATER 2 G/20 ML
2 SYRINGE (ML) INTRAVENOUS
Status: DISCONTINUED | OUTPATIENT
Start: 2022-12-21 | End: 2022-12-21

## 2022-12-21 RX ORDER — ONDANSETRON 2 MG/ML
4 INJECTION INTRAMUSCULAR; INTRAVENOUS EVERY 30 MIN PRN
Status: DISCONTINUED | OUTPATIENT
Start: 2022-12-21 | End: 2022-12-21 | Stop reason: HOSPADM

## 2022-12-21 RX ORDER — PROPOFOL 10 MG/ML
INJECTION, EMULSION INTRAVENOUS PRN
Status: DISCONTINUED | OUTPATIENT
Start: 2022-12-21 | End: 2022-12-21

## 2022-12-21 RX ORDER — SODIUM CHLORIDE, SODIUM LACTATE, POTASSIUM CHLORIDE, CALCIUM CHLORIDE 600; 310; 30; 20 MG/100ML; MG/100ML; MG/100ML; MG/100ML
INJECTION, SOLUTION INTRAVENOUS CONTINUOUS
Status: DISCONTINUED | OUTPATIENT
Start: 2022-12-21 | End: 2022-12-21

## 2022-12-21 RX ORDER — FENTANYL CITRATE 0.05 MG/ML
50 INJECTION, SOLUTION INTRAMUSCULAR; INTRAVENOUS EVERY 5 MIN PRN
Status: DISCONTINUED | OUTPATIENT
Start: 2022-12-21 | End: 2022-12-21 | Stop reason: HOSPADM

## 2022-12-21 RX ORDER — OXYCODONE HYDROCHLORIDE 5 MG/1
5 TABLET ORAL EVERY 4 HOURS PRN
Status: DISCONTINUED | OUTPATIENT
Start: 2022-12-21 | End: 2022-12-22 | Stop reason: HOSPADM

## 2022-12-21 RX ORDER — SODIUM CHLORIDE, SODIUM LACTATE, POTASSIUM CHLORIDE, CALCIUM CHLORIDE 600; 310; 30; 20 MG/100ML; MG/100ML; MG/100ML; MG/100ML
INJECTION, SOLUTION INTRAVENOUS CONTINUOUS PRN
Status: DISCONTINUED | OUTPATIENT
Start: 2022-12-21 | End: 2022-12-21

## 2022-12-21 RX ORDER — HYDROMORPHONE HCL IN WATER/PF 6 MG/30 ML
0.4 PATIENT CONTROLLED ANALGESIA SYRINGE INTRAVENOUS
Status: DISCONTINUED | OUTPATIENT
Start: 2022-12-21 | End: 2022-12-22 | Stop reason: HOSPADM

## 2022-12-21 RX ORDER — CEFAZOLIN SODIUM/WATER 2 G/20 ML
SYRINGE (ML) INTRAVENOUS PRN
Status: DISCONTINUED | OUTPATIENT
Start: 2022-12-21 | End: 2022-12-21

## 2022-12-21 RX ORDER — FENTANYL CITRATE 50 UG/ML
INJECTION, SOLUTION INTRAMUSCULAR; INTRAVENOUS PRN
Status: DISCONTINUED | OUTPATIENT
Start: 2022-12-21 | End: 2022-12-21

## 2022-12-21 RX ORDER — BISACODYL 10 MG
10 SUPPOSITORY, RECTAL RECTAL DAILY PRN
Status: DISCONTINUED | OUTPATIENT
Start: 2022-12-21 | End: 2022-12-22 | Stop reason: HOSPADM

## 2022-12-21 RX ORDER — HYDROMORPHONE HCL IN WATER/PF 6 MG/30 ML
0.2 PATIENT CONTROLLED ANALGESIA SYRINGE INTRAVENOUS EVERY 5 MIN PRN
Status: DISCONTINUED | OUTPATIENT
Start: 2022-12-21 | End: 2022-12-21 | Stop reason: HOSPADM

## 2022-12-21 RX ORDER — ONDANSETRON 4 MG/1
4 TABLET, ORALLY DISINTEGRATING ORAL EVERY 6 HOURS PRN
Status: DISCONTINUED | OUTPATIENT
Start: 2022-12-21 | End: 2022-12-22 | Stop reason: HOSPADM

## 2022-12-21 RX ORDER — SODIUM CHLORIDE, SODIUM LACTATE, POTASSIUM CHLORIDE, CALCIUM CHLORIDE 600; 310; 30; 20 MG/100ML; MG/100ML; MG/100ML; MG/100ML
INJECTION, SOLUTION INTRAVENOUS CONTINUOUS
Status: DISCONTINUED | OUTPATIENT
Start: 2022-12-21 | End: 2022-12-21 | Stop reason: HOSPADM

## 2022-12-21 RX ORDER — FENTANYL CITRATE 0.05 MG/ML
50 INJECTION, SOLUTION INTRAMUSCULAR; INTRAVENOUS
Status: DISCONTINUED | OUTPATIENT
Start: 2022-12-21 | End: 2022-12-21

## 2022-12-21 RX ORDER — GLYCOPYRROLATE 0.2 MG/ML
INJECTION, SOLUTION INTRAMUSCULAR; INTRAVENOUS PRN
Status: DISCONTINUED | OUTPATIENT
Start: 2022-12-21 | End: 2022-12-21

## 2022-12-21 RX ADMIN — ROPIVACAINE HYDROCHLORIDE 40 ML: 5 INJECTION, SOLUTION EPIDURAL; INFILTRATION; PERINEURAL at 14:30

## 2022-12-21 RX ADMIN — MIDAZOLAM 2 MG: 1 INJECTION INTRAMUSCULAR; INTRAVENOUS at 14:10

## 2022-12-21 RX ADMIN — LIDOCAINE HYDROCHLORIDE 50 MG: 20 INJECTION, SOLUTION INFILTRATION; PERINEURAL at 14:13

## 2022-12-21 RX ADMIN — PROPOFOL 200 MG: 10 INJECTION, EMULSION INTRAVENOUS at 14:13

## 2022-12-21 RX ADMIN — METHOCARBAMOL 750 MG: 750 TABLET ORAL at 20:20

## 2022-12-21 RX ADMIN — PHENYLEPHRINE HYDROCHLORIDE 100 MCG: 10 INJECTION INTRAVENOUS at 15:03

## 2022-12-21 RX ADMIN — FENTANYL CITRATE 50 MCG: 50 INJECTION, SOLUTION INTRAMUSCULAR; INTRAVENOUS at 17:42

## 2022-12-21 RX ADMIN — DEXMEDETOMIDINE HYDROCHLORIDE 12 MCG: 200 INJECTION INTRAVENOUS at 16:05

## 2022-12-21 RX ADMIN — SODIUM CHLORIDE, POTASSIUM CHLORIDE, SODIUM LACTATE AND CALCIUM CHLORIDE: 600; 310; 30; 20 INJECTION, SOLUTION INTRAVENOUS at 14:10

## 2022-12-21 RX ADMIN — PHENYLEPHRINE HYDROCHLORIDE 0.2 MCG/KG/MIN: 10 INJECTION INTRAVENOUS at 15:07

## 2022-12-21 RX ADMIN — FENTANYL CITRATE 50 MCG: 50 INJECTION, SOLUTION INTRAMUSCULAR; INTRAVENOUS at 15:50

## 2022-12-21 RX ADMIN — Medication 2 G: at 18:06

## 2022-12-21 RX ADMIN — FENTANYL CITRATE 50 MCG: 50 INJECTION, SOLUTION INTRAMUSCULAR; INTRAVENOUS at 14:13

## 2022-12-21 RX ADMIN — Medication 2 G: at 14:10

## 2022-12-21 RX ADMIN — PHENYLEPHRINE HYDROCHLORIDE 100 MCG: 10 INJECTION INTRAVENOUS at 14:37

## 2022-12-21 RX ADMIN — PHENYLEPHRINE HYDROCHLORIDE 100 MCG: 10 INJECTION INTRAVENOUS at 16:45

## 2022-12-21 RX ADMIN — PHENYLEPHRINE HYDROCHLORIDE 100 MCG: 10 INJECTION INTRAVENOUS at 14:33

## 2022-12-21 RX ADMIN — SENNOSIDES AND DOCUSATE SODIUM 1 TABLET: 50; 8.6 TABLET ORAL at 20:20

## 2022-12-21 RX ADMIN — SODIUM CHLORIDE, POTASSIUM CHLORIDE, SODIUM LACTATE AND CALCIUM CHLORIDE: 600; 310; 30; 20 INJECTION, SOLUTION INTRAVENOUS at 17:26

## 2022-12-21 RX ADMIN — TILMANOCEPT 0.56 MILLICURIE: KIT at 13:18

## 2022-12-21 RX ADMIN — DEXTROSE AND SODIUM CHLORIDE: 5; 450 INJECTION, SOLUTION INTRAVENOUS at 20:13

## 2022-12-21 RX ADMIN — FENTANYL CITRATE 50 MCG: 50 INJECTION, SOLUTION INTRAMUSCULAR; INTRAVENOUS at 14:42

## 2022-12-21 RX ADMIN — PHENYLEPHRINE HYDROCHLORIDE 100 MCG: 10 INJECTION INTRAVENOUS at 16:51

## 2022-12-21 RX ADMIN — DEXMEDETOMIDINE HYDROCHLORIDE 8 MCG: 200 INJECTION INTRAVENOUS at 14:19

## 2022-12-21 RX ADMIN — PHENYLEPHRINE HYDROCHLORIDE 100 MCG: 10 INJECTION INTRAVENOUS at 14:54

## 2022-12-21 RX ADMIN — PHENYLEPHRINE HYDROCHLORIDE 100 MCG: 10 INJECTION INTRAVENOUS at 15:18

## 2022-12-21 RX ADMIN — PROPOFOL 30 MG: 10 INJECTION, EMULSION INTRAVENOUS at 14:45

## 2022-12-21 RX ADMIN — ACETAMINOPHEN 975 MG: 325 TABLET, FILM COATED ORAL at 20:20

## 2022-12-21 RX ADMIN — NEOSTIGMINE METHYLSULFATE 3 MG: 1 INJECTION, SOLUTION INTRAVENOUS at 18:08

## 2022-12-21 RX ADMIN — PROPOFOL 50 MCG/KG/MIN: 10 INJECTION, EMULSION INTRAVENOUS at 14:19

## 2022-12-21 RX ADMIN — ROCURONIUM BROMIDE 50 MG: 50 INJECTION, SOLUTION INTRAVENOUS at 14:14

## 2022-12-21 RX ADMIN — GLYCOPYRROLATE 0.4 MG: 0.2 INJECTION, SOLUTION INTRAMUSCULAR; INTRAVENOUS at 18:08

## 2022-12-21 RX ADMIN — HYDROMORPHONE HYDROCHLORIDE 0.5 MG: 1 INJECTION, SOLUTION INTRAMUSCULAR; INTRAVENOUS; SUBCUTANEOUS at 17:16

## 2022-12-21 ASSESSMENT — ACTIVITIES OF DAILY LIVING (ADL)
ADLS_ACUITY_SCORE: 20
ADLS_ACUITY_SCORE: 18

## 2022-12-21 NOTE — ANESTHESIA PROCEDURE NOTES
Airway       Patient location during procedure: OR       Procedure Start/Stop Times: 12/21/2022 2:16 PM  Staff -        CRNA: Dionna Davis APRN CRNA       Performed By: CRNA  Consent for Airway        Urgency: elective  Indications and Patient Condition       Indications for airway management: sean-procedural       Induction type:intravenous       Mask difficulty assessment: 1 - vent by mask    Final Airway Details       Final airway type: endotracheal airway       Successful airway: ETT - single  Endotracheal Airway Details        ETT size (mm): 7.0       Cuffed: yes       Successful intubation technique: direct laryngoscopy       DL Blade Type: MAC 3       Grade View of Cords: 1       Adjucts: stylet       Position: Right       Measured from: gums/teeth       Secured at (cm): 22    Post intubation assessment        Placement verified by: capnometry, equal breath sounds and chest rise        Number of attempts at approach: 1       Number of other approaches attempted: 0       Secured with: silk tape       Ease of procedure: easy       Dentition: Intact and Unchanged    Medication(s) Administered   Medication Administration Time: 12/21/2022 2:16 PM

## 2022-12-21 NOTE — OP NOTE
[facility] Breast Surgery Operative Note    PREOPERATIVE DIAGNOSIS:  Locally advanced left breast cancer    POSTOPERATIVE DIAGNOSIS:  same    PROCEDURE:    1.  Bilateral skin sparing mastectomies  2. Left sentinel lymph node biopsy  3. Excision of tag localized left axillary node  4. Left axillary node dissection  5. Port removal  6. Reconstruction per Dr. Zamorano, please see their operative report for details regarding their portion of the procedure.     SURGEON:  Janie South MD    ASSISTANT:  Jade Muhammad PA-C  The physician s assistant was medically necessary for their expertise in retraction and suctioning.    ANESTHESIA:  General.    BLOOD LOSS: 50ml    FINDINGS: four sentinel lymph nodes including previously biopsied node, biopsied node had ongoing carcinoma and completion dissection performed.     INDICATIONS:   Betty is a 47yof who presented with left breast invasive ductal carcinoma, grade 2, ER/WI positive (95%), HER 2 negative at 12:00, 6cm FN measuring at least 3.5cm with associated DCIS biopsied at 9:00, posterior. She had biopsy of an axillary lymph node which was positive. She underwent neoadjuvant chemotherapy and MRI revealed a partial response. She is now presenting for surgical treatment.     DETAILS OF PROCEDURE:     The patient was taken to the operating room and placed in supine position and general anesthesia by endotracheal tube.   Perioperative antiobiotics were given. SCDs were placed on the lower extremities. A bruce catheter was placed using sterile technique. Prior to coming to the operating room, patient had the left breast injected with radiolabeled sulfur colloid.   The patient was also marked by Dr. Zamorano with reduction pattern  incisions. The breasts and axilla were prepped with hibaclens and draped in the usual sterile fashion.  The timeout procedure was performed.      Starting on the left side, a reduction pattern incision was made following the pre operative  markings with a #10 scalpel blade and deepened with electrocautery.  The superior flap was raised with electrocautery up to the top edge of the breast tissue just under the clavicle.  The inferior flap was similarly raised using the cautery down to the inframammary fold.  Flaps were raised medially to the lateral aspect of the sternum and laterally to the lateral chest wall. The breast tissue was then elevated off of the pectoralis fascia with cautery. The fascia was excised with the breast tissue.  Once the breast was removed, it was oriented with sutures with a short suture superior and long suture lateral.  The breast was sent to pathology to be placed in formalin and have routine pathology exam.        Attention was then turned to the  left axilla.  The fascia was incised along the edge of the pectoralis muscle. The localizer probe was used to identify the previously biopsied node. This was carefully dissected free from surrounding tissues and excised. It had high counts >100 with the neoprobe.   The Neoprobe was used to identify the site of increased radioactivity for additional sentinel nodes. 3 additional sentinel nodes were excised which had counts of 15-20.   The nodes were sent to pathology for review with frozen section. Pathology called in and reported the previously biopsied node was positive. We then performed a completion node dissection on the left. Cautery was used along with clips for hemostasis to remove the axillary soft tissue inferior to the axillary vein and anterior to the thoracodorsal complex and the long thoracic nerve was identified and protected along the chest wall. The first intercostobrachial branch was divided as there were nodes in close proximity to the nerve. The additional axillary tissue was sent to pathology. Hemostasis was assured. The chest cavity was irrigated. The left side was packed with a saline-wetted lap pad and covered with a dry towel while attention was turned to  the right breast.     A similar  incision was made on the right side with a #10 scalpel blade and deepened with electrocautery.  Again, the flaps were raised with electrocautery and the breast was dissected away from the pectoralis fascia.  The tissue was oriented with a short suture superior and long suture lateral.  The breast was then sent to pathology and placed in formalin for permanent section.  After the breast was removed, we removed the port via the mastectomy incision. Cautery was used to enter the capsule around the port. The port was delivered into our field. A 3-0 vicryl suture was placed around the catheter track and tied down after the catheter was removed. Pressure was held at the neck for 5 minutes. The port capsule was closed with a running 3-0 vicryl suture. The chest wound was irrigated and packed with a moist laparotomy pad.       Dr. Zamorano then performed their portion of the procedure with reconstruction. Please see their operative report for details.     Janie South MD

## 2022-12-21 NOTE — ANESTHESIA PROCEDURE NOTES
Pectoralis (PECS I and II) Procedure Note    Pre-Procedure   Staff -        Anesthesiologist:  Silas Coelho MD       Performed By: Anesthesiologist       Location: OR       Pre-Anesthestic Checklist: patient identified, IV checked, risks and benefits discussed, informed consent, pre-op evaluation, at physician/surgeon's request and post-op pain management  Timeout:       Correct Patient: Yes        Correct Procedure: Yes        Correct Site: Yes        Correct Position: Yes        Correct Laterality: Yes        Site Marked: Yes  Procedure Documentation  Procedure: Pectoralis (PECS I and II)             Pectoralis I and Pectoralis II       Laterality: bilateral       Patient Position: supine       Skin prep: Chloraprep       Local skin infiltrated with mL of 1% lidocaine.        Needle Type: short bevel       Needle Gauge: 21.        Needle Length (Inches): 3.13        Ultrasound guided       1. Ultrasound was used to identify targeted nerve, plexus, vascular marker, or fascial plane and place a needle adjacent to it in real-time.       2. Ultrasound was used to visualize the spread of anesthetic in close proximity to the above referenced structure.       3. A permanent image is entered into the patient's record.    Assessment/Narrative         The placement was negative for: blood aspirated, painful injection and site bleeding       Paresthesias: No.       Bolus given via needle..        Secured via.        Insertion/Infusion Method: Single Shot       Complications: none       Injection made incrementally with aspirations every 5 mL.    Medication(s) Administered   Ropivacaine 0.5% w/ 1:400K Epi (Injection) - Injection   40 mL - 12/21/2022 2:30:00 PM   Comments:  Ultrasound Interpretation, peripheral nerve block    1.  Under ultrasound guidance, needle was inserted and placed in close proximity to the nerve.  2. Ultrasound was also used to visualize the spread of the anesthetic in close proximity to the  "nerve being blocked.  3. The nerve appeared anatomically normal.  4. There were no apparent abnormal pathological findings.  5. A permanent ultrasound image was saved n the patient's record.    20cc 0.5% diluted with 10cc normal saline to make 0.33% Ropivicaine + epi.  10cc for PECS I, 20cc for PECS II on each side bilaterally.      Silas Coelho MD   2:44 PM      FOR [facility] ([facility] [place]) ONLY:   Pain Team Contact information: please page the Pain Team Via [employer]. Search \"Pain\". During daytime hours, please page the attending first. At night please page the resident first.    "

## 2022-12-21 NOTE — ANESTHESIA PREPROCEDURE EVALUATION
Anesthesia Pre-Procedure Evaluation    Patient: Betty Rogers   MRN: [id number] : 1975        Procedure : Procedure(s):  bilateral skin sparing mastectomies with left sentinel lymph node biopsy, tag localized left axillary node excision  possible left axillary node dissection  possible port removal  BILATERAL TISSUE EXPANDER BREAST RECONSTRUCTION          Past Medical History:   Diagnosis Date     Breast cancer metastasized to axillary lymph node (H) 7/15/2022     No active medical problems       Past Surgical History:   Procedure Laterality Date     C/SECTION, LOW TRANSVERSE      ,      INSERT PORT VASCULAR ACCESS Right 2022    Procedure: PORT PLACEMENT;  Surgeon: Janie South MD;  Location: [facility] OR      Allergies   Allergen Reactions     Nuts      Other [Seasonal Allergies]      Tree Nuts-Lips swell and breathing problems      Social History     Tobacco Use     Smoking status: Never     Smokeless tobacco: Never   Substance Use Topics     Alcohol use: Yes     Comment: socially, approx 6-7 drinks per week      Wt Readings from Last 1 Encounters:   22 61.2 kg (135 lb)        Anesthesia Evaluation   Pt has had prior anesthetic.     No history of anesthetic complications       ROS/MED HX  ENT/Pulmonary:    (-) sleep apnea   Neurologic:       Cardiovascular:     (+) -----Previous cardiac testing   Echo: Date: 10/2022 Results:  Interpretation Summary     Global and regional left ventricular function is normal with an EF of 61%.  Global right ventricular function is normal.  No significant valvular abnormalities present.  Pulmonary artery systolic pressure cannot be assessed.  The inferior vena cava is normal.  No pericardial effusion is present.  Stress Test: Date: Results:    ECG Reviewed: Date: Results:    Cath: Date: Results:      METS/Exercise Tolerance:     Hematologic:       Musculoskeletal:       GI/Hepatic:    (-) GERD   Renal/Genitourinary:       Endo:       Psychiatric/Substance  Use:       Infectious Disease:       Malignancy:   (+) Malignancy (met to node), History of Breast.Breast CA Active status post.        Other:            Physical Exam    Airway        Mallampati: II   TM distance: > 3 FB   Neck ROM: full   Mouth opening: > 3 cm    Respiratory Devices and Support         Dental  no notable dental history         Cardiovascular   cardiovascular exam normal          Pulmonary   pulmonary exam normal                OUTSIDE LABS:  CBC:   Lab Results   Component Value Date    WBC 7.4 11/21/2022    WBC 6.4 11/07/2022    HGB 10.2 (L) 11/21/2022    HGB 10.2 (L) 11/07/2022    HCT 30.0 (L) 11/21/2022    HCT 30.4 (L) 11/07/2022     11/21/2022     11/07/2022     BMP:   Lab Results   Component Value Date     11/07/2022     10/18/2022    POTASSIUM 4.2 11/07/2022    POTASSIUM 3.5 10/18/2022    CHLORIDE 109 11/07/2022    CHLORIDE 105 10/18/2022    CO2 26 11/07/2022    CO2 24 10/18/2022    BUN 13 11/07/2022    BUN 8 10/18/2022    CR 0.66 11/07/2022    CR 0.73 10/18/2022     (H) 11/07/2022     (H) 11/07/2022     COAGS: No results found for: PTT, INR, FIBR  POC:   Lab Results   Component Value Date    HCG Negative 12/08/2022     HEPATIC:   Lab Results   Component Value Date    ALBUMIN 3.5 11/07/2022    PROTTOTAL 6.3 (L) 11/07/2022    ALT 21 11/07/2022    AST 13 11/07/2022    ALKPHOS 64 11/07/2022    BILITOTAL 0.2 11/07/2022     OTHER:   Lab Results   Component Value Date    PH 7.45 (H) 10/18/2022    LACT 1.1 10/18/2022    ABEBA 8.9 11/07/2022       Anesthesia Plan    ASA Status:  3   NPO Status:  NPO Appropriate    Anesthesia Type: General.     - Airway: ETT   Induction: Intravenous.   Maintenance: Balanced.        Consents    Anesthesia Plan(s) and associated risks, benefits, and realistic alternatives discussed. Questions answered and patient/representative(s) expressed understanding.    - Discussed:     - Discussed with:  Patient         Postoperative  Care    Pain management: IV analgesics, Peripheral nerve block (Single Shot), Multi-modal analgesia.   PONV prophylaxis: Ondansetron (or other 5HT-3), Background Propofol Infusion     Comments:    Other Comments: H&P reviewed    PECs blocks post induction per surgeon request    Avoid toshia Coelho MD

## 2022-12-21 NOTE — BRIEF OP NOTE
[facility]    Brief Operative Note    Pre-operative diagnosis: Malignant neoplasm of upper-outer quadrant of left breast in female, estrogen receptor positive (H) [C50.412, Z17.0]  History of breast cancer [Z85.3]  Post-operative diagnosis Same as pre-operative diagnosis    Procedure: Procedure(s):  bilateral skin sparing mastectomies with left sentinel lymph node biopsy, tag localized left axillary node excision  possible left axillary node dissection  possible port removal  BILATERAL TISSUE EXPANDER BREAST RECONSTRUCTION  Surgeon: Surgeon(s) and Role:  Panel 1:     * Janie South MD - Primary     * Jade Muhammad PA-C  Panel 2:     * Rc Zamorano MD - Primary  Anesthesia: General with Block   Estimated Blood Loss: 50 ml  Drains: See Dr. Zamorano's Op Note  Specimens:   ID Type Source Tests Collected by Time Destination   1 : RIGHT BREAST MASTECTOMY, SHORT STITCH SUPERIOR, LONG STITCH LATERAL Tissue Breast, Right SURGICAL PATHOLOGY EXAM Janie South MD 12/21/2022  3:14 PM    2 : LEFT AXILLARY LOCALIZED SENTINEL LYMPH NODE #1 Tissue Lymph Node(s), Axillary, Left SURGICAL PATHOLOGY EXAM Janie South MD 12/21/2022  3:41 PM    3 : Left Axillary Node Dissection Tissue Lymph Node(s), Axillary, Left SURGICAL PATHOLOGY EXAM Janie South MD 12/21/2022  4:44 PM    4 : LEFT BREAST MASTECTOMY, SHORT STITCH SUPERIOR , LONG STITCH LATERAL Tissue Breast, Left SURGICAL PATHOLOGY EXAM Janie South MD 12/21/2022  3:32 PM    5 : LEFT AXILLARY LOCALIZED SENTINEL LYMPH NODE #2 Tissue Lymph Node(s), Axillary, Left SURGICAL PATHOLOGY EXAM Janie South MD 12/21/2022  3:42 PM    6 : LEFT AXILLARY LOCALIZED SENTINEL LYMPH NODE #3 Tissue Lymph Node(s), Axillary, Left SURGICAL PATHOLOGY EXAM Janie South MD 12/21/2022  3:42 PM    7 : LEFT AXILLARY LOCALIZED SENTINEL LYMPH NODE #4 Tissue Lymph Node(s), Axillary, Left SURGICAL PATHOLOGY EXAM Janie South MD  12/21/2022  3:43 PM      Findings:   Left skin sparing mastectomy. First axillary sentinel lymph node was the previously biopsied tag-localized node- this was positive for carcinoma on intraop pathology. 3 additional sentinel lymph nodes obtained and left axillary node dissection performed. Right skin sparing mastectomy with right sided port removal.  Complications: None.  Implants:   Implant Name Type Inv. Item Serial No.  Lot No. LRB No. Used Action   CATH PORT POWERPORT CLEARVUE ISP [id number] - [id number] Catheter CATH PORT POWERPORT CLEARVUE ISP [id number]  [employer] [facility] [place] [id number] Right 1 Explanted

## 2022-12-22 VITALS
SYSTOLIC BLOOD PRESSURE: 106 MMHG | OXYGEN SATURATION: 99 % | TEMPERATURE: 98.4 F | RESPIRATION RATE: 16 BRPM | DIASTOLIC BLOOD PRESSURE: 55 MMHG | WEIGHT: 135.2 LBS | HEART RATE: 64 BPM | HEIGHT: 64 IN | BODY MASS INDEX: 23.08 KG/M2

## 2022-12-22 LAB — GLUCOSE BLDC GLUCOMTR-MCNC: 78 MG/DL (ref 70–99)

## 2022-12-22 PROCEDURE — 82962 GLUCOSE BLOOD TEST: CPT

## 2022-12-22 PROCEDURE — 250N000013 HC RX MED GY IP 250 OP 250 PS 637

## 2022-12-22 PROCEDURE — 250N000013 HC RX MED GY IP 250 OP 250 PS 637: Performed by: PHYSICIAN ASSISTANT

## 2022-12-22 PROCEDURE — 250N000011 HC RX IP 250 OP 636

## 2022-12-22 RX ORDER — CEPHALEXIN 500 MG/1
500 CAPSULE ORAL 3 TIMES DAILY
Qty: 21 CAPSULE | Refills: 0 | Status: SHIPPED | OUTPATIENT
Start: 2022-12-22 | End: 2022-12-29

## 2022-12-22 RX ORDER — SENNOSIDES A AND B 8.6 MG/1
1 TABLET, FILM COATED ORAL 2 TIMES DAILY PRN
Qty: 15 TABLET | Refills: 0 | Status: SHIPPED | OUTPATIENT
Start: 2022-12-22 | End: 2023-01-13

## 2022-12-22 RX ORDER — OXYCODONE HYDROCHLORIDE 5 MG/1
5 TABLET ORAL EVERY 6 HOURS PRN
Qty: 24 TABLET | Refills: 0 | Status: SHIPPED | OUTPATIENT
Start: 2022-12-22 | End: 2023-01-13

## 2022-12-22 RX ORDER — METHOCARBAMOL 750 MG/1
750 TABLET, FILM COATED ORAL EVERY 6 HOURS PRN
Qty: 30 TABLET | Refills: 1 | Status: SHIPPED | OUTPATIENT
Start: 2022-12-22 | End: 2023-01-13

## 2022-12-22 RX ADMIN — IBUPROFEN 600 MG: 600 TABLET ORAL at 06:43

## 2022-12-22 RX ADMIN — OXYCODONE HYDROCHLORIDE 5 MG: 5 TABLET ORAL at 00:58

## 2022-12-22 RX ADMIN — CEFAZOLIN 1 G: 1 INJECTION, POWDER, FOR SOLUTION INTRAMUSCULAR; INTRAVENOUS at 03:04

## 2022-12-22 RX ADMIN — IBUPROFEN 600 MG: 600 TABLET ORAL at 00:58

## 2022-12-22 RX ADMIN — ACETAMINOPHEN 975 MG: 325 TABLET, FILM COATED ORAL at 03:03

## 2022-12-22 RX ADMIN — METHOCARBAMOL 750 MG: 750 TABLET ORAL at 03:03

## 2022-12-22 RX ADMIN — ACETAMINOPHEN 975 MG: 325 TABLET, FILM COATED ORAL at 09:03

## 2022-12-22 RX ADMIN — METHOCARBAMOL 750 MG: 750 TABLET ORAL at 09:04

## 2022-12-22 RX ADMIN — POLYETHYLENE GLYCOL 3350 17 G: 17 POWDER, FOR SOLUTION ORAL at 09:08

## 2022-12-22 RX ADMIN — CEFAZOLIN 1 G: 1 INJECTION, POWDER, FOR SOLUTION INTRAMUSCULAR; INTRAVENOUS at 09:04

## 2022-12-22 ASSESSMENT — ACTIVITIES OF DAILY LIVING (ADL)
ADLS_ACUITY_SCORE: 20

## 2022-12-22 NOTE — ANESTHESIA CARE TRANSFER NOTE
Patient: Betty Rogers    Procedure: Procedure(s):  bilateral skin sparing mastectomies with left sentinel lymph node biopsy, tag localized left axillary node excision  left axillary node dissection  possible port removal  BILATERAL TISSUE EXPANDER BREAST RECONSTRUCTION       Diagnosis: Malignant neoplasm of upper-outer quadrant of left breast in female, estrogen receptor positive (H) [C50.412, Z17.0]  History of breast cancer [Z85.3]  Diagnosis Additional Information: No value filed.    Anesthesia Type:   General     Note:    Oropharynx: oral airway in place and spontaneously breathing  Level of Consciousness: awake  Oxygen Supplementation: face mask  Level of Supplemental Oxygen (L/min / FiO2): 6  Independent Airway: airway patency satisfactory and stable    Vital Signs Stable: post-procedure vital signs reviewed and stable  Report to RN Given: handoff report given  Patient transferred to: PACU    Handoff Report: Identifed the Patient, Identified the Reponsible Provider, Reviewed the pertinent medical history, Discussed the surgical course, Reviewed Intra-OP anesthesia mangement and issues during anesthesia, Set expectations for post-procedure period and Allowed opportunity for questions and acknowledgement of understanding      Vitals:  Vitals Value Taken Time   /89    Temp     Pulse 77 12/21/22 1833   Resp 10 12/21/22 1833   SpO2 100 % 12/21/22 1833   Vitals shown include unvalidated device data.    Electronically Signed By: HETAL Bean CRNA  December 21, 2022  6:35 PM

## 2022-12-22 NOTE — DISCHARGE SUMMARY
DISCHARGE SUMMARY    PRINCIPAL DISCHARGE DIAGNOSIS: Breast cancer     Operative procedures: Bilateral mastectomy/tissue expanders     REASON FOR ADMISSION: Breast cancer    HOSPITAL COURSE: The patient was taken to the operating room on her day of admission and tolerated the procedure well. Diet and activity were gradually advanced and she was discharged on POD# 1.     DISCHARGE INSTRUCTIONS:   Diet: Regular   Activity: No heavy strenuous physical activity, 10 lb lifting limit  Wound/Drain Care: Instructions given       Review of your medicines      START taking      Dose / Directions   cephALEXin 500 MG capsule  Commonly known as: KEFLEX  Used for: Carcinoma of left breast metastatic to axillary lymph node (H)      Dose: 500 mg  Take 1 capsule (500 mg) by mouth 3 times daily for 7 days  Quantity: 21 capsule  Refills: 0     methocarbamol 750 MG tablet  Commonly known as: ROBAXIN  Used for: Carcinoma of left breast metastatic to axillary lymph node (H)      Dose: 750 mg  Take 1 tablet (750 mg) by mouth every 6 hours as needed for muscle spasms  Quantity: 30 tablet  Refills: 1     oxyCODONE 5 MG tablet  Commonly known as: ROXICODONE  Used for: Carcinoma of left breast metastatic to axillary lymph node (H)      Dose: 5 mg  Take 1 tablet (5 mg) by mouth every 6 hours as needed for pain  Quantity: 24 tablet  Refills: 0        CONTINUE these medicines which have NOT CHANGED      Dose / Directions   calcium carbonate-vitamin D 600-400 MG-UNIT chewable tablet  Commonly known as: OS-ABEBA      Dose: 1 chew tab  Take 1 chew tab by mouth 2 times daily  Refills: 0     dexamethasone 4 MG tablet  Commonly known as: DECADRON      Dose: 4 mg  Take 4 mg by mouth as needed  Refills: 0     prochlorperazine 10 MG tablet  Commonly known as: COMPAZINE      Dose: 10 mg  Take 10 mg by mouth every 6 hours as needed for nausea or vomiting  Refills: 0     VISINE OP      Dose: 1 drop  Place 1 drop into both eyes as needed  Refills: 0     vitamin  B complex with vitamin C tablet      Dose: 1 tablet  Take 1 tablet by mouth daily  Refills: 0           Where to get your medicines      These medications were sent to [facility] [place], [place] - [address] [facility]  [address] [facility] [place] [address]    Phone: [phone]     cephALEXin 500 MG capsule    methocarbamol 750 MG tablet    oxyCODONE 5 MG tablet         Condition at Discharge: Wounds clean and dry, no sign of infection or hematoma. Normal/stable vitals signs. Pt ambulatory and able to take a regular diet well. Tissues viable. Comfortable on oral meds.     Adriana Doty PA-C on 12/22/2022 at 6:59 AM

## 2022-12-22 NOTE — ANESTHESIA POSTPROCEDURE EVALUATION
Patient: Betty Rogers    Procedure: Procedure(s):  bilateral skin sparing mastectomies with left sentinel lymph node biopsy, tag localized left axillary node excision  left axillary node dissection  possible port removal  BILATERAL TISSUE EXPANDER BREAST RECONSTRUCTION       Anesthesia Type:  General    Note:     Postop Pain Control: Uneventful            Sign Out: Well controlled pain   PONV: No   Neuro/Psych: Uneventful            Sign Out: Acceptable/Baseline neuro status   Airway/Respiratory: Uneventful            Sign Out: Acceptable/Baseline resp. status   CV/Hemodynamics: Uneventful            Sign Out: Acceptable CV status; No obvious hypovolemia; No obvious fluid overload   Other NRE:    DID A NON-ROUTINE EVENT OCCUR? No           Last vitals:  Vitals Value Taken Time   /78 12/21/22 1920   Temp 36.7  C (98  F) 12/21/22 1830   Pulse 61 12/21/22 1928   Resp 10 12/21/22 1928   SpO2 98 % 12/21/22 1928   Vitals shown include unvalidated device data.    Electronically Signed By: Savita Santana MD, MD  December 21, 2022  7:29 PM

## 2022-12-22 NOTE — PROGRESS NOTES
Plastic Surgery POD# 1    Patient feeling sore, but overall well. VSS overnight, no nausea. Pain controlled with PRN oxy, ibuprofen, tylenol. Eating and drinking fine. Ambulating and voiding well. Feels ready to go home today.     Flap Healthy. Incisions clean, intact. Small amount of serous drainage on incisions as is typical, right more than left.  No hematoma.  Mild ecchymosis over the right pre-axillary region, area is soft and capillary refill present. Flaps are soft, appropriately tender. Drain sites healthy, drain output serosanguineous, not excessive.     Plan: Home today. Fully instructed.     Adriana Doty PA-C on 12/22/2022 at 6:53 AM

## 2022-12-22 NOTE — OP NOTE
Procedure Date: 12/21/2022    SURGEON:  Rc Zamorano M.D.     ASSISTANT: Adriana Doty PA-C    PREOPERATIVE DIAGNOSES:     1.  Left breast cancer.  2.  Acquired absence of bilateral breasts.    POSTOPERATIVE DIAGNOSES:     1.  Left breast cancer.  2.  Acquired absence of bilateral breasts.    PROCEDURES:     1.  Bilateral first stage breast reconstruction with placement of tissue expanders.  2.  Creation of dermal fascial flaps, bilateral chest wall, 12 x 20 cm each side.    A surgical first assistant was medically necessary for patient positioning, retraction and visualization of anatomic structures, hemostasis and suture closure of incisions.    DESCRIPTION OF PROCEDURE:  The patient was marked preoperatively.  We discussed the planned reduction pattern mastectomy and estimated goal cup size.  We discussed the expected discomfort, time off work, activity restrictions, healing time, etc.  All of her questions were answered.  She was placed supine on the procedure table under general LMA anesthesia administered by the Anesthesia Department.  Dr. South performed a bilateral skin-sparing mastectomy and left axillary lymph node dissection.  The remaining skin flaps were healthy and viable with bright red dermal bleeding right up to the edges of the flaps.  She had some redundant lower breast skin, which could be used as a dermal fascial flap to help with the reconstruction and soft tissue support.  A timeout was done.  I began on the left side.  I controlled the mastectomy pocket with internal sutures of 2-0 Vicryl to redefine the intermammary crease, inframammary crease and lateral breast pocket.  I irrigated with Betadine and checked for final hemostasis.  I placed an Allergan style 133FX 650 mL tissue expander prepared by removing the air and rinsing it in Betadine solution.  It was filled with 450 of saline and placed in the prepectoral pocket.  It was affixed to the pectoral fascia with 2-0 silk in  multiple locations.  The lower mastectomy skin was then deepithelialized as a large dermal fascial flap extending from either side of the breast and allowing complete living dermal closure underneath the inverted T closure.  A drain was placed in the axilla and a second drain around the device.  The dermal fascial flap was then draped over the lower pole of the device and affixed to the backside of the upper mastectomy flap with 2-0 Vicryl.  The inverted T incision was closed with 3-0 and 4-0 Vicryl.  I turned my attention to the right side.  I used the same technique to control the mastectomy pocket and to irrigate with dilute Betadine.  The same hemostasis was performed as well as placement of an identical style and size of expander prepared in an identical fashion and placed in a symmetrical position.  Suture fixation of the device was done as on the other side.  The same technique was used to de-epithelialize the dermal fascial flap, drape this over the lower pole and affix it to the backside of the upper mastectomy flap.  There was a drain placed under this fascial flap and then the inverted T incision was closed in the same fashion.  The drains were sewn in with 2-0 silk.  Sterile dressings were applied.  Anesthesia was reversed.  The patient was taken to the recovery room in satisfactory condition.    ESTIMATED BLOOD LOSS:  15 mL    COUNTS:  Sponge and needle counts were correct.    SPECIMENS:  None.    FINDINGS:  Noted above.    Rc Zamorano MD        D: 2022   T: 2022   MT: [facility]    Name:     STACY KEARNS  MRN:      [id number]        Account:        [id number]   :      1975           Procedure Date: 2022     Document: [id number]

## 2022-12-22 NOTE — PROGRESS NOTES
"Surgery Note    Pt doing well. Pain controlled.     O: /55   Pulse 64   Temp 98.4  F (36.9  C) (Oral)   Resp 15   Ht 1.626 m (5' 4\")   Wt 61.3 kg (135 lb 3.2 oz)   SpO2 99%   BMI 23.21 kg/m    Chest: incisions with some bloody drainage along steri strips. Mild ecchymosis right axillary area. Soft. Flaps look good. JPs serosanguinous.     A/P: 47yof s/p bilateral mastectomies with left node dissection and immediate reconstruction. Doing well.   - home today  - place in mastectomy bra prior to discharge.   - follow up with me as discussed (2-4 weeks)    Janie South MD  [facility]  [phone]      "

## 2022-12-22 NOTE — PLAN OF CARE
Pt is discharging to home with her mom and dad. Education completed at bedside with both of them, and LENO teaching done. Pt. Alert and oriented x 4. Vital signs stable on RA. Up ind. Tolerating reg diet. Lung sounds clear. Bowel sounds active, + flatus. BM prior to surgery, adequate urine output. Bilateral breast incisions with steri strips in place. LENO drain x 3, stripped and emptied- all 3 still outputting serosanguineous drainage. Pain managed with Robaxin and Tylenol. Denies nausea.

## 2022-12-22 NOTE — PLAN OF CARE
Goal Outcome Evaluation:      Plan of Care Reviewed With: patient    Overall Patient Progress: improvingOverall Patient Progress: improving         Orientations: A/Ox4, POD 1 double mastectomy.   Vitals/Pain: VSS on RA. Intermittent pain, mostly w/ movement. Pain management --scheduled tylenol and robaxin; PRN oxy and ibuprofen   Lines/Drains: R PIV SL and int. abx. 2 LENO drain on L chest and 1 LENO drain on R chest, stripped Q8H  Skin/Wounds: Bilateral breast incision, dried drainage on R dressing. Ace wrap and kerlix dressing  GI/: Adequate urine output, tolerating regular diet.   Ambulation/Assist: Standby  Sleep Quality: Fair  Plan: Potential dx today

## 2022-12-22 NOTE — PROGRESS NOTES
POST OPERATIVE NOTE-IMMEDIATE :    Preoperative Diagnosis:  Malignant neoplasm of upper-outer quadrant of left breast in female, estrogen receptor positive (H) [C50.412, Z17.0]  History of breast cancer [Z85.3]    Postoperative Diagnosis:  Same    Procedures:  Procedure(s):  bilateral skin sparing mastectomies with left sentinel lymph node biopsy, tag localized left axillary node excision  left axillary node dissection  possible port removal  BILATERAL TISSUE EXPANDER BREAST RECONSTRUCTION    Surgeon(s) and Assistants (if any):  Surgeon(s):  Janie South MD Kalthoff, Madeline, PA-C Kunde, Katelyn N, PA-C Ruebeck, David Frederick, MD    EBL:  65 mL    Anesthesia:  General with Block    Complications: None    Specimen:  ID Type Source Tests Collected by Time Destination   1 : RIGHT BREAST MASTECTOMY, SHORT STITCH SUPERIOR, LONG STITCH LATERAL Tissue Breast, Right SURGICAL PATHOLOGY EXAM Janie South MD 12/21/2022  3:14 PM    2 : LEFT AXILLARY LOCALIZED SENTINEL LYMPH NODE #1 Tissue Lymph Node(s), Axillary, Left SURGICAL PATHOLOGY EXAM Janie South MD 12/21/2022  3:41 PM    3 : Left Axillary Node Dissection Tissue Lymph Node(s), Axillary, Left SURGICAL PATHOLOGY EXAM Janie South MD 12/21/2022  4:44 PM    4 : LEFT BREAST MASTECTOMY, SHORT STITCH SUPERIOR , LONG STITCH LATERAL Tissue Breast, Left SURGICAL PATHOLOGY EXAM Janie South MD 12/21/2022  3:32 PM    5 : LEFT AXILLARY LOCALIZED SENTINEL LYMPH NODE #2 Tissue Lymph Node(s), Axillary, Left SURGICAL PATHOLOGY EXAM Janie South MD 12/21/2022  3:42 PM    6 : LEFT AXILLARY LOCALIZED SENTINEL LYMPH NODE #3 Tissue Lymph Node(s), Axillary, Left SURGICAL PATHOLOGY EXAM Janie South MD 12/21/2022  3:42 PM    7 : LEFT AXILLARY LOCALIZED SENTINEL LYMPH NODE #4 Tissue Lymph Node(s), Axillary, Left SURGICAL PATHOLOGY EXAM Janie South MD 12/21/2022  3:43 PM        Findings:  Above    Condition on discharge from  OR:  Satisfactory    Adriana Doty PA-C

## 2022-12-23 ENCOUNTER — TELEPHONE (OUTPATIENT)
Dept: SURGERY | Facility: CLINIC | Age: 47
End: 2022-12-23

## 2022-12-23 LAB
PATH REPORT.ADDENDUM SPEC: ABNORMAL
PATH REPORT.COMMENTS IMP SPEC: ABNORMAL
PATH REPORT.COMMENTS IMP SPEC: ABNORMAL
PATH REPORT.COMMENTS IMP SPEC: YES
PATH REPORT.FINAL DX SPEC: ABNORMAL
PATH REPORT.GROSS SPEC: ABNORMAL
PATH REPORT.INTRAOP OBS SPEC DOC: ABNORMAL
PATH REPORT.MICROSCOPIC SPEC OTHER STN: ABNORMAL
PATH REPORT.RELEVANT HX SPEC: ABNORMAL
PATHOLOGY SYNOPTIC REPORT: ABNORMAL
PHOTO IMAGE: ABNORMAL

## 2022-12-23 NOTE — CONFIDENTIAL NOTE
I called Betty and discussed her pathology results which I feel overall is very good news. She is recovering well.     Janie South MD  [facility]  [phone]

## 2022-12-26 ENCOUNTER — PATIENT OUTREACH (OUTPATIENT)
Dept: RADIATION ONCOLOGY | Facility: CLINIC | Age: 47
End: 2022-12-26

## 2022-12-26 NOTE — PROGRESS NOTES
Spoke with patient today to discuss scheduling a return and CT/SIM with Dr. Foster after she has healed from surgery and her expanders are completely filled. Patient states she is feeling well since surgery, she plans to call Dr. Zamorano's office tomorrow to schedule her first follow up and fill. Discussed patient is to call this RN once she is closer to her final fill date so we can schedule her follow up with Dr. Foster. Patient verbalizes understanding, she is to call with any future questions or concerns.    Melva Bishop RN

## 2022-12-28 ENCOUNTER — PATIENT OUTREACH (OUTPATIENT)
Dept: ONCOLOGY | Facility: CLINIC | Age: 47
End: 2022-12-28

## 2022-12-28 DIAGNOSIS — C50.912 CARCINOMA OF LEFT BREAST METASTATIC TO AXILLARY LYMPH NODE (H): Primary | ICD-10-CM

## 2022-12-28 DIAGNOSIS — C77.3 CARCINOMA OF LEFT BREAST METASTATIC TO AXILLARY LYMPH NODE (H): Primary | ICD-10-CM

## 2022-12-28 NOTE — PROGRESS NOTES
Faxed order for post-mastectomy bra to [facility] at [phone] with 11/21/2023 office visit notes.        Per 11/21/2022 checkout note:      Please request Ki67 be performed on surgery pathology when available.     Hoang Franco MD.      Additional pathology lab order placed for Ki-67.    Della Kc RN

## 2023-01-04 ENCOUNTER — ONCOLOGY VISIT (OUTPATIENT)
Dept: ONCOLOGY | Facility: CLINIC | Age: 48
End: 2023-01-04
Payer: COMMERCIAL

## 2023-01-04 VITALS
HEIGHT: 64 IN | SYSTOLIC BLOOD PRESSURE: 111 MMHG | BODY MASS INDEX: 23.05 KG/M2 | OXYGEN SATURATION: 100 % | WEIGHT: 135 LBS | HEART RATE: 94 BPM | DIASTOLIC BLOOD PRESSURE: 70 MMHG

## 2023-01-04 DIAGNOSIS — C50.912 CARCINOMA OF LEFT BREAST METASTATIC TO AXILLARY LYMPH NODE (H): ICD-10-CM

## 2023-01-04 DIAGNOSIS — Z13.220 LIPID SCREENING: ICD-10-CM

## 2023-01-04 DIAGNOSIS — T45.1X5S ADVERSE EFFECT OF ANTINEOPLASTIC AND IMMUNOSUPPRESSIVE DRUGS, SEQUELA: ICD-10-CM

## 2023-01-04 DIAGNOSIS — D61.810 ANTINEOPLASTIC CHEMOTHERAPY INDUCED PANCYTOPENIA (H): Primary | ICD-10-CM

## 2023-01-04 DIAGNOSIS — Z13.820 SCREENING FOR OSTEOPOROSIS: ICD-10-CM

## 2023-01-04 DIAGNOSIS — C77.3 CARCINOMA OF LEFT BREAST METASTATIC TO AXILLARY LYMPH NODE (H): ICD-10-CM

## 2023-01-04 DIAGNOSIS — T45.1X5A ANTINEOPLASTIC CHEMOTHERAPY INDUCED PANCYTOPENIA (H): Primary | ICD-10-CM

## 2023-01-04 PROCEDURE — 99214 OFFICE O/P EST MOD 30 MIN: CPT | Performed by: INTERNAL MEDICINE

## 2023-01-04 ASSESSMENT — PAIN SCALES - GENERAL: PAINLEVEL: MODERATE PAIN (4)

## 2023-01-04 NOTE — PROGRESS NOTES
"[facility] Hematology / Oncology  Progress Note  Name: Betty Rogers  :  1975    MRN:  [id number]    --------------------    Assessment / Plan:  Multifocal, locally advanced, hormone positive, HER2 negative left breast ductal carcinoma:  # Neoadjuvant Oncotype Dx score 28.  # Neoadjuvant BRCA testing negative.   # Neoadjuvant taxol x 12 weeks; partial response.  # Neoadjuvant dose dense AC x 4 cycle; RCB2 present.  # Left breast mastectomy w/ sentinel node.    Betty and I reviewed her pathology report in detail; positive is improvement, negative is continued alberto involvement.  Planning adjuvant radiotherapy.  Given high risk disease, awaiting Ki67 for potential adjuvant Verzenio on top of antiestrogen therapy.  Placing OB/GYN referral for consideration of BSO for ovarian suppression; will start Tamoxifen and plan to transition to AI once post-menopausal.  Reviewed logistics and side effects of Tamoxifen and AI therapy.  Planning to grab baseline DEXA and lipid panel.  Will recheck CBC, CMP upon return towards end of radiation.  Chemo neuropathy improving.  Chemo pancytopenia will improve.    Hoang Franco MD    --------------------    Interval History:  Betty returns for follow up of breast cancer.  All in all, she continues to do quite well.  Healing well from surgery.  Neuropathy improving.  No periods since September.  Spirits and mood appeared good.  No nausea, vomiting.    --------------------    Physical Exam:  VS: /70 (BP Location: Right arm, Patient Position: Chair, Cuff Size: Adult Regular)   Pulse 94   Ht 1.626 m (5' 4\")   Wt 61.2 kg (135 lb)   SpO2 100%   BMI 23.17 kg/m    GEN: Well appearing.    Labs / Imaging:  We reviewed surgical pathology report in detail.  "

## 2023-01-04 NOTE — PROGRESS NOTES
Ki-67 in process.  Patient has follow up this afternoon.  Per Dr. Franco, ok for visit without result.  [facility] called on 1/3/2023, did not receive order.  Refaxed to alternate fax number [phone].  Transmission confirmed.    Della Kc RN

## 2023-01-04 NOTE — NURSING NOTE
"Oncology Rooming Note    January 4, 2023 3:02 PM   Betty Rogers is a 47 year old female who presents for:    Chief Complaint   Patient presents with     Oncology Clinic Visit     Post-Op     Initial Vitals: /70 (BP Location: Right arm, Patient Position: Chair, Cuff Size: Adult Regular)   Pulse 94   Ht 1.626 m (5' 4\")   Wt 61.2 kg (135 lb)   SpO2 100%   BMI 23.17 kg/m   Estimated body mass index is 23.17 kg/m  as calculated from the following:    Height as of this encounter: 1.626 m (5' 4\").    Weight as of this encounter: 61.2 kg (135 lb). Body surface area is 1.66 meters squared.  Moderate Pain (4) Comment: Data Unavailable   No LMP recorded. (Menstrual status: Chemotherapy).  Allergies reviewed: Yes  Medications reviewed: Yes    Medications: Medication refills not needed today.  Pharmacy name entered into [facility]:    [id number] [address] [address] [address] [place] [facility] [id number] [place] [address] [address]    Clinical concerns: NO Dr. Franco was notified.      Aline Monahan CMA              "

## 2023-01-04 NOTE — LETTER
"    2023         RE: Betty Rogers  [address]  [facility] [place] [facility] [address]        Dear Colleague,    Thank you for referring your patient, Betty Rogers, to the [facility]. Please see a copy of my visit note below.    [facility] Hematology / Oncology  Progress Note  Name: Betty Rogers  :  1975    MRN:  [id number]    --------------------    Assessment / Plan:  Multifocal, locally advanced, hormone positive, HER2 negative left breast ductal carcinoma:  # Neoadjuvant Oncotype Dx score 28.  # Neoadjuvant BRCA testing negative.   # Neoadjuvant taxol x 12 weeks; partial response.  # Neoadjuvant dose dense AC x 4 cycle; RCB2 present.  # Left breast mastectomy w/ sentinel node.    Betty and I reviewed her pathology report in detail; positive is improvement, negative is continued alberto involvement.  Planning adjuvant radiotherapy.  Given high risk disease, awaiting Ki67 for potential adjuvant Verzenio on top of antiestrogen therapy.  Placing OB/GYN referral for consideration of BSO for ovarian suppression; will start Tamoxifen and plan to transition to AI once post-menopausal.  Reviewed logistics and side effects of Tamoxifen and AI therapy.  Planning to grab baseline DEXA and lipid panel.  Will recheck CBC, CMP upon return towards end of radiation.  Chemo neuropathy improving.  Chemo pancytopenia will improve.    Hoang Franco MD    --------------------    Interval History:  Betty returns for follow up of breast cancer.  All in all, she continues to do quite well.  Healing well from surgery.  Neuropathy improving.  No periods since September.  Spirits and mood appeared good.  No nausea, vomiting.    --------------------    Physical Exam:  VS: /70 (BP Location: Right arm, Patient Position: Chair, Cuff Size: Adult Regular)   Pulse 94   Ht 1.626 m (5' 4\")   Wt 61.2 kg (135 lb)   SpO2 100%   BMI 23.17 kg/m    GEN: Well appearing.    Labs / " Imaging:  We reviewed surgical pathology report in detail.      Again, thank you for allowing me to participate in the care of your patient.        Sincerely,        Hoang Franco MD

## 2023-01-06 ENCOUNTER — MYC MEDICAL ADVICE (OUTPATIENT)
Dept: ONCOLOGY | Facility: CLINIC | Age: 48
End: 2023-01-06

## 2023-01-06 DIAGNOSIS — C50.812 MALIGNANT NEOPLASM OF OVERLAPPING SITES OF LEFT BREAST IN FEMALE, ESTROGEN RECEPTOR POSITIVE (H): Primary | ICD-10-CM

## 2023-01-06 DIAGNOSIS — Z80.8 FAMILY HISTORY OF MELANOMA: ICD-10-CM

## 2023-01-06 DIAGNOSIS — Z17.0 MALIGNANT NEOPLASM OF OVERLAPPING SITES OF LEFT BREAST IN FEMALE, ESTROGEN RECEPTOR POSITIVE (H): Primary | ICD-10-CM

## 2023-01-06 DIAGNOSIS — Z80.3 FAMILY HISTORY OF MALIGNANT NEOPLASM OF BREAST: ICD-10-CM

## 2023-01-06 DIAGNOSIS — Z80.0 FAMILY HISTORY OF COLON CANCER: ICD-10-CM

## 2023-01-06 DIAGNOSIS — Z80.42 FAMILY HISTORY OF PROSTATE CANCER: ICD-10-CM

## 2023-01-06 RX ORDER — TAMOXIFEN CITRATE 20 MG/1
20 TABLET ORAL DAILY
Qty: 90 TABLET | Refills: 3 | Status: SHIPPED | OUTPATIENT
Start: 2023-01-06 | End: 2023-04-25

## 2023-01-06 NOTE — TELEPHONE ENCOUNTER
Replied with provider recommendation.  Included drug information from clinical references.      Della Kc RN

## 2023-01-06 NOTE — TELEPHONE ENCOUNTER
Start Tamoxifen now; orders placed.    Full patient scheduling instructions to follow.    Hoang Franco MD.

## 2023-01-07 NOTE — PATIENT INSTRUCTIONS
1) GYN referral.  2) Awaiting KI67.  3) BJT towards end of radiation w/ labs (lipid panel, CBC, CMP) and DEXA.    Hoang Franco MD.

## 2023-01-09 ENCOUNTER — LAB (OUTPATIENT)
Dept: LAB | Facility: CLINIC | Age: 48
End: 2023-01-09
Payer: COMMERCIAL

## 2023-01-09 DIAGNOSIS — Z17.0 MALIGNANT NEOPLASM OF OVERLAPPING SITES OF LEFT BREAST IN FEMALE, ESTROGEN RECEPTOR POSITIVE (H): ICD-10-CM

## 2023-01-09 DIAGNOSIS — C50.812 MALIGNANT NEOPLASM OF OVERLAPPING SITES OF LEFT BREAST IN FEMALE, ESTROGEN RECEPTOR POSITIVE (H): ICD-10-CM

## 2023-01-09 DIAGNOSIS — Z80.3 FAMILY HISTORY OF MALIGNANT NEOPLASM OF BREAST: ICD-10-CM

## 2023-01-09 DIAGNOSIS — Z80.42 FAMILY HISTORY OF PROSTATE CANCER: ICD-10-CM

## 2023-01-09 DIAGNOSIS — Z80.0 FAMILY HISTORY OF COLON CANCER: ICD-10-CM

## 2023-01-09 DIAGNOSIS — Z80.8 FAMILY HISTORY OF MELANOMA: ICD-10-CM

## 2023-01-09 LAB — SCANNED LAB RESULT: NORMAL

## 2023-01-09 PROCEDURE — G0452 MOLECULAR PATHOLOGY INTERPR: HCPCS | Mod: 26 | Performed by: PATHOLOGY

## 2023-01-11 ENCOUNTER — OFFICE VISIT (OUTPATIENT)
Dept: OBGYN | Facility: CLINIC | Age: 48
End: 2023-01-11
Attending: INTERNAL MEDICINE
Payer: COMMERCIAL

## 2023-01-11 ENCOUNTER — MYC MEDICAL ADVICE (OUTPATIENT)
Dept: OBGYN | Facility: CLINIC | Age: 48
End: 2023-01-11

## 2023-01-11 VITALS
DIASTOLIC BLOOD PRESSURE: 53 MMHG | HEART RATE: 109 BPM | WEIGHT: 132.1 LBS | OXYGEN SATURATION: 98 % | BODY MASS INDEX: 22.67 KG/M2 | SYSTOLIC BLOOD PRESSURE: 97 MMHG

## 2023-01-11 DIAGNOSIS — T45.1X5A ANTINEOPLASTIC CHEMOTHERAPY INDUCED PANCYTOPENIA (H): ICD-10-CM

## 2023-01-11 DIAGNOSIS — C50.912 CARCINOMA OF LEFT BREAST METASTATIC TO AXILLARY LYMPH NODE (H): ICD-10-CM

## 2023-01-11 DIAGNOSIS — Z40.02 ENCOUNTER FOR PROPHYLACTIC REMOVAL OF OVARY: Primary | ICD-10-CM

## 2023-01-11 DIAGNOSIS — C77.3 CARCINOMA OF LEFT BREAST METASTATIC TO AXILLARY LYMPH NODE (H): ICD-10-CM

## 2023-01-11 DIAGNOSIS — D61.810 ANTINEOPLASTIC CHEMOTHERAPY INDUCED PANCYTOPENIA (H): ICD-10-CM

## 2023-01-11 PROCEDURE — 99205 OFFICE O/P NEW HI 60 MIN: CPT | Performed by: OBSTETRICS & GYNECOLOGY

## 2023-01-11 NOTE — PROGRESS NOTES
Betty is a 47 year old   here for consultation regarding removal of her ovaries.  She has breast cancer and underwent chemo and a double mastectomy in December.  She is on tamoxifen and is doing radiation treatment for th next 6 weeks.  ROS: Ten point review of systems was reviewed and negative except the above.    PMH: Her past medical, surgical, and obstetric histories were reviewed and are documented in their appropriate chart areas.    ALL/Meds: Her medication and allergy histories were reviewed and are documented in their appropriate chart areas.    SH/FMH: Reviewed and documented in the appropriate area.    PE: There were no vitals taken for this visit.    Reviewed the indication for removal of the ovaries, to decrease the estrogen levels and remove the risk of ovarian cancer.  I did discuss the concern related to tamoxifen and its potential to hyperstimulate the endometrium.   Discussed the risks, benefits and alternatives to laparoscopic oophrectomy.  These include the risks of bleeding, infection, and injury to other structures.  We discussed the outpatient nature and post-operative recovery.  Discussed the possible need to proceed to laparotomy.  Reviewed the risks, benefits, and alternatives of hysterectomy including but not limited to bleeding, infection, injury to bowel,bladder and other structures.  The patient is aware that hysterectomy will render her sterile and unable to have further children.  We discussed the different routes of surgery including abdominal, vaginal, and laparoscopic and the possibility that the route of surgery may change during the procedure.  We discussed both total and supracervical hysterectomy and the benefits and contraindications involved.   Reviewed pre and post op course. Patient was given the opportunity to ask questions and have them answered. The patient is not sure which she prefers    She is given the opportunity to ask questions and have them answered.  We  would plan to do her surgery after her radiation (early April)    A/P:   Betty presents with (C50.912,  C77.3) Carcinoma of left breast metastatic to axillary lymph node (H)  Comment: 60 minutes spent on the date of the encounter doing chart review, review of test results, patient visit and documentation   Plan: Laparoscopic BILATERAL SALPINGO-OOPHRECTOMY vs TOTAL LAPAROSCOPIC HYSTERECTOMY BILATERAL SALPINGO-OOPHRECTOMY       No orders of the defined types were placed in this encounter.        Mario Rosario MD FACOG

## 2023-01-11 NOTE — PATIENT INSTRUCTIONS
If you have any questions regarding your visit, Please contact your care team.     [employer] [facility]: [phone]    To Schedule an Appointment 24/7  Call: [phone]LE[facility]ONE NUM[facility]     Mario Rosario MD  Medical Director    Shai Birch-RITA Dumont-Surgery Scheduler  Amelia-Surgery Scheduler               Tuesday-[place]  7:30 a.m-4:30 p.m    Thursday-[place]  7:30 a.m-4:30 p.m    Typical Surgery Days: Tuesday or Friday [facility] [place]  [address] [place] [address]  PH: [phone]     Imaging Scheduling all locations  PH: [phone]     [facility] Labor and Delivery  [address] [place] [address] [place] [address]  PH: [phone]    [facility]  [address] [place] [address]  PH: [phone] Fax      **Surgeries** Our Surgery Schedulers will contact you to schedule. If you do not receive a call within 3 business days, please call [phone].    Urgent Care locations:  [facility] Monday-Friday  10 am - 8 pm  Saturday and Sunday   9 am - 5 pm  Monday-Friday   10 am - 8 pm  Saturday and Sunday   9 am - 5 pm   [phone]   If you need a medication refill, please contact your pharmacy. Please allow 3 business days for your refill to be completed.  As always, Thank you for trusting us with your healthcare needs!    see additional instructions from your care team below

## 2023-01-13 ENCOUNTER — OFFICE VISIT (OUTPATIENT)
Dept: SURGERY | Facility: CLINIC | Age: 48
End: 2023-01-13
Payer: COMMERCIAL

## 2023-01-13 ENCOUNTER — MYC MEDICAL ADVICE (OUTPATIENT)
Dept: OBGYN | Facility: CLINIC | Age: 48
End: 2023-01-13

## 2023-01-13 DIAGNOSIS — C50.912 CARCINOMA OF LEFT BREAST METASTATIC TO AXILLARY LYMPH NODE (H): Primary | ICD-10-CM

## 2023-01-13 DIAGNOSIS — Z09 SURGERY FOLLOW-UP EXAMINATION: Primary | ICD-10-CM

## 2023-01-13 DIAGNOSIS — C77.3 CARCINOMA OF LEFT BREAST METASTATIC TO AXILLARY LYMPH NODE (H): Primary | ICD-10-CM

## 2023-01-13 DIAGNOSIS — I89.0 LYMPHEDEMA: ICD-10-CM

## 2023-01-13 PROCEDURE — 99024 POSTOP FOLLOW-UP VISIT: CPT | Performed by: SURGERY

## 2023-01-13 NOTE — PROGRESS NOTES
[facility] Breast Surgery Postoperative Note    S: Betty reports she has been doing well overall.  She has met with OB/GYN and is planning for laparoscopic hysterectomy with oophorectomy in the spring.  She has had follow-up with Dr. Franco as well. She did have Dr. Zamorano place some sutures on the left lower aspect of the incision at the apex last week. She feels a bit tight on the left side with limited ROM.     Breasts: Bilateral mastectomy incisions are healing. The right is healing very well. The left has nylon sutures in place at the apex of the reduction pattern incision with a small area of granulation tissue present in the middle. There is no surrounding erythema or induration.   Expanders are in place.     Pathology: Reviewed and copy given to patient.    A/P  Betty Rogers is recovering from a bilateral skin sparing mastectomies with left sentinel lymph node biopsy and excision of tag localized lymph node along with left axillary node dissection and port removal on 12/21/2022.  She had immediate reconstruction with expanders with Dr. Padron..  Her pathology revealed left breast invasive ductal carcinoma grade 2 measuring 4 mm.  She had 4 of 13 lymph nodes positive.  Margins were negative and the right breast was benign. She has slower healing on the left apex and I agree this should be healed prior to starting radiation. I placed a referral for PT/OT to help prevent lymphedema and improve ROM. She will see me back in 6 months for chest wall exam.       Thank you for the opportunity to help in her care.    Janie South MD  [facility]  [phone]    Please route or send letter to:  Primary Care Provider (PCP) and Referring Provider

## 2023-01-13 NOTE — TELEPHONE ENCOUNTER
Seen 1/11/23 for consultation regarding removal of her ovaries, Carcinoma of left breast metastatic to axillary lymph node     Discussed surgical options, would plan to do her surgery after her radiation (early April)    Pt wants to know if it is ok to wait 2-3 weeks to decide on her type of surgery before scheduling.

## 2023-01-18 NOTE — TELEPHONE ENCOUNTER
"Pt would like to schedule surgery for \"uterus removal along with the ovaries\" per her discussion with Dr. Rosario 1/11/23        "

## 2023-01-19 ENCOUNTER — ANCILLARY ORDERS (OUTPATIENT)
Dept: MAMMOGRAPHY | Facility: CLINIC | Age: 48
End: 2023-01-19

## 2023-01-19 DIAGNOSIS — C50.919 BREAST CANCER IN FEMALE (H): ICD-10-CM

## 2023-01-20 ENCOUNTER — HOSPITAL ENCOUNTER (OUTPATIENT)
Dept: ULTRASOUND IMAGING | Facility: CLINIC | Age: 48
Discharge: HOME | End: 2023-01-20
Attending: PLASTIC SURGERY
Payer: COMMERCIAL

## 2023-01-20 ENCOUNTER — ANCILLARY ORDERS (OUTPATIENT)
Dept: MAMMOGRAPHY | Facility: CLINIC | Age: 48
End: 2023-01-20

## 2023-01-20 ENCOUNTER — MYC MEDICAL ADVICE (OUTPATIENT)
Dept: ONCOLOGY | Facility: CLINIC | Age: 48
End: 2023-01-20

## 2023-01-20 DIAGNOSIS — C50.919 BREAST CANCER IN FEMALE (H): ICD-10-CM

## 2023-01-20 LAB
GRAM STAIN RESULT: NORMAL
GRAM STAIN RESULT: NORMAL

## 2023-01-20 PROCEDURE — 87070 CULTURE OTHR SPECIMN AEROBIC: CPT | Performed by: PLASTIC SURGERY

## 2023-01-20 PROCEDURE — C1729 CATH, DRAINAGE: HCPCS

## 2023-01-20 PROCEDURE — 87075 CULTR BACTERIA EXCEPT BLOOD: CPT | Performed by: PLASTIC SURGERY

## 2023-01-20 PROCEDURE — 250N000009 HC RX 250: Performed by: RADIOLOGY

## 2023-01-20 PROCEDURE — 76642 ULTRASOUND BREAST LIMITED: CPT | Mod: LT

## 2023-01-20 PROCEDURE — 87205 SMEAR GRAM STAIN: CPT | Performed by: PLASTIC SURGERY

## 2023-01-20 RX ADMIN — LIDOCAINE HYDROCHLORIDE 5 ML: 10 INJECTION, SOLUTION EPIDURAL; INFILTRATION; INTRACAUDAL; PERINEURAL at 14:03

## 2023-01-23 ENCOUNTER — TELEPHONE (OUTPATIENT)
Dept: OBGYN | Facility: CLINIC | Age: 48
End: 2023-01-23

## 2023-01-23 LAB — SCANNED LAB RESULT: NORMAL

## 2023-01-23 NOTE — TELEPHONE ENCOUNTER
[facility] SURGERY PLANNING/SCHEDULING WORKSHEET                                                     Betty Rogers                :  1975  MRN:  [id number]  Home Phone [phone]   Work Phone Not on file.   Mobile [phone]         Surgeon: Mario Rosario MD     DIAGNOSIS:   Metastatic Breast cancer, need to remove ovaries     SURGICAL PROCEDURE:  TOTAL LAPAROSCOPIC HYSTERECTOMY BILATERAL SALPINGO-OOPHRECTOMY      Surgery Location:  [facility]  Patient Surgery Class:  SDS  Length of Procedure:  120 minutes  Type of anesthesia:  General     Multi-surgeon case: yes, Zeke Ralph, Yvan or cameron  OR Assistant needed:   No  Vendor needed: No  Positioning:  See Preference Card  Laterality:  Bilateral  Date requested:  after rad treatment     Special Equipment: None  Special Instructions for patient:  Not needed  Precautions:  NONE  :  NOT NEEDED     Sterilization consent:  [facility] consent needs to be signed.     Preop: Pre-op options: PCP  Pre-surgery consult needed:  Not applicable.  Postop evaluation needed:  2 weeks     ALLERGIES:         Allergies   Allergen Reactions     Nuts       Other [Seasonal Allergies]         Tree Nuts-Lips swell and breathing problems      BMI:There is no height or weight on file to calculate BMI.       The proposed surgical procedure is considered HIGH risk.        Mario Rosario MD    2023  SURGERY SCHEDULING AND PRECERTIFICATION    Medical Record Number: [id number]  Betty Rogers  YOB: 1975   Phone: [phone] (home)   Primary Provider: Diana Luevano    Reason for Admit:  ICD-10 CODE: C50.912, C77.3, Z40.02    Surgeon: Mario Rosario MD w/Dr Sethi assisting  Surgical Procedure: TOTAL LAPAROSCOPIC HYSTERECTOMY BILATERAL SALPINGO-OOPHRECTOMY     Date of Surgery  Time of Surgery 7:30am  Surgery to be performed at:  [facility]  Status: Outpatient  Type of Anesthesia Anticipated: General    Sterilization  consent:  Not yet, mailed to patient.    Pre-Op: On 04/25 with Dr Luevano at [facility]  COVID testing:  Per Provider's discretion Covid testing is not indicated.     Post-Op:  2 weeks on 05/17 with Dr Rosario at [place]    Pre-certification routed to [employer]:  Yes    Surgery packet mailed to patient's home address: Yes  Patient instructed NPO 12 hours prior to surgery, arrive 1.5 hours  prior to surgery, must have a .  Patient understood and agrees to the plan.      Requestor:  Valentine Victoria     Location:  [facility]'s [phone]

## 2023-01-23 NOTE — TELEPHONE ENCOUNTER
[facility] SURGERY PLANNING/SCHEDULING WORKSHEET                                                     Betty Rogers                :  1975  MRN:  [id number]  Home Phone [phone]   Work Phone Not on file.   Mobile [phone]         Surgeon: Mario Rosario MD    DIAGNOSIS:   Metastatic Breast cancer, need to remove ovaries    SURGICAL PROCEDURE:  TOTAL LAPAROSCOPIC HYSTERECTOMY BILATERAL SALPINGO-OOPHRECTOMY     Surgery Location:  [facility]  Patient Surgery Class:  SDS  Length of Procedure:  120 minutes  Type of anesthesia:  General    Multi-surgeon case: yes, Yvan Jamison or cameron  OR Assistant needed:   No  Vendor needed: No  Positioning:  See Preference Card  Laterality:  Bilateral  Date requested:  after rad treatment    Special Equipment: None  Special Instructions for patient:  Not needed  Precautions:  NONE  :  NOT NEEDED    Sterilization consent:  [facility] consent needs to be signed.    Preop: Pre-op options: PCP  Pre-surgery consult needed:  Not applicable.  Postop evaluation needed:  2 weeks    ALLERGIES:   Allergies   Allergen Reactions     Nuts      Other [Seasonal Allergies]      Tree Nuts-Lips swell and breathing problems      BMI:There is no height or weight on file to calculate BMI.      The proposed surgical procedure is considered HIGH risk.      Mario Rosario MD    2023

## 2023-01-24 ENCOUNTER — VIRTUAL VISIT (OUTPATIENT)
Dept: ONCOLOGY | Facility: CLINIC | Age: 48
End: 2023-01-24
Attending: GENETIC COUNSELOR, MS
Payer: COMMERCIAL

## 2023-01-24 DIAGNOSIS — Z17.0 MALIGNANT NEOPLASM OF OVERLAPPING SITES OF LEFT BREAST IN FEMALE, ESTROGEN RECEPTOR POSITIVE (H): Primary | ICD-10-CM

## 2023-01-24 DIAGNOSIS — C50.812 MALIGNANT NEOPLASM OF OVERLAPPING SITES OF LEFT BREAST IN FEMALE, ESTROGEN RECEPTOR POSITIVE (H): Primary | ICD-10-CM

## 2023-01-24 LAB
BKR LAB AP ADD'L TEST STATUS: NORMAL
BKR PATH ADDL TEST FINAL COMMENTS: NORMAL

## 2023-01-24 PROCEDURE — 999N000069 HC STATISTIC GENETIC COUNSELING, < 16 MIN: Mod: GT,95 | Performed by: GENETIC COUNSELOR, MS

## 2023-01-24 NOTE — TELEPHONE ENCOUNTER
Ki-67 ordered by Dr. Franco in process, but another order for same test was ordered per Dr. South, resulted on 1/23/2023.      Results:          Forwarding to provider for review/recommendation.    Della Kc RN

## 2023-01-24 NOTE — LETTER
Cancer Risk Management  Program Locations    [facility]  [facility]  Mailing Address  Cancer Risk Management Program  [address]  [place] [address]    New patient appointments  [phone]  February 11, 2023    Betty Rogers  [address]  [facility] [address]      Dear Betty,    It was a pleasure speaking with you over video for genetic counseling on 1/24/2023. Here is a copy of the progress note from our discussion. If you have any additional questions, please feel free to call.     Referring Provider: Janie South MD    Presenting Information:  I spoke with Betty over video today to discuss her genetic testing results. The Hereditary Cancer Comprehensive 40-Gene Panel was ordered from the Molecular Diagnostics Laboratory at [facility]. This testing was done because of her personal and family history of cancer.    Genetic Testing Result: Multiple Variants of Uncertain Significance (VUS)  Betty was found to have two variants of uncertain significance (VUS):    A variant of uncertain significance in the POLE gene called c.259A>G (also known as p.Epc88Yut).    A variant of uncertain significance in the SMARCA4 gene called c.263C>T (also known as p.Nhp70Trh).     No other variants or mutations were found in these genes. Given the uncertain significance of this result, medical management decisions should NOT be made based on this test result alone.    Of note, Betty tested negative for mutations or variants of uncertain significance in the following genes by sequencing and deletion/duplication analysis: APC, DEMETRA, AXIN2, BAP1, BARD1, BMPR1A, BRCA1, BRCA2, BRIP1, CDH1, CDK4, CDKN2A, CHEK2, DICER1, EPCAM, GREM1, HOXB13, MITF, MLH1, MRE11, MSH2, MSH3, MSH6, MUTYH, NBN, NF1, NTHL1, PALB2, PMS2, POLD1,  POT1, PTEN, RAD50, RAD51C, RAD51D, SMAD4, STK11, TP53. We reviewed the autosomal dominant inheritance of these genes. Betty cannot pass on a mutation in any of these genes to her children based on this test result. Mutations in these genes do not skip generations.      Interpretation:  We discussed several different interpretations of this inconclusive test result. It is not clear if either of these variants are associated with increased cancer risk. These variants may be benign changes that do not increase cancer risk, or they may be harmful mutations that cause increased risk for certain cancers.    A variant of uncertain significance in the POLE gene called c.259A>G (also known as p.Dyd64Zhk).    We discussed that known pathogenic (harmful) mutations in the POLE gene are associated with an increased risk for colon polyps and colon cancer.     In rare situations in which both parents have a harmful mutation in the POLE gene, their children each have a 25% risk for FUNMI syndrome (facial dysmorphism, immunodeficiency, livedo, and short stature). If this variant is later classified as harmful, Betty would be considered a carrier for FUNMI syndrome.     A variant of uncertain significance in the SMARCA4 gene called c.263C>T (also known as p.Yye60Ctt).     We discussed that known pathogenic (harmful) mutations in the SMARCA4 gene are associated with increased risk for small-cell carcinoma of the ovary, hypercalcemic type (SCCOHT) and rhabdoid tumors.    The laboratory is working to determine if either of these variants are harmful or benign, and they will contact me if either of them are reclassified. If these variants are determined to be benign, there may be a different gene or combination of genes and environment that are associated with the cancers in this family.    It is also important to consider that her relatives may have a mutation in one of the genes tested and she did not inherit it.      Inheritance:  We  reviewed the autosomal dominant inheritance of the variants in the POLE and SMARCA4 genes. We discussed that Betty has a 50% chance to pass each of these variants to each of her children. Likewise, there is a 50% chance for each of these variants to be present in each of her siblings. Because it is unclear what, if any, risk is associated with these variants, clinical genetic testing for these POLE and SMARCA4 variants alone is not recommended for relatives.    Screening:  Based on this inconclusive test result, it is important for Betty and her relatives to refer back to the family history for appropriate cancer screening.      She should continue with her breast cancer care as recommended by her oncology team.    Betty s close female relatives remain at increased risk for breast cancer given their family history. Breast cancer screening is generally recommended to begin approximately 10 years younger than the earliest age of breast cancer diagnosis in the family, or at age 40, whichever comes first. In this family, screening may begin at age 39. Breast screening options should be discussed with an individual's primary care provider and a genetic counselor, to determine at what age to begin screening, what screening is appropriate, and if additional screening (such as breast MRI) is necessary based on personal/family history factors.       Other population cancer screening options, such as those recommended by the American Cancer Society and the National Comprehensive Cancer Network (NCCN), are also appropriate for Betty and her family. These screening recommendations may change if there are changes to Betty's personal and/or family history of cancer. Final screening recommendations should be made by each individual's primary care provider.       Additional Testing Considerations:  Although Betty's genetic testing result is inconclusive, other relatives may still carry a harmful gene mutation associated with  an increased risk for cancer. Genetic counseling is recommended for her maternal cousins who have had young breast and rectal cancers to discuss genetic testing options. If they do not have genetic testing, her other maternal relatives should consider genetic testing. If any of these relatives do pursue genetic testing, Betty is encouraged to contact me so that we may review the impact of their test results on her    Summary:  We do not have an explanation for Betty's personal or family history of cancer. While no genetic changes were identified, Betty may still be at risk for certain cancers due to family history, environmental factors, or other genetic causes not identified by this test. Because of that, it is important that she continue with cancer screening based on her personal and family history as discussed above.    Genetic testing is rapidly advancing, and new cancer susceptibility genes will most likely be identified in the future. Therefore, I encouraged Betty to contact me annually or if there are changes in her personal or family history. This may change how we assess her cancer risk, screening, and the testing we would offer.    Plan:  1.  Betty has access to a copy of her test results via [email or web address].  2. She plans to follow-up with her physicians.  3. She should contact me regularly, or sooner if her family history changes.  4. I will contact Betty if the laboratory informs me that these variants have been reclassified. This may change screening and testing recommendations for Betty and her relatives.    If Betty has any further questions, I encouraged her to contact me at [phone].    Marcella London MS, [facility] [place]  Licensed, Certified Genetic Counselor  Office: [phone]  Email: [email or web address]

## 2023-01-24 NOTE — Clinical Note
Please send copy of letter to patient with test results. Please enclose test results:  Next Generation Sequencing [[id number]] (Order [id number])

## 2023-01-24 NOTE — PROGRESS NOTES
Betty is a 47 year old who is being evaluated via a billable video visit.     Pt is in [place]     How would you like to obtain your AVS? MyChart  If the video visit is dropped, the invitation should be resent by: Text to cell phone: [phone]  Will anyone else be joining your video visit? No    Tamar Garg CHELSEA    Video-Visit Details    Type of service:  Video Visit     Originating Location (pt. Location): Home  Distant Location (provider location):  Off-site  Platform used for Video Visit: [employer]   Time spent over video: 10 minutes    1/24/2023    Referring Provider: Janie South MD    Presenting Information:  I spoke with Betty over video today to discuss her genetic testing results. The Hereditary Cancer Comprehensive 40-Gene Panel was ordered from the Molecular Diagnostics Laboratory at [facility]. This testing was done because of her personal and family history of cancer.    Genetic Testing Result: Multiple Variants of Uncertain Significance (VUS)  Btety was found to have two variants of uncertain significance (VUS):    A variant of uncertain significance in the POLE gene called c.259A>G (also known as p.Ere68Oxc).    A variant of uncertain significance in the SMARCA4 gene called c.263C>T (also known as p.Zpb65Evk).     No other variants or mutations were found in these genes. Given the uncertain significance of this result, medical management decisions should NOT be made based on this test result alone.    Of note, Betty tested negative for mutations or variants of uncertain significance in the following genes by sequencing and deletion/duplication analysis: APC, DEMETRA, AXIN2, BAP1, BARD1, BMPR1A, BRCA1, BRCA2, BRIP1, CDH1, CDK4, CDKN2A, CHEK2, DICER1, EPCAM, GREM1, HOXB13, MITF, MLH1, MRE11, MSH2, MSH3, MSH6, MUTYH, NBN, NF1, NTHL1, PALB2, PMS2, POLD1, POT1, PTEN, RAD50, RAD51C, RAD51D, SMAD4, STK11, TP53. We reviewed the autosomal dominant inheritance of these genes. Betty cannot pass on a mutation in any  "of these genes to her children based on this test result. Mutations in these genes do not skip generations.      A copy of the test report can be found in the Laboratory tab, dated 1/9/23, and named \"Next generation sequencing\".     Interpretation:  We discussed several different interpretations of this inconclusive test result. It is not clear if either of these variants are associated with increased cancer risk. These variants may be benign changes that do not increase cancer risk, or they may be harmful mutations that cause increased risk for certain cancers.    A variant of uncertain significance in the POLE gene called c.259A>G (also known as p.Too05Yrc).    We discussed that known pathogenic (harmful) mutations in the POLE gene are associated with an increased risk for colon polyps and colon cancer.     In rare situations in which both parents have a harmful mutation in the POLE gene, their children each have a 25% risk for FUNMI syndrome (facial dysmorphism, immunodeficiency, livedo, and short stature). If this variant is later classified as harmful, Betty would be considered a carrier for FUNMI syndrome.     A variant of uncertain significance in the SMARCA4 gene called c.263C>T (also known as p.Epp86Tan).     We discussed that known pathogenic (harmful) mutations in the SMARCA4 gene are associated with increased risk for small-cell carcinoma of the ovary, hypercalcemic type (SCCOHT) and rhabdoid tumors.    The laboratory is working to determine if either of these variants are harmful or benign, and they will contact me if either of them are reclassified. If these variants are determined to be benign, there may be a different gene or combination of genes and environment that are associated with the cancers in this family.    It is also important to consider that her relatives may have a mutation in one of the genes tested and she did not inherit it.      Inheritance:  We reviewed the autosomal dominant " inheritance of the variants in the POLE and SMARCA4 genes. We discussed that Betty has a 50% chance to pass each of these variants to each of her children. Likewise, there is a 50% chance for each of these variants to be present in each of her siblings. Because it is unclear what, if any, risk is associated with these variants, clinical genetic testing for these POLE and SMARCA4 variants alone is not recommended for relatives.    Screening:  Based on this inconclusive test result, it is important for Betty and her relatives to refer back to the family history for appropriate cancer screening.      She should continue with her breast cancer care as recommended by her oncology team.    Betty s close female relatives remain at increased risk for breast cancer given their family history. Breast cancer screening is generally recommended to begin approximately 10 years younger than the earliest age of breast cancer diagnosis in the family, or at age 40, whichever comes first. In this family, screening may begin at age 39. Breast screening options should be discussed with an individual's primary care provider and a genetic counselor, to determine at what age to begin screening, what screening is appropriate, and if additional screening (such as breast MRI) is necessary based on personal/family history factors.       Other population cancer screening options, such as those recommended by the American Cancer Society and the National Comprehensive Cancer Network (NCCN), are also appropriate for Betty and her family. These screening recommendations may change if there are changes to Betty's personal and/or family history of cancer. Final screening recommendations should be made by each individual's primary care provider.       Additional Testing Considerations:  Although Betty's genetic testing result is inconclusive, other relatives may still carry a harmful gene mutation associated with an increased risk for cancer.  Genetic counseling is recommended for her maternal cousins who have had young breast and rectal cancers to discuss genetic testing options. If they do not have genetic testing, her other maternal relatives should consider genetic testing. If any of these relatives do pursue genetic testing, Betty is encouraged to contact me so that we may review the impact of their test results on her    Summary:  We do not have an explanation for Betty's personal or family history of cancer. While no genetic changes were identified, Betty may still be at risk for certain cancers due to family history, environmental factors, or other genetic causes not identified by this test. Because of that, it is important that she continue with cancer screening based on her personal and family history as discussed above.    Genetic testing is rapidly advancing, and new cancer susceptibility genes will most likely be identified in the future. Therefore, I encouraged Betty to contact me annually or if there are changes in her personal or family history. This may change how we assess her cancer risk, screening, and the testing we would offer.    Plan:  1.  Betty has access to a copy of her test results via [place] [facility].  2. She plans to follow-up with her physicians.  3. She should contact me regularly, or sooner if her family history changes.  4. I will contact Betty if the laboratory informs me that these variants have been reclassified. This may change screening and testing recommendations for Betty and her relatives.    If Betty has any further questions, I encouraged her to contact me at [phone].    Marcella London MS, [facility] [place]  Licensed, Certified Genetic Counselor  Office: [phone]  Email: [email or web address]

## 2023-01-25 LAB — BACTERIA ABSC ANAEROBE+AEROBE CULT: NO GROWTH

## 2023-01-26 ENCOUNTER — ANCILLARY PROCEDURE (OUTPATIENT)
Dept: ULTRASOUND IMAGING | Facility: CLINIC | Age: 48
End: 2023-01-26
Attending: OBSTETRICS & GYNECOLOGY
Payer: COMMERCIAL

## 2023-01-26 ENCOUNTER — PATIENT OUTREACH (OUTPATIENT)
Dept: RADIATION ONCOLOGY | Facility: CLINIC | Age: 48
End: 2023-01-26

## 2023-01-26 DIAGNOSIS — C50.912 CARCINOMA OF LEFT BREAST METASTATIC TO AXILLARY LYMPH NODE (H): ICD-10-CM

## 2023-01-26 DIAGNOSIS — Z40.02 ENCOUNTER FOR PROPHYLACTIC REMOVAL OF OVARY: ICD-10-CM

## 2023-01-26 DIAGNOSIS — C50.812 MALIGNANT NEOPLASM OF OVERLAPPING SITES OF LEFT BREAST IN FEMALE, ESTROGEN RECEPTOR POSITIVE (H): Primary | ICD-10-CM

## 2023-01-26 DIAGNOSIS — C77.3 CARCINOMA OF LEFT BREAST METASTATIC TO AXILLARY LYMPH NODE (H): ICD-10-CM

## 2023-01-26 DIAGNOSIS — Z17.0 MALIGNANT NEOPLASM OF OVERLAPPING SITES OF LEFT BREAST IN FEMALE, ESTROGEN RECEPTOR POSITIVE (H): Primary | ICD-10-CM

## 2023-01-26 PROCEDURE — 99207 PR NO CHARGE LOS: CPT | Performed by: RADIOLOGY

## 2023-01-26 PROCEDURE — 76856 US EXAM PELVIC COMPLETE: CPT

## 2023-01-26 PROCEDURE — 76830 TRANSVAGINAL US NON-OB: CPT

## 2023-01-26 NOTE — PROGRESS NOTES
Received telephone call from patient reporting that she has received clearance from her plastic surgeon to move forward with radiation treatment and would like to schedule next steps.  Patient follows with Dr. Foster.  Informed patient that this RN would send message to Dr. Foster and RITA Frye with Dr. Foster with request to contact patient and assist in next steps.  Patient verbalized understanding of all information and had no questions at this time.    In-basket message sent to Dr. Foster and Melva Bishop RN with above information and request to contact patient.    Keri Phoenix, RN BSN OCN CBCN

## 2023-01-27 ENCOUNTER — DOCUMENTATION ONLY (OUTPATIENT)
Dept: ONCOLOGY | Facility: CLINIC | Age: 48
End: 2023-01-27
Payer: COMMERCIAL

## 2023-01-27 ENCOUNTER — TELEPHONE (OUTPATIENT)
Dept: ONCOLOGY | Facility: CLINIC | Age: 48
End: 2023-01-27
Payer: COMMERCIAL

## 2023-01-27 LAB — BACTERIA ABSC ANAEROBE+AEROBE CULT: NORMAL

## 2023-01-27 NOTE — TELEPHONE ENCOUNTER
PA Initiation    Medication: Verzenio 150mg, PA pending   Ref.# [id number]  Insurance Company: [employer] - Phone [phone] Fax [phone]  Pharmacy Filling the Rx:    Filling Pharmacy Phone:    Filling Pharmacy Fax:    Start Date: 1/27/2023

## 2023-01-27 NOTE — TELEPHONE ENCOUNTER
I have requested abemaciclib.    It gets a little complicated because NCCN guidelines support abema use w/ 4+ positive lymph node, buts FDA is a bit more restrictive / selective about abema approval.    Sent to pharmacy for review and approval.    Hoang Franco MD.

## 2023-01-27 NOTE — TELEPHONE ENCOUNTER
Patient updated about test result as a tool for treatment and provider ordered abemaciclib, awaiting insurance approval.      Della Kc RN

## 2023-01-30 ENCOUNTER — OFFICE VISIT (OUTPATIENT)
Dept: RADIATION ONCOLOGY | Facility: CLINIC | Age: 48
End: 2023-01-30
Payer: COMMERCIAL

## 2023-01-30 VITALS
OXYGEN SATURATION: 100 % | WEIGHT: 130 LBS | RESPIRATION RATE: 16 BRPM | SYSTOLIC BLOOD PRESSURE: 99 MMHG | BODY MASS INDEX: 22.31 KG/M2 | HEART RATE: 74 BPM | DIASTOLIC BLOOD PRESSURE: 66 MMHG

## 2023-01-30 DIAGNOSIS — C50.812 MALIGNANT NEOPLASM OF OVERLAPPING SITES OF LEFT BREAST IN FEMALE, ESTROGEN RECEPTOR POSITIVE (H): Primary | ICD-10-CM

## 2023-01-30 DIAGNOSIS — Z17.0 MALIGNANT NEOPLASM OF OVERLAPPING SITES OF LEFT BREAST IN FEMALE, ESTROGEN RECEPTOR POSITIVE (H): Primary | ICD-10-CM

## 2023-01-30 PROCEDURE — 99207 PR NO BILLABLE SERVICE THIS VISIT: CPT | Performed by: RADIOLOGY

## 2023-01-30 PROCEDURE — 77263 THER RADIOLOGY TX PLNG CPLX: CPT | Performed by: RADIOLOGY

## 2023-01-30 RX ORDER — CLINDAMYCIN HCL 300 MG
1 CAPSULE ORAL 3 TIMES DAILY
COMMUNITY
Start: 2023-01-26 | End: 2023-02-15

## 2023-01-30 RX ORDER — LEVOFLOXACIN 500 MG/1
500 TABLET, FILM COATED ORAL DAILY
COMMUNITY
Start: 2023-01-26 | End: 2023-02-15

## 2023-01-30 ASSESSMENT — PAIN SCALES - GENERAL: PAINLEVEL: NO PAIN (1)

## 2023-01-30 NOTE — NURSING NOTE
"Oncology Rooming Note    January 30, 2023 1:23 PM   Betty Rogers is a 47 year old female who presents for:    Chief Complaint   Patient presents with     Radiation Therapy     Return appointment with Dr. Foster     Initial Vitals: BP 99/66   Pulse 74   Resp 16   Wt 59 kg (130 lb)   LMP  (LMP Unknown)   SpO2 100%   BMI 22.31 kg/m   Estimated body mass index is 22.31 kg/m  as calculated from the following:    Height as of 1/4/23: 1.626 m (5' 4\").    Weight as of this encounter: 59 kg (130 lb). Body surface area is 1.63 meters squared.  No Pain (1) Comment: Data Unavailable   No LMP recorded (lmp unknown). (Menstrual status: Chemotherapy).  Allergies reviewed: Yes  Medications reviewed: Yes    Medications: Medication refills not needed today.  Pharmacy name entered into [facility]:    [id number] [address] [facility] [address]  [facility] [address] [place] [facility] [id number] [place] [address] [address]    Clinical concerns: Return appointment today for expander assessment  was notified.      Melva Bishop RN              "

## 2023-01-30 NOTE — TELEPHONE ENCOUNTER
Prior Authorization Approval    Authorization Effective Date: 1/27/2023  Authorization Expiration Date: 1/27/2024  Medication: Verzenio 150mg, PA approved  Approved Dose/Quantity: 56/28 days  Reference #: [id number]   Insurance Company: [employer] - Phone [phone] Fax [phone]  Expected CoPay:       CoPay Card Available: Yes    Foundation Assistance Needed:    Which Pharmacy is filling the prescription (Not needed for infusion/clinic administered): [facility] - [place], [place] - [address] [place] [address]  Pharmacy Notified: Yes  Patient Notified: Yes

## 2023-01-30 NOTE — LETTER
1/30/2023         RE: Betty Rogers  [address]  [facility] [address]        Dear Colleague,    Thank you for referring your patient, Betty Rogers, to the [facility] RADIATION [facility]COLOG[facility] [place]. Please see a copy of my visit note below.    Dear Colleagues,  Today Betty Rogers was seen in consultation for her diagnosis of breast cancer.     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  Postoperative course was complicated by development of a seroma and possible infection and currently on clindamycin and Levaquin being seen today for expander eval prior to CT simulation.     HISTORY OF PRESENT ILLNESS: Betty Rogers is a 47 year old woman with a recent diagnosis of breast cancer.  On May 23, 2022 bilateral screening mammogram showed within the left breast 12 o'clock position architectural distortion.  There is no suspicious finding in the right breast.  On June 1, 2022 left diagnostic mammogram confirmed the left breast 12:00 architectural distortion and by same-day ultrasound there was a hypoechoic mass measuring 3.7 cm in greatest dimension.  There was also a left axillary suspicious lymph node.  Contrast-enhanced mammogram on June 2, 2022 confirmed a left breast 3.5 cm solid mass.  Same-day biopsy was completed revealing within the left breast 12 o'clock position grade 2 IDC ER/MI positive HER2/percy negative.  At the 9 o'clock position there was grade 3 DCIS with invasive carcinoma which was also ER MI positive HER2/percy negative.    On June 16, 2022 bilateral breast MRI confirmed the left breast index lesion with a satellite mass measuring up to 5.1 cm. In addition there is suspicious  subcentimeter masses at the 6:00 and 10 o'clock position.  Minimal non-mass enhancement at the site of biopsy-proven DCIS at 9:00.  There is no MRI evidence of malignancy on the right.    On June 17, 2022 ultrasound-guided biopsy of the left axilla was c/c metastatic carcinoma consistent with breast primary.    On June 25, 2022 PET/CT once again showed the left breast mass is hypermetabolic with associated atypical appearing left axillary hypermetabolic lymph nodes.  There is no distant metastatic disease identified.    From July 20, 2022 to November 21, 2022 she received Taxol x 11 cycles (1 cycle skipped due to low blood ct) followed by AC x 4.  Repeat breast MRI in September showed improvement within the breast with relative stability within the axilla.  Once again there is no concerning areas of enhancement within the right.  Of note in October 18, 2022 she had an ED visit for febrile neutropenia.    On December 21, 2022 Dr. South took her to the OR and she had a left breast mastectomy and axillary lymph node dissection with immediate reconstruction.  Pathology revealed 4mm of grade 2 IDC with 4 of 13 involved lymph nodes with extracapsular extension.  Negative LVSI questionable DCIS.  Negative invasive margins with the closest margin being 8 mm.  This gave her a pathologic stage of [id number].    Her postoperative course was complicated by development of a seroma requiring a drain to be placed.  There was also a questionable infection and patient was placed on clindamycin and Levaquin.  Her drain was removed last week.    Currently the patient is doing well postchemotherapy.  She has some alopecia associated with her prior chemotherapy treatment but otherwise has good energy level and no complaints.  She is being seen here today to initiate radiation therapy.    REVIEW OF SYSTEMS: As per HPI, a 14-point review of system is otherwise negative.     PAST RADIATION THERAPY:  Denies.     No CTD/Pacemaker:  Denies     BREAST RISK FACTORS:  Mother had breast cancer age 40, (m) aunt age 50, (m) cousin also had breast. No h/o ovarian cancer.  .  1st at 34 yo.  Menses at age 11. She is premenopausal.   14 months. OCP x 10 years. No h/o HRT. No history of prior breast biopsies.     Past Medical History:   Diagnosis Date     Breast cancer metastasized to axillary lymph node (H) 7/15/2022     No active medical problems        Past Surgical History:   Procedure Laterality Date     C/SECTION, LOW TRANSVERSE      2007     DISSECT LYMPH NODE AXILLA Left 2022    Procedure: left axillary node dissection;  Surgeon: Janie South MD;  Location:  OR     INSERT PORT VASCULAR ACCESS Right 2022    Procedure: PORT PLACEMENT;  Surgeon: Janie South MD;  Location: [facility] OR     MASTECTOMY SIMPLE, SENTINEL NODE, COMBINED Bilateral 2022    Procedure: bilateral skin sparing mastectomies with left sentinel lymph node biopsy, tag localized left axillary node excision;  Surgeon: Janie South MD;  Location:  OR     RECONSTRUCT BREAST, INSERT TISSUE EXPANDER BILATERAL, COMBINED Bilateral 2022    Procedure: BILATERAL TISSUE EXPANDER BREAST RECONSTRUCTION;  Surgeon: Rc Zamorano MD;  Location:  OR     REMOVE PORT VASCULAR ACCESS N/A 2022    Procedure: possible port removal;  Surgeon: Janie South MD;  Location:  OR       Family History   Problem Relation Age of Onset     Breast Cancer Mother         atypical lobular hyperplasia breast cancer     Unknown/Adopted Brother         Sucide     Heart Disease Maternal Grandfather         Heart Issues     Breast Cancer Maternal Aunt 50     Breast Cancer Maternal Cousin      Mental Illness Brother        Social History     Tobacco Use     Smoking status: Never     Smokeless tobacco: Never   Substance Use Topics     Alcohol use: Yes     Comment: socially, approx 6-7 drinks per week     Current Outpatient Medications    Medication     clindamycin (CLEOCIN) 300 MG capsule     levofloxacin (LEVAQUIN) 500 MG tablet     tamoxifen (NOLVADEX) 20 MG tablet     IBUPROFEN PO     prochlorperazine (COMPAZINE) 10 MG tablet     No current facility-administered medications for this visit.        Allergies   Allergen Reactions     Nuts      Other [Seasonal Allergies]      Tree Nuts-Lips swell and breathing problems     PHYSICAL EXAMINATION:  BP 99/66   Pulse 74   Resp 16   Wt 59 kg (130 lb)   LMP  (LMP Unknown)   SpO2 100%   BMI 22.31 kg/m    GENERAL Well appearing woman in no acute distress.  HEENT Normocephalic, atraumatic.  Sclerae anicteric.   CVR  Regular rate and rhythm.  No murmurs, rubs, or gallops.  LUNGS Clear to auscultation bilaterally.  BREASTS Breasts are surgically absent  CHEST  Bilateral expanders in place. Well healed surgical scars. No suspicious erythema, desquamation or nodularity.  LYMPH No supraclavicular, infraclavicular, or axillary lymphadenopathy appreciated bilaterally.  ABDOMEN Soft.    EXT  No CCE     NEURO No focal deficits  MSK  Good ROM.   SKIN  Warm and well perfused.  Diffuse alopecia.  PSYCH Orientation: Alert, attentive, and oriented to time, place, and person                           Affect: Affect was appropriate to the situation and showed normal range band stability. No anxious mood                           Speech: Speech was clear with normal fluency, rate, tone, and volume.                           Memory: Although not formally assessed, immediate, recent, and remote memory appeared to be intact.                            Insight and Judgement:  demonstrates adequate awareness of the issues discussed and was able to come to reasonable conclusions.                           Thought: Thought patterns were coherent and logical.    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  Postoperative course was complicated by development of a seroma and possible infection and currently on clindamycin and Levaquin being seen today for expander eval prior to CT simulation.     The risks, benefits, treatment rationale and regimen of radiation therapy to the chest wall and regional nodes were discussed previously. I also reiterated today that given that she has an expander in place risk of complications of her expander with radiation are roughly 20%.  She previously consented to therapy and will return for CT simulation this Wednesday.        There was ample time for questions and all were answered to the patient's satisfaction. Thank you for allowing me to participate in the care of this pleasant patient. If you have any questions, please do not hesitate to contact my office.       Sincerely,    Latrell Foster MD  Adjunct   Dept of Radiation Oncology  [facility]                      Again, thank you for allowing me to participate in the care of your patient.        Sincerely,        LEXIE Foster MD

## 2023-01-30 NOTE — PROGRESS NOTES
Dear Colleagues,  Today Betty Rogers was seen in consultation for her diagnosis of breast cancer.     IDENTIFICATION: Betty Rogers is a 47 year old woman with a clinical diagnosis of left breast G2 IDCA w/ G3 DCIS, [id number], ER/NM positive HER2/percy negative s/p neoadjuvant chemo with mixed response followed by mastectomy and axillary lymph node dissection with immediate reconstruction revealing [id number] ER/NM positive HER2/percy negative.  Postoperative course was complicated by development of a seroma and possible infection and currently on clindamycin and Levaquin being seen today for expander eval prior to CT simulation.     HISTORY OF PRESENT ILLNESS: Betty Rogers is a 47 year old woman with a recent diagnosis of breast cancer.  On May 23, 2022 bilateral screening mammogram showed within the left breast 12 o'clock position architectural distortion.  There is no suspicious finding in the right breast.  On June 1, 2022 left diagnostic mammogram confirmed the left breast 12:00 architectural distortion and by same-day ultrasound there was a hypoechoic mass measuring 3.7 cm in greatest dimension.  There was also a left axillary suspicious lymph node.  Contrast-enhanced mammogram on June 2, 2022 confirmed a left breast 3.5 cm solid mass.  Same-day biopsy was completed revealing within the left breast 12 o'clock position grade 2 IDC ER/NM positive HER2/percy negative.  At the 9 o'clock position there was grade 3 DCIS with invasive carcinoma which was also ER NM positive HER2/percy negative.    On June 16, 2022 bilateral breast MRI confirmed the left breast index lesion with a satellite mass measuring up to 5.1 cm. In addition there is suspicious subcentimeter masses at the 6:00 and 10 o'clock position.  Minimal non-mass enhancement at the site of biopsy-proven DCIS at 9:00.  There is no MRI evidence of malignancy on the right.    On June 17, 2022 ultrasound-guided biopsy of the left axilla was c/c metastatic  carcinoma consistent with breast primary.    On 2022 PET/CT once again showed the left breast mass is hypermetabolic with associated atypical appearing left axillary hypermetabolic lymph nodes.  There is no distant metastatic disease identified.    From 2022 to 2022 she received Taxol x 11 cycles (1 cycle skipped due to low blood ct) followed by AC x 4.  Repeat breast MRI in September showed improvement within the breast with relative stability within the axilla.  Once again there is no concerning areas of enhancement within the right.  Of note in 2022 she had an ED visit for febrile neutropenia.    On 2022 Dr. South took her to the OR and she had a left breast mastectomy and axillary lymph node dissection with immediate reconstruction.  Pathology revealed 4mm of grade 2 IDC with 4 of 13 involved lymph nodes with extracapsular extension.  Negative LVSI questionable DCIS.  Negative invasive margins with the closest margin being 8 mm.  This gave her a pathologic stage of [id number].    Her postoperative course was complicated by development of a seroma requiring a drain to be placed.  There was also a questionable infection and patient was placed on clindamycin and Levaquin.  Her drain was removed last week.    Currently the patient is doing well postchemotherapy.  She has some alopecia associated with her prior chemotherapy treatment but otherwise has good energy level and no complaints.  She is being seen here today to initiate radiation therapy.    REVIEW OF SYSTEMS: As per HPI, a 14-point review of system is otherwise negative.     PAST RADIATION THERAPY:  Denies.     No CTD/Pacemaker: Denies     BREAST RISK FACTORS:  Mother had breast cancer age 40, (m) aunt age 50, (m) cousin also had breast. No h/o ovarian cancer.  .  1st at 32 yo.  Menses at age 11. She is premenopausal.   14 months. OCP x 10 years. No h/o HRT. No history of prior breast  biopsies.     Past Medical History:   Diagnosis Date     Breast cancer metastasized to axillary lymph node (H) 7/15/2022     No active medical problems        Past Surgical History:   Procedure Laterality Date     C/SECTION, LOW TRANSVERSE      2010, 2007     DISSECT LYMPH NODE AXILLA Left 12/21/2022    Procedure: left axillary node dissection;  Surgeon: Janie South MD;  Location: [facility] OR     INSERT PORT VASCULAR ACCESS Right 07/25/2022    Procedure: PORT PLACEMENT;  Surgeon: Janie South MD;  Location: [facility] OR     MASTECTOMY SIMPLE, SENTINEL NODE, COMBINED Bilateral 12/21/2022    Procedure: bilateral skin sparing mastectomies with left sentinel lymph node biopsy, tag localized left axillary node excision;  Surgeon: Janie South MD;  Location: [facility] OR     RECONSTRUCT BREAST, INSERT TISSUE EXPANDER BILATERAL, COMBINED Bilateral 12/21/2022    Procedure: BILATERAL TISSUE EXPANDER BREAST RECONSTRUCTION;  Surgeon: Rc Zamorano MD;  Location: [facility] OR     REMOVE PORT VASCULAR ACCESS N/A 12/21/2022    Procedure: possible port removal;  Surgeon: Janie South MD;  Location: [facility] OR       Family History   Problem Relation Age of Onset     Breast Cancer Mother         atypical lobular hyperplasia breast cancer     Unknown/Adopted Brother         Sucide     Heart Disease Maternal Grandfather         Heart Issues     Breast Cancer Maternal Aunt 50     Breast Cancer Maternal Cousin      Mental Illness Brother        Social History     Tobacco Use     Smoking status: Never     Smokeless tobacco: Never   Substance Use Topics     Alcohol use: Yes     Comment: socially, approx 6-7 drinks per week     Current Outpatient Medications   Medication     clindamycin (CLEOCIN) 300 MG capsule     levofloxacin (LEVAQUIN) 500 MG tablet     tamoxifen (NOLVADEX) 20 MG tablet     IBUPROFEN PO     prochlorperazine (COMPAZINE) 10 MG tablet     No current facility-administered medications for this visit.        Allergies    Allergen Reactions     Nuts      Other [Seasonal Allergies]      Tree Nuts-Lips swell and breathing problems     PHYSICAL EXAMINATION:  BP 99/66   Pulse 74   Resp 16   Wt 59 kg (130 lb)   LMP  (LMP Unknown)   SpO2 100%   BMI 22.31 kg/m    GENERAL Well appearing woman in no acute distress.  HEENT Normocephalic, atraumatic.  Sclerae anicteric.   CVR  Regular rate and rhythm.  No murmurs, rubs, or gallops.  LUNGS Clear to auscultation bilaterally.  BREASTS Breasts are surgically absent  CHEST  Bilateral expanders in place. Well healed surgical scars. No suspicious erythema, desquamation or nodularity.  LYMPH No supraclavicular, infraclavicular, or axillary lymphadenopathy appreciated bilaterally.  ABDOMEN Soft.    EXT  No CCE     NEURO No focal deficits  MSK  Good ROM.   SKIN  Warm and well perfused.  Diffuse alopecia.  PSYCH Orientation: Alert, attentive, and oriented to time, place, and person                           Affect: Affect was appropriate to the situation and showed normal range band stability. No anxious mood                           Speech: Speech was clear with normal fluency, rate, tone, and volume.                           Memory: Although not formally assessed, immediate, recent, and remote memory appeared to be intact.                            Insight and Judgement:  demonstrates adequate awareness of the issues discussed and was able to come to reasonable conclusions.                           Thought: Thought patterns were coherent and logical.    ECOG PERFORMANCE STATUS: 1    IMPRESSION/PLAN:  Betty Rogers is a 47 year old woman with a clinical diagnosis of left breast G2 IDCA w/ G3 DCIS, [id number], ER/CT positive HER2/percy negative s/p neoadjuvant chemo with mixed response followed by mastectomy and axillary lymph node dissection with immediate reconstruction revealing [id number] ER/CT positive HER2/percy negative.  Postoperative course was complicated by development of a seroma and  possible infection and currently on clindamycin and Levaquin being seen today for expander eval prior to CT simulation.     The risks, benefits, treatment rationale and regimen of radiation therapy to the chest wall and regional nodes were discussed previously. I also reiterated today that given that she has an expander in place risk of complications of her expander with radiation are roughly 20%.  She previously consented to therapy and will return for CT simulation this Wednesday.        There was ample time for questions and all were answered to the patient's satisfaction. Thank you for allowing me to participate in the care of this pleasant patient. If you have any questions, please do not hesitate to contact my office.       Sincerely,    Latrell Foster MD  Adjunct   Dept of Radiation Oncology  [facility]

## 2023-01-31 ENCOUNTER — TELEPHONE (OUTPATIENT)
Dept: ONCOLOGY | Facility: CLINIC | Age: 48
End: 2023-01-31
Payer: COMMERCIAL

## 2023-01-31 ENCOUNTER — DOCUMENTATION ONLY (OUTPATIENT)
Dept: ONCOLOGY | Facility: CLINIC | Age: 48
End: 2023-01-31
Payer: COMMERCIAL

## 2023-01-31 NOTE — PROGRESS NOTES
"Oral Chemotherapy Monitoring Program    Primary Oncologist: Salvador  Primary Oncology Clinic: [facility]  Cancer Diagnosis: Breast Cancer    Drug: Verzenio/Tamoxifen  Verzenio 150mg BID  Start Date: TBD  Expected duration of therapy: Until disease progression or unacceptable toxicity    Drug Interaction Assessment: none    Lab Monitoring Plan  Every 2 weeks for the first 2 months then monthly thereafter    Subjective/Objective:  Betty Rogers is a 47 year old female contacted by phone for an initial visit for oral chemotherapy education.      ORAL CHEMOTHERAPY 1/27/2023 1/31/2023   Assessment Type Initial Work up New Teach   Diagnosis Code Breast Cancer Breast Cancer   Providers Dr Salvador Franco   Clinic Name/Location [facility]   Drug Name Verzenio (abemaciclib) Verzenio (abemaciclib)   Dose 150 mg 150 mg   Current Schedule BID BID   Cycle Details Continuous Continuous   Any new drug interactions? No -   Is the dose as ordered appropriate for the patient? Yes -       Vitals:  BP:   BP Readings from Last 1 Encounters:   01/30/23 99/66     Wt Readings from Last 1 Encounters:   01/30/23 59 kg (130 lb)     Estimated body surface area is 1.63 meters squared as calculated from the following:    Height as of 1/4/23: 1.626 m (5' 4\").    Weight as of 1/30/23: 59 kg (130 lb).    Labs:  No results found for NA within last 30 days.     No results found for K within last 30 days.     No results found for CA within last 30 days.     No results found for Mag within last 30 days.     No results found for Phos within last 30 days.     No results found for ALBUMIN within last 30 days.     No results found for BUN within last 30 days.     No results found for CR within last 30 days.       No results found for AST within last 30 days.     No results found for ALT within last 30 days.     No results found for BILITOTAL within last 30 days.       No results found for WBC within last 30 days.     No results found " for HGB within last 30 days.     No results found for PLT within last 30 days.     No results found for ANC within last 30 days.         Assessment:  Patient is appropriate to start therapy.    Plan:  Basic chemotherapy teaching was reviewed with the patient including indication, start date of therapy, dose, administration, adverse effects, missed doses, food and drug interactions, monitoring, side effect management, office contact information, and safe handling. Written materials were provided and all questions answered.    Follow-Up:  2/13/23 for follow-up phone call     Nubia Montague  Oncology Pharmacy Intern  [facility]  [phone]

## 2023-01-31 NOTE — TELEPHONE ENCOUNTER
Patient returned call. Informed that a low (<1%) Ki-67 is prognostically good.    Explained that Dr Franco still recommends Verzenio with the low Ki-67. The decision to proceed with Verzenio isn't based soley on Ki-67. The decision also includes number of positive lymph nodes, etc.    Informed patient her insurance company has approved Verzenio (see telephone encounter dated 1/27/23) but it needs to be filled at a [facility].  Informed patient she may get a call from [facility] to verify address, pay copay, etc.    Patient verbalized understanding of this information.    Patient states she expects to complete radiation mid to end of March 2023.    Per Salvador's check out notes on 1/4/23 patient should see Dr Franco at the end of radiation with labs and DEXA prior.  Explained rationale for DEXA along with how it's performed.  Transferred to schedulers to schedule these appointments mid to end of March.    Marly Stoner RN on 1/31/2023 at 3:51 PM

## 2023-01-31 NOTE — TELEPHONE ENCOUNTER
Patient : Ginger Ramirez Obi Age: 66 year old Sex: female   MRN: [id number] Encounter Date: 6/3/2022  E19/19      History     Chief Complaint   Patient presents with   • Mouth/Lip Problem     HPI  6/3/2022  3:18 PM Ginger Ramirez Obi is a 66 year old female with a hx of Afib on coumadin who presents to the ED for evaluation of mouth bleeding that began this morning. The pt states that she had woken up this morning with her mouth full of blood. She had tried to clean her mouth but the bleeding continued. The bleeding stopped when she went to work until she had eaten a sandwich. She is unsure where the bleeding originated. The pt denies bloody stool, hematuria and epistaxis. She last c/o itchiness to her bilateral eyes. She is not sure when her last INRO was checked. There are no further complaints or modifying factors at this time.    PCP: Sea Pressley MD    Allergies   Allergen Reactions   • Lisinopril Other (See Comments)     She \"felt bad.\"   • Maxitrol [Neomycin-Polymyxin-Dexameth] SWELLING     Severe allergic conjunctivitis        Current Discharge Medication List      Prior to Admission Medications    Details   chlorthalidone (THALITONE) 25 MG tablet Take 1 tablet by mouth daily.  Qty: 30 tablet, Refills: 5      warfarin (COUMADIN) 4 MG tablet TAKE 2 TABLETS BY MOUTH ON  MON DAY WEDNESDAY TAKE 1 TABLET BY MOUTH ALL OTHER DAYS  Qty: 130 tablet, Refills: 3      atenolol (TENORMIN) 50 MG tablet TAKE 1 TABLET BY MOUTH  DAILY  Qty: 90 tablet, Refills: 3      atorvastatin (LIPITOR) 40 MG tablet TAKE 1 TABLET BY MOUTH  DAILY  Qty: 90 tablet, Refills: 3      metFORMIN (GLUCOPHAGE) 500 MG tablet TAKE 1 TABLET BY MOUTH  DAILY WITH BREAKFAST  Qty: 90 tablet, Refills: 3      magnesium 250 MG tablet Take 1 tablet by mouth nightly.  Qty: 30 tablet, Refills: 12      acetaminophen (TYLENOL) 325 MG tablet Take 2 tablets by mouth every 6 hours as needed for Pain.  Qty: 90 tablet, Refills: 3      TURMERIC PO Take by mouth  Per Marly KRAMER, patient needs to schedule labs, DEXA and follow up with Dr VALLECILLO in Mid-March. LVM for patient to call back.     Kenzie    daily.             Past Medical History:   Diagnosis Date   • HLD (hyperlipidemia)    • [place] (hard of hearing)    • Hypertension    • Low back pain     disc problems       Past Surgical History:   Procedure Laterality Date   • APPENDECTOMY Right 01/17/2018    Hussain       Family History   Problem Relation Age of Onset   • Diabetes Sister        Social History     Tobacco Use   • Smoking status: Never Smoker   • Smokeless tobacco: Never Used   Substance Use Topics   • Alcohol use: No     Alcohol/week: 0.0 standard drinks   • Drug use: No       E-cigarette/Vaping     E-Cigarette/Vaping Substances & Devices       Review of Systems   Constitutional: Negative for chills and fever.   HENT: Positive for dental problem (bleeding mouth). Negative for congestion and nosebleeds.    Eyes: Positive for itching (bilateral). Negative for discharge and redness.   Respiratory: Negative for cough.    Cardiovascular: Negative for chest pain.   Gastrointestinal: Negative for abdominal pain and blood in stool.   Genitourinary: Negative for frequency and hematuria.   Musculoskeletal: Negative for arthralgias.   Skin: Negative for rash.   Neurological: Negative for headaches.       Physical Exam     ED Triage Vitals [06/03/22 1433]   ED Triage Vitals Group      Temp 98.4 °F (36.9 °C)      Heart Rate 81      Resp 16      BP (!) 152/99      SpO2 98 %      EtCO2 mmHg       Height 5' 2\" (1.575 m)      Weight 172 lb (78 kg)      Weight Scale Used Standing scale      BMI (Calculated) 31.46      IBW/kg (Calculated) 50.1       Physical Exam  Vitals and nursing note reviewed.   Constitutional:       General: She is not in acute distress.     Appearance: Normal appearance. She is well-developed. She is not ill-appearing, toxic-appearing or diaphoretic.   HENT:      Head: Atraumatic.      Nose: Congestion present.      Mouth/Throat:      Mouth: Mucous membranes are moist.      Comments: No bleeding to mouth.  No blood to the oropharynx.   Eyes:       General: No scleral icterus.        Right eye: Discharge (clear) present.         Left eye: Discharge (clear) present.     Conjunctiva/sclera:      Right eye: Right conjunctiva is injected.      Left eye: Left conjunctiva is injected.      Pupils: Pupils are equal, round, and reactive to light.   Cardiovascular:      Rate and Rhythm: Normal rate and regular rhythm.      Pulses: Normal pulses.           Radial pulses are 2+ on the right side and 2+ on the left side.        Dorsalis pedis pulses are 2+ on the right side and 2+ on the left side.      Heart sounds: Normal heart sounds. No murmur heard.    No friction rub.   Pulmonary:      Effort: Pulmonary effort is normal. No respiratory distress.      Breath sounds: Normal breath sounds. No stridor. No wheezing, rhonchi or rales.   Chest:      Chest wall: No tenderness.   Abdominal:      General: Bowel sounds are normal. There is no distension.      Palpations: Abdomen is soft. There is no mass.      Tenderness: There is no abdominal tenderness. There is no guarding or rebound.   Musculoskeletal:         General: No swelling, tenderness, deformity or signs of injury. Normal range of motion.      Cervical back: Normal range of motion and neck supple. No rigidity.   Skin:     General: Skin is warm and dry.      Capillary Refill: Capillary refill takes less than 2 seconds.      Findings: No erythema or rash.   Neurological:      General: No focal deficit present.      Mental Status: She is alert and oriented to person, place, and time.      GCS: GCS eye subscore is 4. GCS verbal subscore is 5. GCS motor subscore is 6.      Cranial Nerves: No cranial nerve deficit.      Sensory: No sensory deficit.      Coordination: Coordination normal.   Psychiatric:         Mood and Affect: Mood normal.       ED Course     Procedures    Lab Results     Results for orders placed or performed during the hospital encounter of 06/03/22   Basic Metabolic Panel   Result Value Ref Range     Fasting Status      Sodium 139 135 - 145 mmol/L    Potassium 3.0 (L) 3.4 - 5.1 mmol/L    Chloride 100 97 - 110 mmol/L    Carbon Dioxide 32 21 - 32 mmol/L    Anion Gap 10 7 - 19 mmol/L    Glucose 100 (H) 70 - 99 mg/dL    BUN 39 (H) 6 - 20 mg/dL    Creatinine 1.28 (H) 0.51 - 0.95 mg/dL    Glomerular Filtration Rate 46 (L) >=60    BUN/ Creatinine Ratio 30 (H) 7 - 25    Calcium 10.0 8.4 - 10.2 mg/dL   Prothrombin Time   Result Value Ref Range    Prothrombin Time 28.3 (H) 9.7 - 11.8 sec    INR 2.8     CBC with Automated Differential (performable only)   Result Value Ref Range    WBC 5.1 4.2 - 11.0 K/mcL    RBC 4.58 4.00 - 5.20 mil/mcL    HGB 12.6 12.0 - 15.5 g/dL    HCT 38.8 36.0 - 46.5 %    MCV 84.7 78.0 - 100.0 fl    MCH 27.5 26.0 - 34.0 pg    MCHC 32.5 32.0 - 36.5 g/dL    RDW-CV 14.7 11.0 - 15.0 %    RDW-SD 45.2 39.0 - 50.0 fL     140 - 450 K/mcL    NRBC 0 <=0 /100 WBC    Neutrophil, Percent 55 %    Lymphocytes, Percent 32 %    Mono, Percent 11 %    Eosinophils, Percent 2 %    Basophils, Percent 0 %    Immature Granulocytes 0 %    Absolute Neutrophils 2.8 1.8 - 7.7 K/mcL    Absolute Lymphocytes 1.6 1.0 - 4.0 K/mcL    Absolute Monocytes 0.6 0.3 - 0.9 K/mcL    Absolute Eosinophils  0.1 0.0 - 0.5 K/mcL    Absolute Basophils 0.0 0.0 - 0.3 K/mcL    Absolute Immmature Granulocytes 0.0 0.0 - 0.2 K/mcL       EKG Results     N/A    Radiology Results     Imaging Results    None         ED Medication Orders (From admission, onward)    Ordered Start     Status Ordering Provider    06/03/22 1657 06/03/22 1658  potassium CHLORIDE (KLOR-CON M) simone ER tablet 40 mEq  ONCE         Last MAR action: Given DANII STANFORD               [facility]  Vitals  Vitals:    06/03/22 1433 06/03/22 1457 06/03/22 1724   BP: (!) 152/99 (!) 155/101 (!) 142/87   Patient Position: Sitting     Pulse: 81 63 67   Resp: 16 18 18   Temp: 98.4 °F (36.9 °C)     TempSrc: Oral     SpO2: 98% 99% 100%   Weight: 78 kg (172 lb)     Height: 5' 2\" (1.575 m)          ED Course  3:40 PM We discussed the plan of care including observation in the ED. I informed the pt that I will prescribe her erythromycin. We discussed the plan of care including follow up with her PCP. I informed her that she should return to the ED for any new or worsening symptoms.  They understand and agree with the plan. Any questions were answered.    4:54 PM I rechecked the pt and informed her of her INR of 2.8. She states that the bleeding has not returned. We discussed the plan of care including follow up with PCP. I informed her that she should return to the ED for any new or worsening symptoms.  They understand and agree with the plan. Any questions were answered.    5:14 PM I rechecked the pt and informed her that her potassium is slightly low. We discussed eating more bananas or potatoes to resolve this issue.    MDM  Patient with bleeding from mouth.  The bleeding is stopped and there is no identifiable source of bleeding.  No epistaxis.  Labs reviewed.  Patient is nontoxic and well-appearing with stable vitals.  Will treat with erythromycin ointment for her eyes as this could up conjunctivitis.  Patient and family agreeable with plan of care, understand return and follow-up instructions and questions answered    Critical Care    No Critical Care      ED Diagnoses        Final diagnoses    Anticoagulated on Coumadin          Blood in mouth of unknown source          Hypokalemia                Follow Up:  Sea Pressley MD  [address]  [place] [address] [phone]    Call in 3 days            Summary of your Discharge Medications      Take these Medications      Details   erythromycin ophthalmic ointment  Commonly known as: ILOTYCIN   Place 0.5 inches into both eyes every 6 hours.            Pt is discharged to home/self care in stable condition.         I have reviewed the information recorded by the scribe for accuracy and agree with its  contents.    ____________________________________________________________________    Chase Perry acting as a scribe for Dr. Paul Tate  Dictation # [id number]  Scribe: Chase Tate MD  06/03/22 2009

## 2023-01-31 NOTE — TELEPHONE ENCOUNTER
"Discussed with Dr Franco. A low Ki-67 score is prognostically \"good.\" Left message for patient to return call to cancer coordinator. Want to try and explain Ki-67 results.  "

## 2023-02-01 ENCOUNTER — LAB (OUTPATIENT)
Dept: LAB | Facility: CLINIC | Age: 48
End: 2023-02-01
Payer: COMMERCIAL

## 2023-02-01 ENCOUNTER — MYC MEDICAL ADVICE (OUTPATIENT)
Dept: ONCOLOGY | Facility: CLINIC | Age: 48
End: 2023-02-01

## 2023-02-01 ENCOUNTER — APPOINTMENT (OUTPATIENT)
Dept: RADIATION ONCOLOGY | Facility: CLINIC | Age: 48
End: 2023-02-01
Payer: COMMERCIAL

## 2023-02-01 DIAGNOSIS — C50.912 CARCINOMA OF LEFT BREAST METASTATIC TO AXILLARY LYMPH NODE (H): Primary | ICD-10-CM

## 2023-02-01 DIAGNOSIS — Z17.0 MALIGNANT NEOPLASM OF OVERLAPPING SITES OF LEFT BREAST IN FEMALE, ESTROGEN RECEPTOR POSITIVE (H): ICD-10-CM

## 2023-02-01 DIAGNOSIS — C77.3 CARCINOMA OF LEFT BREAST METASTATIC TO AXILLARY LYMPH NODE (H): ICD-10-CM

## 2023-02-01 DIAGNOSIS — C77.3 CARCINOMA OF LEFT BREAST METASTATIC TO AXILLARY LYMPH NODE (H): Primary | ICD-10-CM

## 2023-02-01 DIAGNOSIS — C50.912 CARCINOMA OF LEFT BREAST METASTATIC TO AXILLARY LYMPH NODE (H): ICD-10-CM

## 2023-02-01 DIAGNOSIS — C50.812 MALIGNANT NEOPLASM OF OVERLAPPING SITES OF LEFT BREAST IN FEMALE, ESTROGEN RECEPTOR POSITIVE (H): ICD-10-CM

## 2023-02-01 LAB
ALBUMIN SERPL-MCNC: 3.3 G/DL (ref 3.4–5)
ALP SERPL-CCNC: 52 U/L (ref 40–150)
ALT SERPL W P-5'-P-CCNC: 18 U/L (ref 0–50)
ANION GAP SERPL CALCULATED.3IONS-SCNC: 6 MMOL/L (ref 3–14)
AST SERPL W P-5'-P-CCNC: 13 U/L (ref 0–45)
BASOPHILS # BLD AUTO: 0 10E3/UL (ref 0–0.2)
BASOPHILS NFR BLD AUTO: 1 %
BILIRUB SERPL-MCNC: 0.4 MG/DL (ref 0.2–1.3)
BUN SERPL-MCNC: 11 MG/DL (ref 7–30)
CALCIUM SERPL-MCNC: 9.1 MG/DL (ref 8.5–10.1)
CHLORIDE BLD-SCNC: 107 MMOL/L (ref 94–109)
CO2 SERPL-SCNC: 27 MMOL/L (ref 20–32)
CREAT SERPL-MCNC: 0.84 MG/DL (ref 0.52–1.04)
EOSINOPHIL # BLD AUTO: 0.2 10E3/UL (ref 0–0.7)
EOSINOPHIL NFR BLD AUTO: 6 %
ERYTHROCYTE [DISTWIDTH] IN BLOOD BY AUTOMATED COUNT: 12.3 % (ref 10–15)
GFR SERPL CREATININE-BSD FRML MDRD: 85 ML/MIN/1.73M2
GLUCOSE BLD-MCNC: 94 MG/DL (ref 70–99)
HCG UR QL: NEGATIVE
HCT VFR BLD AUTO: 35.4 % (ref 35–47)
HGB BLD-MCNC: 11.4 G/DL (ref 11.7–15.7)
IMM GRANULOCYTES # BLD: 0 10E3/UL
IMM GRANULOCYTES NFR BLD: 0 %
LYMPHOCYTES # BLD AUTO: 1.1 10E3/UL (ref 0.8–5.3)
LYMPHOCYTES NFR BLD AUTO: 30 %
MCH RBC QN AUTO: 28.1 PG (ref 26.5–33)
MCHC RBC AUTO-ENTMCNC: 32.2 G/DL (ref 31.5–36.5)
MCV RBC AUTO: 87 FL (ref 78–100)
MONOCYTES # BLD AUTO: 0.2 10E3/UL (ref 0–1.3)
MONOCYTES NFR BLD AUTO: 6 %
NEUTROPHILS # BLD AUTO: 2.2 10E3/UL (ref 1.6–8.3)
NEUTROPHILS NFR BLD AUTO: 57 %
NRBC # BLD AUTO: 0 10E3/UL
NRBC BLD AUTO-RTO: 0 /100
PLATELET # BLD AUTO: 190 10E3/UL (ref 150–450)
POTASSIUM BLD-SCNC: 4.6 MMOL/L (ref 3.4–5.3)
PROT SERPL-MCNC: 6.5 G/DL (ref 6.8–8.8)
RBC # BLD AUTO: 4.06 10E6/UL (ref 3.8–5.2)
SODIUM SERPL-SCNC: 140 MMOL/L (ref 133–144)
WBC # BLD AUTO: 3.8 10E3/UL (ref 4–11)

## 2023-02-01 PROCEDURE — 77334 RADIATION TREATMENT AID(S): CPT | Performed by: RADIOLOGY

## 2023-02-01 PROCEDURE — 36415 COLL VENOUS BLD VENIPUNCTURE: CPT

## 2023-02-01 PROCEDURE — 81025 URINE PREGNANCY TEST: CPT

## 2023-02-01 PROCEDURE — 80053 COMPREHEN METABOLIC PANEL: CPT

## 2023-02-01 PROCEDURE — 85025 COMPLETE CBC W/AUTO DIFF WBC: CPT

## 2023-02-01 PROCEDURE — 77290 THER RAD SIMULAJ FIELD CPLX: CPT | Performed by: RADIOLOGY

## 2023-02-01 RX ORDER — MOMETASONE FUROATE 1 MG/G
CREAM TOPICAL DAILY
Qty: 50 G | Refills: 1 | Status: ON HOLD | OUTPATIENT
Start: 2023-02-01 | End: 2023-02-18

## 2023-02-01 RX ORDER — LOPERAMIDE HCL 2 MG
CAPSULE ORAL
Qty: 30 CAPSULE | Refills: 0 | Status: SHIPPED | OUTPATIENT
Start: 2023-02-01 | End: 2023-04-25

## 2023-02-01 RX ORDER — PROCHLORPERAZINE MALEATE 10 MG
10 TABLET ORAL EVERY 6 HOURS PRN
Qty: 30 TABLET | Refills: 2 | Status: ON HOLD | OUTPATIENT
Start: 2023-02-01 | End: 2023-02-18

## 2023-02-03 NOTE — TELEPHONE ENCOUNTER
Spoke with oncology pharmacist who will have Yu, in finance, work on getting patient assistance, or a kal.    Marly Stoner RN on 2/3/2023 at 9:27 AM

## 2023-02-03 NOTE — TELEPHONE ENCOUNTER
Spoke to Raghu at [facility], Copay is $2,250 a fill and gave copay card info over    Copay card Approved for [employer]  Effective Dates: 1/1/23-12/31/23  Patient notified? Yes  Additional Information: copay will be $0 a fill and will cover up to $25,000 a year.   Copay card giving to [facility] pharmacy to put on Betty's account.

## 2023-02-07 ENCOUNTER — VIRTUAL VISIT (OUTPATIENT)
Dept: FAMILY MEDICINE | Facility: CLINIC | Age: 48
End: 2023-02-07
Payer: COMMERCIAL

## 2023-02-07 DIAGNOSIS — C50.912 CARCINOMA OF LEFT BREAST METASTATIC TO AXILLARY LYMPH NODE (H): ICD-10-CM

## 2023-02-07 DIAGNOSIS — L60.3 DYSTROPHIC NAIL: Primary | ICD-10-CM

## 2023-02-07 DIAGNOSIS — Z79.810 SERM USE (SELECTIVE ESTROGEN RECEPTOR MODULATOR): ICD-10-CM

## 2023-02-07 DIAGNOSIS — C77.3 CARCINOMA OF LEFT BREAST METASTATIC TO AXILLARY LYMPH NODE (H): ICD-10-CM

## 2023-02-07 PROCEDURE — 99213 OFFICE O/P EST LOW 20 MIN: CPT | Mod: 95 | Performed by: INTERNAL MEDICINE

## 2023-02-07 NOTE — PROGRESS NOTES
Betty is a 47 year old who is being evaluated via a billable video visit.      How would you like to obtain your AVS? MyChart  If the video visit is dropped, the invitation should be resent by: Text to cell phone: [phone]  Will anyone else be joining your video visit? No      Assessment & Plan     Diagnoses and all orders for this visit:    Dystrophic nail  -     Orthopedic  Referral; Future    Carcinoma of left breast metastatic to axillary lymph node (H)    SERM use (selective estrogen receptor modulator)       Possible onychomycosis, compounded with recent trauma from nail clipping.  Recommended podiatry evaluation for definitive diagnosis and treatment plan in light of her breast cancer history and tamoxifen use.    Return in about 4 weeks (around 3/7/2023), or as needed for new issues or concerns..    Diana Luevano MD PhD  [facility]   Betty is a 47 year old, presenting for the following health issues:  No chief complaint on file.      History of Present Illness       Reason for visit:  Toenail hematoma  Symptom onset:  1-2 weeks ago  Symptoms include:  Blood blister or hematoma in toenail  Symptom intensity:  Mild  Symptom progression:  Staying the same  Had these symptoms before:  No  What makes it worse:  No  What makes it better:  No    She eats 2-3 servings of fruits and vegetables daily.She consumes 0 sweetened beverage(s) daily.She exercises with enough effort to increase her heart rate 10 to 19 minutes per day.  She exercises with enough effort to increase her heart rate 4 days per week.   She is taking medications regularly.       Patient with metastatic cancer to the left breast, completed chemotherapy, now on tamoxifen.  She is scheduled to have radiation therapy.    She has a thickened toenail on the left third toe.  2 weeks ago there seem to be a blister under the nail.  She was clipping her toenail, went a little too far, it bled.  Since then it  continues to have that thickened appearance.  It is not painful or itchy.    Review of Systems   Constitutional, HEENT, cardiovascular, pulmonary, gi and gu systems are negative, except as otherwise noted.      Objective           Vitals:  No vitals were obtained today due to virtual visit.    Physical Exam   GENERAL: Healthy, alert and no distress  EYES: Eyes grossly normal to inspection.  No discharge or erythema, or obvious scleral/conjunctival abnormalities.  RESP: No audible wheeze, cough, or visible cyanosis.  No visible retractions or increased work of breathing.    NEURO:Mentation and speech appropriate for age.  PSYCH: Mentation appears normal, affect normal/bright, judgement and insight intact, normal speech and appearance well-groomed.    Left foot: Patient has nail polish on.  The third toenail looked to be thicker, with maroon purplish discoloration under the nail.          Video-Visit Details    Type of service:  Video Visit     Originating Location (pt. Location): Home  Distant Location (provider location):  Off-site  Platform used for Video Visit: TraciWell

## 2023-02-09 ENCOUNTER — APPOINTMENT (OUTPATIENT)
Dept: RADIATION ONCOLOGY | Facility: CLINIC | Age: 48
End: 2023-02-09
Payer: COMMERCIAL

## 2023-02-09 PROCEDURE — 77334 RADIATION TREATMENT AID(S): CPT | Performed by: RADIOLOGY

## 2023-02-09 PROCEDURE — 77295 3-D RADIOTHERAPY PLAN: CPT | Performed by: RADIOLOGY

## 2023-02-09 PROCEDURE — 77293 RESPIRATOR MOTION MGMT SIMUL: CPT | Performed by: RADIOLOGY

## 2023-02-09 PROCEDURE — 77300 RADIATION THERAPY DOSE PLAN: CPT | Performed by: RADIOLOGY

## 2023-02-11 NOTE — PATIENT INSTRUCTIONS
Genetic Testing  Genetic testing involved a blood or saliva test which looked at the genetic information in select genes for variants associated with cancer risk.  This testing may have included analysis of a single gene due to a known variant in the family, multiple genes most associated with the cancers in a family, or an expanded panel of genes related to many types of cancers.    Results  There are several possible genetic test results, including:   Positive--a harmful mutation (also known as a  pathogenic  or  likely pathogenic  variant) was identified in a gene associated with increased cancer risk.  These risks, as well as medical management options, depend on the specific genetic variant identified.    Negative--no variants were identified in the genes analyzed   Variant of unknown significance--a variant was identified in one or more genes, though it is currently unclear how this impacts cancer risk in the family.  Genetic testing labs are working to collect evidence about these uncertain variants and may provide updates in the future.    What is a Variant of Unknown Significance?  A variant of unknown significance (VUS) is a genetic change with unclear clinical significance.  Scientists currently do not know if this specific variant is associated with increased cancer risks,  or if it is a benign (harmless) change with no impact on health.       A variant may be of uncertain significance for several reasons.  It is possible that this specific variant has not been seen before by the laboratory, or only in a small number of families.  There is currently not enough information to know how this variant may impact your health.          Reclassification  Genetic testing laboratories and researchers are collecting evidence to determine the importance of variants of unknown significance.  Many variants of uncertain significance are later reclassified as benign findings, however some may be associated with  increased cancer risk.  Laboratories will often notify the genetic counselor/ordering provider when a patient s VUS has been reclassified.        Some families may be candidates for participation in the laboratory s variant research programs to help determine the importance of their VUS.  Participating in these programs does not guarantee that families will learn the significance of their VUS immediately.  It could be months or years before a VUS is reclassified.       Screening Recommendations  A combination of personal and family history factors may inform cancer risk and medical management recommendations.  Population cancer screening options, such as those recommended by the American Cancer Society and the National Comprehensive Cancer Network (NCCN) are appropriate for many families at average risk for cancer.  However, earlier and/or more frequent screening may be recommended based on personal factors (lifestyle, exposures, medications, screening results), family history of cancer, and sometimes genetic factors.  These cancer risk management options should be discussed in more detail with an individual's medical providers.      It is usually not recommended that other relatives have genetic testing for the VUS unless it is done as part of the laboratory s variant research program because an individual s test results should not influence their cancer screening until we determine the importance of the VUS.  Your genetic counselor can help you and your relatives understand the risks and benefits of all genetic testing and cancer screening options.    Please call us if you have any questions or concerns.   Cancer Risk Management Program (Appointments: [phone])  Shahram Jennings, MS [facility]  [phone]  Clara Lazcano, MS, [facility] [phone]  Clary Kovacs, MS, [facility]  [phone]  Kandis Rios, MS, [facility]  [phone]  Marcella London, MS, [facility]  [phone]  Nayla Villarreal, MS, [facility] [phone]  Sandi Pinto MS,  [facility] [phone]

## 2023-02-13 ENCOUNTER — APPOINTMENT (OUTPATIENT)
Dept: RADIATION ONCOLOGY | Facility: CLINIC | Age: 48
End: 2023-02-13
Payer: COMMERCIAL

## 2023-02-13 PROCEDURE — 77280 THER RAD SIMULAJ FIELD SMPL: CPT | Performed by: RADIOLOGY

## 2023-02-14 ENCOUNTER — APPOINTMENT (OUTPATIENT)
Dept: RADIATION ONCOLOGY | Facility: CLINIC | Age: 48
End: 2023-02-14
Payer: COMMERCIAL

## 2023-02-14 PROCEDURE — 77332 RADIATION TREATMENT AID(S): CPT | Performed by: RADIOLOGY

## 2023-02-14 PROCEDURE — 77412 RADIATION TX DELIVERY LVL 3: CPT | Performed by: RADIOLOGY

## 2023-02-14 PROCEDURE — 77387 GUIDANCE FOR RADJ TX DLVR: CPT | Performed by: RADIOLOGY

## 2023-02-15 ENCOUNTER — HOSPITAL ENCOUNTER (OUTPATIENT)
Dept: OCCUPATIONAL THERAPY | Facility: CLINIC | Age: 48
Setting detail: THERAPIES SERIES
Discharge: HOME | End: 2023-02-15
Attending: SURGERY
Payer: COMMERCIAL

## 2023-02-15 ENCOUNTER — APPOINTMENT (OUTPATIENT)
Dept: RADIATION ONCOLOGY | Facility: CLINIC | Age: 48
End: 2023-02-15
Payer: COMMERCIAL

## 2023-02-15 ENCOUNTER — OFFICE VISIT (OUTPATIENT)
Dept: RADIATION ONCOLOGY | Facility: CLINIC | Age: 48
End: 2023-02-15
Payer: COMMERCIAL

## 2023-02-15 VITALS
OXYGEN SATURATION: 100 % | HEART RATE: 68 BPM | DIASTOLIC BLOOD PRESSURE: 72 MMHG | RESPIRATION RATE: 16 BRPM | WEIGHT: 134.4 LBS | TEMPERATURE: 97.5 F | SYSTOLIC BLOOD PRESSURE: 111 MMHG | BODY MASS INDEX: 23.07 KG/M2

## 2023-02-15 DIAGNOSIS — C50.812 MALIGNANT NEOPLASM OF OVERLAPPING SITES OF LEFT BREAST IN FEMALE, ESTROGEN RECEPTOR POSITIVE (H): Primary | ICD-10-CM

## 2023-02-15 DIAGNOSIS — Z17.0 MALIGNANT NEOPLASM OF OVERLAPPING SITES OF LEFT BREAST IN FEMALE, ESTROGEN RECEPTOR POSITIVE (H): Primary | ICD-10-CM

## 2023-02-15 DIAGNOSIS — I89.0 LYMPHEDEMA: ICD-10-CM

## 2023-02-15 PROCEDURE — 77331 SPECIAL RADIATION DOSIMETRY: CPT | Performed by: RADIOLOGY

## 2023-02-15 PROCEDURE — 97140 MANUAL THERAPY 1/> REGIONS: CPT | Mod: GO

## 2023-02-15 PROCEDURE — 77387 GUIDANCE FOR RADJ TX DLVR: CPT | Performed by: RADIOLOGY

## 2023-02-15 PROCEDURE — 99207 PR DROP WITH A PROCEDURE: CPT | Performed by: RADIOLOGY

## 2023-02-15 PROCEDURE — 97165 OT EVAL LOW COMPLEX 30 MIN: CPT | Mod: GO

## 2023-02-15 PROCEDURE — 77412 RADIATION TX DELIVERY LVL 3: CPT | Performed by: RADIOLOGY

## 2023-02-15 ASSESSMENT — PAIN SCALES - GENERAL: PAINLEVEL: MODERATE PAIN (4)

## 2023-02-15 NOTE — PROGRESS NOTES
02/15/23 0800   Rehab Discipline   Discipline OT   Type of Visit   Type of visit Initial Edema Evaluation   General Information   Start of care 02/15/23   Referring physician Dr Janie South   Orders Evaluate and treat as indicated   Order date 01/13/23   Medical diagnosis I89.0 (ICD-10-CM) - Lymphedema   Onset of illness / date of surgery 12/21/22   Edema onset 12/21/22   Affected body parts Trunk   Edema etiology Cancer with lymph node dissection   Edema etiology comments s/p bilateral mastectomies with left axillary node dissection on 12/21/22 with tightness in the left axilla and high risk for lymphedema. Expanders are in place. Postoperative course was complicated by development of a seroma and possible infection   Pertinent history of current problem (PT: include personal factors and/or comorbidities that impact the POC; OT: include additional occupational profile info) Pt is a 49 yo female referred to lymphedema therapy to address LUQ swelling and tightness due to treatment for breast cancer. Pt currently has expanders in place, the R breast is fluid/loose while the L breast is larger and very firm. Pt just began radiation as well.   Surgical / medical history reviewed Yes   Prior level of functional mobility indep   Community support Family / friend caregiver   Patient role / employment history Employed  (full time)   Psychosocial concerns Impaired body image   Living environment House / [facility]   Living environment comments 15 and 13 yo kids, no concerns with physically getting around the house   Fall Risk Screen   Fall screen completed by OT   Have you fallen 2 or more times in the past year? No   Have you fallen and had an injury in the past year? No   Is patient a fall risk? No   Abuse Screen (yes response referral indicated)   Feels Unsafe at Home or Work/School no   Feels Threatened by Someone no   Does Anyone Try to Keep You From Having Contact with Others or Doing Things Outside Your Home? no    Physical Signs of Abuse Present no   Subjective Report   Patient report of symptoms L breast pain, sharp pain in ribs just under the breast   Cognitive Status   Orientation Orientation to person, place and time   Level of consciousness Alert   Edema Exam / Assessment   Skin condition comments Skin intact/incisions fully healed. L breast is firm/taut with pockets of edema along the inferior, lateral and superior breast. [place] shaped scar with good mobility on the vertical part, scar tissue on the inferior breast is thicker. One area of what appears to be cording or adhered scar tissue from the scar to the fascia/tissue below the scar that is creating pain for pt. Area of adhesion/thicker tissue on lateral breast with erythema   Girth Measurements   Girth Measurements Refer to separate girth measurement flowsheet   Volume UE   Left UE (mL) 1634   Range of Motion   ROM comments WFL, some decreased L shoulder flexion   Strength   Strength No deficits were identified   Activities of Daily Living   Activities of Daily Living indep   Bed Mobility   Bed mobility indep   Transfers   Transfers indep   Gait / Locomotion   Gait / Locomotion indep   Sensory   Sensory perception comments intact   Planned Edema Interventions   Planned edema interventions Manual lymph drainage;Gradient compression bandaging;Fit for compression garment;Exercises;Precautions to prevent infection / exacerbation;Education;Manual therapy;ADL training;Skin care / precautions;Scar mobilization;Soft tissue mobilization;Myofascial release;Home management program development   Clinical Impression   Criteria for skilled therapeutic intervention met Yes   Therapy diagnosis LUQ lymphedema   Assessment of Occupational Performance 1-3 Performance Deficits   Identified Performance Deficits at risk for progression of symptoms, impaired ROM affecting ADLs   Clinical Decision Making (Complexity) Low complexity   Treatment Frequency 1x/week   Treatment duration 6  weeks   Patient / family and/or staff in agreement with plan of care Yes   Risks and benefits of therapy have been explained Yes   Clinical impression comments Pt presents with thick scar tissue and adhesions along the L inferior breast that is causing pain and accumulation of lymphatic fluid. Pt would benefit from skilled treatment to reduce LUQ edema and educated pt on edema management technqiues.   Goals   Edema Eval Goals 1;2   Goal 1   Goal identifier Home program   Goal description Pt will verbalize understanding of long term edema management including following recommended wear schedule for compression garments, manual techniques, skin care and exercise.   Target date 05/16/23   Total Evaluation Time   OT Eval, Low Complexity Minutes (92326) 15

## 2023-02-15 NOTE — LETTER
2/15/2023         RE: Betty Rogers  [address]  [facility] [address]        Dear Colleague,    Thank you for referring your patient, Betty Rogers, to the [facility] RADIATION ONCOLOGY [place]. Please see a copy of my visit note below.    [facility] PHYSICIANS  SPECIALIZING IN BREAKTHROUGHS  Radiation Oncology    On Treatment Visit Note      Betty Rogers      Date: 2/15/2023   MRN: [id number]   : 1975  Diagnosis: L breast IDC ER/MD+ HER2-      Reason for Visit:  On Radiation Treatment Visit     Treatment Summary to Date  Treatment Site: L CW, L SC Current Dose: 400/5000, 400/5000 cGy Fractions: ,       Chemotherapy  Chemo concurrent with radx?: No (Dr. Franco)    Subjective:     She has applied prescription cream once.  However prior to application and since being seen there has been increasing erythema of her left chest wall/reconstructed breast mound.  She denies any fevers or significant pain.  She recently finished her antibiotic for presumed infection.    Nursing ROS:   Nutrition Alteration  Diet Type: Patient's Preference  Skin  Skin Reaction: 0 - No changes (no desquamation)  Skin Note: Patient finished antibiotics on Saturday per Plastics, she continues to have redness to L expander/chestwall. Dr. Foster would like patient to stop mometasone and has stopped XRT until cleared by Plastics. Patient to call to schedule an appointment with Plastics for infection assessment.        Cardiovascular  Respiratory effort: 1 - Normal - without distress        Psychosocial  Psychosocial Note: Patient denies fatigue  Pain Assessment  0-10 Pain Scale: 4  Pain Note: Cording under L breast- follow up with PT/lymphedema      Objective:   /72   Pulse 68   Temp 97.5  F (36.4  C) (Oral)   Resp 16   Wt 61 kg (134 lb 6.4 oz)   LMP  (LMP Unknown)   SpO2 100%   BMI 23.07 kg/m    Gen: Appears well, in no acute distress  Skin: Minimal diffuse erythema over  treatment field with no nodularity or desquamation.  No warmth appreciated.    Labs:  CBC RESULTS: Recent Labs   Lab Test 02/01/23  0853   WBC 3.8*   RBC 4.06   HGB 11.4*   HCT 35.4   MCV 87   MCH 28.1   MCHC 32.2   RDW 12.3        ELECTROLYTES:  Recent Labs   Lab Test 02/01/23  0853      POTASSIUM 4.6   CHLORIDE 107   ABEBA 9.1   CO2 27   BUN 11   CR 0.84   GLC 94       Assessment:    It is too early in the course of treatment to have radiation dermatitis.  I suspect the erythema, which is new and has developed since her CT simulation scan, is due to her previous infection.  I have asked her to be seen by a plastic surgeon so they can evaluate her and possibly resume her antibiotics.  In the interim, we will stop her radiation treatments until her infection clears.  She needs to be cleared by plastic surgery before resuming radiation.  Today I reiterated the risk of losing the expander if she does develops an infection and it contaminates the expander.      Plan:   1. Radiation treatment on hold.  Patient will be reevaluated by RN on Monday.  If her skin is still erythematous hold off on treatment until the following week (February 27)      Mosaiq chart and setup information reviewed  Ports checked    Medication Review  Med list reviewed with patient?: Yes  Med Note: Patient taking Tamoxifen    Educational Topic Discussed  Education Instructions: Skin cares, fatigue, monitoring for infection, patient to call Plastics to make a follow up appointment since completing her antibiotics.        Latrell Foster MD    Please do not send letter to referring physician.          Again, thank you for allowing me to participate in the care of your patient.        Sincerely,        LEXIE Foster MD

## 2023-02-16 ENCOUNTER — OFFICE VISIT (OUTPATIENT)
Dept: PODIATRY | Facility: CLINIC | Age: 48
End: 2023-02-16
Attending: INTERNAL MEDICINE
Payer: COMMERCIAL

## 2023-02-16 ENCOUNTER — MYC MEDICAL ADVICE (OUTPATIENT)
Dept: RADIATION ONCOLOGY | Facility: CLINIC | Age: 48
End: 2023-02-16

## 2023-02-16 VITALS — DIASTOLIC BLOOD PRESSURE: 60 MMHG | HEART RATE: 68 BPM | SYSTOLIC BLOOD PRESSURE: 114 MMHG

## 2023-02-16 DIAGNOSIS — L60.3 DYSTROPHIC NAIL: ICD-10-CM

## 2023-02-16 DIAGNOSIS — R22.42 MASS OF FOOT, LEFT: Primary | ICD-10-CM

## 2023-02-16 PROCEDURE — 11730 AVULSION NAIL PLATE SIMPLE 1: CPT | Mod: 51 | Performed by: PODIATRIST

## 2023-02-16 PROCEDURE — 88305 TISSUE EXAM BY PATHOLOGIST: CPT | Performed by: PATHOLOGY

## 2023-02-16 PROCEDURE — 11420 EXC H-F-NK-SP B9+MARG 0.5/<: CPT | Performed by: PODIATRIST

## 2023-02-16 PROCEDURE — 99244 OFF/OP CNSLTJ NEW/EST MOD 40: CPT | Mod: 25 | Performed by: PODIATRIST

## 2023-02-16 NOTE — LETTER
2/16/2023         RE: Betty Rogers  [address]  [facility] [address]        Dear Colleague,    Thank you for referring your patient, Betty Rogers, to the [facility]. Please see a copy of my visit note below.    Subjective:    Pt is seen today in consult from Dr. Luevano.  Noted some recent bleeding from her left third toenail.  Started 3 weeks ago.  No pain.  No purulence or odor.  Patient is a runner in the past.  She states that some of her nails have become thick from running.  This has been going on for years.  Occasionally painful.  Aggravated by activity and relieved by rest.  Patient's mother had breast cancer.  History of breast cancer and recently finished chemotherapy.  She states her fingernails and her right hallux nail has gotten worse from chemotherapy.  She denies numbness and tingling in her feet.  She has never smoked.    ROS:  A 10-point review of systems was performed and is positive for that noted in the HPI and as seen above.  All other areas are negative.    Past Medical History:   Diagnosis Date     Breast cancer metastasized to axillary lymph node (H) 7/15/2022     No active medical problems        Past Surgical History:   Procedure Laterality Date     C/SECTION, LOW TRANSVERSE      2010, 2007     DISSECT LYMPH NODE AXILLA Left 12/21/2022    Procedure: left axillary node dissection;  Surgeon: Janie South MD;  Location:  OR     INSERT PORT VASCULAR ACCESS Right 07/25/2022    Procedure: PORT PLACEMENT;  Surgeon: Janie South MD;  Location:  OR     MASTECTOMY SIMPLE, SENTINEL NODE, COMBINED Bilateral 12/21/2022    Procedure: bilateral skin sparing mastectomies with left sentinel lymph node biopsy, tag localized left axillary node excision;  Surgeon: Janie South MD;  Location:  OR     RECONSTRUCT BREAST, INSERT TISSUE EXPANDER BILATERAL, COMBINED Bilateral 12/21/2022    Procedure: BILATERAL TISSUE EXPANDER BREAST RECONSTRUCTION;  Surgeon:  Rc Zamorano MD;  Location:  OR     REMOVE PORT VASCULAR ACCESS N/A 12/21/2022    Procedure: possible port removal;  Surgeon: Janie South MD;  Location:  OR       Family History   Problem Relation Age of Onset     Breast Cancer Mother         atypical lobular hyperplasia breast cancer     Unknown/Adopted Brother         Sucide     Heart Disease Maternal Grandfather         Heart Issues     Breast Cancer Maternal Aunt 50     Breast Cancer Maternal Cousin      Mental Illness Brother        Social History     Tobacco Use     Smoking status: Never     Smokeless tobacco: Never   Substance Use Topics     Alcohol use: Yes     Comment: socially, approx 6-7 drinks per week         Current Outpatient Medications:      abemaciclib (VERZENIO) 150 MG tablet, Take 1 tablet (150 mg) by mouth 2 times daily for 28 days, Disp: 56 tablet, Rfl: 0     loperamide (IMODIUM) 2 MG capsule, Start with 2 caps (4 mg), then take one cap (2 mg) after each diarrheal stool as needed. Do not use more than 8 caps (16 mg) per day., Disp: 30 capsule, Rfl: 0     mometasone (ELOCON) 0.1 % external cream, Apply topically daily to radiation treatment field.  Stop application 1 week after completing treatment. (Patient not taking: Reported on 2/15/2023), Disp: 50 g, Rfl: 1     prochlorperazine (COMPAZINE) 10 MG tablet, Take 1 tablet (10 mg) by mouth every 6 hours as needed for nausea or vomiting (Patient not taking: Reported on 2/15/2023), Disp: 30 tablet, Rfl: 2     tamoxifen (NOLVADEX) 20 MG tablet, Take 1 tablet (20 mg) by mouth daily, Disp: 90 tablet, Rfl: 3       Allergies   Allergen Reactions     Nuts      Other [Seasonal Allergies]      Tree Nuts-Lips swell and breathing problems       /60   Pulse 68   LMP  (LMP Unknown) .      Constitutional/ general:  Pt is in no apparent distress, appears well-nourished.  Cooperative with history and physical exam.     Psych:  The patient answered questions appropriately.  Normal  affect.  Seems to have reasonable expectations, in terms of treatment.     Lungs:  Non labored breathing, non labored speech. No cough.  No audible wheezing. Even, quiet breathing.       Eyes:  Visual scanning/ tracking without deficit.     Ears:  Response to auditory stimuli is normal.  negative hearing aid devices.  Auricles in proper alignment.     Lymphatic:  Popliteal lymph nodes not enlarged.     Vascular:  Pedal pulses are palpable bilaterally for both the DP and PT arteries.  CFT < 3 sec.  No ankle varicosities or edema.  Pedal hair growth noted.     Neuro:  Alert and oriented x 3. Coordinated gait.  Light touch sensation is intact     Derm: Normal texture and turgor.  No ecchymosis, or cyanosis.  Hair growth noted.        Musculoskeletal:     Patient is ambulatory without an assistive device or brace.   Majority of patient's nails are somewhat long and thick on the end.  We note the right hallux is the most discolored.  No erythema edema or drainage here.  With trimming back the left third toenail we note that there is a mass under the distal half of the nailbed.  It bleeds quite easily.  It does have a central stock.  There is no directly probing to bone.      ASSESSMENT:    Bilateral onychodystrophy  Left third toe nailbed mass    Discussed with patient that running or repetitive pressure on nails can make them dystrophic.  They are quite long today.  Explained the importance of keeping them as short as possible.  Patient will just watch the right hallux nail for now.  Hopefully with her being off of chemotherapy and with keeping this shorter she will have some resolution here.  We will keep feet as dry as possible.  Explained that there is a mass under her left third nail.  Discussed could possibly be a granuloma or fibroma.  Recommend removal of this and sending this to pathology for evaluation.  Patient is in agreement.  After thorough discussion and answering all questions, the patient elected to have  nail avulsion and removal of mass from left third toe nailbed..  Obtained consent, used 3cc of 1% lidocaine plain to block left third toe.  Sterile prep, then avulsed the left third nail.  Mass distal central was noted.  This did have a stalk going to the nailbed.  After removing the mass this measured 5 x 4 x 3 mm.  We placed this in formalin for further evaluation.  We curetted the nail bed where this started.  We dressed this with Adaptic gauze and Coban.  We given patient is postop instructions on how to care for this.  We will call patient with pathology results.  Thank you for allowing me to participate in the care of this patient.    Fernando Li DPM, FACFAS            Again, thank you for allowing me to participate in the care of your patient.        Sincerely,        Fernando Li DPM

## 2023-02-16 NOTE — PATIENT INSTRUCTIONS
We wish you continued good healing. If you have any questions or concerns, please do not hesitate to contact us at  [phone]    [email or web address] (secure e-mail communication and access to your chart) to send a message or to make an appointment.    Please remember to call and schedule a follow up appointment if one was recommended at your earliest convenience.     PODIATRY CLINIC HOURS  TELEPHONE NUMBER    Dr. Fernando ABDULPFLO [facility]        Clinics:  [facility]   [place]  Tuesday 1PM-6PM  [place]  Wednesday 745AM-330PM  [place]  Thursday/Friday 745AM-230PM       [place] APPOINTMENTS  [phone]    [place] APPOINTMENTS  [phone]        If you need a medication refill, please contact us you may need lab work and/or a follow up visit prior to your refill (i.e. Antifungal medications).  If MRI needed please call Imaging at [phone]   HOW DO I GET MY KNEE SCOOTER? Knee scooters can be picked up at ANY Medical Supply stores with your knee scooter Prescription.  OR  Bring your signed prescription to an [facility] showroom.

## 2023-02-16 NOTE — PROGRESS NOTES
Subjective:    Pt is seen today in consult from Dr. Luevano.  Noted some recent bleeding from her left third toenail.  Started 3 weeks ago.  No pain.  No purulence or odor.  Patient is a runner in the past.  She states that some of her nails have become thick from running.  This has been going on for years.  Occasionally painful.  Aggravated by activity and relieved by rest.  Patient's mother had breast cancer.  History of breast cancer and recently finished chemotherapy.  She states her fingernails and her right hallux nail has gotten worse from chemotherapy.  She denies numbness and tingling in her feet.  She has never smoked.    ROS:  A 10-point review of systems was performed and is positive for that noted in the HPI and as seen above.  All other areas are negative.    Past Medical History:   Diagnosis Date     Breast cancer metastasized to axillary lymph node (H) 7/15/2022     No active medical problems        Past Surgical History:   Procedure Laterality Date     C/SECTION, LOW TRANSVERSE      2010, 2007     DISSECT LYMPH NODE AXILLA Left 12/21/2022    Procedure: left axillary node dissection;  Surgeon: Janie South MD;  Location:  OR     INSERT PORT VASCULAR ACCESS Right 07/25/2022    Procedure: PORT PLACEMENT;  Surgeon: Janie South MD;  Location:  OR     MASTECTOMY SIMPLE, SENTINEL NODE, COMBINED Bilateral 12/21/2022    Procedure: bilateral skin sparing mastectomies with left sentinel lymph node biopsy, tag localized left axillary node excision;  Surgeon: Janie South MD;  Location:  OR     RECONSTRUCT BREAST, INSERT TISSUE EXPANDER BILATERAL, COMBINED Bilateral 12/21/2022    Procedure: BILATERAL TISSUE EXPANDER BREAST RECONSTRUCTION;  Surgeon: Rc Zamorano MD;  Location:  OR     REMOVE PORT VASCULAR ACCESS N/A 12/21/2022    Procedure: possible port removal;  Surgeon: Janie South MD;  Location:  OR       Family History   Problem Relation Age of Onset     Breast Cancer  Mother         atypical lobular hyperplasia breast cancer     Unknown/Adopted Brother         Sucide     Heart Disease Maternal Grandfather         Heart Issues     Breast Cancer Maternal Aunt 50     Breast Cancer Maternal Cousin      Mental Illness Brother        Social History     Tobacco Use     Smoking status: Never     Smokeless tobacco: Never   Substance Use Topics     Alcohol use: Yes     Comment: socially, approx 6-7 drinks per week         Current Outpatient Medications:      abemaciclib (VERZENIO) 150 MG tablet, Take 1 tablet (150 mg) by mouth 2 times daily for 28 days, Disp: 56 tablet, Rfl: 0     loperamide (IMODIUM) 2 MG capsule, Start with 2 caps (4 mg), then take one cap (2 mg) after each diarrheal stool as needed. Do not use more than 8 caps (16 mg) per day., Disp: 30 capsule, Rfl: 0     mometasone (ELOCON) 0.1 % external cream, Apply topically daily to radiation treatment field.  Stop application 1 week after completing treatment. (Patient not taking: Reported on 2/15/2023), Disp: 50 g, Rfl: 1     prochlorperazine (COMPAZINE) 10 MG tablet, Take 1 tablet (10 mg) by mouth every 6 hours as needed for nausea or vomiting (Patient not taking: Reported on 2/15/2023), Disp: 30 tablet, Rfl: 2     tamoxifen (NOLVADEX) 20 MG tablet, Take 1 tablet (20 mg) by mouth daily, Disp: 90 tablet, Rfl: 3       Allergies   Allergen Reactions     Nuts      Other [Seasonal Allergies]      Tree Nuts-Lips swell and breathing problems       /60   Pulse 68   LMP  (LMP Unknown) .      Constitutional/ general:  Pt is in no apparent distress, appears well-nourished.  Cooperative with history and physical exam.     Psych:  The patient answered questions appropriately.  Normal affect.  Seems to have reasonable expectations, in terms of treatment.     Lungs:  Non labored breathing, non labored speech. No cough.  No audible wheezing. Even, quiet breathing.       Eyes:  Visual scanning/ tracking without deficit.     Ears:   Response to auditory stimuli is normal.  negative hearing aid devices.  Auricles in proper alignment.     Lymphatic:  Popliteal lymph nodes not enlarged.     Vascular:  Pedal pulses are palpable bilaterally for both the DP and PT arteries.  CFT < 3 sec.  No ankle varicosities or edema.  Pedal hair growth noted.     Neuro:  Alert and oriented x 3. Coordinated gait.  Light touch sensation is intact     Derm: Normal texture and turgor.  No ecchymosis, or cyanosis.  Hair growth noted.        Musculoskeletal:     Patient is ambulatory without an assistive device or brace.   Majority of patient's nails are somewhat long and thick on the end.  We note the right hallux is the most discolored.  No erythema edema or drainage here.  With trimming back the left third toenail we note that there is a mass under the distal half of the nailbed.  It bleeds quite easily.  It does have a central stock.  There is no directly probing to bone.      ASSESSMENT:    Bilateral onychodystrophy  Left third toe nailbed mass    Discussed with patient that running or repetitive pressure on nails can make them dystrophic.  They are quite long today.  Explained the importance of keeping them as short as possible.  Patient will just watch the right hallux nail for now.  Hopefully with her being off of chemotherapy and with keeping this shorter she will have some resolution here.  We will keep feet as dry as possible.  Explained that there is a mass under her left third nail.  Discussed could possibly be a granuloma or fibroma.  Recommend removal of this and sending this to pathology for evaluation.  Patient is in agreement.  After thorough discussion and answering all questions, the patient elected to have nail avulsion and removal of mass from left third toe nailbed..  Obtained consent, used 3cc of 1% lidocaine plain to block left third toe.  Sterile prep, then avulsed the left third nail.  Mass distal central was noted.  This did have a stalk  going to the nailbed.  After removing the mass this measured 5 x 4 x 3 mm.  We placed this in formalin for further evaluation.  We curetted the nail bed where this started.  We dressed this with Adaptic gauze and Coban.  We given patient is postop instructions on how to care for this.  We will call patient with pathology results.  Thank you for allowing me to participate in the care of this patient.    Fernando Li, JOELLE, FACFAS

## 2023-02-17 RX ORDER — SODIUM CHLORIDE, SODIUM LACTATE, POTASSIUM CHLORIDE, CALCIUM CHLORIDE 600; 310; 30; 20 MG/100ML; MG/100ML; MG/100ML; MG/100ML
INJECTION, SOLUTION INTRAVENOUS CONTINUOUS
Status: CANCELLED | OUTPATIENT
Start: 2023-02-18

## 2023-02-18 ENCOUNTER — ANESTHESIA (OUTPATIENT)
Dept: SURGERY | Facility: CLINIC | Age: 48
End: 2023-02-18
Payer: COMMERCIAL

## 2023-02-18 ENCOUNTER — ANESTHESIA EVENT (OUTPATIENT)
Dept: SURGERY | Facility: CLINIC | Age: 48
End: 2023-02-18
Payer: COMMERCIAL

## 2023-02-18 ENCOUNTER — HOSPITAL ENCOUNTER (OUTPATIENT)
Facility: CLINIC | Age: 48
Discharge: HOME | End: 2023-02-18
Attending: PLASTIC SURGERY | Admitting: PLASTIC SURGERY
Payer: COMMERCIAL

## 2023-02-18 VITALS
HEIGHT: 64 IN | DIASTOLIC BLOOD PRESSURE: 62 MMHG | TEMPERATURE: 98.3 F | HEART RATE: 74 BPM | SYSTOLIC BLOOD PRESSURE: 106 MMHG | WEIGHT: 132.4 LBS | BODY MASS INDEX: 22.61 KG/M2 | RESPIRATION RATE: 16 BRPM | OXYGEN SATURATION: 99 %

## 2023-02-18 DIAGNOSIS — D05.12 DUCTAL CARCINOMA IN SITU (DCIS) OF LEFT BREAST: Primary | ICD-10-CM

## 2023-02-18 LAB
GRAM STAIN RESULT: NORMAL
GRAM STAIN RESULT: NORMAL

## 2023-02-18 PROCEDURE — 87077 CULTURE AEROBIC IDENTIFY: CPT | Performed by: PLASTIC SURGERY

## 2023-02-18 PROCEDURE — 87205 SMEAR GRAM STAIN: CPT | Performed by: PLASTIC SURGERY

## 2023-02-18 PROCEDURE — 250N000009 HC RX 250: Performed by: NURSE ANESTHETIST, CERTIFIED REGISTERED

## 2023-02-18 PROCEDURE — 250N000011 HC RX IP 250 OP 636: Performed by: NURSE ANESTHETIST, CERTIFIED REGISTERED

## 2023-02-18 PROCEDURE — 360N000076 HC SURGERY LEVEL 3, PER MIN: Performed by: PLASTIC SURGERY

## 2023-02-18 PROCEDURE — 250N000011 HC RX IP 250 OP 636: Performed by: PLASTIC SURGERY

## 2023-02-18 PROCEDURE — 87075 CULTR BACTERIA EXCEPT BLOOD: CPT | Performed by: PLASTIC SURGERY

## 2023-02-18 PROCEDURE — 710N000009 HC RECOVERY PHASE 1, LEVEL 1, PER MIN: Performed by: PLASTIC SURGERY

## 2023-02-18 PROCEDURE — 999N000141 HC STATISTIC PRE-PROCEDURE NURSING ASSESSMENT: Performed by: PLASTIC SURGERY

## 2023-02-18 PROCEDURE — 258N000003 HC RX IP 258 OP 636: Performed by: NURSE ANESTHETIST, CERTIFIED REGISTERED

## 2023-02-18 PROCEDURE — 710N000012 HC RECOVERY PHASE 2, PER MINUTE: Performed by: PLASTIC SURGERY

## 2023-02-18 PROCEDURE — 370N000017 HC ANESTHESIA TECHNICAL FEE, PER MIN: Performed by: PLASTIC SURGERY

## 2023-02-18 PROCEDURE — 272N000001 HC OR GENERAL SUPPLY STERILE: Performed by: PLASTIC SURGERY

## 2023-02-18 PROCEDURE — 250N000009 HC RX 250: Performed by: PLASTIC SURGERY

## 2023-02-18 RX ORDER — CEFAZOLIN SODIUM/WATER 2 G/20 ML
2 SYRINGE (ML) INTRAVENOUS
Status: COMPLETED | OUTPATIENT
Start: 2023-02-18 | End: 2023-02-18

## 2023-02-18 RX ORDER — HYDROMORPHONE HCL IN WATER/PF 6 MG/30 ML
0.4 PATIENT CONTROLLED ANALGESIA SYRINGE INTRAVENOUS EVERY 5 MIN PRN
Status: DISCONTINUED | OUTPATIENT
Start: 2023-02-18 | End: 2023-02-18 | Stop reason: HOSPADM

## 2023-02-18 RX ORDER — FENTANYL CITRATE 0.05 MG/ML
50 INJECTION, SOLUTION INTRAMUSCULAR; INTRAVENOUS EVERY 5 MIN PRN
Status: DISCONTINUED | OUTPATIENT
Start: 2023-02-18 | End: 2023-02-18 | Stop reason: HOSPADM

## 2023-02-18 RX ORDER — BUPIVACAINE HYDROCHLORIDE AND EPINEPHRINE 5; 5 MG/ML; UG/ML
INJECTION, SOLUTION EPIDURAL; INTRACAUDAL; PERINEURAL PRN
Status: DISCONTINUED | OUTPATIENT
Start: 2023-02-18 | End: 2023-02-18 | Stop reason: HOSPADM

## 2023-02-18 RX ORDER — CEFAZOLIN SODIUM/WATER 2 G/20 ML
2 SYRINGE (ML) INTRAVENOUS SEE ADMIN INSTRUCTIONS
Status: DISCONTINUED | OUTPATIENT
Start: 2023-02-18 | End: 2023-02-18 | Stop reason: HOSPADM

## 2023-02-18 RX ORDER — OXYCODONE HYDROCHLORIDE 5 MG/1
5 TABLET ORAL EVERY 6 HOURS PRN
Qty: 10 TABLET | Refills: 0 | Status: SHIPPED | OUTPATIENT
Start: 2023-02-18 | End: 2023-02-21

## 2023-02-18 RX ORDER — ONDANSETRON 4 MG/1
4 TABLET, ORALLY DISINTEGRATING ORAL EVERY 30 MIN PRN
Status: DISCONTINUED | OUTPATIENT
Start: 2023-02-18 | End: 2023-02-18 | Stop reason: HOSPADM

## 2023-02-18 RX ORDER — ONDANSETRON 2 MG/ML
INJECTION INTRAMUSCULAR; INTRAVENOUS PRN
Status: DISCONTINUED | OUTPATIENT
Start: 2023-02-18 | End: 2023-02-18

## 2023-02-18 RX ORDER — PROPOFOL 10 MG/ML
INJECTION, EMULSION INTRAVENOUS PRN
Status: DISCONTINUED | OUTPATIENT
Start: 2023-02-18 | End: 2023-02-18

## 2023-02-18 RX ORDER — FENTANYL CITRATE 0.05 MG/ML
25 INJECTION, SOLUTION INTRAMUSCULAR; INTRAVENOUS EVERY 5 MIN PRN
Status: DISCONTINUED | OUTPATIENT
Start: 2023-02-18 | End: 2023-02-18 | Stop reason: HOSPADM

## 2023-02-18 RX ORDER — FENTANYL CITRATE 50 UG/ML
INJECTION, SOLUTION INTRAMUSCULAR; INTRAVENOUS PRN
Status: DISCONTINUED | OUTPATIENT
Start: 2023-02-18 | End: 2023-02-18

## 2023-02-18 RX ORDER — SODIUM CHLORIDE, SODIUM LACTATE, POTASSIUM CHLORIDE, CALCIUM CHLORIDE 600; 310; 30; 20 MG/100ML; MG/100ML; MG/100ML; MG/100ML
INJECTION, SOLUTION INTRAVENOUS CONTINUOUS
Status: DISCONTINUED | OUTPATIENT
Start: 2023-02-18 | End: 2023-02-18 | Stop reason: HOSPADM

## 2023-02-18 RX ORDER — ONDANSETRON 2 MG/ML
4 INJECTION INTRAMUSCULAR; INTRAVENOUS EVERY 30 MIN PRN
Status: DISCONTINUED | OUTPATIENT
Start: 2023-02-18 | End: 2023-02-18 | Stop reason: HOSPADM

## 2023-02-18 RX ORDER — SODIUM CHLORIDE, SODIUM LACTATE, POTASSIUM CHLORIDE, CALCIUM CHLORIDE 600; 310; 30; 20 MG/100ML; MG/100ML; MG/100ML; MG/100ML
INJECTION, SOLUTION INTRAVENOUS CONTINUOUS PRN
Status: DISCONTINUED | OUTPATIENT
Start: 2023-02-18 | End: 2023-02-18

## 2023-02-18 RX ORDER — MULTIPLE VITAMINS W/ MINERALS TAB 9MG-400MCG
1 TAB ORAL DAILY
COMMUNITY

## 2023-02-18 RX ORDER — LABETALOL HYDROCHLORIDE 5 MG/ML
10 INJECTION, SOLUTION INTRAVENOUS
Status: DISCONTINUED | OUTPATIENT
Start: 2023-02-18 | End: 2023-02-18 | Stop reason: HOSPADM

## 2023-02-18 RX ORDER — OXYCODONE HYDROCHLORIDE 5 MG/1
5 TABLET ORAL EVERY 4 HOURS PRN
Status: DISCONTINUED | OUTPATIENT
Start: 2023-02-18 | End: 2023-02-18 | Stop reason: HOSPADM

## 2023-02-18 RX ORDER — DEXAMETHASONE SODIUM PHOSPHATE 4 MG/ML
INJECTION, SOLUTION INTRA-ARTICULAR; INTRALESIONAL; INTRAMUSCULAR; INTRAVENOUS; SOFT TISSUE PRN
Status: DISCONTINUED | OUTPATIENT
Start: 2023-02-18 | End: 2023-02-18

## 2023-02-18 RX ORDER — HYDROMORPHONE HCL IN WATER/PF 6 MG/30 ML
0.2 PATIENT CONTROLLED ANALGESIA SYRINGE INTRAVENOUS EVERY 5 MIN PRN
Status: DISCONTINUED | OUTPATIENT
Start: 2023-02-18 | End: 2023-02-18 | Stop reason: HOSPADM

## 2023-02-18 RX ORDER — OXYCODONE HYDROCHLORIDE 5 MG/1
10 TABLET ORAL EVERY 4 HOURS PRN
Status: DISCONTINUED | OUTPATIENT
Start: 2023-02-18 | End: 2023-02-18 | Stop reason: HOSPADM

## 2023-02-18 RX ORDER — LIDOCAINE HYDROCHLORIDE 20 MG/ML
INJECTION, SOLUTION INFILTRATION; PERINEURAL PRN
Status: DISCONTINUED | OUTPATIENT
Start: 2023-02-18 | End: 2023-02-18

## 2023-02-18 RX ORDER — IBUPROFEN 200 MG
1 CAPSULE ORAL 2 TIMES DAILY
COMMUNITY

## 2023-02-18 RX ADMIN — ONDANSETRON 4 MG: 2 INJECTION INTRAMUSCULAR; INTRAVENOUS at 07:33

## 2023-02-18 RX ADMIN — Medication 2 G: at 07:25

## 2023-02-18 RX ADMIN — PHENYLEPHRINE HYDROCHLORIDE 100 MCG: 10 INJECTION INTRAVENOUS at 07:46

## 2023-02-18 RX ADMIN — FENTANYL CITRATE 50 MCG: 50 INJECTION, SOLUTION INTRAMUSCULAR; INTRAVENOUS at 07:27

## 2023-02-18 RX ADMIN — MIDAZOLAM 2 MG: 1 INJECTION INTRAMUSCULAR; INTRAVENOUS at 07:23

## 2023-02-18 RX ADMIN — DEXAMETHASONE SODIUM PHOSPHATE 4 MG: 4 INJECTION, SOLUTION INTRA-ARTICULAR; INTRALESIONAL; INTRAMUSCULAR; INTRAVENOUS; SOFT TISSUE at 07:32

## 2023-02-18 RX ADMIN — FENTANYL CITRATE 50 MCG: 50 INJECTION, SOLUTION INTRAMUSCULAR; INTRAVENOUS at 07:31

## 2023-02-18 RX ADMIN — SODIUM CHLORIDE, POTASSIUM CHLORIDE, SODIUM LACTATE AND CALCIUM CHLORIDE: 600; 310; 30; 20 INJECTION, SOLUTION INTRAVENOUS at 07:23

## 2023-02-18 RX ADMIN — PROPOFOL 200 MG: 10 INJECTION, EMULSION INTRAVENOUS at 07:27

## 2023-02-18 RX ADMIN — PROPOFOL 200 MCG/KG/MIN: 10 INJECTION, EMULSION INTRAVENOUS at 07:28

## 2023-02-18 RX ADMIN — LIDOCAINE HYDROCHLORIDE 100 MG: 20 INJECTION, SOLUTION INFILTRATION; PERINEURAL at 07:27

## 2023-02-18 RX ADMIN — PHENYLEPHRINE HYDROCHLORIDE 100 MCG: 10 INJECTION INTRAVENOUS at 07:40

## 2023-02-18 ASSESSMENT — ACTIVITIES OF DAILY LIVING (ADL)
ADLS_ACUITY_SCORE: 35
ADLS_ACUITY_SCORE: 35

## 2023-02-18 NOTE — OP NOTE
Procedure Date: 02/18/2023    SURGEON:  Rc Zamorano MD    PREOPERATIVE DIAGNOSES:     1.  Left breast cancer.  2.  Acquired absence of bilateral breasts.  3.  Infected left breast reconstruction tissue expander.    POSTOPERATIVE DIAGNOSES:     1.  Left breast cancer.  2.  Acquired absence of bilateral breasts.  3.  Infected left breast reconstruction tissue expander.    PROCEDURES:  Removal of left breast tissue expander.    TECHNIQUE:  The patient was marked preoperatively.  She has a recent history of bilateral mastectomy and tissue expander placement.  She just started radiation, but has developed redness on the left breast, which appeared distinctly different from typical radiation reaction.  She did have a history of some periprosthetic fluid on the left side and now presents with a clinically infected left breast reconstruction.  In order to allow for resumption of radiation in a rapid fashion, our plan today is to remove the expander.  The patient understands reconstruction will be more after radiation, likely requiring some type of flap as a part of her reconstruction.  Questions were answered.  She understands the risks and benefits.  We talked about wound care, antibiotic coverage, activity restrictions, etc.  She was agreeable to proceed.    She was placed supine on the procedure table with the left breast prepped and draped in a sterile fashion.  A timeout was done.  I opened the vertical aspect of the mastectomy incision and opened the dermal flap beneath it.  I removed the expander.  There was cloudy fluid within the pocket, which was cultured.  I irrigated with saline and checked for final hemostasis.  I injected the soft tissues with 10 mL of 0.25% Marcaine, 1:200,000 epinephrine.  I placed a drain, and this was sewn in with 2-0 nylon.  The dermal flap was closed with 2-0 Vicryl and the skin was closed with 3-0 and 4-0 Vicryl.  A sterile compressive dressing was applied.  Anesthesia was reversed  and the patient was taken to the recovery room in satisfactory condition.    ESTIMATED BLOOD LOSS:  10 mL.    COUNTS:  Sponge and needle counts were correct.    SPECIMENS:   Left breast periprosthetic fluid culture.    FINDINGS:  Noted above.    Rc Zamorano MD        D: 2023   T: 2023   MT: [id number]    Name:     STACY KEARNS  MRN:      [id number]        Account:        [id number]   :      1975           Procedure Date: 2023     Document: [id number]

## 2023-02-18 NOTE — ANESTHESIA CARE TRANSFER NOTE
Patient: Betty Rogers    Procedure: Procedure(s):  REMOVAL OF LEFT BREAST RECONSTRUCTION TISSUE EXPANDERS       Diagnosis: Breast cancer (H) [C50.919]  Diagnosis Additional Information: No value filed.    Anesthesia Type:   General     Note:    Oropharynx: oropharynx clear of all foreign objects and spontaneously breathing  Level of Consciousness: awake  Oxygen Supplementation: room air    Independent Airway: airway patency satisfactory and stable  Dentition: dentition unchanged  Vital Signs Stable: post-procedure vital signs reviewed and stable  Report to RN Given: handoff report given  Patient transferred to: PACU    Handoff Report: Identifed the Patient, Identified the Reponsible Provider, Reviewed the pertinent medical history, Discussed the surgical course, Reviewed Intra-OP anesthesia mangement and issues during anesthesia, Set expectations for post-procedure period and Allowed opportunity for questions and acknowledgement of understanding      Vitals:  Vitals Value Taken Time   /38    Temp     Pulse 106 02/18/23 0807   Resp 15 02/18/23 0807   SpO2 98 % 02/18/23 0807   Vitals shown include unvalidated device data.    Electronically Signed By: HETAL Joseph CRNA  February 18, 2023  8:09 AM

## 2023-02-18 NOTE — ANESTHESIA PREPROCEDURE EVALUATION
Anesthesia Pre-Procedure Evaluation    Patient: Betty Rogers   MRN: [id number] : 1975        Procedure : Procedure(s):  REMOVAL OF LEFT BREAST RECONSTRUCTION TISSUE EXPANDERS          Past Medical History:   Diagnosis Date     Breast cancer metastasized to axillary lymph node (H) 7/15/2022     No active medical problems       Past Surgical History:   Procedure Laterality Date     C/SECTION, LOW TRANSVERSE      ,      DISSECT LYMPH NODE AXILLA Left 2022    Procedure: left axillary node dissection;  Surgeon: Janie South MD;  Location:  OR     INSERT PORT VASCULAR ACCESS Right 2022    Procedure: PORT PLACEMENT;  Surgeon: Janie South MD;  Location: [facility] OR     MASTECTOMY SIMPLE, SENTINEL NODE, COMBINED Bilateral 2022    Procedure: bilateral skin sparing mastectomies with left sentinel lymph node biopsy, tag localized left axillary node excision;  Surgeon: Janie South MD;  Location:  OR     RECONSTRUCT BREAST, INSERT TISSUE EXPANDER BILATERAL, COMBINED Bilateral 2022    Procedure: BILATERAL TISSUE EXPANDER BREAST RECONSTRUCTION;  Surgeon: Rc Zamorano MD;  Location:  OR     REMOVE PORT VASCULAR ACCESS N/A 2022    Procedure: possible port removal;  Surgeon: Janie South MD;  Location: [facility] OR      Allergies   Allergen Reactions     Nuts      Other [Seasonal Allergies]      Tree Nuts-Lips swell and breathing problems      Social History     Tobacco Use     Smoking status: Never     Smokeless tobacco: Never   Substance Use Topics     Alcohol use: Yes     Comment: socially, approx 6-7 drinks per week      Wt Readings from Last 1 Encounters:   23 60.1 kg (132 lb 6.4 oz)        Anesthesia Evaluation   Pt has had prior anesthetic.     No history of anesthetic complications       ROS/MED HX  ENT/Pulmonary:    (-) sleep apnea   Neurologic:       Cardiovascular:     (+) -----Previous cardiac testing   Echo: Date: 10/2022  Results:  Interpretation Summary     Global and regional left ventricular function is normal with an EF of 61%.  Global right ventricular function is normal.  No significant valvular abnormalities present.  Pulmonary artery systolic pressure cannot be assessed.  The inferior vena cava is normal.  No pericardial effusion is present.  Stress Test: Date: Results:    ECG Reviewed: Date: Results:    Cath: Date: Results:      METS/Exercise Tolerance:     Hematologic:       Musculoskeletal:       GI/Hepatic:    (-) GERD   Renal/Genitourinary:       Endo:       Psychiatric/Substance Use:       Infectious Disease:       Malignancy: Comment: S/p bilateral skin sparing mastectomies with left sentinel lymph node biopsy and excision of tag localized lymph node along with left axillary node dissection and port removal followed by reconstruction with expander placement on 12/21/2022  (+) Malignancy (met to node), History of Breast.Breast CA status post Surgery, Chemo and Radiation.        Other:            Physical Exam    Airway        Mallampati: II   TM distance: > 3 FB   Neck ROM: full   Mouth opening: > 3 cm    Respiratory Devices and Support         Dental       (+) Completely normal teeth      Cardiovascular   cardiovascular exam normal          Pulmonary   pulmonary exam normal                OUTSIDE LABS:  CBC:   Lab Results   Component Value Date    WBC 3.8 (L) 02/01/2023    WBC 7.4 11/21/2022    HGB 11.4 (L) 02/01/2023    HGB 10.2 (L) 12/21/2022    HCT 35.4 02/01/2023    HCT 30.0 (L) 11/21/2022     02/01/2023     11/21/2022     BMP:   Lab Results   Component Value Date     02/01/2023     11/07/2022    POTASSIUM 4.6 02/01/2023    POTASSIUM 4.2 11/07/2022    CHLORIDE 107 02/01/2023    CHLORIDE 109 11/07/2022    CO2 27 02/01/2023    CO2 26 11/07/2022    BUN 11 02/01/2023    BUN 13 11/07/2022    CR 0.84 02/01/2023    CR 0.66 11/07/2022    GLC 94 02/01/2023    GLC 78 12/22/2022     COAGS: No results  found for: PTT, INR, FIBR  POC:   Lab Results   Component Value Date    HCG Negative 02/01/2023     HEPATIC:   Lab Results   Component Value Date    ALBUMIN 3.3 (L) 02/01/2023    PROTTOTAL 6.5 (L) 02/01/2023    ALT 18 02/01/2023    AST 13 02/01/2023    ALKPHOS 52 02/01/2023    BILITOTAL 0.4 02/01/2023     OTHER:   Lab Results   Component Value Date    PH 7.45 (H) 10/18/2022    LACT 1.1 10/18/2022    ABEBA 9.1 02/01/2023       Anesthesia Plan    ASA Status:  3   NPO Status:  NPO Appropriate    Anesthesia Type: General.     - Airway: LMA   Induction: Intravenous.   Maintenance: TIVA.        Consents    Anesthesia Plan(s) and associated risks, benefits, and realistic alternatives discussed. Questions answered and patient/representative(s) expressed understanding.    - Discussed:     - Discussed with:  Patient      - Extended Intubation/Ventilatory Support Discussed: No.      - Patient is DNR/DNI Status: No    Use of blood products discussed: Yes.     - Discussed with: Patient.     - Consented: consented to blood products            Reason for refusal: other.     Postoperative Care    Pain management: IV analgesics, Oral pain medications, Multi-modal analgesia.   PONV prophylaxis: Ondansetron (or other 5HT-3), Dexamethasone or Solumedrol     Comments:                Karan Shannon DO

## 2023-02-18 NOTE — DISCHARGE INSTRUCTIONS
Same Day Surgery Discharge Instructions for  Sedation and General Anesthesia     It's not unusual to feel dizzy, light-headed or faint for up to 24 hours after surgery or while taking pain medication.  If you have these symptoms: sit for a few minutes before standing and have someone assist you when you get up to walk or use the bathroom.    You should rest and relax for the next 24 hours. We recommend you make arrangements to have an adult stay with you for at least 24 hours after your discharge.  Avoid hazardous and strenuous activity.    DO NOT DRIVE any vehicle or operate mechanical equipment for 24 hours following the end of your surgery.  Even though you may feel normal, your reactions may be affected by the medication you have received.    Do not drink alcoholic beverages for 24 hours following surgery.     Slowly progress to your regular diet as you feel able. It's not unusual to feel nauseated and/or vomit after receiving anesthesia.  If you develop these symptoms, drink clear liquids (apple juice, ginger ale, broth, 7-up, etc. ) until you feel better.  If your nausea and vomiting persists for 24 hours, please notify your surgeon.      All narcotic pain medications, along with inactivity and anesthesia, can cause constipation. Drinking plenty of liquids and increasing fiber intake will help.    For any questions of a medical nature, call your surgeon.    Do not make important decisions for 24 hours.    If you had general anesthesia, you may have a sore throat for a couple of days related to the breathing tube used during surgery.  You may use Cepacol lozenges to help with this discomfort.  If it worsens or if you develop a fever, contact your surgeon.     If you feel your pain is not well managed with the pain medications prescribed by your surgeon, please contact your surgeon's office to let them know so they can address your concerns.   Norman Giraldo Drain  Home Care Instructions    What is a Norman  Kristal (LENO) Drain?  This is a small tube that connects to a bulb.  Its gentle suction removes extra fluid from a surgical wound.  Your doctor will remove the tube when the amount of fluid decreases.  The color and amount of fluid varies.  Right after surgery the fluid is bright red.  Over time, it changes to light pink and may become clear or the color of straw.    How should I care for my tube site?  Keep the skin around the tube dry.  Check with your doctor about how to shower.  You may need to cover the site with plastic when you shower.  Or, it may be okay to let the site get wet and put on a clean bandage after you shower.    If the bandage gets wet, you will need to change it.  How should I care for the bulb?  Keep the bulb compressed at all times except while you empty it.   Attach the bulb to your clothing with tape and a safety pin.  Try to empty the bulb at the same time every day.  Empty the bulb at least once a day, or when the bulb becomes half full.  If it becomes too full, there will not be enough suction.    To empty the bulb:  Wash your hands.  Open the bulb cap.  Drain the fluid from the bulb into the measuring cup.  If you have two drains, use two cups.    Clean the mouth of the bulb with an alcohol wipe if your nurse told you to.  Squeeze the bulb (fold it in half before you close the bulb cap) If it does not stay compressed, call your nurse or clinic.  Write the amount of drainage on the drainage record (see back page).  If you have two drains, write the amount for each bulb.  Flush the drainage down the toilet.  Rinse the measuring cup.  Wash your hands.    When should I call my doctor?   Call your doctor if:  You have a fever over 101 F (38.3 C), taken under the tongue.   The drainage increases or smells bad.  The skin around your tube has increased redness, swelling, warmth or pain.  You have pus or fluid leaking at the tube site.  Your stitches break.  You think the tube is not draining.  The  tube falls out.  You have any problems or concerns.    Your drainage record:    Empty your bulb at least once per day or when 1/2 to 1/3 full.  Write down the date, time and amount of drainage in each bulb.   Bring this record to each clinic visit.    Date Time Bulb 1: amount of  Drainage in (ml or cc) Bulb 2: amount of drainage (in ml or cc) Notes                                                        **If you have questions or concerns about your procedure,   call Dr Zamorano at [phone]**

## 2023-02-18 NOTE — ANESTHESIA POSTPROCEDURE EVALUATION
Patient: Betty Rogers    Procedure: Procedure(s):  REMOVAL OF LEFT BREAST RECONSTRUCTION TISSUE EXPANDERS       Anesthesia Type:  General    Note:  Disposition: Outpatient   Postop Pain Control: Uneventful            Sign Out: Well controlled pain   PONV: No   Neuro/Psych: Uneventful            Sign Out: Acceptable/Baseline neuro status   Airway/Respiratory: Uneventful            Sign Out: Acceptable/Baseline resp. status   CV/Hemodynamics: Uneventful            Sign Out: Acceptable CV status   Other NRE: NONE   DID A NON-ROUTINE EVENT OCCUR? No           Last vitals:  Vitals Value Taken Time   /45 02/18/23 0900   Temp 37  C (98.6  F) 02/18/23 0845   Pulse 82 02/18/23 0910   Resp 13 02/18/23 0910   SpO2 100 % 02/18/23 0910   Vitals shown include unvalidated device data.    Electronically Signed By: Sancho Stephen MD  February 18, 2023  3:49 PM

## 2023-02-18 NOTE — ANESTHESIA PROCEDURE NOTES
Airway       Patient location during procedure: OR ([facility] - Operating Room or Procedural Area)  Staff -        Anesthesiologist:  Sancho Stephen MD       CRNA: Gema Cronin APRN CRNA       Performed By: CRNAIndications and Patient Condition       Indications for airway management: sean-procedural       Induction type:intravenous      Final Airway Details       Final airway type: supraglottic airway    Supraglottic Airway Details        Type: LMA       Brand: I-Gel       LMA size: 4    Post intubation assessment        Number of attempts at approach: 1       Number of other approaches attempted: 0       Ease of procedure: easy       Dentition: Intact and Unchanged

## 2023-02-20 LAB
PATH REPORT.COMMENTS IMP SPEC: NORMAL
PATH REPORT.FINAL DX SPEC: NORMAL
PATH REPORT.GROSS SPEC: NORMAL
PATH REPORT.MICROSCOPIC SPEC OTHER STN: NORMAL
PATH REPORT.RELEVANT HX SPEC: NORMAL
PHOTO IMAGE: NORMAL

## 2023-02-20 NOTE — TELEPHONE ENCOUNTER
Spoke with patient today following the removal of her L expander on 2/18/23 due to infection. Patient continues on antibiotics, she states she had a follow up this morning with plastic surgery, they stated she would be ready to re-sim on 2/24/23. Informed patient that Dr. Foster is out of office this week, she also is not at the [place] location on Fridays. We discussed that Dr. Foster does prefer she is cleared by Plastics prior to re-simming. Patient is scheduled for her next follow up with Plastics on 2/27/23 at 0800. Patient was scheduled for a CT/SIM on 2/2723 at 0900 with Dr. Foster pending she has clearance from her Plastics team. Patient had questions regarding an upcoming trip to [place] from 4/7/23-4/11/23. We reviewed her timeline and plan for radiation, we discussed that Dr. Foster does not recommend sun exposure right after finishing radiation therapy due to the side effects of the radiation to her skin. We discussed she would need to wear spf clothing and avoid the sun if she were to go on the [place] We also discussed that her skin, within the radiation treatment field, will be sensitive to the sun for years to come and reviewed sunscreen use. Informed patient she can discuss her trip with Dr. Foster also when she is back for her CT/SIM next week. Patient verbalizes understanding, no further questions or concerns at this time.    Melva Bishop RN

## 2023-02-22 ENCOUNTER — MYC MEDICAL ADVICE (OUTPATIENT)
Dept: SURGERY | Facility: CLINIC | Age: 48
End: 2023-02-22

## 2023-02-23 LAB — BACTERIA ABSC ANAEROBE+AEROBE CULT: ABNORMAL

## 2023-02-25 LAB — BACTERIA ABSC ANAEROBE+AEROBE CULT: NORMAL

## 2023-02-27 ENCOUNTER — APPOINTMENT (OUTPATIENT)
Dept: RADIATION ONCOLOGY | Facility: CLINIC | Age: 48
End: 2023-02-27
Payer: COMMERCIAL

## 2023-02-27 PROCEDURE — 77290 THER RAD SIMULAJ FIELD CPLX: CPT | Performed by: RADIOLOGY

## 2023-03-01 ENCOUNTER — APPOINTMENT (OUTPATIENT)
Dept: RADIATION ONCOLOGY | Facility: CLINIC | Age: 48
End: 2023-03-01
Payer: COMMERCIAL

## 2023-03-02 ENCOUNTER — APPOINTMENT (OUTPATIENT)
Dept: RADIATION ONCOLOGY | Facility: CLINIC | Age: 48
End: 2023-03-02
Payer: COMMERCIAL

## 2023-03-02 PROCEDURE — 77293 RESPIRATOR MOTION MGMT SIMUL: CPT | Performed by: RADIOLOGY

## 2023-03-02 PROCEDURE — 77300 RADIATION THERAPY DOSE PLAN: CPT | Performed by: RADIOLOGY

## 2023-03-02 PROCEDURE — 77334 RADIATION TREATMENT AID(S): CPT | Performed by: RADIOLOGY

## 2023-03-02 PROCEDURE — 77295 3-D RADIOTHERAPY PLAN: CPT | Performed by: RADIOLOGY

## 2023-03-06 ENCOUNTER — APPOINTMENT (OUTPATIENT)
Dept: RADIATION ONCOLOGY | Facility: CLINIC | Age: 48
End: 2023-03-06
Payer: COMMERCIAL

## 2023-03-06 PROCEDURE — 77280 THER RAD SIMULAJ FIELD SMPL: CPT | Performed by: RADIOLOGY

## 2023-03-07 ENCOUNTER — APPOINTMENT (OUTPATIENT)
Dept: RADIATION ONCOLOGY | Facility: CLINIC | Age: 48
End: 2023-03-07
Payer: COMMERCIAL

## 2023-03-07 PROCEDURE — 77412 RADIATION TX DELIVERY LVL 3: CPT | Performed by: SURGERY

## 2023-03-07 PROCEDURE — 77331 SPECIAL RADIATION DOSIMETRY: CPT | Performed by: RADIOLOGY

## 2023-03-07 PROCEDURE — 77387 GUIDANCE FOR RADJ TX DLVR: CPT | Performed by: SURGERY

## 2023-03-08 ENCOUNTER — APPOINTMENT (OUTPATIENT)
Dept: RADIATION ONCOLOGY | Facility: CLINIC | Age: 48
End: 2023-03-08
Payer: COMMERCIAL

## 2023-03-08 ENCOUNTER — ANCILLARY ORDERS (OUTPATIENT)
Dept: ONCOLOGY | Facility: CLINIC | Age: 48
End: 2023-03-08

## 2023-03-08 ENCOUNTER — OFFICE VISIT (OUTPATIENT)
Dept: RADIATION ONCOLOGY | Facility: CLINIC | Age: 48
End: 2023-03-08
Payer: COMMERCIAL

## 2023-03-08 ENCOUNTER — ANCILLARY PROCEDURE (OUTPATIENT)
Dept: BONE DENSITY | Facility: CLINIC | Age: 48
End: 2023-03-08
Attending: INTERNAL MEDICINE
Payer: COMMERCIAL

## 2023-03-08 VITALS
BODY MASS INDEX: 22.73 KG/M2 | HEART RATE: 68 BPM | DIASTOLIC BLOOD PRESSURE: 75 MMHG | RESPIRATION RATE: 16 BRPM | SYSTOLIC BLOOD PRESSURE: 106 MMHG | WEIGHT: 132.4 LBS | TEMPERATURE: 97.9 F | OXYGEN SATURATION: 100 %

## 2023-03-08 DIAGNOSIS — C50.812 MALIGNANT NEOPLASM OF OVERLAPPING SITES OF LEFT BREAST IN FEMALE, ESTROGEN RECEPTOR POSITIVE (H): Primary | ICD-10-CM

## 2023-03-08 DIAGNOSIS — C77.3 BREAST CANCER METASTASIZED TO AXILLARY LYMPH NODE, LEFT (H): ICD-10-CM

## 2023-03-08 DIAGNOSIS — C77.3 CARCINOMA OF LEFT BREAST METASTATIC TO AXILLARY LYMPH NODE (H): ICD-10-CM

## 2023-03-08 DIAGNOSIS — T45.1X5A ANTINEOPLASTIC CHEMOTHERAPY INDUCED PANCYTOPENIA (H): ICD-10-CM

## 2023-03-08 DIAGNOSIS — D61.810 ANTINEOPLASTIC CHEMOTHERAPY INDUCED PANCYTOPENIA (H): ICD-10-CM

## 2023-03-08 DIAGNOSIS — Z17.0 MALIGNANT NEOPLASM OF OVERLAPPING SITES OF LEFT BREAST IN FEMALE, ESTROGEN RECEPTOR POSITIVE (H): Primary | ICD-10-CM

## 2023-03-08 DIAGNOSIS — C50.912 CARCINOMA OF LEFT BREAST METASTATIC TO AXILLARY LYMPH NODE (H): ICD-10-CM

## 2023-03-08 DIAGNOSIS — Z13.820 SCREENING FOR OSTEOPOROSIS: ICD-10-CM

## 2023-03-08 DIAGNOSIS — C50.912 BREAST CANCER METASTASIZED TO AXILLARY LYMPH NODE, LEFT (H): ICD-10-CM

## 2023-03-08 PROCEDURE — 99207 PR DROP WITH A PROCEDURE: CPT | Performed by: SURGERY

## 2023-03-08 PROCEDURE — 77387 GUIDANCE FOR RADJ TX DLVR: CPT | Performed by: SURGERY

## 2023-03-08 PROCEDURE — 77412 RADIATION TX DELIVERY LVL 3: CPT | Performed by: SURGERY

## 2023-03-08 PROCEDURE — 77080 DXA BONE DENSITY AXIAL: CPT | Performed by: RADIOLOGY

## 2023-03-08 ASSESSMENT — PAIN SCALES - GENERAL: PAINLEVEL: NO PAIN (0)

## 2023-03-08 NOTE — PROGRESS NOTES
[facility]  SPECIALIZING IN BREAKTHROUGHS  Radiation Oncology    On Treatment Visit Note      Betty Rogers      Date: Mar 8, 2023   MRN: [id number]   : 1975  Diagnosis: L breast IDC ER/[place]+ HER2-      Reason for Visit:  On Radiation Treatment Visit       Treatment Summary to Date  Treatment Site: L CW, L SC Current Dose: 800/5400, 800/5400 cGy Fractions: ,       Chemotherapy  Chemo concurrent with radx?: No (Dr. Franco)    Subjective:   No complaints, tolerating RT well overall.    Nursing ROS:   Nutrition Alteration  Diet Type: Patient's Preference  Skin  Skin Reaction: 0 - No changes (no desquamation)  Skin Intervention: Aquaphor 2-3 x day  Skin Note: Expander was removed due to infection. Patient has completed antibiotics and now restarted XRT.        Cardiovascular  Respiratory effort: 1 - Normal - without distress        Psychosocial  Psychosocial Note: Patient denies fatigue  Pain Assessment  0-10 Pain Scale: 0      Objective:   /75   Pulse 68   Temp 97.9  F (36.6  C) (Oral)   Resp 16   Wt 60.1 kg (132 lb 6.4 oz)   LMP 09/10/2022   SpO2 100%   BMI 22.73 kg/m    Gen: Appears well, in no acute distress  Skin: incision intact, mild erythema, no skin breakdown  CV/Resp: rrr, breathing comfortably on room air  Neuro: CN 2-12 grossly intact, UE/LE full strength     Labs:  CBC RESULTS: Recent Labs   Lab Test 23  0853   WBC 3.8*   RBC 4.06   HGB 11.4*   HCT 35.4   MCV 87   MCH 28.1   MCHC 32.2   RDW 12.3        ELECTROLYTES:  Recent Labs   Lab Test 23  0853      POTASSIUM 4.6   CHLORIDE 107   ABEBA 9.1   CO2 27   BUN 11   CR 0.84   GLC 94       Assessment:    Tolerating radiation therapy well.  All questions and concerns addressed.      Plan:   1. Continue current therapy.        [employer] chart and setup information reviewed  Ports checked and MVCT/IGRT images checked    Medication Review  Med list reviewed with patient?: Yes  Med Note:  Patient taking Tamoxifen    Educational Topic Discussed  Education Instructions: Skin cares, fatigue    Rosendo Olivas MD  Radiation Oncology   [facility]: [phone]

## 2023-03-08 NOTE — LETTER
3/8/2023         RE: Betty Rogers  [address]  [place] [address]        Dear Colleague,    Thank you for referring your patient, Betty Rogers, to the [facility] RADIATION ONCOLOGY [place]. Please see a copy of my visit note below.    [facility]  SPECIALIZING IN BREAKTHROUGHS  Radiation Oncology    On Treatment Visit Note      Betty Rogers      Date: Mar 8, 2023   MRN: [id number]   : 1975  Diagnosis: L breast IDC ER/[place]+ HER2-      Reason for Visit:  On Radiation Treatment Visit       Treatment Summary to Date  Treatment Site: L CW, L SC Current Dose: 800/5400, 800/5400 cGy Fractions: ,       Chemotherapy  Chemo concurrent with radx?: No (Dr. Franco)    Subjective:   No complaints, tolerating RT well overall.    Nursing ROS:   Nutrition Alteration  Diet Type: Patient's Preference  Skin  Skin Reaction: 0 - No changes (no desquamation)  Skin Intervention: Aquaphor 2-3 x day  Skin Note: Expander was removed due to infection. Patient has completed antibiotics and now restarted XRT.        Cardiovascular  Respiratory effort: 1 - Normal - without distress        Psychosocial  Psychosocial Note: Patient denies fatigue  Pain Assessment  0-10 Pain Scale: 0      Objective:   /75   Pulse 68   Temp 97.9  F (36.6  C) (Oral)   Resp 16   Wt 60.1 kg (132 lb 6.4 oz)   LMP 09/10/2022   SpO2 100%   BMI 22.73 kg/m    Gen: Appears well, in no acute distress  Skin: incision intact, mild erythema, no skin breakdown  CV/Resp: rrr, breathing comfortably on room air  Neuro: CN 2-12 grossly intact, UE/LE full strength     Labs:  CBC RESULTS: Recent Labs   Lab Test 23  0853   WBC 3.8*   RBC 4.06   HGB 11.4*   HCT 35.4   MCV 87   MCH 28.1   MCHC 32.2   RDW 12.3        ELECTROLYTES:  Recent Labs   Lab Test 23  0853      POTASSIUM 4.6   CHLORIDE 107   ABEBA 9.1   CO2 27   BUN 11   CR 0.84   GLC 94       Assessment:    Tolerating  radiation therapy well.  All questions and concerns addressed.      Plan:   1. Continue current therapy.        [employer] chart and setup information reviewed  Ports checked and MVCT/IGRT images checked    Medication Review  Med list reviewed with patient?: Yes  Med Note: Patient taking Tamoxifen    Educational Topic Discussed  Education Instructions: Skin cares, fatigue    Rosendo Olivas MD  Radiation Oncology   [facility]: [phone]          Again, thank you for allowing me to participate in the care of your patient.        Sincerely,        Rosendo Olivas MD

## 2023-03-09 NOTE — TELEPHONE ENCOUNTER
PB DOS: 5/2/2023  Type of Procedure: TOTAL LAPAROSCOPIC HYSTERECTOMY BILATERAL SALPINGO-OOPHRECTOMY X same day surgery  CPT Codes: 06639  ICD10 Codes: Metastatic Breast cancer, need to remove ovaries [C50.912, C77.3, Z40.02]  Surgeon/Ordering provider: Mario Rosario MD  Pre-cert/Authorization completed:  No PA Required   Payer: [facility]  Spoke to April C from [facility]   Ref. # April C 3/9/2023 / Auth #   Valid Dates:     This is not a guarantee of payment. Payment is subject to the patient's plan benefits and eligibility at the time services are rendered.     [facility] Form, facesheet and clinicals faxed to [facility]

## 2023-03-10 ENCOUNTER — APPOINTMENT (OUTPATIENT)
Dept: RADIATION ONCOLOGY | Facility: CLINIC | Age: 48
End: 2023-03-10
Payer: COMMERCIAL

## 2023-03-10 PROCEDURE — 77387 GUIDANCE FOR RADJ TX DLVR: CPT | Performed by: SURGERY

## 2023-03-10 PROCEDURE — 77412 RADIATION TX DELIVERY LVL 3: CPT | Performed by: SURGERY

## 2023-03-10 PROCEDURE — 77427 RADIATION TX MANAGEMENT X5: CPT | Performed by: SURGERY

## 2023-03-10 PROCEDURE — 77336 RADIATION PHYSICS CONSULT: CPT | Performed by: SURGERY

## 2023-03-13 ENCOUNTER — APPOINTMENT (OUTPATIENT)
Dept: RADIATION ONCOLOGY | Facility: CLINIC | Age: 48
End: 2023-03-13
Payer: COMMERCIAL

## 2023-03-13 ENCOUNTER — LAB (OUTPATIENT)
Dept: LAB | Facility: CLINIC | Age: 48
End: 2023-03-13
Payer: COMMERCIAL

## 2023-03-13 DIAGNOSIS — Z13.220 LIPID SCREENING: ICD-10-CM

## 2023-03-13 DIAGNOSIS — D61.810 ANTINEOPLASTIC CHEMOTHERAPY INDUCED PANCYTOPENIA (H): ICD-10-CM

## 2023-03-13 DIAGNOSIS — C77.3 CARCINOMA OF LEFT BREAST METASTATIC TO AXILLARY LYMPH NODE (H): ICD-10-CM

## 2023-03-13 DIAGNOSIS — T45.1X5A ANTINEOPLASTIC CHEMOTHERAPY INDUCED PANCYTOPENIA (H): ICD-10-CM

## 2023-03-13 DIAGNOSIS — C50.912 CARCINOMA OF LEFT BREAST METASTATIC TO AXILLARY LYMPH NODE (H): ICD-10-CM

## 2023-03-13 LAB
ALBUMIN SERPL-MCNC: 3.8 G/DL (ref 3.4–5)
ALP SERPL-CCNC: 47 U/L (ref 40–150)
ALT SERPL W P-5'-P-CCNC: 27 U/L (ref 0–50)
ANION GAP SERPL CALCULATED.3IONS-SCNC: 4 MMOL/L (ref 3–14)
AST SERPL W P-5'-P-CCNC: 18 U/L (ref 0–45)
BASOPHILS # BLD AUTO: 0 10E3/UL (ref 0–0.2)
BASOPHILS NFR BLD AUTO: 1 %
BILIRUB SERPL-MCNC: 0.4 MG/DL (ref 0.2–1.3)
BUN SERPL-MCNC: 15 MG/DL (ref 7–30)
CALCIUM SERPL-MCNC: 9.4 MG/DL (ref 8.5–10.1)
CHLORIDE BLD-SCNC: 110 MMOL/L (ref 94–109)
CHOLEST SERPL-MCNC: 177 MG/DL
CO2 SERPL-SCNC: 26 MMOL/L (ref 20–32)
CREAT SERPL-MCNC: 0.77 MG/DL (ref 0.52–1.04)
EOSINOPHIL # BLD AUTO: 0.3 10E3/UL (ref 0–0.7)
EOSINOPHIL NFR BLD AUTO: 8 %
ERYTHROCYTE [DISTWIDTH] IN BLOOD BY AUTOMATED COUNT: 14 % (ref 10–15)
FASTING STATUS PATIENT QL REPORTED: NORMAL
GFR SERPL CREATININE-BSD FRML MDRD: >90 ML/MIN/1.73M2
GLUCOSE BLD-MCNC: 80 MG/DL (ref 70–99)
HCT VFR BLD AUTO: 39.8 % (ref 35–47)
HDLC SERPL-MCNC: 78 MG/DL
HGB BLD-MCNC: 13.1 G/DL (ref 11.7–15.7)
IMM GRANULOCYTES # BLD: 0 10E3/UL
IMM GRANULOCYTES NFR BLD: 0 %
LDLC SERPL CALC-MCNC: 82 MG/DL
LYMPHOCYTES # BLD AUTO: 0.9 10E3/UL (ref 0.8–5.3)
LYMPHOCYTES NFR BLD AUTO: 23 %
MCH RBC QN AUTO: 27.2 PG (ref 26.5–33)
MCHC RBC AUTO-ENTMCNC: 32.9 G/DL (ref 31.5–36.5)
MCV RBC AUTO: 83 FL (ref 78–100)
MONOCYTES # BLD AUTO: 0.3 10E3/UL (ref 0–1.3)
MONOCYTES NFR BLD AUTO: 6 %
NEUTROPHILS # BLD AUTO: 2.4 10E3/UL (ref 1.6–8.3)
NEUTROPHILS NFR BLD AUTO: 62 %
NONHDLC SERPL-MCNC: 99 MG/DL
NRBC # BLD AUTO: 0 10E3/UL
NRBC BLD AUTO-RTO: 0 /100
PLATELET # BLD AUTO: 146 10E3/UL (ref 150–450)
POTASSIUM BLD-SCNC: 4.4 MMOL/L (ref 3.4–5.3)
PROT SERPL-MCNC: 6.6 G/DL (ref 6.8–8.8)
RBC # BLD AUTO: 4.82 10E6/UL (ref 3.8–5.2)
SODIUM SERPL-SCNC: 140 MMOL/L (ref 133–144)
TRIGL SERPL-MCNC: 86 MG/DL
WBC # BLD AUTO: 3.9 10E3/UL (ref 4–11)

## 2023-03-13 PROCEDURE — 85025 COMPLETE CBC W/AUTO DIFF WBC: CPT

## 2023-03-13 PROCEDURE — 80061 LIPID PANEL: CPT

## 2023-03-13 PROCEDURE — 36415 COLL VENOUS BLD VENIPUNCTURE: CPT

## 2023-03-13 PROCEDURE — 80053 COMPREHEN METABOLIC PANEL: CPT

## 2023-03-13 PROCEDURE — 77387 GUIDANCE FOR RADJ TX DLVR: CPT | Performed by: RADIOLOGY

## 2023-03-13 PROCEDURE — 77412 RADIATION TX DELIVERY LVL 3: CPT | Performed by: RADIOLOGY

## 2023-03-14 ENCOUNTER — ONCOLOGY VISIT (OUTPATIENT)
Dept: ONCOLOGY | Facility: CLINIC | Age: 48
End: 2023-03-14
Payer: COMMERCIAL

## 2023-03-14 ENCOUNTER — APPOINTMENT (OUTPATIENT)
Dept: RADIATION ONCOLOGY | Facility: CLINIC | Age: 48
End: 2023-03-14
Payer: COMMERCIAL

## 2023-03-14 VITALS
HEART RATE: 62 BPM | SYSTOLIC BLOOD PRESSURE: 110 MMHG | OXYGEN SATURATION: 100 % | RESPIRATION RATE: 16 BRPM | HEIGHT: 64 IN | DIASTOLIC BLOOD PRESSURE: 73 MMHG | WEIGHT: 131.7 LBS | BODY MASS INDEX: 22.48 KG/M2

## 2023-03-14 DIAGNOSIS — C50.912 CARCINOMA OF LEFT BREAST METASTATIC TO AXILLARY LYMPH NODE (H): ICD-10-CM

## 2023-03-14 DIAGNOSIS — T45.1X5A ANTINEOPLASTIC CHEMOTHERAPY INDUCED PANCYTOPENIA (H): ICD-10-CM

## 2023-03-14 DIAGNOSIS — C77.3 CARCINOMA OF LEFT BREAST METASTATIC TO AXILLARY LYMPH NODE (H): ICD-10-CM

## 2023-03-14 DIAGNOSIS — D05.12 DUCTAL CARCINOMA IN SITU (DCIS) OF LEFT BREAST: Primary | ICD-10-CM

## 2023-03-14 DIAGNOSIS — D61.810 ANTINEOPLASTIC CHEMOTHERAPY INDUCED PANCYTOPENIA (H): ICD-10-CM

## 2023-03-14 PROCEDURE — 99214 OFFICE O/P EST MOD 30 MIN: CPT | Performed by: INTERNAL MEDICINE

## 2023-03-14 PROCEDURE — 77387 GUIDANCE FOR RADJ TX DLVR: CPT | Performed by: RADIOLOGY

## 2023-03-14 PROCEDURE — 77412 RADIATION TX DELIVERY LVL 3: CPT | Performed by: RADIOLOGY

## 2023-03-14 ASSESSMENT — PAIN SCALES - GENERAL: PAINLEVEL: NO PAIN (0)

## 2023-03-14 NOTE — PROGRESS NOTES
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  # Neoadjuvant BRCA testing negative.   # Neoadjuvant taxol x 12 weeks; partial response.  # Neoadjuvant dose dense AC x 4 cycle; RCB2 present.  # Left breast mastectomy w/ sentinel node / axillary node and tissue expander recon; path w/ gr 2, 4 mm tumor, 8 mm margin, 7/13 nodes involved (1 macromet, 3 micromet, 21 mm largest, pos KAMERON).  # Left breast tissue expander removal 2/2 infection.  # Adjuvant tamoxifen ongoing.  # Adjuvant radiation ongoing.    Clinically, Betty remains well.  Going through radiation.  Tolerating Tamoxifen well and w/o issues.  DEXA normal.  Lipids and ALT look okay.  Ki67 <1%.  Planning Tamoxifen through BSO / AI w/ gyn-onc.  Completing radiation.  RTC 3 months.    Hoang Franco MD    --------------------    Interval History:  Betty returns for follow up of breast cancer.  All in all, she continues to do quite well.  Healing well from surgery.  Neuropathy improving.  No periods since September.  Spirits and mood appeared good.  No nausea, vomiting.    --------------------    Physical Exam:  VS: /73 (BP Location: Right arm)   Pulse 62   Resp 16   Ht 1.626 m (5' 4.02\")   Wt 59.7 kg (131 lb 11.2 oz)   LMP 09/10/2022   SpO2 100%   BMI 22.59 kg/m    GEN: Well appearing.    Labs / Imaging:  We reviewed surgical pathology report in detail.  "

## 2023-03-14 NOTE — LETTER
"    3/14/2023         RE: Betty Rogers  [address]  [place] [address]        Dear Colleague,    Thank you for referring your patient, Betty Rogers, to the [facility] [place]. Please see a copy of my visit note below.    [facility] Hematology / Oncology  Progress Note  Name: Betty Rogers  :  1975    MRN:  [id number]    --------------------    Assessment / Plan:  Multifocal, locally advanced, hormone positive, HER2 negative left breast ductal carcinoma (ypT1a ypN2a):  # Neoadjuvant Oncotype Dx score 28.  # Neoadjuvant BRCA testing negative.   # Neoadjuvant taxol x 12 weeks; partial response.  # Neoadjuvant dose dense AC x 4 cycle; RCB2 present.  # Left breast mastectomy w/ sentinel node / axillary node and tissue expander recon; path w/ gr 2, 4 mm tumor, 8 mm margin, 7/13 nodes involved (1 macromet, 3 micromet, 21 mm largest, pos KAMERON).  # Left breast tissue expander removal 2/2 infection.  # Adjuvant tamoxifen ongoing.  # Adjuvant radiation ongoing.    Clinically, Betty remains well.  Going through radiation.  Tolerating Tamoxifen well and w/o issues.  DEXA normal.  Lipids and ALT look okay.  Ki67 <1%.  Planning Tamoxifen through BSO / AI w/ gyn-onc.  Completing radiation.  RTC 3 months.    Hoang Franco MD    --------------------    Interval History:  Betty returns for follow up of breast cancer.  All in all, she continues to do quite well.  Healing well from surgery.  Neuropathy improving.  No periods since September.  Spirits and mood appeared good.  No nausea, vomiting.    --------------------    Physical Exam:  VS: /73 (BP Location: Right arm)   Pulse 62   Resp 16   Ht 1.626 m (5' 4.02\")   Wt 59.7 kg (131 lb 11.2 oz)   LMP 09/10/2022   SpO2 100%   BMI 22.59 kg/m    GEN: Well appearing.    Labs / Imaging:  We reviewed surgical pathology report in detail.      Again, thank you for allowing me to participate in the care of your " patient.        Sincerely,        Hoang Franco MD

## 2023-03-14 NOTE — NURSING NOTE
"Oncology Rooming Note    March 14, 2023 9:06 AM   Betty Rogers is a 48 year old female who presents for:    Chief Complaint   Patient presents with     Oncology Clinic Visit     Follow up     Initial Vitals: /73 (BP Location: Right arm)   Pulse 62   Resp 16   Ht 1.626 m (5' 4.02\")   Wt 59.7 kg (131 lb 11.2 oz)   LMP 09/10/2022   SpO2 100%   BMI 22.59 kg/m   Estimated body mass index is 22.59 kg/m  as calculated from the following:    Height as of this encounter: 1.626 m (5' 4.02\").    Weight as of this encounter: 59.7 kg (131 lb 11.2 oz). Body surface area is 1.64 meters squared.  No Pain (0) Comment: Data Unavailable   Patient's last menstrual period was 09/10/2022.  Allergies reviewed: Yes  Medications reviewed: Yes    Medications: Medication refills not needed today.  Pharmacy name entered into [facility]:    [id number] - [facility] [address] [address] [address] [place] [facility] [address] [facility] - [place], [place] - [address]    Clinical concerns: No new concerns         Sarina Hughes LPN            "

## 2023-03-15 ENCOUNTER — OFFICE VISIT (OUTPATIENT)
Dept: RADIATION ONCOLOGY | Facility: CLINIC | Age: 48
End: 2023-03-15
Payer: COMMERCIAL

## 2023-03-15 ENCOUNTER — APPOINTMENT (OUTPATIENT)
Dept: RADIATION ONCOLOGY | Facility: CLINIC | Age: 48
End: 2023-03-15
Payer: COMMERCIAL

## 2023-03-15 VITALS
SYSTOLIC BLOOD PRESSURE: 105 MMHG | TEMPERATURE: 97.6 F | DIASTOLIC BLOOD PRESSURE: 70 MMHG | HEART RATE: 71 BPM | BODY MASS INDEX: 22.68 KG/M2 | OXYGEN SATURATION: 100 % | WEIGHT: 132.2 LBS

## 2023-03-15 DIAGNOSIS — C50.812 MALIGNANT NEOPLASM OF OVERLAPPING SITES OF LEFT BREAST IN FEMALE, ESTROGEN RECEPTOR POSITIVE (H): Primary | ICD-10-CM

## 2023-03-15 DIAGNOSIS — Z17.0 MALIGNANT NEOPLASM OF OVERLAPPING SITES OF LEFT BREAST IN FEMALE, ESTROGEN RECEPTOR POSITIVE (H): Primary | ICD-10-CM

## 2023-03-15 PROCEDURE — 77412 RADIATION TX DELIVERY LVL 3: CPT | Performed by: RADIOLOGY

## 2023-03-15 PROCEDURE — 99207 PR DROP WITH A PROCEDURE: CPT | Performed by: RADIOLOGY

## 2023-03-15 ASSESSMENT — PAIN SCALES - GENERAL: PAINLEVEL: NO PAIN (0)

## 2023-03-15 NOTE — LETTER
3/15/2023         RE: Betty Rogers  [address]  [facility] [address]        Dear Colleague,    Thank you for referring your patient, Betty Rogers, to the [facility] RADIATION ONCOLOGY [place]. Please see a copy of my visit note below.    [facility] PHYSICIANS  SPECIALIZING IN BREAKTHROUGHS  Radiation Oncology    On Treatment Visit Note      Betty Rogers      Date: 3/15/2023   MRN: [id number]   : 1975  Diagnosis: L breast IDC ER/CA+ HER2-      Reason for Visit:  On Radiation Treatment Visit     Treatment Summary to Date  Treatment Site: L CW, L SC Current Dose: 1600/5400, 1600/5400 cGy Fractions: ,            Subjective:     Doing well with no radiaiton induced complaints.  Had expander removed ~2 weeks ago due to infection.  Has been healing well since that time no longer on abx.    Objective:   /70 (BP Location: Right arm, Cuff Size: Adult Regular)   Pulse 71   Temp 97.6  F (36.4  C)   Wt 60 kg (132 lb 3.2 oz)   LMP 09/10/2022   SpO2 100%   BMI 22.68 kg/m    Gen: Appears well, in no acute distress  Skin: No erythema    Labs:  CBC RESULTS: Recent Labs   Lab Test 23  1156   WBC 3.9*   RBC 4.82   HGB 13.1   HCT 39.8   MCV 83   MCH 27.2   MCHC 32.9   RDW 14.0   *     ELECTROLYTES:  Recent Labs   Lab Test 23  1156      POTASSIUM 4.4   CHLORIDE 110*   ABEBA 9.4   CO2 26   BUN 15   CR 0.77   GLC 80       Assessment:    Tolerating radiation therapy well.  All questions and concerns addressed.    Plan:   1. Continue current therapy.    2. Aquaphor 3-4 times per day      Mosaiq chart and setup information reviewed  Ports checked                  Latrell Foster MD    Please do not send letter to referring physician.          Again, thank you for allowing me to participate in the care of your patient.        Sincerely,        LEXIE Foster MD

## 2023-03-15 NOTE — PROGRESS NOTES
[facility] PHYSICIANS  SPECIALIZING IN BREAKTHROUGHS  Radiation Oncology    On Treatment Visit Note      Betty Rogers      Date: 3/15/2023   MRN: [id number]   : 1975  Diagnosis: L breast IDC ER/AL+ HER2-      Reason for Visit:  On Radiation Treatment Visit     Treatment Summary to Date  Treatment Site: L CW, L SC Current Dose: 1600/5400, 1600/5400 cGy Fractions: ,            Subjective:     Doing well with no radiaiton induced complaints.  Had expander removed ~2 weeks ago due to infection.  Has been healing well since that time no longer on abx.    Objective:   /70 (BP Location: Right arm, Cuff Size: Adult Regular)   Pulse 71   Temp 97.6  F (36.4  C)   Wt 60 kg (132 lb 3.2 oz)   LMP 09/10/2022   SpO2 100%   BMI 22.68 kg/m    Gen: Appears well, in no acute distress  Skin: No erythema    Labs:  CBC RESULTS: Recent Labs   Lab Test 23  1156   WBC 3.9*   RBC 4.82   HGB 13.1   HCT 39.8   MCV 83   MCH 27.2   MCHC 32.9   RDW 14.0   *     ELECTROLYTES:  Recent Labs   Lab Test 23  1156      POTASSIUM 4.4   CHLORIDE 110*   ABEBA 9.4   CO2 26   BUN 15   CR 0.77   GLC 80       Assessment:    Tolerating radiation therapy well.  All questions and concerns addressed.    Plan:   1. Continue current therapy.    2. Aquaphor 3-4 times per day      Mosaiq chart and setup information reviewed  Ports checked                  Latrell Foster MD    Please do not send letter to referring physician.

## 2023-03-15 NOTE — PATIENT INSTRUCTIONS
Please contact [facility] RN with questions or concerns following today's appointment: [phone].    Thank you!

## 2023-03-16 ENCOUNTER — APPOINTMENT (OUTPATIENT)
Dept: RADIATION ONCOLOGY | Facility: CLINIC | Age: 48
End: 2023-03-16
Payer: COMMERCIAL

## 2023-03-16 PROCEDURE — 77387 GUIDANCE FOR RADJ TX DLVR: CPT | Performed by: SURGERY

## 2023-03-16 PROCEDURE — 77412 RADIATION TX DELIVERY LVL 3: CPT | Performed by: SURGERY

## 2023-03-17 ENCOUNTER — APPOINTMENT (OUTPATIENT)
Dept: RADIATION ONCOLOGY | Facility: CLINIC | Age: 48
End: 2023-03-17
Payer: COMMERCIAL

## 2023-03-17 PROCEDURE — 77427 RADIATION TX MANAGEMENT X5: CPT | Performed by: RADIOLOGY

## 2023-03-17 PROCEDURE — 77412 RADIATION TX DELIVERY LVL 3: CPT | Performed by: SURGERY

## 2023-03-17 PROCEDURE — 77387 GUIDANCE FOR RADJ TX DLVR: CPT | Performed by: SURGERY

## 2023-03-20 ENCOUNTER — APPOINTMENT (OUTPATIENT)
Dept: RADIATION ONCOLOGY | Facility: CLINIC | Age: 48
End: 2023-03-20
Payer: COMMERCIAL

## 2023-03-20 PROCEDURE — 77387 GUIDANCE FOR RADJ TX DLVR: CPT | Performed by: RADIOLOGY

## 2023-03-20 PROCEDURE — 77412 RADIATION TX DELIVERY LVL 3: CPT | Performed by: RADIOLOGY

## 2023-03-21 ENCOUNTER — APPOINTMENT (OUTPATIENT)
Dept: RADIATION ONCOLOGY | Facility: CLINIC | Age: 48
End: 2023-03-21
Payer: COMMERCIAL

## 2023-03-21 PROCEDURE — 77387 GUIDANCE FOR RADJ TX DLVR: CPT | Performed by: RADIOLOGY

## 2023-03-21 PROCEDURE — 77412 RADIATION TX DELIVERY LVL 3: CPT | Performed by: RADIOLOGY

## 2023-03-22 ENCOUNTER — APPOINTMENT (OUTPATIENT)
Dept: RADIATION ONCOLOGY | Facility: CLINIC | Age: 48
End: 2023-03-22
Payer: COMMERCIAL

## 2023-03-22 ENCOUNTER — OFFICE VISIT (OUTPATIENT)
Dept: RADIATION ONCOLOGY | Facility: CLINIC | Age: 48
End: 2023-03-22
Payer: COMMERCIAL

## 2023-03-22 VITALS
TEMPERATURE: 97.9 F | SYSTOLIC BLOOD PRESSURE: 106 MMHG | WEIGHT: 132 LBS | OXYGEN SATURATION: 100 % | DIASTOLIC BLOOD PRESSURE: 71 MMHG | BODY MASS INDEX: 22.65 KG/M2 | RESPIRATION RATE: 16 BRPM | HEART RATE: 73 BPM

## 2023-03-22 DIAGNOSIS — Z17.0 MALIGNANT NEOPLASM OF OVERLAPPING SITES OF LEFT BREAST IN FEMALE, ESTROGEN RECEPTOR POSITIVE (H): Primary | ICD-10-CM

## 2023-03-22 DIAGNOSIS — C50.812 MALIGNANT NEOPLASM OF OVERLAPPING SITES OF LEFT BREAST IN FEMALE, ESTROGEN RECEPTOR POSITIVE (H): Primary | ICD-10-CM

## 2023-03-22 PROCEDURE — 99207 PR DROP WITH A PROCEDURE: CPT | Performed by: RADIOLOGY

## 2023-03-22 PROCEDURE — 77387 GUIDANCE FOR RADJ TX DLVR: CPT | Performed by: RADIOLOGY

## 2023-03-22 PROCEDURE — 77370 RADIATION PHYSICS CONSULT: CPT | Performed by: RADIOLOGY

## 2023-03-22 PROCEDURE — 77412 RADIATION TX DELIVERY LVL 3: CPT | Performed by: RADIOLOGY

## 2023-03-22 ASSESSMENT — PAIN SCALES - GENERAL: PAINLEVEL: NO PAIN (0)

## 2023-03-22 NOTE — LETTER
3/22/2023         RE: Betty Rogers  [address]  [facility] [address]        Dear Colleague,    Thank you for referring your patient, Betty Rogers, to the [facility] RADIATION ONCOLOGY [place]. Please see a copy of my visit note below.    [facility] PHYSICIANS  SPECIALIZING IN BREAKTHROUGHS  Radiation Oncology    On Treatment Visit Note      Betty Rogers      Date: 3/22/2023   MRN: [id number]   : 1975  Diagnosis: L breast IDC ER/[place]+ HER2-      Reason for Visit:  On Radiation Treatment Visit     Treatment Summary to Date  Treatment Site: L CW, L SC Current Dose: 2600/5400, 2600/5400 cGy Fractions: ,       Chemotherapy  Chemo concurrent with radx?: No (Dr. Franco)    Subjective:     Doing well with minimal side effects.    Nursing ROS:   Nutrition Alteration  Diet Type: Patient's Preference  Skin  Skin Reaction: 0 - No changes (no desquamation)  Skin Intervention: Aquaphor 2-3 x day  Skin Note: Expander was removed due to infection. Patient has completed antibiotics and restarted XRT.        Cardiovascular  Respiratory effort: 1 - Normal - without distress        Psychosocial  Psychosocial Note: Patient denies fatigue  Pain Assessment  0-10 Pain Scale: 0      Objective:   /71   Pulse 73   Temp 97.9  F (36.6  C) (Oral)   Resp 16   Wt 59.9 kg (132 lb)   LMP 09/10/2022   SpO2 100%   BMI 22.65 kg/m    Gen: Appears well, in no acute distress  Skin: No erythema    Labs:  CBC RESULTS: Recent Labs   Lab Test 23  1156   WBC 3.9*   RBC 4.82   HGB 13.1   HCT 39.8   MCV 83   MCH 27.2   MCHC 32.9   RDW 14.0   *     ELECTROLYTES:  Recent Labs   Lab Test 23  1156      POTASSIUM 4.4   CHLORIDE 110*   ABEBA 9.4   CO2 26   BUN 15   CR 0.77   GLC 80       Assessment:    Tolerating radiation therapy well.  All questions and concerns addressed.  Per personal communication with Dr. Padron and Dr. South margins are 8 mm posteriorly and  likely larger in other directions    Toxicities:  Dermatitis: Grade 0: No toxicity    Plan:   1. Continue current therapy.        Mosaiq chart and setup information reviewed  Ports checked    Medication Review  Med list reviewed with patient?: Yes  Med Note: Patient taking Tamoxifen    Educational Topic Discussed  Education Instructions: Skin cares, fatigue        Latrell Foster MD    Please do not send letter to referring physician.          Again, thank you for allowing me to participate in the care of your patient.        Sincerely,        LEXIE Foster MD

## 2023-03-22 NOTE — PROGRESS NOTES
[facility] PHYSICIANS  SPECIALIZING IN BREAKTHROUGHS  Radiation Oncology    On Treatment Visit Note      Betty Rogers      Date: 3/22/2023   MRN: [id number]   : 1975  Diagnosis: L breast IDC ER/[place]+ HER2-      Reason for Visit:  On Radiation Treatment Visit     Treatment Summary to Date  Treatment Site: L CW, L SC Current Dose: 2600/5400, 2600/5400 cGy Fractions: ,       Chemotherapy  Chemo concurrent with radx?: No (Dr. Franco)    Subjective:     Doing well with minimal side effects.    Nursing ROS:   Nutrition Alteration  Diet Type: Patient's Preference  Skin  Skin Reaction: 0 - No changes (no desquamation)  Skin Intervention: Aquaphor 2-3 x day  Skin Note: Expander was removed due to infection. Patient has completed antibiotics and restarted XRT.        Cardiovascular  Respiratory effort: 1 - Normal - without distress        Psychosocial  Psychosocial Note: Patient denies fatigue  Pain Assessment  0-10 Pain Scale: 0      Objective:   /71   Pulse 73   Temp 97.9  F (36.6  C) (Oral)   Resp 16   Wt 59.9 kg (132 lb)   LMP 09/10/2022   SpO2 100%   BMI 22.65 kg/m    Gen: Appears well, in no acute distress  Skin: No erythema    Labs:  CBC RESULTS: Recent Labs   Lab Test 23  1156   WBC 3.9*   RBC 4.82   HGB 13.1   HCT 39.8   MCV 83   MCH 27.2   MCHC 32.9   RDW 14.0   *     ELECTROLYTES:  Recent Labs   Lab Test 23  1156      POTASSIUM 4.4   CHLORIDE 110*   ABEBA 9.4   CO2 26   BUN 15   CR 0.77   GLC 80       Assessment:    Tolerating radiation therapy well.  All questions and concerns addressed.  Per personal communication with Dr. Padron and Dr. South margins are 8 mm posteriorly and likely larger in other directions    Toxicities:  Dermatitis: Grade 0: No toxicity    Plan:   1. Continue current therapy.        Mosaiq chart and setup information reviewed  Ports checked    Medication Review  Med list reviewed with patient?: Yes  Med Note: Patient  taking Tamoxifen    Educational Topic Discussed  Education Instructions: Skin cares, fatigue        Latrell Foster MD    Please do not send letter to referring physician.

## 2023-03-23 ENCOUNTER — APPOINTMENT (OUTPATIENT)
Dept: RADIATION ONCOLOGY | Facility: CLINIC | Age: 48
End: 2023-03-23
Payer: COMMERCIAL

## 2023-03-23 PROCEDURE — 77387 GUIDANCE FOR RADJ TX DLVR: CPT | Performed by: SURGERY

## 2023-03-23 PROCEDURE — 77412 RADIATION TX DELIVERY LVL 3: CPT | Performed by: SURGERY

## 2023-03-24 ENCOUNTER — APPOINTMENT (OUTPATIENT)
Dept: RADIATION ONCOLOGY | Facility: CLINIC | Age: 48
End: 2023-03-24
Payer: COMMERCIAL

## 2023-03-24 PROCEDURE — 77387 GUIDANCE FOR RADJ TX DLVR: CPT | Performed by: SURGERY

## 2023-03-24 PROCEDURE — 77331 SPECIAL RADIATION DOSIMETRY: CPT | Performed by: SURGERY

## 2023-03-24 PROCEDURE — 77336 RADIATION PHYSICS CONSULT: CPT | Performed by: RADIOLOGY

## 2023-03-24 PROCEDURE — 77412 RADIATION TX DELIVERY LVL 3: CPT | Performed by: SURGERY

## 2023-03-24 PROCEDURE — 77427 RADIATION TX MANAGEMENT X5: CPT | Performed by: RADIOLOGY

## 2023-03-27 ENCOUNTER — APPOINTMENT (OUTPATIENT)
Dept: RADIATION ONCOLOGY | Facility: CLINIC | Age: 48
End: 2023-03-27
Payer: COMMERCIAL

## 2023-03-27 PROCEDURE — 77412 RADIATION TX DELIVERY LVL 3: CPT | Performed by: RADIOLOGY

## 2023-03-27 PROCEDURE — 77387 GUIDANCE FOR RADJ TX DLVR: CPT | Performed by: RADIOLOGY

## 2023-03-28 ENCOUNTER — APPOINTMENT (OUTPATIENT)
Dept: RADIATION ONCOLOGY | Facility: CLINIC | Age: 48
End: 2023-03-28
Payer: COMMERCIAL

## 2023-03-28 PROCEDURE — 77387 GUIDANCE FOR RADJ TX DLVR: CPT | Performed by: RADIOLOGY

## 2023-03-28 PROCEDURE — 77412 RADIATION TX DELIVERY LVL 3: CPT | Performed by: RADIOLOGY

## 2023-03-29 ENCOUNTER — OFFICE VISIT (OUTPATIENT)
Dept: RADIATION ONCOLOGY | Facility: CLINIC | Age: 48
End: 2023-03-29
Payer: COMMERCIAL

## 2023-03-29 ENCOUNTER — APPOINTMENT (OUTPATIENT)
Dept: RADIATION ONCOLOGY | Facility: CLINIC | Age: 48
End: 2023-03-29
Payer: COMMERCIAL

## 2023-03-29 VITALS
WEIGHT: 133.2 LBS | OXYGEN SATURATION: 100 % | TEMPERATURE: 97.7 F | RESPIRATION RATE: 16 BRPM | DIASTOLIC BLOOD PRESSURE: 69 MMHG | HEART RATE: 74 BPM | SYSTOLIC BLOOD PRESSURE: 112 MMHG | BODY MASS INDEX: 22.85 KG/M2

## 2023-03-29 DIAGNOSIS — Z17.0 MALIGNANT NEOPLASM OF OVERLAPPING SITES OF LEFT BREAST IN FEMALE, ESTROGEN RECEPTOR POSITIVE (H): Primary | ICD-10-CM

## 2023-03-29 DIAGNOSIS — C50.812 MALIGNANT NEOPLASM OF OVERLAPPING SITES OF LEFT BREAST IN FEMALE, ESTROGEN RECEPTOR POSITIVE (H): Primary | ICD-10-CM

## 2023-03-29 PROCEDURE — 99207 PR DROP WITH A PROCEDURE: CPT | Performed by: RADIOLOGY

## 2023-03-29 PROCEDURE — 77387 GUIDANCE FOR RADJ TX DLVR: CPT | Performed by: RADIOLOGY

## 2023-03-29 PROCEDURE — 77412 RADIATION TX DELIVERY LVL 3: CPT | Performed by: RADIOLOGY

## 2023-03-29 ASSESSMENT — PAIN SCALES - GENERAL: PAINLEVEL: NO PAIN (0)

## 2023-03-29 NOTE — LETTER
3/29/2023         RE: Betty Rogers  [address]  [facility] [address]        Dear Colleague,    Thank you for referring your patient, Betty Rogers, to the [facility] RADIATION ONCOLOGY [place]. Please see a copy of my visit note below.    [facility] PHYSICIANS  SPECIALIZING IN BREAKTHROUGHS  Radiation Oncology    On Treatment Visit Note      Betty Rogers      Date: 3/29/2023   MRN: [id number]   : 1975  Diagnosis: L breast IDC ER/[place]+ HER2-      Reason for Visit:  On Radiation Treatment Visit     Treatment Summary to Date  Treatment Site: L CW, L SC Current Dose: 3600/5400, 3600/5400 cGy Fractions: ,       Chemotherapy  Chemo concurrent with radx?: No (Dr. Franco)    Subjective:     Doing well with expected side effects.    Nursing ROS:   Nutrition Alteration  Diet Type: Patient's Preference  Skin  Skin Reaction: 1 - Faint erythema or dry desquamation (no desquamation)  Skin Intervention: Aquaphor 2-3 x day, Mometasone to radiation site, do not apply to midline scar. Mepilex dressing for under L arm as needed to prevent rubbing.  Skin Note: Expander was removed due to infection. Patient has completed antibiotics and restarted XRT.        Cardiovascular  Respiratory effort: 1 - Normal - without distress        Psychosocial  Psychosocial Note: Patient denies fatigue  Pain Assessment  0-10 Pain Scale: 0      Objective:   /69   Pulse 74   Temp 97.7  F (36.5  C) (Oral)   Resp 16   Wt 60.4 kg (133 lb 3.2 oz)   LMP 09/10/2022   SpO2 100%   BMI 22.85 kg/m    Gen: Appears well, in no acute distress  Skin: Mild diffuse erythema over treatment field, no desquamation no signs of infection.    Labs:  CBC RESULTS: Recent Labs   Lab Test 23  1156   WBC 3.9*   RBC 4.82   HGB 13.1   HCT 39.8   MCV 83   MCH 27.2   MCHC 32.9   RDW 14.0   *     ELECTROLYTES:  Recent Labs   Lab Test 23  1156      POTASSIUM 4.4   CHLORIDE 110*   ABEBA 9.4    CO2 26   BUN 15   CR 0.77   GLC 80       Assessment:    Tolerating radiation therapy well.  All questions and concerns addressed.    Toxicities:  Dermatitis: Grade 1: Faint erythema or dry desquamation    Plan:   1. Continue current therapy.   2. Can start using mometasone twice daily to erythematous areas.  Asked patient to avoid using mometasone around surgical scars or breast mound.  Continue to use Aquaphor as previously directed      Mosaiq chart and setup information reviewed  Ports checked    Medication Review  Med list reviewed with patient?: Yes  Med Note: Patient taking Tamoxifen    Educational Topic Discussed  Education Instructions: Skin cares, fatigue        Latrell Foster MD    Please do not send letter to referring physician.          Again, thank you for allowing me to participate in the care of your patient.        Sincerely,        LEXIE Foster MD

## 2023-03-29 NOTE — PROGRESS NOTES
[facility] PHYSICIANS  SPECIALIZING IN BREAKTHROUGHS  Radiation Oncology    On Treatment Visit Note      Betty Rogers      Date: 3/29/2023   MRN: [id number]   : 1975  Diagnosis: L breast IDC ER/[place]+ HER2-      Reason for Visit:  On Radiation Treatment Visit     Treatment Summary to Date  Treatment Site: L CW, L SC Current Dose: 3600/5400, 3600/5400 cGy Fractions: ,       Chemotherapy  Chemo concurrent with radx?: No (Dr. Franco)    Subjective:     Doing well with expected side effects.    Nursing ROS:   Nutrition Alteration  Diet Type: Patient's Preference  Skin  Skin Reaction: 1 - Faint erythema or dry desquamation (no desquamation)  Skin Intervention: Aquaphor 2-3 x day, Mometasone to radiation site, do not apply to midline scar. Mepilex dressing for under L arm as needed to prevent rubbing.  Skin Note: Expander was removed due to infection. Patient has completed antibiotics and restarted XRT.        Cardiovascular  Respiratory effort: 1 - Normal - without distress        Psychosocial  Psychosocial Note: Patient denies fatigue  Pain Assessment  0-10 Pain Scale: 0      Objective:   /69   Pulse 74   Temp 97.7  F (36.5  C) (Oral)   Resp 16   Wt 60.4 kg (133 lb 3.2 oz)   LMP 09/10/2022   SpO2 100%   BMI 22.85 kg/m    Gen: Appears well, in no acute distress  Skin: Mild diffuse erythema over treatment field, no desquamation no signs of infection.    Labs:  CBC RESULTS: Recent Labs   Lab Test 23  1156   WBC 3.9*   RBC 4.82   HGB 13.1   HCT 39.8   MCV 83   MCH 27.2   MCHC 32.9   RDW 14.0   *     ELECTROLYTES:  Recent Labs   Lab Test 23  1156      POTASSIUM 4.4   CHLORIDE 110*   ABEBA 9.4   CO2 26   BUN 15   CR 0.77   GLC 80       Assessment:    Tolerating radiation therapy well.  All questions and concerns addressed.    Toxicities:  Dermatitis: Grade 1: Faint erythema or dry desquamation    Plan:   1. Continue current therapy.   2. Can start using  mometasone twice daily to erythematous areas.  Asked patient to avoid using mometasone around surgical scars or breast mound.  Continue to use Aquaphor as previously directed      Mosaiq chart and setup information reviewed  Ports checked    Medication Review  Med list reviewed with patient?: Yes  Med Note: Patient taking Tamoxifen    Educational Topic Discussed  Education Instructions: Skin cares, fatigue        Latrell Foster MD    Please do not send letter to referring physician.

## 2023-03-30 ENCOUNTER — APPOINTMENT (OUTPATIENT)
Dept: RADIATION ONCOLOGY | Facility: CLINIC | Age: 48
End: 2023-03-30
Payer: COMMERCIAL

## 2023-03-30 PROCEDURE — 77387 GUIDANCE FOR RADJ TX DLVR: CPT | Performed by: SURGERY

## 2023-03-30 PROCEDURE — 77412 RADIATION TX DELIVERY LVL 3: CPT | Performed by: SURGERY

## 2023-03-31 ENCOUNTER — APPOINTMENT (OUTPATIENT)
Dept: RADIATION ONCOLOGY | Facility: CLINIC | Age: 48
End: 2023-03-31
Payer: COMMERCIAL

## 2023-03-31 PROCEDURE — 77412 RADIATION TX DELIVERY LVL 3: CPT | Performed by: SURGERY

## 2023-03-31 PROCEDURE — 77336 RADIATION PHYSICS CONSULT: CPT | Performed by: RADIOLOGY

## 2023-03-31 PROCEDURE — 77387 GUIDANCE FOR RADJ TX DLVR: CPT | Performed by: SURGERY

## 2023-03-31 PROCEDURE — 77427 RADIATION TX MANAGEMENT X5: CPT | Performed by: RADIOLOGY

## 2023-03-31 PROCEDURE — 77331 SPECIAL RADIATION DOSIMETRY: CPT | Performed by: RADIOLOGY

## 2023-04-03 ENCOUNTER — APPOINTMENT (OUTPATIENT)
Dept: RADIATION ONCOLOGY | Facility: CLINIC | Age: 48
End: 2023-04-03
Payer: COMMERCIAL

## 2023-04-03 ENCOUNTER — MYC MEDICAL ADVICE (OUTPATIENT)
Dept: OBGYN | Facility: CLINIC | Age: 48
End: 2023-04-03

## 2023-04-03 PROCEDURE — 77387 GUIDANCE FOR RADJ TX DLVR: CPT | Performed by: RADIOLOGY

## 2023-04-03 PROCEDURE — 77412 RADIATION TX DELIVERY LVL 3: CPT | Performed by: RADIOLOGY

## 2023-04-03 NOTE — TELEPHONE ENCOUNTER
"Pt scheduled for TLH w/ BSP on 5/2/2023: \"Date of Surgery 05/02 Time of Surgery 7:30am  Surgery to be performed at:  [facility]  Status: Outpatient  Type of Anesthesia Anticipated: General\"    RN routing to Surgery Schedulers to be sure time has not changed.    Brisa Joseph RN on 4/3/2023 at 4:43 PM    "

## 2023-04-04 ENCOUNTER — APPOINTMENT (OUTPATIENT)
Dept: RADIATION ONCOLOGY | Facility: CLINIC | Age: 48
End: 2023-04-04
Payer: COMMERCIAL

## 2023-04-04 PROCEDURE — 77387 GUIDANCE FOR RADJ TX DLVR: CPT | Performed by: RADIOLOGY

## 2023-04-04 PROCEDURE — 77412 RADIATION TX DELIVERY LVL 3: CPT | Performed by: RADIOLOGY

## 2023-04-05 ENCOUNTER — APPOINTMENT (OUTPATIENT)
Dept: RADIATION ONCOLOGY | Facility: CLINIC | Age: 48
End: 2023-04-05
Payer: COMMERCIAL

## 2023-04-05 ENCOUNTER — OFFICE VISIT (OUTPATIENT)
Dept: RADIATION ONCOLOGY | Facility: CLINIC | Age: 48
End: 2023-04-05
Payer: COMMERCIAL

## 2023-04-05 VITALS
SYSTOLIC BLOOD PRESSURE: 111 MMHG | BODY MASS INDEX: 22.82 KG/M2 | TEMPERATURE: 97.7 F | RESPIRATION RATE: 16 BRPM | WEIGHT: 133 LBS | DIASTOLIC BLOOD PRESSURE: 78 MMHG | OXYGEN SATURATION: 100 % | HEART RATE: 79 BPM

## 2023-04-05 DIAGNOSIS — C50.812 MALIGNANT NEOPLASM OF OVERLAPPING SITES OF LEFT BREAST IN FEMALE, ESTROGEN RECEPTOR POSITIVE (H): Primary | ICD-10-CM

## 2023-04-05 DIAGNOSIS — Z17.0 MALIGNANT NEOPLASM OF OVERLAPPING SITES OF LEFT BREAST IN FEMALE, ESTROGEN RECEPTOR POSITIVE (H): Primary | ICD-10-CM

## 2023-04-05 DIAGNOSIS — L58.9 RADIATION DERMATITIS: ICD-10-CM

## 2023-04-05 PROCEDURE — 77412 RADIATION TX DELIVERY LVL 3: CPT | Performed by: RADIOLOGY

## 2023-04-05 PROCEDURE — 99207 PR DROP WITH A PROCEDURE: CPT | Performed by: RADIOLOGY

## 2023-04-05 RX ORDER — SILVER SULFADIAZINE 10 MG/G
CREAM TOPICAL DAILY
Qty: 50 G | Refills: 1 | Status: SHIPPED | OUTPATIENT
Start: 2023-04-05 | End: 2023-04-25

## 2023-04-05 ASSESSMENT — PAIN SCALES - GENERAL: PAINLEVEL: NO PAIN (0)

## 2023-04-05 NOTE — LETTER
2023         RE: Betty Rogers  [address]  [facility] [address]        Dear Colleague,    Thank you for referring your patient, Betty Rogers, to the [facility] RADIATION ONCOLOGY [place]. Please see a copy of my visit note below.    [facility] PHYSICIANS  SPECIALIZING IN BREAKTHROUGHS  Radiation Oncology    On Treatment Visit Note      Betty Rogers      Date: 2023   MRN: [id number]   : 1975  Diagnosis: L breast IDC ER/[place]+ HER2-      Reason for Visit:  On Radiation Treatment Visit     Treatment Summary to Date  Treatment Site: L CW, L SC Current Dose: 4600/5400, 4600/5400 cGy Fractions: ,       Chemotherapy  Chemo concurrent with radx?: No (Dr. Franco)    Subjective:     Doing well with expected side effects    Nursing ROS:   Nutrition Alteration  Diet Type: Patient's Preference  Skin  Skin Reaction: 2 - Moderate to brisk erythema, patchy moist desquamation, mostly confined to skin folds and creases, moderate edema (dry desquamation L axilla)  Skin Intervention: Aquaphor 2-3 x day, Mometasone BID to radiation site, continue x 1 week then stop. Do not apply to peeled areas or midline scar. Silvadene to L axilla BID. Mepilex dressing for under L arm as needed to prevent rubbing.  Skin Note: Expander was removed due to infection. Patient has completed antibiotics and restarted XRT.        Cardiovascular  Respiratory effort: 1 - Normal - without distress        Psychosocial  Psychosocial Note: Patient reports mild fatigue  Pain Assessment  0-10 Pain Scale: 0      Objective:   /78   Pulse 79   Temp 97.7  F (36.5  C) (Oral)   Resp 16   Wt 60.3 kg (133 lb)   LMP 09/10/2022   SpO2 100%   BMI 22.82 kg/m    Gen: Appears well, in no acute distress  Skin: Mild ot moderate diffuse erythema over treatment field.  Dry desquamation in left axilla.    Labs:  CBC RESULTS: Recent Labs   Lab Test 23  1156   WBC 3.9*   RBC 4.82   HGB 13.1   HCT  39.8   MCV 83   MCH 27.2   MCHC 32.9   RDW 14.0   *     ELECTROLYTES:  Recent Labs   Lab Test 03/13/23  1156      POTASSIUM 4.4   CHLORIDE 110*   ABEBA 9.4   CO2 26   BUN 15   CR 0.77   GLC 80       Assessment:    Tolerating radiation therapy well.  All questions and concerns addressed.    Toxicities:  Dermatitis: Grade 2: Moderate to brisk erythema; patchy moist desquamation, mostly confined to skin folds and creases; moderate erythema    Plan:   1. Continue current therapy.    2. Continue mometasone to intact radiation treatment field  3. SSD cream to desquamation areas.      Mosaiq chart and setup information reviewed  Ports checked    Medication Review  Med list reviewed with patient?: Yes  Med Note: Patient taking Tamoxifen    Educational Topic Discussed  Additional Instructions: Follow up with Dr. Franco on 525/23. Follow up with Mayte Sam NP in 1 and 3.5 months. Patient scheduled for total hysterectomy on 5/2/23.  Education Instructions: Skin cares, fatigue        Latrell Foster MD    Please do not send letter to referring physician.          Again, thank you for allowing me to participate in the care of your patient.        Sincerely,        LEXIE Foster MD

## 2023-04-05 NOTE — PROGRESS NOTES
[facility] PHYSICIANS  SPECIALIZING IN BREAKTHROUGHS  Radiation Oncology    On Treatment Visit Note      Betty Rogers      Date: 2023   MRN: [id number]   : 1975  Diagnosis: L breast IDC ER/[place]+ HER2-      Reason for Visit:  On Radiation Treatment Visit     Treatment Summary to Date  Treatment Site: L CW, L SC Current Dose: 4600/5400, 4600/5400 cGy Fractions: ,       Chemotherapy  Chemo concurrent with radx?: No (Dr. Franco)    Subjective:     Doing well with expected side effects    Nursing ROS:   Nutrition Alteration  Diet Type: Patient's Preference  Skin  Skin Reaction: 2 - Moderate to brisk erythema, patchy moist desquamation, mostly confined to skin folds and creases, moderate edema (dry desquamation L axilla)  Skin Intervention: Aquaphor 2-3 x day, Mometasone BID to radiation site, continue x 1 week then stop. Do not apply to peeled areas or midline scar. Silvadene to L axilla BID. Mepilex dressing for under L arm as needed to prevent rubbing.  Skin Note: Expander was removed due to infection. Patient has completed antibiotics and restarted XRT.        Cardiovascular  Respiratory effort: 1 - Normal - without distress        Psychosocial  Psychosocial Note: Patient reports mild fatigue  Pain Assessment  0-10 Pain Scale: 0      Objective:   /78   Pulse 79   Temp 97.7  F (36.5  C) (Oral)   Resp 16   Wt 60.3 kg (133 lb)   LMP 09/10/2022   SpO2 100%   BMI 22.82 kg/m    Gen: Appears well, in no acute distress  Skin: Mild ot moderate diffuse erythema over treatment field.  Dry desquamation in left axilla.    Labs:  CBC RESULTS: Recent Labs   Lab Test 23  1156   WBC 3.9*   RBC 4.82   HGB 13.1   HCT 39.8   MCV 83   MCH 27.2   MCHC 32.9   RDW 14.0   *     ELECTROLYTES:  Recent Labs   Lab Test 23  1156      POTASSIUM 4.4   CHLORIDE 110*   ABEBA 9.4   CO2 26   BUN 15   CR 0.77   GLC 80       Assessment:    Tolerating radiation therapy well.  All  questions and concerns addressed.    Toxicities:  Dermatitis: Grade 2: Moderate to brisk erythema; patchy moist desquamation, mostly confined to skin folds and creases; moderate erythema    Plan:   1. Continue current therapy.    2. Continue mometasone to intact radiation treatment field  3. SSD cream to desquamation areas.      Mosaiq chart and setup information reviewed  Ports checked    Medication Review  Med list reviewed with patient?: Yes  Med Note: Patient taking Tamoxifen    Educational Topic Discussed  Additional Instructions: Follow up with Dr. Franco on 525/23. Follow up with Mayte Sam NP in 1 and 3.5 months. Patient scheduled for total hysterectomy on 5/2/23.  Education Instructions: Skin cares, fatigue        Latrell Foster MD    Please do not send letter to referring physician.

## 2023-04-06 ENCOUNTER — APPOINTMENT (OUTPATIENT)
Dept: RADIATION ONCOLOGY | Facility: CLINIC | Age: 48
End: 2023-04-06
Payer: COMMERCIAL

## 2023-04-06 ENCOUNTER — MEDICAL CORRESPONDENCE (OUTPATIENT)
Dept: HEALTH INFORMATION MANAGEMENT | Facility: CLINIC | Age: 48
End: 2023-04-06

## 2023-04-06 PROCEDURE — 77387 GUIDANCE FOR RADJ TX DLVR: CPT | Performed by: SURGERY

## 2023-04-06 PROCEDURE — 77412 RADIATION TX DELIVERY LVL 3: CPT | Performed by: SURGERY

## 2023-04-06 NOTE — TELEPHONE ENCOUNTER
Form filled out and sent to [facility] to be printed and signed by Dr. Rosario.  Ting Shannon CMA 4/6/2023 3:27 PM

## 2023-04-06 NOTE — TELEPHONE ENCOUNTER
I recommend 4 weeks off from surgery and we will re-evaluate that at her 2 week post-op appointment.    Mario Rosario MD FACOG

## 2023-04-07 ENCOUNTER — APPOINTMENT (OUTPATIENT)
Dept: RADIATION ONCOLOGY | Facility: CLINIC | Age: 48
End: 2023-04-07
Payer: COMMERCIAL

## 2023-04-07 PROCEDURE — 77412 RADIATION TX DELIVERY LVL 3: CPT | Performed by: SURGERY

## 2023-04-07 PROCEDURE — 77387 GUIDANCE FOR RADJ TX DLVR: CPT | Performed by: SURGERY

## 2023-04-07 PROCEDURE — 77336 RADIATION PHYSICS CONSULT: CPT | Performed by: RADIOLOGY

## 2023-04-07 PROCEDURE — 77427 RADIATION TX MANAGEMENT X5: CPT | Performed by: RADIOLOGY

## 2023-04-10 ENCOUNTER — APPOINTMENT (OUTPATIENT)
Dept: RADIATION ONCOLOGY | Facility: CLINIC | Age: 48
End: 2023-04-10
Payer: COMMERCIAL

## 2023-04-10 PROCEDURE — 77387 GUIDANCE FOR RADJ TX DLVR: CPT | Performed by: RADIOLOGY

## 2023-04-10 PROCEDURE — 77412 RADIATION TX DELIVERY LVL 3: CPT | Performed by: RADIOLOGY

## 2023-04-10 NOTE — TELEPHONE ENCOUNTER
Forms printed and awaiting providers signature.    Mary Yee, [facility]  April 10, 2023

## 2023-04-10 NOTE — TELEPHONE ENCOUNTER
Forms signed, faxed, and copy sent to scanning.  [employer] message sent informing patient.    Mary Yee, [facility]  April 10, 2023

## 2023-04-11 ENCOUNTER — DOCUMENTATION ONLY (OUTPATIENT)
Dept: RADIATION ONCOLOGY | Facility: CLINIC | Age: 48
End: 2023-04-11

## 2023-04-11 ENCOUNTER — APPOINTMENT (OUTPATIENT)
Dept: RADIATION ONCOLOGY | Facility: CLINIC | Age: 48
End: 2023-04-11
Payer: COMMERCIAL

## 2023-04-11 DIAGNOSIS — Z17.0 MALIGNANT NEOPLASM OF OVERLAPPING SITES OF LEFT BREAST IN FEMALE, ESTROGEN RECEPTOR POSITIVE (H): Primary | ICD-10-CM

## 2023-04-11 DIAGNOSIS — C50.812 MALIGNANT NEOPLASM OF OVERLAPPING SITES OF LEFT BREAST IN FEMALE, ESTROGEN RECEPTOR POSITIVE (H): Primary | ICD-10-CM

## 2023-04-11 PROCEDURE — 77412 RADIATION TX DELIVERY LVL 3: CPT | Performed by: RADIOLOGY

## 2023-04-11 PROCEDURE — 77387 GUIDANCE FOR RADJ TX DLVR: CPT | Performed by: RADIOLOGY

## 2023-04-11 NOTE — PROGRESS NOTES
Radiotherapy Treatment Summary              PATIENT: Betty Rogers  MEDICAL RECORD NO: [id number]   : 1975    PATHOLOGY/STAGE:  Betty Rogers is a 47 year old woman with a recent diagnosis of left breast cancer, [id number], ER/NH positive HER2/percy negative s/p neoadjuvant chemo with mixed response     INTENT OF RADIOTHERAPY: Curative  CONCURRENT SYSTEMIC THERAPY:  None           SITE OF TREATMENT:  Left Breast    DATES  OF TREATMENT:  2023 and 2/15/2023 and then patient developed a breast infection requiring removal of expander and antibiotic therapy.  Resumed treatment 3/7/2023-2023    TOTAL DOSE OF TREATMENT: 5,400 cGy    DOSE PER FRACTION OF TREATMENT: 200 cGy       COMMENT/TOXICITY:  Grade 2 skin reaction managed with moisturizer and topical steroids. Patient did have mild fatigue relieved by rest.                PAIN MANAGEMENT:  Patient did not require any pain medications.                            FOLLOW UP PLAN:  Patient will follow up with Mayte Sam NP in 1 month and in 3.5 months  Patient will continue close follow up with medical oncology.     Latrell Foster MD  Department of Radiation Oncology  [facility]  Patient Care Team:  Diana Luevano MD PhD as PCP - General (Internal Medicine)  Diana Luevano MD PhD as Assigned PCP  Hoang Franco MD as MD (Hematology & Oncology)  Janie South MD as Assigned Surgical Provider  Della Kc RN as Specialty Care Coordinator (Hematology & Oncology)  Hoang Franco MD as Assigned Cancer Care Provider  Ning Foster MD as MD (Radiation Oncology)  Melva Bishop RN as Registered Nurse  Mario Rosario MD as Assigned OBGYN Provider

## 2023-04-25 ENCOUNTER — OFFICE VISIT (OUTPATIENT)
Dept: FAMILY MEDICINE | Facility: CLINIC | Age: 48
End: 2023-04-25
Payer: COMMERCIAL

## 2023-04-25 VITALS
SYSTOLIC BLOOD PRESSURE: 96 MMHG | WEIGHT: 132.31 LBS | RESPIRATION RATE: 16 BRPM | OXYGEN SATURATION: 100 % | HEART RATE: 63 BPM | BODY MASS INDEX: 23.44 KG/M2 | TEMPERATURE: 98.3 F | HEIGHT: 63 IN | DIASTOLIC BLOOD PRESSURE: 61 MMHG

## 2023-04-25 DIAGNOSIS — T45.1X5A CHEMOTHERAPY-INDUCED NEUTROPENIA (H): ICD-10-CM

## 2023-04-25 DIAGNOSIS — C77.3 CARCINOMA OF LEFT BREAST METASTATIC TO AXILLARY LYMPH NODE (H): ICD-10-CM

## 2023-04-25 DIAGNOSIS — C50.912 CARCINOMA OF LEFT BREAST METASTATIC TO AXILLARY LYMPH NODE (H): ICD-10-CM

## 2023-04-25 DIAGNOSIS — D64.81 ANEMIA ASSOCIATED WITH CHEMOTHERAPY: ICD-10-CM

## 2023-04-25 DIAGNOSIS — Z01.818 PREOP GENERAL PHYSICAL EXAM: Primary | ICD-10-CM

## 2023-04-25 DIAGNOSIS — D70.1 CHEMOTHERAPY-INDUCED NEUTROPENIA (H): ICD-10-CM

## 2023-04-25 DIAGNOSIS — T45.1X5A ANEMIA ASSOCIATED WITH CHEMOTHERAPY: ICD-10-CM

## 2023-04-25 PROCEDURE — 90471 IMMUNIZATION ADMIN: CPT | Performed by: INTERNAL MEDICINE

## 2023-04-25 PROCEDURE — 99214 OFFICE O/P EST MOD 30 MIN: CPT | Mod: 25 | Performed by: INTERNAL MEDICINE

## 2023-04-25 PROCEDURE — 90750 HZV VACC RECOMBINANT IM: CPT | Performed by: INTERNAL MEDICINE

## 2023-04-25 PROCEDURE — 90472 IMMUNIZATION ADMIN EACH ADD: CPT | Performed by: INTERNAL MEDICINE

## 2023-04-25 PROCEDURE — 90746 HEPB VACCINE 3 DOSE ADULT IM: CPT | Performed by: INTERNAL MEDICINE

## 2023-04-25 ASSESSMENT — PAIN SCALES - GENERAL: PAINLEVEL: NO PAIN (0)

## 2023-04-25 NOTE — PATIENT INSTRUCTIONS
For informational purposes only. Not to replace the advice of your health care provider. Copyright   2003,  [email or web address] [facility]. All rights reserved. Clinically reviewed by Gloria Hammond MD. [employer] [id number] - REV .  Preparing for Your Surgery  Getting started  A nurse will call you to review your health history and instructions. They will give you an arrival time based on your scheduled surgery time. Please be ready to share:  Your doctor's clinic name and phone number  Your medical, surgical, and anesthesia history  A list of allergies and sensitivities  A list of medicines, including herbal treatments and over-the-counter drugs  Whether the patient has a legal guardian (ask how to send us the papers in advance)  Please tell us if you're pregnant--or if there's any chance you might be pregnant. Some surgeries may injure a fetus (unborn baby), so they require a pregnancy test. Surgeries that are safe for a fetus don't always need a test, and you can choose whether to have one.   If you have a child who's having surgery, please ask for a copy of Preparing for Your Child's Surgery.    Preparing for surgery  Within 10 to 30 days of surgery: Have a pre-op exam (sometimes called an H&P, or History and Physical). This can be done at a clinic or pre-operative center.  If you're having a , you may not need this exam. Talk to your care team.  At your pre-op exam, talk to your care team about all medicines you take. If you need to stop any medicines before surgery, ask when to start taking them again.  We do this for your safety. Many medicines can make you bleed too much during surgery. Some change how well surgery (anesthesia) drugs work.  Call your insurance company to let them know you're having surgery. (If you don't have insurance, call [phone].)  Call your clinic if there's any change in your health. This includes signs of a cold or flu (sore throat, runny nose, cough, rash, fever). It  also includes a scrape or scratch near the surgery site.  If you have questions on the day of surgery, call your hospital or surgery center.  Eating and drinking guidelines  For your safety: Unless your surgeon tells you otherwise, follow the guidelines below.  Eat and drink as usual until 8 hours before you arrive for surgery. After that, no food or milk.  Drink clear liquids until 2 hours before you arrive. These are liquids you can see through, like water, Gatorade, and Propel Water. They also include plain black coffee and tea (no cream or milk), candy, and breath mints. You can spit out gum when you arrive.  If you drink alcohol: Stop drinking it the night before surgery.  If your care team tells you to take medicine on the morning of surgery, it's okay to take it with a sip of water.  Preventing infection  Shower or bathe the night before and morning of your surgery. Follow the instructions your clinic gave you. (If no instructions, use regular soap.)  Don't shave or clip hair near your surgery site. We'll remove the hair if needed.  Don't smoke or vape the morning of surgery. You may chew nicotine gum up to 2 hours before surgery. A nicotine patch is okay.  Note: Some surgeries require you to completely quit smoking and nicotine. Check with your surgeon.  Your care team will make every effort to keep you safe from infection. We will:  Clean our hands often with soap and water (or an alcohol-based hand rub).  Clean the skin at your surgery site with a special soap that kills germs.  Give you a special gown to keep you warm. (Cold raises the risk of infection.)  Wear special hair covers, masks, gowns and gloves during surgery.  Give antibiotic medicine, if prescribed. Not all surgeries need antibiotics.  What to bring on the day of surgery  Photo ID and insurance card  Copy of your health care directive, if you have one  Glasses and hearing aids (bring cases)  You can't wear contacts during surgery  Inhaler and  eye drops, if you use them (tell us about these when you arrive)  CPAP machine or breathing device, if you use them  A few personal items, if spending the night  If you have . . .  A pacemaker, ICD (cardiac defibrillator) or other implant: Bring the ID card.  An implanted stimulator: Bring the remote control.  A legal guardian: Bring a copy of the certified (court-stamped) guardianship papers.  Please remove any jewelry, including body piercings. Leave jewelry and other valuables at home.  If you're going home the day of surgery  You must have a responsible adult drive you home. They should stay with you overnight as well.  If you don't have someone to stay with you, and you aren't safe to go home alone, we may keep you overnight. Insurance often won't pay for this.  After surgery  If it's hard to control your pain or you need more pain medicine, please call your surgeon's office.  Questions?   If you have any questions for your care team, list them here: _________________________________________________________________________________________________________________________________________________________________________ ____________________________________ ____________________________________ ____________________________________    How to Take Your Medication Before Surgery  - STOP taking all vitamins and herbal supplements 14 days before surgery.

## 2023-04-25 NOTE — PROGRESS NOTES
[address] [address]  Phone: [phone]  Primary Provider: Diana Hernandez  Pre-op Performing Provider: DIANA HERNANDEZ      PREOPERATIVE EVALUATION:  Today's date: 4/25/2023    Betty Rogers is a 48 year old female who presents for a preoperative evaluation.       View : No data to display.              Surgical Information:  Surgery/Procedure: total hysterectomy; removal of ovaries  Surgery Location: [facility]  Surgeon: Mario Rosario MD  Surgery Date: 05/02/2023  Time of Surgery: 07:30 am  Where patient plans to recover: At home with family  Fax number for surgical facility: [phone]    Assessment & Plan     The proposed surgical procedure is considered INTERMEDIATE risk.    Betty was seen today for pre-op exam.    Diagnoses and all orders for this visit:    Preop general physical exam  -     Cancel: CBC with platelets; Future  -     CBC with platelets and differential; Future    Carcinoma of left breast metastatic to axillary lymph node (H)  -     CBC with platelets and differential; Future    Chemotherapy-induced neutropenia (H)  -     CBC with platelets and differential; Future    Anemia associated with chemotherapy  -     CBC with platelets and differential; Future    Other orders  -     HEPATITIS B VACCINE ADULT 3 DOSE IM (ENGERIX-B/RECOMBIVAX HB)  -     ZOSTER VACCINE RECOMBINANT ADJUVANTED (SHINGRIX)  -     REVIEW OF HEALTH MAINTENANCE PROTOCOL ORDERS                 - No identified additional risk factors other than previously addressed    Antiplatelet or Anticoagulation Medication Instructions:   - Patient is on no antiplatelet or anticoagulation medications.    Additional Medication Instructions:  Patient is on no additional chronic medications    RECOMMENDATION:  APPROVAL GIVEN to proceed with proposed procedure, without further diagnostic evaluation.          Subjective     HPI related to upcoming procedure: patient with  history of breast cancer, s/p bilateral mastectomy, radiation. She is scheduled for LAILA/BSO.         4/24/2023     3:34 PM   Preop Questions   1. Have you ever had a heart attack or stroke? No   2. Have you ever had surgery on your heart or blood vessels, such as a stent placement, a coronary artery bypass, or surgery on an artery in your head, neck, heart, or legs? No   3. Do you have chest pain with activity? No   4. Do you have a history of  heart failure? No   5. Do you currently have a cold, bronchitis or symptoms of other infection? No   6. Do you have a cough, shortness of breath, or wheezing? No   7. Do you or anyone in your family have previous history of blood clots? No   8. Do you or does anyone in your family have a serious bleeding problem such as prolonged bleeding following surgeries or cuts? No   9. Have you ever had problems with anemia or been told to take iron pills? No   10. Have you had any abnormal blood loss such as black, tarry or bloody stools, or abnormal vaginal bleeding? No   11. Have you ever had a blood transfusion? No   12. Are you willing to have a blood transfusion if it is medically needed before, during, or after your surgery? NO   13. Have you or any of your relatives ever had problems with anesthesia? No   14. Do you have sleep apnea, excessive snoring or daytime drowsiness? No   15. Do you have any artifical heart valves or other implanted medical devices like a pacemaker, defibrillator, or continuous glucose monitor? No   16. Do you have artificial joints? No   17. Are you allergic to latex? No   18. Is there any chance that you may be pregnant? No       Health Care Directive:  Patient does not have a Health Care Directive or Living Will: Discussed advance care planning with patient; however, patient declined at this time.    Preoperative Review of :   reviewed - no record of controlled substances prescribed.        Review of Systems  CONSTITUTIONAL: NEGATIVE for fever,  chills, change in weight  ENT/MOUTH: NEGATIVE for ear, mouth and throat problems  RESP: NEGATIVE for significant cough or SOB  CV: NEGATIVE for chest pain, palpitations or peripheral edema  MUSCULOSKELETAL: NEGATIVE for significant arthralgias or myalgia  HEME/ALLERGY/IMMUNE: NEGATIVE for bleeding problems    Patient Active Problem List    Diagnosis Date Noted     Adverse effect of antineoplastic and immunosuppressive drugs, sequela 10/07/2022     Priority: Medium     Anemia associated with chemotherapy 09/12/2022     Priority: Medium     Chemotherapy-induced neutropenia (H) 09/12/2022     Priority: Medium     Ductal carcinoma in situ (DCIS) of left breast 07/20/2022     Priority: Medium     Carcinoma of left breast metastatic to axillary lymph node (H) 07/15/2022     Priority: Medium     CARDIOVASCULAR SCREENING; LDL GOAL LESS THAN 160 01/03/2012     Priority: Medium     Family history of breast cancer- mother 01/03/2012     Priority: Medium      Past Medical History:   Diagnosis Date     Breast cancer metastasized to axillary lymph node (H) 7/15/2022     No active medical problems      Past Surgical History:   Procedure Laterality Date     C/SECTION, LOW TRANSVERSE      2010, 2007     DISSECT LYMPH NODE AXILLA Left 12/21/2022    Procedure: left axillary node dissection;  Surgeon: Janie South MD;  Location:  OR     INSERT PORT VASCULAR ACCESS Right 07/25/2022    Procedure: PORT PLACEMENT;  Surgeon: Janie South MD;  Location:  OR     MASTECTOMY SIMPLE, SENTINEL NODE, COMBINED Bilateral 12/21/2022    Procedure: bilateral skin sparing mastectomies with left sentinel lymph node biopsy, tag localized left axillary node excision;  Surgeon: Janie South MD;  Location:  OR     RECONSTRUCT BREAST, INSERT TISSUE EXPANDER BILATERAL, COMBINED Bilateral 12/21/2022    Procedure: BILATERAL TISSUE EXPANDER BREAST RECONSTRUCTION;  Surgeon: Rc Zamorano MD;  Location:  OR     REMOVE PORT  "VASCULAR ACCESS N/A 12/21/2022    Procedure: possible port removal;  Surgeon: Janie South MD;  Location:  OR     REMOVE TISSUE EXPANDER BREAST Left 2/18/2023    Procedure: REMOVAL OF LEFT BREAST RECONSTRUCTION TISSUE EXPANDERS;  Surgeon: Rc Zamorano MD;  Location:  OR     Current Outpatient Medications   Medication Sig Dispense Refill     calcium citrate (CITRACAL) 950 (200 Ca) MG tablet Take 1 tablet by mouth 2 times daily       multivitamin w/minerals (THERA-VIT-M) tablet Take 1 tablet by mouth daily         Allergies   Allergen Reactions     Nuts      Other [Seasonal Allergies]      Tree Nuts-Lips swell and breathing problems        Social History     Tobacco Use     Smoking status: Never     Smokeless tobacco: Never   Vaping Use     Vaping status: Never Used   Substance Use Topics     Alcohol use: Yes     Comment: socially, approx 6-7 drinks per week     Family History   Problem Relation Age of Onset     Breast Cancer Mother         atypical lobular hyperplasia breast cancer     Unknown/Adopted Brother         Sucide     Heart Disease Maternal Grandfather         Heart Issues     Breast Cancer Maternal Aunt 50     Breast Cancer Maternal Cousin      Mental Illness Brother      History   Drug Use No         Objective     BP 96/61 (BP Location: Right arm, Patient Position: Sitting, Cuff Size: Adult Regular)   Pulse 63   Temp 98.3  F (36.8  C) (Oral)   Resp 16   Ht 1.6 m (5' 2.99\")   Wt 60 kg (132 lb 5 oz)   SpO2 100%   BMI 23.44 kg/m      Physical Exam  GENERAL APPEARANCE: healthy, alert and no distress  HENT: ear canals and TM's normal and nose and mouth without ulcers or lesions  RESP: lungs clear to auscultation - no rales, rhonchi or wheezes  CV: regular rate and rhythm, normal S1 S2, no S3 or S4 and no murmur, click or rub   ABDOMEN: soft, nontender, no HSM or masses and bowel sounds normal  MS: extremities normal- no gross deformities noted  NEURO: Normal strength and tone, " sensory exam grossly normal, mentation intact and speech normal    Recent Labs   Lab Test 03/13/23  1156 02/01/23  0853   HGB 13.1 11.4*   * 190    140   POTASSIUM 4.4 4.6   CR 0.77 0.84        Diagnostics:    No EKG required, no history of coronary heart disease, significant arrhythmia, peripheral arterial disease or other structural heart disease.    Revised Cardiac Risk Index (RCRI):  The patient has the following serious cardiovascular risks for perioperative complications:   - No serious cardiac risks = 0 points     RCRI Interpretation: 0 points: Class I (very low risk - 0.4% complication rate)           Signed Electronically by: Diana Luevano MD PhD  Copy of this evaluation report is provided to requesting physician.

## 2023-04-25 NOTE — NURSING NOTE
Prior to immunization administration, verified patients identity using patient s name and date of birth. Please see Immunization Activity for additional information.     Screening Questionnaire for Adult Immunization    Are you sick today?   No   Do you have allergies to medications, food, a vaccine component or latex?   No   Have you ever had a serious reaction after receiving a vaccination?   No   Do you have a long-term health problem with heart, lung, kidney, or metabolic disease (e.g., diabetes), asthma, a blood disorder, no spleen, complement component deficiency, a cochlear implant, or a spinal fluid leak?  Are you on long-term aspirin therapy?   No   Do you have cancer, leukemia, HIV/AIDS, or any other immune system problem?   No   Do you have a parent, brother, or sister with an immune system problem?   No   In the past 3 months, have you taken medications that affect  your immune system, such as prednisone, other steroids, or anticancer drugs; drugs for the treatment of rheumatoid arthritis, Crohn s disease, or psoriasis; or have you had radiation treatments?   No   Have you had a seizure, or a brain or other nervous system problem?   No   During the past year, have you received a transfusion of blood or blood    products, or been given immune (gamma) globulin or antiviral drug?   No   For women: Are you pregnant or is there a chance you could become       pregnant during the next month?   No   Have you received any vaccinations in the past 4 weeks?   No     Immunization questionnaire answers were all negative.      Injection of Hep B and Zoster (Shingles) given by Britney Donahue MA. Patient instructed to remain in clinic for 15 minutes afterwards, and to report any adverse reactions.     Screening performed by Britney Donahue MA on 4/25/2023 at 2:49 PM.

## 2023-04-28 ENCOUNTER — MYC MEDICAL ADVICE (OUTPATIENT)
Dept: FAMILY MEDICINE | Facility: CLINIC | Age: 48
End: 2023-04-28
Payer: COMMERCIAL

## 2023-04-28 NOTE — TELEPHONE ENCOUNTER
Routing to provider to review and advise on MyChart message.    RITA Martinez  [facility]

## 2023-05-17 ENCOUNTER — OFFICE VISIT (OUTPATIENT)
Dept: OBGYN | Facility: CLINIC | Age: 48
End: 2023-05-17
Payer: COMMERCIAL

## 2023-05-17 VITALS
BODY MASS INDEX: 23.46 KG/M2 | DIASTOLIC BLOOD PRESSURE: 64 MMHG | HEART RATE: 70 BPM | WEIGHT: 132.4 LBS | OXYGEN SATURATION: 100 % | SYSTOLIC BLOOD PRESSURE: 99 MMHG

## 2023-05-17 DIAGNOSIS — Z98.890 POST-OPERATIVE STATE: Primary | ICD-10-CM

## 2023-05-17 PROCEDURE — 99024 POSTOP FOLLOW-UP VISIT: CPT | Performed by: OBSTETRICS & GYNECOLOGY

## 2023-05-17 NOTE — PATIENT INSTRUCTIONS
If you have any questions regarding your visit, Please contact your care team.     [employer] [facility]: [phone]    To Schedule an Appointment 24/7  Call: [phone]     Mario Rosario MD  Medical Director    Shai Birch-RITA Dumont-Surgery Scheduler  Amelia-Surgery Scheduler               Tuesday-[place]  7:30 a.m-4:30 p.m    Thursday-[place]  7:30 a.m-4:30 p.m    Typical Surgery Days: Tuesday or Friday [facility] [place]  [address] [place] [address]  PH: [phone]     Imaging Scheduling all locations  PH: [phone]     [facility] Labor and Delivery  [address] [place] [address] [place] [address]  PH: [phone]    [facility]  [address] [place] [address]  PH: [phone] Fax      **Surgeries** Our Surgery Schedulers will contact you to schedule. If you do not receive a call within 3 business days, please call [phone].    Urgent Care locations:  [facility] Monday-Friday  10 am - 8 pm  Saturday and Sunday   9 am - 5 pm  Monday-Friday   10 am - 8 pm  Saturday and Sunday   9 am - 5 pm   [phone]   If you need a medication refill, please contact your pharmacy. Please allow 3 business days for your refill to be completed.  As always, Thank you for trusting us with your healthcare needs!    see additional instructions from your care team below

## 2023-05-17 NOTE — PROGRESS NOTES
Betty is a 48 year old  2 weeks s/p  TOTAL LAPAROSCOPIC HYSTERECTOMY BILATERAL SALPINGO-OOPHRECTOMY complicated by no problems reported.  She is currently requiring nothing for pain management.  - vaginal bleeding, - hot flashes.  - GI/ complaints.  Energy level is Medium.  Denies fever.  Reviewed the pathology results Uterus, cervix, bilateral ovaries and bilateral fallopian tubes, hysterectomy with bilateral salpingo-oophorectomy-  1. Endometrium: Cystic change, negative for atypical hyperplasia or malignancy.  2. Myometrium: No significant finding.  3. Cervix: Chronic cervicitis, negative for malignancy.  4. Bilateral fallopian tubes: Bilateral fimbriated tubes, negative for atypia or malignancy.  Bilateral ovaries: Physiologic changes with benign small cysts.  PE: There were no vitals taken for this visit.  Abd: soft, non tender, no masses  Incision: intact, no erythema, induration or discharge  vaginal cuff intact and non tender  Ext. Genitalia: Normal  Vagina:cuff healing, no lesions, Normal mucosa, no discharge  BME: no tenderness    A/P 2 weeks s/p surgery, doing well     1. Pelvic rest for 4 weeks, otherwise activity as tolerated.  Return as needed  Mario Rosario MD

## 2023-05-24 ENCOUNTER — ONCOLOGY VISIT (OUTPATIENT)
Dept: ONCOLOGY | Facility: CLINIC | Age: 48
End: 2023-05-24
Payer: COMMERCIAL

## 2023-05-24 VITALS
TEMPERATURE: 97.8 F | WEIGHT: 132.9 LBS | RESPIRATION RATE: 16 BRPM | DIASTOLIC BLOOD PRESSURE: 73 MMHG | BODY MASS INDEX: 23.55 KG/M2 | HEART RATE: 56 BPM | OXYGEN SATURATION: 100 % | SYSTOLIC BLOOD PRESSURE: 128 MMHG

## 2023-05-24 DIAGNOSIS — T38.6X5A OSTEOPOROSIS DUE TO AROMATASE INHIBITOR: ICD-10-CM

## 2023-05-24 DIAGNOSIS — C50.912 CARCINOMA OF LEFT BREAST METASTATIC TO AXILLARY LYMPH NODE (H): Primary | ICD-10-CM

## 2023-05-24 DIAGNOSIS — D05.12 DUCTAL CARCINOMA IN SITU (DCIS) OF LEFT BREAST: ICD-10-CM

## 2023-05-24 DIAGNOSIS — C77.3 CARCINOMA OF LEFT BREAST METASTATIC TO AXILLARY LYMPH NODE (H): Primary | ICD-10-CM

## 2023-05-24 DIAGNOSIS — M81.8 OSTEOPOROSIS DUE TO AROMATASE INHIBITOR: ICD-10-CM

## 2023-05-24 DIAGNOSIS — Z79.811 USE OF AROMATASE INHIBITORS: ICD-10-CM

## 2023-05-24 PROCEDURE — 99214 OFFICE O/P EST MOD 30 MIN: CPT | Performed by: INTERNAL MEDICINE

## 2023-05-24 NOTE — LETTER
2023         RE: Betty Rogers  [address]  [facility] [facility] [address]        Dear Colleague,    Thank you for referring your patient, Betty Rogers, to the [facility]. Please see a copy of my visit note below.    [facility] Hematology / Oncology  Progress Note  Name: Betty Rogers  :  1975    MRN:  [id number]    --------------------    Assessment / Plan:  Multifocal, locally advanced, hormone positive, HER2 negative left breast ductal carcinoma (ypT1a ypN2a):  # Neoadjuvant Oncotype Dx score 28.  # Neoadjuvant BRCA testing negative.   # Neoadjuvant taxol x 12 weeks; partial response.  # Neoadjuvant dose dense AC x 4 cycle; RCB2 present.  # Left breast mastectomy w/ sentinel node / axillary node and tissue expander recon; path w/ gr 2, 4 mm tumor, 8 mm margin, 7/13 nodes involved (1 macromet, 3 micromet, 21 mm largest, pos KAMERON).  # Left breast tissue expander removal 2/2 infection.  # Adjuvant tamoxifen changed to AI w/ LAILA/BSO  # Adjuvant radiation ongoing.  # Status post LAILA/BSO.    Clinically, Betty remains well from breast cancer standpoint.  Given s/p LAILA/BSO, start AI x 10 years (probably indefinitely).  Given node positive disease, start Verzenio x 2 years.  Given AI use and breast cancer, start Xgeva q6 months x 2 years minimum.  Reviewed logistics and side effects of agents above.  Will return in 4 weeks for toxicity check and Xgeva.  DEXA normal.    Hoang Franco MD    --------------------    Interval History:  Betty returns for follow up of breast cancer.  All in all, she continues to do quite well.  Healing well from surgery.  Neuropathy improving.  Spirits and mood appeared good.  No nausea, vomiting.    --------------------    Physical Exam:  VS: /73   Pulse 56   Temp 97.8  F (36.6  C)   Resp 16   Wt 60.3 kg (132 lb 14.4 oz)   LMP 09/10/2022   SpO2 100%   BMI 23.55 kg/m    GEN: Well appearing.    Labs / Imaging:  We  reviewed surgical pathology report in detail; benign LAILA/BSO findings.      Again, thank you for allowing me to participate in the care of your patient.        Sincerely,        Hoang Franco MD

## 2023-05-24 NOTE — PROGRESS NOTES
[facility] Hematology / Oncology  Progress Note  Name: Betty Rogers  :  1975    MRN:  [id number]    --------------------    Assessment / Plan:  Multifocal, locally advanced, hormone positive, HER2 negative left breast ductal carcinoma (ypT1a ypN2a):  # Neoadjuvant Oncotype Dx score 28.  # Neoadjuvant BRCA testing negative.   # Neoadjuvant taxol x 12 weeks; partial response.  # Neoadjuvant dose dense AC x 4 cycle; RCB2 present.  # Left breast mastectomy w/ sentinel node / axillary node and tissue expander recon; path w/ gr 2, 4 mm tumor, 8 mm margin, 7/13 nodes involved (1 macromet, 3 micromet, 21 mm largest, pos KAMERON).  # Left breast tissue expander removal 2/2 infection.  # Adjuvant tamoxifen changed to AI w/ LAILA/BSO  # Adjuvant radiation ongoing.  # Status post LAILA/BSO.    Clinically, Betty remains well from breast cancer standpoint.  Given s/p LAILA/BSO, start AI x 10 years (probably indefinitely).  Given node positive disease, start Verzenio x 2 years.  Given AI use and breast cancer, start Xgeva q6 months x 2 years minimum.  Reviewed logistics and side effects of agents above.  Will return in 4 weeks for toxicity check and Xgeva.  DEXA normal.    Hoang Franco MD    --------------------    Interval History:  Betty returns for follow up of breast cancer.  All in all, she continues to do quite well.  Healing well from surgery.  Neuropathy improving.  Spirits and mood appeared good.  No nausea, vomiting.    --------------------    Physical Exam:  VS: /73   Pulse 56   Temp 97.8  F (36.6  C)   Resp 16   Wt 60.3 kg (132 lb 14.4 oz)   LMP 09/10/2022   SpO2 100%   BMI 23.55 kg/m    GEN: Well appearing.    Labs / Imaging:  We reviewed surgical pathology report in detail; benign LAILA/BSO findings.

## 2023-05-24 NOTE — NURSING NOTE
"Oncology Rooming Note    May 24, 2023 11:26 AM   Betty Rogers is a 48 year old female who presents for:    Chief Complaint   Patient presents with     Oncology Clinic Visit     Follow up-surgery      Initial Vitals: /73 (BP Location: Right arm, Patient Position: Sitting, Cuff Size: Adult Regular)   Pulse 56   Temp 97.8  F (36.6  C) (Oral)   Resp 16   Wt 60.3 kg (132 lb 14.4 oz)   LMP 09/10/2022   SpO2 100%   BMI 23.55 kg/m   Estimated body mass index is 23.55 kg/m  as calculated from the following:    Height as of 4/25/23: 1.6 m (5' 2.99\").    Weight as of this encounter: 60.3 kg (132 lb 14.4 oz). Body surface area is 1.64 meters squared.  Data Unavailable Comment: Data Unavailable   Patient's last menstrual period was 09/10/2022.  Allergies reviewed: Yes  Medications reviewed: Yes    Medications: Medication refills not needed today.  Pharmacy name entered into [facility]:    [id number] - [place], [place] - [address] [address] [address] [place] [facility] [address] [facility] - [place], [place] - [address]    Clinical concerns: No major changes reported.        Pat Corrales LPN May 24, 2023 11:27 AM            "

## 2023-05-25 RX ORDER — LETROZOLE 2.5 MG/1
2.5 TABLET, FILM COATED ORAL DAILY
Qty: 90 TABLET | Refills: 3 | Status: SHIPPED | OUTPATIENT
Start: 2023-05-25 | End: 2024-04-18

## 2023-05-26 PROBLEM — T38.6X5A OSTEOPOROSIS DUE TO AROMATASE INHIBITOR: Status: ACTIVE | Noted: 2023-05-26

## 2023-05-26 PROBLEM — M81.8 OSTEOPOROSIS DUE TO AROMATASE INHIBITOR: Status: ACTIVE | Noted: 2023-05-26

## 2023-05-26 RX ORDER — METHYLPREDNISOLONE SODIUM SUCCINATE 125 MG/2ML
125 INJECTION, POWDER, LYOPHILIZED, FOR SOLUTION INTRAMUSCULAR; INTRAVENOUS
Status: CANCELLED
Start: 2023-06-23

## 2023-05-26 RX ORDER — MEPERIDINE HYDROCHLORIDE 25 MG/ML
25 INJECTION INTRAMUSCULAR; INTRAVENOUS; SUBCUTANEOUS EVERY 30 MIN PRN
OUTPATIENT
Start: 2025-01-12

## 2023-05-26 RX ORDER — DIPHENHYDRAMINE HYDROCHLORIDE 50 MG/ML
50 INJECTION INTRAMUSCULAR; INTRAVENOUS
Status: CANCELLED
Start: 2024-01-15

## 2023-05-26 RX ORDER — EPINEPHRINE 1 MG/ML
0.3 INJECTION, SOLUTION INTRAMUSCULAR; SUBCUTANEOUS EVERY 5 MIN PRN
Status: CANCELLED | OUTPATIENT
Start: 2023-06-23

## 2023-05-26 RX ORDER — EPINEPHRINE 1 MG/ML
0.3 INJECTION, SOLUTION INTRAMUSCULAR; SUBCUTANEOUS EVERY 5 MIN PRN
OUTPATIENT
Start: 2025-01-12

## 2023-05-26 RX ORDER — DIPHENHYDRAMINE HYDROCHLORIDE 50 MG/ML
50 INJECTION INTRAMUSCULAR; INTRAVENOUS
Start: 2024-07-16

## 2023-05-26 RX ORDER — ALBUTEROL SULFATE 90 UG/1
1-2 AEROSOL, METERED RESPIRATORY (INHALATION)
Start: 2024-07-16

## 2023-05-26 RX ORDER — MEPERIDINE HYDROCHLORIDE 25 MG/ML
25 INJECTION INTRAMUSCULAR; INTRAVENOUS; SUBCUTANEOUS EVERY 30 MIN PRN
OUTPATIENT
Start: 2024-07-16

## 2023-05-26 RX ORDER — ALBUTEROL SULFATE 0.83 MG/ML
2.5 SOLUTION RESPIRATORY (INHALATION)
Status: CANCELLED | OUTPATIENT
Start: 2024-01-15

## 2023-05-26 RX ORDER — ALBUTEROL SULFATE 90 UG/1
1-2 AEROSOL, METERED RESPIRATORY (INHALATION)
Start: 2025-01-12

## 2023-05-26 RX ORDER — DIPHENHYDRAMINE HYDROCHLORIDE 50 MG/ML
50 INJECTION INTRAMUSCULAR; INTRAVENOUS
Start: 2025-01-12

## 2023-05-26 RX ORDER — ALBUTEROL SULFATE 0.83 MG/ML
2.5 SOLUTION RESPIRATORY (INHALATION)
OUTPATIENT
Start: 2024-07-16

## 2023-05-26 RX ORDER — ALBUTEROL SULFATE 0.83 MG/ML
2.5 SOLUTION RESPIRATORY (INHALATION)
OUTPATIENT
Start: 2025-01-12

## 2023-05-26 RX ORDER — ALBUTEROL SULFATE 0.83 MG/ML
2.5 SOLUTION RESPIRATORY (INHALATION)
Status: CANCELLED | OUTPATIENT
Start: 2023-06-23

## 2023-05-26 RX ORDER — MEPERIDINE HYDROCHLORIDE 25 MG/ML
25 INJECTION INTRAMUSCULAR; INTRAVENOUS; SUBCUTANEOUS EVERY 30 MIN PRN
Status: CANCELLED | OUTPATIENT
Start: 2023-06-23

## 2023-05-26 RX ORDER — ALBUTEROL SULFATE 90 UG/1
1-2 AEROSOL, METERED RESPIRATORY (INHALATION)
Status: CANCELLED
Start: 2024-01-15

## 2023-05-26 RX ORDER — ALBUTEROL SULFATE 90 UG/1
1-2 AEROSOL, METERED RESPIRATORY (INHALATION)
Status: CANCELLED
Start: 2023-06-23

## 2023-05-26 RX ORDER — METHYLPREDNISOLONE SODIUM SUCCINATE 125 MG/2ML
125 INJECTION, POWDER, LYOPHILIZED, FOR SOLUTION INTRAMUSCULAR; INTRAVENOUS
Status: CANCELLED
Start: 2024-01-15

## 2023-05-26 RX ORDER — METHYLPREDNISOLONE SODIUM SUCCINATE 125 MG/2ML
125 INJECTION, POWDER, LYOPHILIZED, FOR SOLUTION INTRAMUSCULAR; INTRAVENOUS
Start: 2024-07-16

## 2023-05-26 RX ORDER — EPINEPHRINE 1 MG/ML
0.3 INJECTION, SOLUTION INTRAMUSCULAR; SUBCUTANEOUS EVERY 5 MIN PRN
OUTPATIENT
Start: 2024-07-16

## 2023-05-26 RX ORDER — METHYLPREDNISOLONE SODIUM SUCCINATE 125 MG/2ML
125 INJECTION, POWDER, LYOPHILIZED, FOR SOLUTION INTRAMUSCULAR; INTRAVENOUS
Start: 2025-01-12

## 2023-05-26 RX ORDER — DIPHENHYDRAMINE HYDROCHLORIDE 50 MG/ML
50 INJECTION INTRAMUSCULAR; INTRAVENOUS
Status: CANCELLED
Start: 2023-06-23

## 2023-05-26 RX ORDER — MEPERIDINE HYDROCHLORIDE 25 MG/ML
25 INJECTION INTRAMUSCULAR; INTRAVENOUS; SUBCUTANEOUS EVERY 30 MIN PRN
Status: CANCELLED | OUTPATIENT
Start: 2024-01-15

## 2023-05-26 RX ORDER — EPINEPHRINE 1 MG/ML
0.3 INJECTION, SOLUTION INTRAMUSCULAR; SUBCUTANEOUS EVERY 5 MIN PRN
Status: CANCELLED | OUTPATIENT
Start: 2024-01-15

## 2023-06-01 ENCOUNTER — TELEPHONE (OUTPATIENT)
Dept: ONCOLOGY | Facility: CLINIC | Age: 48
End: 2023-06-01
Payer: COMMERCIAL

## 2023-06-01 NOTE — TELEPHONE ENCOUNTER
"Oral Chemotherapy Monitoring Program    Subjective/Objective:  Betty Rogers is a 48 year old female contacted by phone for a follow-up visit for oral chemotherapy.  Betty states that she is doing well with her Verzenio therapy.  She is not having any adverse side effects.  Her start date was 5/25/23.  We verified upcoming appointments.  She had no further questions.        1/27/2023     1:00 PM 1/31/2023     4:00 PM 6/1/2023    11:00 AM   ORAL CHEMOTHERAPY   Assessment Type Initial Work up New Teach Initial Follow up   Diagnosis Code Breast Cancer Breast Cancer Breast Cancer   Providers Dr Salvador Franco   Clinic Name/Location [facility]   Drug Name Verzenio (abemaciclib) Verzenio (abemaciclib) Verzenio (abemaciclib)   Dose 150 mg 150 mg 150 mg   Current Schedule BID BID BID   Cycle Details Continuous Continuous Continuous   Start Date of Last Cycle   5/25/2023   Doses missed in last 2 weeks   0   Adherence Assessment   Adherent   Adverse Effects   No AE identified during assessment   Any new drug interactions? No     Is the dose as ordered appropriate for the patient? Yes         Vitals:  BP:   BP Readings from Last 1 Encounters:   05/24/23 128/73     Wt Readings from Last 1 Encounters:   05/24/23 60.3 kg (132 lb 14.4 oz)     Estimated body surface area is 1.64 meters squared as calculated from the following:    Height as of 4/25/23: 1.6 m (5' 2.99\").    Weight as of 5/24/23: 60.3 kg (132 lb 14.4 oz).    Labs:  No results found for NA within last 30 days.     No results found for K within last 30 days.     No results found for CA within last 30 days.     No results found for Mag within last 30 days.     No results found for Phos within last 30 days.     No results found for ALBUMIN within last 30 days.     No results found for BUN within last 30 days.     No results found for CR within last 30 days.       No results found for AST within last 30 days.     No results found " for ALT within last 30 days.     No results found for BILITOTAL within last 30 days.       No results found for WBC within last 30 days.     No results found for HGB within last 30 days.     No results found for PLT within last 30 days.     No results found for ANC within last 30 days.     Assessment/Plan:  Continue same dose as ordered    Follow-Up:  Labs and appt with Salvador 6/21/23    Rc Abdlu PharmD, BCOP  6/1/2023

## 2023-06-05 ENCOUNTER — TELEPHONE (OUTPATIENT)
Dept: ONCOLOGY | Facility: CLINIC | Age: 48
End: 2023-06-05
Payer: COMMERCIAL

## 2023-06-05 NOTE — TELEPHONE ENCOUNTER
Oral Chemotherapy Monitoring Program  Called Betty to touch base regarding a recent MyChart message patient about some recurrent diarrhea. Spoke with patient who mentioned have some recurrent diarrhea that has been off and on for the past week. Recently had not taken any Imodium to help. I educated her to start taking her Imodium (2 caps with 1st loose stool then 1 cap with each loose stool thereafter, max of 8/day). I also told patient to call us or triage if no improvement or if it gets worse. Patient was agreeable with the plan.     Cabrera Abbott, PharmD, BCOP  Oncology Pharmacy Manager  [facility]  [phone]

## 2023-06-14 DIAGNOSIS — C50.912 CARCINOMA OF LEFT BREAST METASTATIC TO AXILLARY LYMPH NODE (H): Primary | ICD-10-CM

## 2023-06-14 DIAGNOSIS — C77.3 CARCINOMA OF LEFT BREAST METASTATIC TO AXILLARY LYMPH NODE (H): Primary | ICD-10-CM

## 2023-06-20 DIAGNOSIS — Z79.811 USE OF AROMATASE INHIBITORS: ICD-10-CM

## 2023-06-21 ENCOUNTER — ONCOLOGY VISIT (OUTPATIENT)
Dept: ONCOLOGY | Facility: CLINIC | Age: 48
End: 2023-06-21
Payer: COMMERCIAL

## 2023-06-21 ENCOUNTER — LAB (OUTPATIENT)
Dept: LAB | Facility: CLINIC | Age: 48
End: 2023-06-21
Payer: COMMERCIAL

## 2023-06-21 VITALS
BODY MASS INDEX: 23.74 KG/M2 | DIASTOLIC BLOOD PRESSURE: 72 MMHG | OXYGEN SATURATION: 98 % | HEIGHT: 63 IN | HEART RATE: 68 BPM | RESPIRATION RATE: 16 BRPM | SYSTOLIC BLOOD PRESSURE: 112 MMHG | WEIGHT: 134 LBS

## 2023-06-21 DIAGNOSIS — T45.1X5A CHEMOTHERAPY-INDUCED NEUTROPENIA (H): ICD-10-CM

## 2023-06-21 DIAGNOSIS — C50.912 CARCINOMA OF LEFT BREAST METASTATIC TO AXILLARY LYMPH NODE (H): ICD-10-CM

## 2023-06-21 DIAGNOSIS — T38.6X5A OSTEOPOROSIS DUE TO AROMATASE INHIBITOR: ICD-10-CM

## 2023-06-21 DIAGNOSIS — C77.3 CARCINOMA OF LEFT BREAST METASTATIC TO AXILLARY LYMPH NODE (H): ICD-10-CM

## 2023-06-21 DIAGNOSIS — T45.1X5A CHEMOTHERAPY INDUCED DIARRHEA: ICD-10-CM

## 2023-06-21 DIAGNOSIS — M81.8 OSTEOPOROSIS DUE TO AROMATASE INHIBITOR: ICD-10-CM

## 2023-06-21 DIAGNOSIS — Z17.0 MALIGNANT NEOPLASM OF OVERLAPPING SITES OF LEFT BREAST IN FEMALE, ESTROGEN RECEPTOR POSITIVE (H): Primary | ICD-10-CM

## 2023-06-21 DIAGNOSIS — K52.1 CHEMOTHERAPY INDUCED DIARRHEA: ICD-10-CM

## 2023-06-21 DIAGNOSIS — D70.1 CHEMOTHERAPY-INDUCED NEUTROPENIA (H): ICD-10-CM

## 2023-06-21 DIAGNOSIS — C50.812 MALIGNANT NEOPLASM OF OVERLAPPING SITES OF LEFT BREAST IN FEMALE, ESTROGEN RECEPTOR POSITIVE (H): Primary | ICD-10-CM

## 2023-06-21 LAB
ALBUMIN SERPL BCG-MCNC: 4.2 G/DL (ref 3.5–5.2)
ALP SERPL-CCNC: 56 U/L (ref 35–104)
ALT SERPL W P-5'-P-CCNC: 16 U/L (ref 0–50)
ANION GAP SERPL CALCULATED.3IONS-SCNC: 7 MMOL/L (ref 7–15)
AST SERPL W P-5'-P-CCNC: 22 U/L (ref 0–45)
BASOPHILS # BLD AUTO: 0 10E3/UL (ref 0–0.2)
BASOPHILS NFR BLD AUTO: 2 %
BILIRUB SERPL-MCNC: 0.4 MG/DL
BUN SERPL-MCNC: 10.9 MG/DL (ref 6–20)
CALCIUM SERPL-MCNC: 9.6 MG/DL (ref 8.6–10)
CHLORIDE SERPL-SCNC: 103 MMOL/L (ref 98–107)
CREAT SERPL-MCNC: 1.07 MG/DL (ref 0.51–0.95)
DEPRECATED HCO3 PLAS-SCNC: 26 MMOL/L (ref 22–29)
EOSINOPHIL # BLD AUTO: 0.2 10E3/UL (ref 0–0.7)
EOSINOPHIL NFR BLD AUTO: 9 %
ERYTHROCYTE [DISTWIDTH] IN BLOOD BY AUTOMATED COUNT: 13 % (ref 10–15)
GFR SERPL CREATININE-BSD FRML MDRD: 63 ML/MIN/1.73M2
GLUCOSE SERPL-MCNC: 94 MG/DL (ref 70–99)
HCT VFR BLD AUTO: 37.5 % (ref 35–47)
HGB BLD-MCNC: 12.8 G/DL (ref 11.7–15.7)
IMM GRANULOCYTES # BLD: 0 10E3/UL
IMM GRANULOCYTES NFR BLD: 0 %
LYMPHOCYTES # BLD AUTO: 0.7 10E3/UL (ref 0.8–5.3)
LYMPHOCYTES NFR BLD AUTO: 40 %
MCH RBC QN AUTO: 30.5 PG (ref 26.5–33)
MCHC RBC AUTO-ENTMCNC: 34.1 G/DL (ref 31.5–36.5)
MCV RBC AUTO: 90 FL (ref 78–100)
MONOCYTES # BLD AUTO: 0.2 10E3/UL (ref 0–1.3)
MONOCYTES NFR BLD AUTO: 8 %
NEUTROPHILS # BLD AUTO: 0.8 10E3/UL (ref 1.6–8.3)
NEUTROPHILS NFR BLD AUTO: 41 %
NRBC # BLD AUTO: 0 10E3/UL
NRBC BLD AUTO-RTO: 0 /100
PLATELET # BLD AUTO: 161 10E3/UL (ref 150–450)
POTASSIUM SERPL-SCNC: 4.1 MMOL/L (ref 3.4–5.3)
PROT SERPL-MCNC: 6.4 G/DL (ref 6.4–8.3)
RBC # BLD AUTO: 4.19 10E6/UL (ref 3.8–5.2)
SODIUM SERPL-SCNC: 136 MMOL/L (ref 136–145)
WBC # BLD AUTO: 1.9 10E3/UL (ref 4–11)

## 2023-06-21 PROCEDURE — 80053 COMPREHEN METABOLIC PANEL: CPT

## 2023-06-21 PROCEDURE — 99214 OFFICE O/P EST MOD 30 MIN: CPT | Performed by: INTERNAL MEDICINE

## 2023-06-21 PROCEDURE — 85025 COMPLETE CBC W/AUTO DIFF WBC: CPT

## 2023-06-21 PROCEDURE — 36415 COLL VENOUS BLD VENIPUNCTURE: CPT

## 2023-06-21 ASSESSMENT — PAIN SCALES - GENERAL: PAINLEVEL: NO PAIN (0)

## 2023-06-21 NOTE — PROGRESS NOTES
[facility] Hematology / Oncology  Progress Note  Name: Betty Rogers  :  1975    MRN:  [id number]    --------------------    Assessment / Plan:  Multifocal, locally advanced, hormone positive, HER2 negative left breast ductal carcinoma (ypT1a ypN2a):  # Neoadjuvant Oncotype Dx score 28.  # Neoadjuvant BRCA testing negative.   # Neoadjuvant taxol x 12 weeks; partial response.  # Neoadjuvant dose dense AC x 4 cycle; RCB2 present.  # Left breast mastectomy w/ sentinel node / axillary node and tissue expander recon; path w/ gr 2, 4 mm tumor, 8 mm margin, 7/13 nodes involved (1 macromet, 3 micromet, 21 mm largest, pos KAMERON).  # Left breast tissue expander removal 2/2 infection.  # Adjuvant tamoxifen changed to AI w/ LAILA/BSO  # Adjuvant radiation ongoing.  # Status post LAILA/BSO.  # Adjuvant letrozole / abemaciclib ongoing    Clinically, Zachery remains incredibly well from a breast cancer standpoint.  She is fortunately tolerating adjuvant letrozole with abemaciclib well and without major toxicity.  We have plans for 10 years of letrozole.  We have plans for 2 years of being by psych lab.  Due to some slight neutropenia secondary to abemaciclib, she will take a 2-week break to allow for count recovery and to finish up her last month of 150 mg twice daily dosing that just arrived.  She will then need to take a break for breast reconstruction in early August and we will plan to dose reduce her to 100 mg twice daily primarily due to the cytopenias but all the details of the diarrhea.  She could schedule some Imodium to help with the diarrhea from the Verzenio.  She will receive her first dose of Xgeva for prevention of bone thinning secondary to her aromatase inhibitor as well as early menopause and prevention of skeletal mets every 6 months x 2 years at a minimum and potentially longer.  We reviewed potential side effects of Xgeva.  No dental issues.  We will plan to see her back in 3 months time given how  "well she is doing.    Haong Franco MD    --------------------    Interval History:  Betty returns for follow up of breast cancer unaccompanied.  All in all, she is doing incredibly well.  She does deal with some slight diarrhea from Verzenio here and there.  It is responsive to Imodium.  She otherwise continues to do well from a postsurgical, postradiation standpoint.  No concerns for side effects from her adjuvant antiestrogen therapy as well.  A big focus personally for her will be focusing on dietary choices.    --------------------    Physical Exam:  VS: /72 (BP Location: Right arm)   Pulse 68   Resp 16   Ht 1.6 m (5' 2.99\")   Wt 60.8 kg (134 lb)   LMP 09/10/2022   SpO2 98%   BMI 23.74 kg/m    GEN: Well appearing.    Labs / Imaging:  We reviewed CBC and CMP and DEXA scan.  "

## 2023-06-21 NOTE — NURSING NOTE
"Oncology Rooming Note    June 21, 2023 9:34 AM   Betty Rogers is a 48 year old female who presents for:    Chief Complaint   Patient presents with     Oncology Clinic Visit     1 month follow up     Initial Vitals: /72 (BP Location: Right arm)   Pulse 68   Resp 16   Ht 1.6 m (5' 2.99\")   Wt 60.8 kg (134 lb)   LMP 09/10/2022   SpO2 98%   BMI 23.74 kg/m   Estimated body mass index is 23.74 kg/m  as calculated from the following:    Height as of this encounter: 1.6 m (5' 2.99\").    Weight as of this encounter: 60.8 kg (134 lb). Body surface area is 1.64 meters squared.  No Pain (0) Comment: Data Unavailable   Patient's last menstrual period was 09/10/2022.  Allergies reviewed: Yes  Medications reviewed: Yes    Medications: Medication refills not needed today.  Pharmacy name entered into [facility]:    [id number] [address] [facility] [address]  [facility] [address] [place] [facility] [address] [facility] - [facility], [place] - [address]    Clinical concerns: No new concerns         Sarina Hughes LPN              "

## 2023-06-21 NOTE — LETTER
2023         RE: Betty Rogers  [address]  [place] [address]        Dear Colleague,    Thank you for referring your patient, Betty Rogers, to the [facility]. Please see a copy of my visit note below.    [facility] Hematology / Oncology  Progress Note  Name: Betty Rogers  :  1975    MRN:  [id number]    --------------------    Assessment / Plan:  Multifocal, locally advanced, hormone positive, HER2 negative left breast ductal carcinoma (ypT1a ypN2a):  # Neoadjuvant Oncotype Dx score 28.  # Neoadjuvant BRCA testing negative.   # Neoadjuvant taxol x 12 weeks; partial response.  # Neoadjuvant dose dense AC x 4 cycle; RCB2 present.  # Left breast mastectomy w/ sentinel node / axillary node and tissue expander recon; path w/ gr 2, 4 mm tumor, 8 mm margin, 7/13 nodes involved (1 macromet, 3 micromet, 21 mm largest, pos KAMERON).  # Left breast tissue expander removal 2/2 infection.  # Adjuvant tamoxifen changed to AI w/ LAILA/BSO  # Adjuvant radiation ongoing.  # Status post LAILA/BSO.  # Adjuvant letrozole / abemaciclib ongoing    Clinically, Zachery remains incredibly well from a breast cancer standpoint.  She is fortunately tolerating adjuvant letrozole with abemaciclib well and without major toxicity.  We have plans for 10 years of letrozole.  We have plans for 2 years of being by psych lab.  Due to some slight neutropenia secondary to abemaciclib, she will take a 2-week break to allow for count recovery and to finish up her last month of 150 mg twice daily dosing that just arrived.  She will then need to take a break for breast reconstruction in early August and we will plan to dose reduce her to 100 mg twice daily primarily due to the cytopenias but all the details of the diarrhea.  She could schedule some Imodium to help with the diarrhea from the Verzenio.  She will receive her first dose of Xgeva for prevention of bone thinning secondary to her  "aromatase inhibitor as well as early menopause and prevention of skeletal mets every 6 months x 2 years at a minimum and potentially longer.  We reviewed potential side effects of Xgeva.  No dental issues.  We will plan to see her back in 3 months time given how well she is doing.    Hoang Franco MD    --------------------    Interval History:  Betty returns for follow up of breast cancer unaccompanied.  All in all, she is doing incredibly well.  She does deal with some slight diarrhea from Verzenio here and there.  It is responsive to Imodium.  She otherwise continues to do well from a postsurgical, postradiation standpoint.  No concerns for side effects from her adjuvant antiestrogen therapy as well.  A big focus personally for her will be focusing on dietary choices.    --------------------    Physical Exam:  VS: /72 (BP Location: Right arm)   Pulse 68   Resp 16   Ht 1.6 m (5' 2.99\")   Wt 60.8 kg (134 lb)   LMP 09/10/2022   SpO2 98%   BMI 23.74 kg/m    GEN: Well appearing.    Labs / Imaging:  We reviewed CBC and CMP and DEXA scan.      Again, thank you for allowing me to participate in the care of your patient.        Sincerely,        Hoang Franco MD    "

## 2023-06-23 ENCOUNTER — VIRTUAL VISIT (OUTPATIENT)
Dept: RADIATION ONCOLOGY | Facility: CLINIC | Age: 48
End: 2023-06-23
Payer: COMMERCIAL

## 2023-06-23 ENCOUNTER — INFUSION THERAPY VISIT (OUTPATIENT)
Dept: INFUSION THERAPY | Facility: CLINIC | Age: 48
End: 2023-06-23
Payer: COMMERCIAL

## 2023-06-23 VITALS
DIASTOLIC BLOOD PRESSURE: 62 MMHG | RESPIRATION RATE: 16 BRPM | HEART RATE: 72 BPM | SYSTOLIC BLOOD PRESSURE: 97 MMHG | OXYGEN SATURATION: 100 %

## 2023-06-23 DIAGNOSIS — Z79.811 USE OF AROMATASE INHIBITORS: Primary | ICD-10-CM

## 2023-06-23 DIAGNOSIS — T38.6X5A OSTEOPOROSIS DUE TO AROMATASE INHIBITOR: ICD-10-CM

## 2023-06-23 DIAGNOSIS — Z17.0 MALIGNANT NEOPLASM OF OVERLAPPING SITES OF LEFT BREAST IN FEMALE, ESTROGEN RECEPTOR POSITIVE (H): Primary | ICD-10-CM

## 2023-06-23 DIAGNOSIS — C50.812 MALIGNANT NEOPLASM OF OVERLAPPING SITES OF LEFT BREAST IN FEMALE, ESTROGEN RECEPTOR POSITIVE (H): Primary | ICD-10-CM

## 2023-06-23 DIAGNOSIS — C77.3 CARCINOMA OF LEFT BREAST METASTATIC TO AXILLARY LYMPH NODE (H): ICD-10-CM

## 2023-06-23 DIAGNOSIS — C50.912 CARCINOMA OF LEFT BREAST METASTATIC TO AXILLARY LYMPH NODE (H): ICD-10-CM

## 2023-06-23 DIAGNOSIS — M81.8 OSTEOPOROSIS DUE TO AROMATASE INHIBITOR: ICD-10-CM

## 2023-06-23 PROCEDURE — 99024 POSTOP FOLLOW-UP VISIT: CPT | Mod: VID | Performed by: NURSE PRACTITIONER

## 2023-06-23 PROCEDURE — 99207 PR NO CHARGE LOS: CPT

## 2023-06-23 PROCEDURE — 96372 THER/PROPH/DIAG INJ SC/IM: CPT | Performed by: INTERNAL MEDICINE

## 2023-06-23 ASSESSMENT — PAIN SCALES - GENERAL: PAINLEVEL: NO PAIN (0)

## 2023-06-23 NOTE — PROGRESS NOTES
Infusion Nursing Note:  Betty NIA PulidoKen presents today for Prolia.    Patient seen by provider today: No   present during visit today: Not Applicable.    Note: Assessment performed by Phoebe BENDER RN prior to injection today. Patient denies symptoms/concerns following previous injection.    Intravenous Access:  No Intravenous access/labs at this visit.    Treatment Conditions:  Lab Results   Component Value Date     06/21/2023    POTASSIUM 4.1 06/21/2023    CR 1.07 (H) 06/21/2023    ABEBA 9.6 06/21/2023    BILITOTAL 0.4 06/21/2023    ALBUMIN 4.2 06/21/2023    ALT 16 06/21/2023    AST 22 06/21/2023     Results reviewed, labs MET treatment parameters, ok to proceed with treatment.      Post Infusion Assessment:  Patient tolerated injection without incident.  Site patent and intact, free from redness, edema or discomfort.       Discharge Plan:   Patient discharged in stable condition accompanied by: self.  Departure Mode: Ambulatory.      Sarina Hughes LPN

## 2023-06-26 ENCOUNTER — ALLIED HEALTH/NURSE VISIT (OUTPATIENT)
Dept: FAMILY MEDICINE | Facility: CLINIC | Age: 48
End: 2023-06-26
Payer: COMMERCIAL

## 2023-06-26 DIAGNOSIS — Z23 ENCOUNTER FOR IMMUNIZATION: Primary | ICD-10-CM

## 2023-06-26 PROCEDURE — 90471 IMMUNIZATION ADMIN: CPT

## 2023-06-26 PROCEDURE — 90746 HEPB VACCINE 3 DOSE ADULT IM: CPT

## 2023-06-26 PROCEDURE — 99207 PR NO CHARGE NURSE ONLY: CPT

## 2023-06-26 NOTE — PROGRESS NOTES
Prior to immunization administration, verified patients identity using patient s name and date of birth. Please see Immunization Activity for additional information.     Screening Questionnaire for Adult Immunization    Are you sick today?   No   Do you have allergies to medications, food, a vaccine component or latex?   No   Have you ever had a serious reaction after receiving a vaccination?   No   Do you have a long-term health problem with heart, lung, kidney, or metabolic disease (e.g., diabetes), asthma, a blood disorder, no spleen, complement component deficiency, a cochlear implant, or a spinal fluid leak?  Are you on long-term aspirin therapy?   No   Do you have cancer, leukemia, HIV/AIDS, or any other immune system problem?   No   Do you have a parent, brother, or sister with an immune system problem?   No   In the past 3 months, have you taken medications that affect  your immune system, such as prednisone, other steroids, or anticancer drugs; drugs for the treatment of rheumatoid arthritis, Crohn s disease, or psoriasis; or have you had radiation treatments?   No   Have you had a seizure, or a brain or other nervous system problem?   No   During the past year, have you received a transfusion of blood or blood    products, or been given immune (gamma) globulin or antiviral drug?   No   For women: Are you pregnant or is there a chance you could become       pregnant during the next month?   No   Have you received any vaccinations in the past 4 weeks?   No     Immunization questionnaire answers were all negative.    I have reviewed the following standing orders:   This patient is due and qualifies for the Hepatitis B vaccine.    Click here for Hepatitis B Standing Order    I have reviewed the vaccines inclusion and exclusion criteria; No concerns regarding eligibility.         Patient instructed to remain in clinic for 15 minutes/patient declined to wait afterwards, and to report any adverse reactions.      Screening performed by Marla Benitez MA on 6/26/2023 at 11:38 AM.

## 2023-07-14 PROBLEM — D05.12 DUCTAL CARCINOMA IN SITU (DCIS) OF LEFT BREAST: Status: ACTIVE | Noted: 2022-07-20

## 2023-07-15 ENCOUNTER — HEALTH MAINTENANCE LETTER (OUTPATIENT)
Age: 48
End: 2023-07-15

## 2023-07-17 ENCOUNTER — MYC MEDICAL ADVICE (OUTPATIENT)
Dept: FAMILY MEDICINE | Facility: CLINIC | Age: 48
End: 2023-07-17
Payer: COMMERCIAL

## 2023-07-26 DIAGNOSIS — C77.3 CARCINOMA OF LEFT BREAST METASTATIC TO AXILLARY LYMPH NODE (H): Primary | ICD-10-CM

## 2023-07-26 DIAGNOSIS — C50.912 CARCINOMA OF LEFT BREAST METASTATIC TO AXILLARY LYMPH NODE (H): Primary | ICD-10-CM

## 2023-08-09 ENCOUNTER — OFFICE VISIT (OUTPATIENT)
Dept: FAMILY MEDICINE | Facility: CLINIC | Age: 48
End: 2023-08-09
Payer: COMMERCIAL

## 2023-08-09 VITALS
WEIGHT: 133.9 LBS | TEMPERATURE: 98.4 F | HEIGHT: 63 IN | HEART RATE: 73 BPM | DIASTOLIC BLOOD PRESSURE: 65 MMHG | BODY MASS INDEX: 23.73 KG/M2 | RESPIRATION RATE: 15 BRPM | OXYGEN SATURATION: 98 % | SYSTOLIC BLOOD PRESSURE: 100 MMHG

## 2023-08-09 DIAGNOSIS — D72.819 LEUKOPENIA, UNSPECIFIED TYPE: ICD-10-CM

## 2023-08-09 DIAGNOSIS — C77.3 CARCINOMA OF LEFT BREAST METASTATIC TO AXILLARY LYMPH NODE (H): ICD-10-CM

## 2023-08-09 DIAGNOSIS — C50.912 CARCINOMA OF LEFT BREAST METASTATIC TO AXILLARY LYMPH NODE (H): ICD-10-CM

## 2023-08-09 DIAGNOSIS — Z01.818 PREOP GENERAL PHYSICAL EXAM: Primary | ICD-10-CM

## 2023-08-09 LAB
ANION GAP SERPL CALCULATED.3IONS-SCNC: 9 MMOL/L (ref 7–15)
BASOPHILS # BLD AUTO: 0.1 10E3/UL (ref 0–0.2)
BASOPHILS NFR BLD AUTO: 2 %
BUN SERPL-MCNC: 12.3 MG/DL (ref 6–20)
CALCIUM SERPL-MCNC: 9.6 MG/DL (ref 8.6–10)
CHLORIDE SERPL-SCNC: 107 MMOL/L (ref 98–107)
CREAT SERPL-MCNC: 0.95 MG/DL (ref 0.51–0.95)
DEPRECATED HCO3 PLAS-SCNC: 25 MMOL/L (ref 22–29)
EOSINOPHIL # BLD AUTO: 0.4 10E3/UL (ref 0–0.7)
EOSINOPHIL NFR BLD AUTO: 12 %
ERYTHROCYTE [DISTWIDTH] IN BLOOD BY AUTOMATED COUNT: 13.7 % (ref 10–15)
GFR SERPL CREATININE-BSD FRML MDRD: 73 ML/MIN/1.73M2
GLUCOSE SERPL-MCNC: 90 MG/DL (ref 70–99)
HCT VFR BLD AUTO: 36 % (ref 35–47)
HGB BLD-MCNC: 12 G/DL (ref 11.7–15.7)
IMM GRANULOCYTES # BLD: 0 10E3/UL
IMM GRANULOCYTES NFR BLD: 0 %
LYMPHOCYTES # BLD AUTO: 1 10E3/UL (ref 0.8–5.3)
LYMPHOCYTES NFR BLD AUTO: 32 %
MCH RBC QN AUTO: 31 PG (ref 26.5–33)
MCHC RBC AUTO-ENTMCNC: 33.3 G/DL (ref 31.5–36.5)
MCV RBC AUTO: 93 FL (ref 78–100)
MONOCYTES # BLD AUTO: 0.3 10E3/UL (ref 0–1.3)
MONOCYTES NFR BLD AUTO: 10 %
NEUTROPHILS # BLD AUTO: 1.4 10E3/UL (ref 1.6–8.3)
NEUTROPHILS NFR BLD AUTO: 44 %
PLATELET # BLD AUTO: 188 10E3/UL (ref 150–450)
POTASSIUM SERPL-SCNC: 5.3 MMOL/L (ref 3.4–5.3)
RBC # BLD AUTO: 3.87 10E6/UL (ref 3.8–5.2)
SODIUM SERPL-SCNC: 141 MMOL/L (ref 136–145)
WBC # BLD AUTO: 3.1 10E3/UL (ref 4–11)

## 2023-08-09 PROCEDURE — 36415 COLL VENOUS BLD VENIPUNCTURE: CPT | Performed by: INTERNAL MEDICINE

## 2023-08-09 PROCEDURE — 99214 OFFICE O/P EST MOD 30 MIN: CPT | Performed by: INTERNAL MEDICINE

## 2023-08-09 PROCEDURE — 85025 COMPLETE CBC W/AUTO DIFF WBC: CPT | Performed by: INTERNAL MEDICINE

## 2023-08-09 PROCEDURE — 80048 BASIC METABOLIC PNL TOTAL CA: CPT | Performed by: INTERNAL MEDICINE

## 2023-08-09 ASSESSMENT — PAIN SCALES - GENERAL: PAINLEVEL: NO PAIN (0)

## 2023-08-09 NOTE — H&P (VIEW-ONLY)
[address] [address]  Phone: [phone]  Primary Provider: Diana Hernandez  Pre-op Performing Provider: DIANA HERNANDEZ      PREOPERATIVE EVALUATION:  Today's date: 8/9/2023    Betty Rogers is a 48 year old female who presents for a preoperative evaluation.      Surgical Information:  Surgery/Procedure: LEFT LATISSIMUS DORSI FLAP BREAST RECONSTRUCTION   Surgery Location: [facility]  Surgeon: Rc Zamorano  Surgery Date: 08/14/23  Time of Surgery: 1:00pm  Where patient plans to recover: At home with family  Fax number for surgical facility: Note does not need to be faxed, will be available electronically in Epic.    Assessment & Plan     The proposed surgical procedure is considered INTERMEDIATE risk.    Betty was seen today for pre-op exam.    Diagnoses and all orders for this visit:    Preop general physical exam  -     Basic metabolic panel  (Ca, Cl, CO2, Creat, Gluc, K, Na, BUN); Future  -     CBC with platelets and differential; Future  -     Basic metabolic panel  (Ca, Cl, CO2, Creat, Gluc, K, Na, BUN)  -     CBC with platelets and differential    Carcinoma of left breast metastatic to axillary lymph node (H)  -     Basic metabolic panel  (Ca, Cl, CO2, Creat, Gluc, K, Na, BUN); Future  -     CBC with platelets and differential; Future  -     Basic metabolic panel  (Ca, Cl, CO2, Creat, Gluc, K, Na, BUN)  -     CBC with platelets and differential    Leukopenia, unspecified type  -     Basic metabolic panel  (Ca, Cl, CO2, Creat, Gluc, K, Na, BUN); Future  -     CBC with platelets and differential; Future  -     CBC with platelets and differential                 - No identified additional risk factors other than previously addressed    Antiplatelet or Anticoagulation Medication Instructions:   - Patient is on no antiplatelet or anticoagulation medications.    Additional Medication Instructions:  Patient is to take all scheduled  medications on the day of surgery EXCEPT for modifications listed below:    RECOMMENDATION:  APPROVAL GIVEN to proceed with proposed procedure, without further diagnostic evaluation.      Subjective       HPI related to upcoming procedure: patient history of breast cancer, scheduled for left breast reconstruction.     Had leukopenia from Verzenio. Pt stopped taking it 3 weeks ago in preparation for surgery.         8/2/2023     9:03 AM   Preop Questions   1. Have you ever had a heart attack or stroke? No   2. Have you ever had surgery on your heart or blood vessels, such as a stent placement, a coronary artery bypass, or surgery on an artery in your head, neck, heart, or legs? No   3. Do you have chest pain with activity? No   4. Do you have a history of  heart failure? No   5. Do you currently have a cold, bronchitis or symptoms of other infection? No   6. Do you have a cough, shortness of breath, or wheezing? No   7. Do you or anyone in your family have previous history of blood clots? No   8. Do you or does anyone in your family have a serious bleeding problem such as prolonged bleeding following surgeries or cuts? No   9. Have you ever had problems with anemia or been told to take iron pills? No   10. Have you had any abnormal blood loss such as black, tarry or bloody stools, or abnormal vaginal bleeding? No   11. Have you ever had a blood transfusion? No   12. Are you willing to have a blood transfusion if it is medically needed before, during, or after your surgery? Yes   13. Have you or any of your relatives ever had problems with anesthesia? No   14. Do you have sleep apnea, excessive snoring or daytime drowsiness? No   15. Do you have any artifical heart valves or other implanted medical devices like a pacemaker, defibrillator, or continuous glucose monitor? No   16. Do you have artificial joints? No   17. Are you allergic to latex? No   18. Is there any chance that you may be pregnant? No       Health Care  Directive:  Patient does not have a Health Care Directive or Living Will: Discussed advance care planning with patient; however, patient declined at this time.    Preoperative Review of :   reviewed - no record of controlled substances prescribed.        Review of Systems  ROS:  Constitutional, HEENT, cardiovascular, pulmonary, gi and gu systems are negative, except as otherwise noted.     Patient Active Problem List    Diagnosis Date Noted    Use of aromatase inhibitors 06/20/2023     Priority: Medium    Osteoporosis due to aromatase inhibitor 05/26/2023     Priority: Medium    Adverse effect of antineoplastic and immunosuppressive drugs, sequela 10/07/2022     Priority: Medium    Anemia associated with chemotherapy 09/12/2022     Priority: Medium    Chemotherapy-induced neutropenia (H) 09/12/2022     Priority: Medium    Ductal carcinoma in situ (DCIS) of left breast 07/20/2022     Priority: Medium    Carcinoma of left breast metastatic to axillary lymph node (H) 07/15/2022     Priority: Medium    CARDIOVASCULAR SCREENING; LDL GOAL LESS THAN 160 01/03/2012     Priority: Medium    Family history of breast cancer- mother 01/03/2012     Priority: Medium      Past Medical History:   Diagnosis Date    Breast cancer metastasized to axillary lymph node (H) 7/15/2022    No active medical problems      Past Surgical History:   Procedure Laterality Date    C/SECTION, LOW TRANSVERSE      2010, 2007    DISSECT LYMPH NODE AXILLA Left 12/21/2022    Procedure: left axillary node dissection;  Surgeon: Janie South MD;  Location: [facility] OR    HYSTERECTOMY, PAP NO LONGER INDICATED N/A     INSERT PORT VASCULAR ACCESS Right 07/25/2022    Procedure: PORT PLACEMENT;  Surgeon: Janie South MD;  Location: [facility] OR    MASTECTOMY SIMPLE, SENTINEL NODE, COMBINED Bilateral 12/21/2022    Procedure: bilateral skin sparing mastectomies with left sentinel lymph node biopsy, tag localized left axillary node excision;  Surgeon: Brannon  "MD Janie;  Location: [facility] OR    RECONSTRUCT BREAST, INSERT TISSUE EXPANDER BILATERAL, COMBINED Bilateral 12/21/2022    Procedure: BILATERAL TISSUE EXPANDER BREAST RECONSTRUCTION;  Surgeon: Rc Zamorano MD;  Location: [facility] OR    REMOVE PORT VASCULAR ACCESS N/A 12/21/2022    Procedure: possible port removal;  Surgeon: Janie South MD;  Location: [facility] OR    REMOVE TISSUE EXPANDER BREAST Left 02/18/2023    Procedure: REMOVAL OF LEFT BREAST RECONSTRUCTION TISSUE EXPANDERS;  Surgeon: Rc Zamorano MD;  Location:  OR     Current Outpatient Medications   Medication Sig Dispense Refill    [START ON 8/15/2023] abemaciclib (VERZENIO) 100 MG tablet Take 1 tablet (100 mg) by mouth 2 times daily 60 tablet 0    calcium citrate (CITRACAL) 950 (200 Ca) MG tablet Take 1 tablet by mouth 2 times daily      letrozole (FEMARA) 2.5 MG tablet Take 1 tablet (2.5 mg) by mouth daily 90 tablet 3    multivitamin w/minerals (THERA-VIT-M) tablet Take 1 tablet by mouth daily         Allergies   Allergen Reactions    Nuts     Other [Seasonal Allergies]      Tree Nuts-Lips swell and breathing problems        Social History     Tobacco Use    Smoking status: Never    Smokeless tobacco: Never   Substance Use Topics    Alcohol use: Yes     Comment: socially, approx 6-7 drinks per week     Family History   Problem Relation Age of Onset    Breast Cancer Mother         atypical lobular hyperplasia breast cancer    Unknown/Adopted Brother         Sucide    Heart Disease Maternal Grandfather         Heart Issues    Breast Cancer Maternal Aunt 50    Breast Cancer Maternal Cousin     Mental Illness Brother      History   Drug Use No         Objective     /65 (BP Location: Right arm, Patient Position: Sitting, Cuff Size: Adult Regular)   Pulse 73   Temp 98.4  F (36.9  C) (Oral)   Resp 15   Ht 1.6 m (5' 2.99\")   Wt 60.7 kg (133 lb 14.4 oz)   LMP 09/10/2022   SpO2 98%   BMI 23.73 kg/m      Physical Exam  GENERAL " APPEARANCE: healthy, alert and no distress  HENT: ear canals and TM's normal and nose and mouth without ulcers or lesions  RESP: lungs clear to auscultation - no rales, rhonchi or wheezes  CV: regular rate and rhythm, normal S1 S2, no S3 or S4 and no murmur, click or rub   ABDOMEN: soft, nontender, no HSM or masses and bowel sounds normal  MS: extremities normal- no gross deformities noted  NEURO: Normal strength and tone, sensory exam grossly normal, mentation intact and speech normal    Diagnostics:  Results for orders placed or performed in visit on 08/09/23   Basic metabolic panel  (Ca, Cl, CO2, Creat, Gluc, K, Na, BUN)     Status: Normal   Result Value Ref Range    Sodium 141 136 - 145 mmol/L    Potassium 5.3 3.4 - 5.3 mmol/L    Chloride 107 98 - 107 mmol/L    Carbon Dioxide (CO2) 25 22 - 29 mmol/L    Anion Gap 9 7 - 15 mmol/L    Urea Nitrogen 12.3 6.0 - 20.0 mg/dL    Creatinine 0.95 0.51 - 0.95 mg/dL    Calcium 9.6 8.6 - 10.0 mg/dL    Glucose 90 70 - 99 mg/dL    GFR Estimate 73 >60 mL/min/1.73m2   CBC with platelets and differential     Status: Abnormal   Result Value Ref Range    WBC Count 3.1 (L) 4.0 - 11.0 10e3/uL    RBC Count 3.87 3.80 - 5.20 10e6/uL    Hemoglobin 12.0 11.7 - 15.7 g/dL    Hematocrit 36.0 35.0 - 47.0 %    MCV 93 78 - 100 fL    MCH 31.0 26.5 - 33.0 pg    MCHC 33.3 31.5 - 36.5 g/dL    RDW 13.7 10.0 - 15.0 %    Platelet Count 188 150 - 450 10e3/uL    % Neutrophils 44 %    % Lymphocytes 32 %    % Monocytes 10 %    % Eosinophils 12 %    % Basophils 2 %    % Immature Granulocytes 0 %    Absolute Neutrophils 1.4 (L) 1.6 - 8.3 10e3/uL    Absolute Lymphocytes 1.0 0.8 - 5.3 10e3/uL    Absolute Monocytes 0.3 0.0 - 1.3 10e3/uL    Absolute Eosinophils 0.4 0.0 - 0.7 10e3/uL    Absolute Basophils 0.1 0.0 - 0.2 10e3/uL    Absolute Immature Granulocytes 0.0 <=0.4 10e3/uL   CBC with platelets and differential     Status: Abnormal    Narrative    The following orders were created for panel order CBC with  platelets and differential.  Procedure                               Abnormality         Status                     ---------                               -----------         ------                     CBC with platelets and d...[[id number]]  Abnormal            Final result                 Please view results for these tests on the individual orders.       No EKG required for low risk surgery (cataract, skin procedure, breast biopsy, etc).    Revised Cardiac Risk Index (RCRI):  The patient has the following serious cardiovascular risks for perioperative complications:   - No serious cardiac risks = 0 points     RCRI Interpretation: 0 points: Class I (very low risk - 0.4% complication rate)         Signed Electronically by: Diana Luevano MD PhD  Copy of this evaluation report is provided to requesting physician.

## 2023-08-09 NOTE — PROGRESS NOTES
[address] [address]  Phone: [phone]  Primary Provider: Diana Hernandez  Pre-op Performing Provider: DIANA HERNANDEZ      PREOPERATIVE EVALUATION:  Today's date: 8/9/2023    Betty Rogers is a 48 year old female who presents for a preoperative evaluation.      Surgical Information:  Surgery/Procedure: LEFT LATISSIMUS DORSI FLAP BREAST RECONSTRUCTION   Surgery Location: [facility]  Surgeon: Rc Zamorano  Surgery Date: 08/14/23  Time of Surgery: 1:00pm  Where patient plans to recover: At home with family  Fax number for surgical facility: Note does not need to be faxed, will be available electronically in Epic.    Assessment & Plan     The proposed surgical procedure is considered INTERMEDIATE risk.    Betty was seen today for pre-op exam.    Diagnoses and all orders for this visit:    Preop general physical exam  -     Basic metabolic panel  (Ca, Cl, CO2, Creat, Gluc, K, Na, BUN); Future  -     CBC with platelets and differential; Future  -     Basic metabolic panel  (Ca, Cl, CO2, Creat, Gluc, K, Na, BUN)  -     CBC with platelets and differential    Carcinoma of left breast metastatic to axillary lymph node (H)  -     Basic metabolic panel  (Ca, Cl, CO2, Creat, Gluc, K, Na, BUN); Future  -     CBC with platelets and differential; Future  -     Basic metabolic panel  (Ca, Cl, CO2, Creat, Gluc, K, Na, BUN)  -     CBC with platelets and differential    Leukopenia, unspecified type  -     Basic metabolic panel  (Ca, Cl, CO2, Creat, Gluc, K, Na, BUN); Future  -     CBC with platelets and differential; Future  -     CBC with platelets and differential                 - No identified additional risk factors other than previously addressed    Antiplatelet or Anticoagulation Medication Instructions:   - Patient is on no antiplatelet or anticoagulation medications.    Additional Medication Instructions:  Patient is to take all scheduled  medications on the day of surgery EXCEPT for modifications listed below:    RECOMMENDATION:  APPROVAL GIVEN to proceed with proposed procedure, without further diagnostic evaluation.      Subjective       HPI related to upcoming procedure: patient history of breast cancer, scheduled for left breast reconstruction.     Had leukopenia from Verzenio. Pt stopped taking it 3 weeks ago in preparation for surgery.         8/2/2023     9:03 AM   Preop Questions   1. Have you ever had a heart attack or stroke? No   2. Have you ever had surgery on your heart or blood vessels, such as a stent placement, a coronary artery bypass, or surgery on an artery in your head, neck, heart, or legs? No   3. Do you have chest pain with activity? No   4. Do you have a history of  heart failure? No   5. Do you currently have a cold, bronchitis or symptoms of other infection? No   6. Do you have a cough, shortness of breath, or wheezing? No   7. Do you or anyone in your family have previous history of blood clots? No   8. Do you or does anyone in your family have a serious bleeding problem such as prolonged bleeding following surgeries or cuts? No   9. Have you ever had problems with anemia or been told to take iron pills? No   10. Have you had any abnormal blood loss such as black, tarry or bloody stools, or abnormal vaginal bleeding? No   11. Have you ever had a blood transfusion? No   12. Are you willing to have a blood transfusion if it is medically needed before, during, or after your surgery? Yes   13. Have you or any of your relatives ever had problems with anesthesia? No   14. Do you have sleep apnea, excessive snoring or daytime drowsiness? No   15. Do you have any artifical heart valves or other implanted medical devices like a pacemaker, defibrillator, or continuous glucose monitor? No   16. Do you have artificial joints? No   17. Are you allergic to latex? No   18. Is there any chance that you may be pregnant? No       Health Care  Directive:  Patient does not have a Health Care Directive or Living Will: Discussed advance care planning with patient; however, patient declined at this time.    Preoperative Review of :   reviewed - no record of controlled substances prescribed.        Review of Systems  ROS:  Constitutional, HEENT, cardiovascular, pulmonary, gi and gu systems are negative, except as otherwise noted.     Patient Active Problem List    Diagnosis Date Noted    Use of aromatase inhibitors 06/20/2023     Priority: Medium    Osteoporosis due to aromatase inhibitor 05/26/2023     Priority: Medium    Adverse effect of antineoplastic and immunosuppressive drugs, sequela 10/07/2022     Priority: Medium    Anemia associated with chemotherapy 09/12/2022     Priority: Medium    Chemotherapy-induced neutropenia (H) 09/12/2022     Priority: Medium    Ductal carcinoma in situ (DCIS) of left breast 07/20/2022     Priority: Medium    Carcinoma of left breast metastatic to axillary lymph node (H) 07/15/2022     Priority: Medium    CARDIOVASCULAR SCREENING; LDL GOAL LESS THAN 160 01/03/2012     Priority: Medium    Family history of breast cancer- mother 01/03/2012     Priority: Medium      Past Medical History:   Diagnosis Date    Breast cancer metastasized to axillary lymph node (H) 7/15/2022    No active medical problems      Past Surgical History:   Procedure Laterality Date    C/SECTION, LOW TRANSVERSE      2010, 2007    DISSECT LYMPH NODE AXILLA Left 12/21/2022    Procedure: left axillary node dissection;  Surgeon: Janie South MD;  Location: [facility] OR    HYSTERECTOMY, PAP NO LONGER INDICATED N/A     INSERT PORT VASCULAR ACCESS Right 07/25/2022    Procedure: PORT PLACEMENT;  Surgeon: Janie South MD;  Location: [facility] OR    MASTECTOMY SIMPLE, SENTINEL NODE, COMBINED Bilateral 12/21/2022    Procedure: bilateral skin sparing mastectomies with left sentinel lymph node biopsy, tag localized left axillary node excision;  Surgeon: Brannon  "MD Janie;  Location: [facility] OR    RECONSTRUCT BREAST, INSERT TISSUE EXPANDER BILATERAL, COMBINED Bilateral 12/21/2022    Procedure: BILATERAL TISSUE EXPANDER BREAST RECONSTRUCTION;  Surgeon: Rc Zamorano MD;  Location: [facility] OR    REMOVE PORT VASCULAR ACCESS N/A 12/21/2022    Procedure: possible port removal;  Surgeon: Janie South MD;  Location: [facility] OR    REMOVE TISSUE EXPANDER BREAST Left 02/18/2023    Procedure: REMOVAL OF LEFT BREAST RECONSTRUCTION TISSUE EXPANDERS;  Surgeon: Rc Zamorano MD;  Location:  OR     Current Outpatient Medications   Medication Sig Dispense Refill    [START ON 8/15/2023] abemaciclib (VERZENIO) 100 MG tablet Take 1 tablet (100 mg) by mouth 2 times daily 60 tablet 0    calcium citrate (CITRACAL) 950 (200 Ca) MG tablet Take 1 tablet by mouth 2 times daily      letrozole (FEMARA) 2.5 MG tablet Take 1 tablet (2.5 mg) by mouth daily 90 tablet 3    multivitamin w/minerals (THERA-VIT-M) tablet Take 1 tablet by mouth daily         Allergies   Allergen Reactions    Nuts     Other [Seasonal Allergies]      Tree Nuts-Lips swell and breathing problems        Social History     Tobacco Use    Smoking status: Never    Smokeless tobacco: Never   Substance Use Topics    Alcohol use: Yes     Comment: socially, approx 6-7 drinks per week     Family History   Problem Relation Age of Onset    Breast Cancer Mother         atypical lobular hyperplasia breast cancer    Unknown/Adopted Brother         Sucide    Heart Disease Maternal Grandfather         Heart Issues    Breast Cancer Maternal Aunt 50    Breast Cancer Maternal Cousin     Mental Illness Brother      History   Drug Use No         Objective     /65 (BP Location: Right arm, Patient Position: Sitting, Cuff Size: Adult Regular)   Pulse 73   Temp 98.4  F (36.9  C) (Oral)   Resp 15   Ht 1.6 m (5' 2.99\")   Wt 60.7 kg (133 lb 14.4 oz)   LMP 09/10/2022   SpO2 98%   BMI 23.73 kg/m      Physical Exam  GENERAL " APPEARANCE: healthy, alert and no distress  HENT: ear canals and TM's normal and nose and mouth without ulcers or lesions  RESP: lungs clear to auscultation - no rales, rhonchi or wheezes  CV: regular rate and rhythm, normal S1 S2, no S3 or S4 and no murmur, click or rub   ABDOMEN: soft, nontender, no HSM or masses and bowel sounds normal  MS: extremities normal- no gross deformities noted  NEURO: Normal strength and tone, sensory exam grossly normal, mentation intact and speech normal    Diagnostics:  Results for orders placed or performed in visit on 08/09/23   Basic metabolic panel  (Ca, Cl, CO2, Creat, Gluc, K, Na, BUN)     Status: Normal   Result Value Ref Range    Sodium 141 136 - 145 mmol/L    Potassium 5.3 3.4 - 5.3 mmol/L    Chloride 107 98 - 107 mmol/L    Carbon Dioxide (CO2) 25 22 - 29 mmol/L    Anion Gap 9 7 - 15 mmol/L    Urea Nitrogen 12.3 6.0 - 20.0 mg/dL    Creatinine 0.95 0.51 - 0.95 mg/dL    Calcium 9.6 8.6 - 10.0 mg/dL    Glucose 90 70 - 99 mg/dL    GFR Estimate 73 >60 mL/min/1.73m2   CBC with platelets and differential     Status: Abnormal   Result Value Ref Range    WBC Count 3.1 (L) 4.0 - 11.0 10e3/uL    RBC Count 3.87 3.80 - 5.20 10e6/uL    Hemoglobin 12.0 11.7 - 15.7 g/dL    Hematocrit 36.0 35.0 - 47.0 %    MCV 93 78 - 100 fL    MCH 31.0 26.5 - 33.0 pg    MCHC 33.3 31.5 - 36.5 g/dL    RDW 13.7 10.0 - 15.0 %    Platelet Count 188 150 - 450 10e3/uL    % Neutrophils 44 %    % Lymphocytes 32 %    % Monocytes 10 %    % Eosinophils 12 %    % Basophils 2 %    % Immature Granulocytes 0 %    Absolute Neutrophils 1.4 (L) 1.6 - 8.3 10e3/uL    Absolute Lymphocytes 1.0 0.8 - 5.3 10e3/uL    Absolute Monocytes 0.3 0.0 - 1.3 10e3/uL    Absolute Eosinophils 0.4 0.0 - 0.7 10e3/uL    Absolute Basophils 0.1 0.0 - 0.2 10e3/uL    Absolute Immature Granulocytes 0.0 <=0.4 10e3/uL   CBC with platelets and differential     Status: Abnormal    Narrative    The following orders were created for panel order CBC with  platelets and differential.  Procedure                               Abnormality         Status                     ---------                               -----------         ------                     CBC with platelets and d...[[id number]]  Abnormal            Final result                 Please view results for these tests on the individual orders.       No EKG required for low risk surgery (cataract, skin procedure, breast biopsy, etc).    Revised Cardiac Risk Index (RCRI):  The patient has the following serious cardiovascular risks for perioperative complications:   - No serious cardiac risks = 0 points     RCRI Interpretation: 0 points: Class I (very low risk - 0.4% complication rate)         Signed Electronically by: Diana Luevano MD PhD  Copy of this evaluation report is provided to requesting physician.

## 2023-08-09 NOTE — PATIENT INSTRUCTIONS
For informational purposes only. Not to replace the advice of your health care provider. Copyright   2003,  [facility] [employer] [facility]. All rights reserved. Clinically reviewed by Gloria Hammond MD. [place] [id number] - REV .  Preparing for Your Surgery  Getting started  A nurse will call you to review your health history and instructions. They will give you an arrival time based on your scheduled surgery time. Please be ready to share:  Your doctor's clinic name and phone number  Your medical, surgical, and anesthesia history  A list of allergies and sensitivities  A list of medicines, including herbal treatments and over-the-counter drugs  Whether the patient has a legal guardian (ask how to send us the papers in advance)  Please tell us if you're pregnant--or if there's any chance you might be pregnant. Some surgeries may injure a fetus (unborn baby), so they require a pregnancy test. Surgeries that are safe for a fetus don't always need a test, and you can choose whether to have one.   If you have a child who's having surgery, please ask for a copy of Preparing for Your Child's Surgery.    Preparing for surgery  Within 10 to 30 days of surgery: Have a pre-op exam (sometimes called an H&P, or History and Physical). This can be done at a clinic or pre-operative center.  If you're having a , you may not need this exam. Talk to your care team.  At your pre-op exam, talk to your care team about all medicines you take. If you need to stop any medicines before surgery, ask when to start taking them again.  We do this for your safety. Many medicines can make you bleed too much during surgery. Some change how well surgery (anesthesia) drugs work.  Call your insurance company to let them know you're having surgery. (If you don't have insurance, call [phone].)  Call your clinic if there's any change in your health. This includes signs of a cold or flu (sore throat, runny nose, cough, rash, fever). It also  includes a scrape or scratch near the surgery site.  If you have questions on the day of surgery, call your hospital or surgery center.  Eating and drinking guidelines  For your safety: Unless your surgeon tells you otherwise, follow the guidelines below.  Eat and drink as usual until 8 hours before you arrive for surgery. After that, no food or milk.  Drink clear liquids until 2 hours before you arrive. These are liquids you can see through, like water, Gatorade, and Propel Water. They also include plain black coffee and tea (no cream or milk), candy, and breath mints. You can spit out gum when you arrive.  If you drink alcohol: Stop drinking it the night before surgery.  If your care team tells you to take medicine on the morning of surgery, it's okay to take it with a sip of water.  Preventing infection  Shower or bathe the night before and morning of your surgery. Follow the instructions your clinic gave you. (If no instructions, use regular soap.)  Don't shave or clip hair near your surgery site. We'll remove the hair if needed.  Don't smoke or vape the morning of surgery. You may chew nicotine gum up to 2 hours before surgery. A nicotine patch is okay.  Note: Some surgeries require you to completely quit smoking and nicotine. Check with your surgeon.  Your care team will make every effort to keep you safe from infection. We will:  Clean our hands often with soap and water (or an alcohol-based hand rub).  Clean the skin at your surgery site with a special soap that kills germs.  Give you a special gown to keep you warm. (Cold raises the risk of infection.)  Wear special hair covers, masks, gowns and gloves during surgery.  Give antibiotic medicine, if prescribed. Not all surgeries need antibiotics.  What to bring on the day of surgery  Photo ID and insurance card  Copy of your health care directive, if you have one  Glasses and hearing aids (bring cases)  You can't wear contacts during surgery  Inhaler and eye  drops, if you use them (tell us about these when you arrive)  CPAP machine or breathing device, if you use them  A few personal items, if spending the night  If you have . . .  A pacemaker, ICD (cardiac defibrillator) or other implant: Bring the ID card.  An implanted stimulator: Bring the remote control.  A legal guardian: Bring a copy of the certified (court-stamped) guardianship papers.  Please remove any jewelry, including body piercings. Leave jewelry and other valuables at home.  If you're going home the day of surgery  You must have a responsible adult drive you home. They should stay with you overnight as well.  If you don't have someone to stay with you, and you aren't safe to go home alone, we may keep you overnight. Insurance often won't pay for this.  After surgery  If it's hard to control your pain or you need more pain medicine, please call your surgeon's office.  Questions?   If you have any questions for your care team, list them here: _________________________________________________________________________________________________________________________________________________________________________ ____________________________________ ____________________________________ ____________________________________    How to Take Your Medication Before Surgery  - Take all of your medications before surgery except as noted below  - Verzenio stopped last Thursday per oncology recommendation and restart per surgeon

## 2023-08-11 NOTE — PROGRESS NOTES
PTA medications updated by Medication Scribe prior to surgery via phone call with patient (last doses completed by Nurse)     Medication history sources: Patient, Surescripts, and H&P  In the past week, patient estimated taking medication this percent of the time: Greater than 90%      Significant changes made to the medication list:  Patient reports no longer taking the following meds (med scribe removed from PTA med list): Tamoxifen      Additional medication history information:   None    Medication reconciliation completed by provider prior to medication history? No    Time spent in this activity: 20 minutes    The information provided in this note is only as accurate as the sources available at the time of update(s)    Prior to Admission medications    Medication Sig Last Dose Taking? Auth Provider Long Term End Date   abemaciclib (VERZENIO) 100 MG tablet Take 1 tablet (100 mg) by mouth 2 times daily 8/3/2023 at am Yes Hoang Franco MD     calcium citrate (CITRACAL) 950 (200 Ca) MG tablet Take 1 tablet by mouth 2 times daily 8/11/2023 at pm Yes Reported, Patient     letrozole (FEMARA) 2.5 MG tablet Take 1 tablet (2.5 mg) by mouth daily  at am Yes Hoang Franco MD Yes    multivitamin w/minerals (THERA-VIT-M) tablet Take 1 tablet by mouth daily 8/11/2023 at am Yes Reported, Patient         Medication history completed by:    Chele Valentin CPhT  Medication Scribe  [facility]

## 2023-08-14 ENCOUNTER — HOSPITAL ENCOUNTER (INPATIENT)
Facility: CLINIC | Age: 48
LOS: 1 days | Discharge: HOME | End: 2023-08-15
Attending: PLASTIC SURGERY | Admitting: PLASTIC SURGERY
Payer: COMMERCIAL

## 2023-08-14 ENCOUNTER — ANESTHESIA (OUTPATIENT)
Dept: SURGERY | Facility: CLINIC | Age: 48
End: 2023-08-14
Payer: COMMERCIAL

## 2023-08-14 ENCOUNTER — ANESTHESIA EVENT (OUTPATIENT)
Dept: SURGERY | Facility: CLINIC | Age: 48
End: 2023-08-14
Payer: COMMERCIAL

## 2023-08-14 DIAGNOSIS — C50.912 CARCINOMA OF LEFT BREAST METASTATIC TO AXILLARY LYMPH NODE (H): ICD-10-CM

## 2023-08-14 DIAGNOSIS — C50.919 MALIGNANT NEOPLASM OF FEMALE BREAST, UNSPECIFIED ESTROGEN RECEPTOR STATUS, UNSPECIFIED LATERALITY, UNSPECIFIED SITE OF BREAST (H): Primary | ICD-10-CM

## 2023-08-14 DIAGNOSIS — C77.3 CARCINOMA OF LEFT BREAST METASTATIC TO AXILLARY LYMPH NODE (H): ICD-10-CM

## 2023-08-14 PROCEDURE — 120N000001 HC R&B MED SURG/OB

## 2023-08-14 PROCEDURE — 0HHU0NZ INSERTION OF TISSUE EXPANDER INTO LEFT BREAST, OPEN APPROACH: ICD-10-PCS | Performed by: PLASTIC SURGERY

## 2023-08-14 PROCEDURE — 272N000001 HC OR GENERAL SUPPLY STERILE: Performed by: PLASTIC SURGERY

## 2023-08-14 PROCEDURE — 250N000011 HC RX IP 250 OP 636: Performed by: REGISTERED NURSE

## 2023-08-14 PROCEDURE — 250N000011 HC RX IP 250 OP 636

## 2023-08-14 PROCEDURE — 360N000077 HC SURGERY LEVEL 4, PER MIN: Performed by: PLASTIC SURGERY

## 2023-08-14 PROCEDURE — 250N000011 HC RX IP 250 OP 636: Performed by: ANESTHESIOLOGY

## 2023-08-14 PROCEDURE — 250N000013 HC RX MED GY IP 250 OP 250 PS 637

## 2023-08-14 PROCEDURE — 250N000009 HC RX 250: Performed by: NURSE ANESTHETIST, CERTIFIED REGISTERED

## 2023-08-14 PROCEDURE — C1789 PROSTHESIS, BREAST, IMP: HCPCS | Performed by: PLASTIC SURGERY

## 2023-08-14 PROCEDURE — 258N000003 HC RX IP 258 OP 636: Performed by: ANESTHESIOLOGY

## 2023-08-14 PROCEDURE — 0HRU075 REPLACEMENT OF LEFT BREAST USING LATISSIMUS DORSI MYOCUTANEOUS FLAP, OPEN APPROACH: ICD-10-PCS | Performed by: PLASTIC SURGERY

## 2023-08-14 PROCEDURE — 258N000003 HC RX IP 258 OP 636

## 2023-08-14 PROCEDURE — 999N000141 HC STATISTIC PRE-PROCEDURE NURSING ASSESSMENT: Performed by: PLASTIC SURGERY

## 2023-08-14 PROCEDURE — 250N000009 HC RX 250: Performed by: PLASTIC SURGERY

## 2023-08-14 PROCEDURE — 250N000011 HC RX IP 250 OP 636: Mod: JZ | Performed by: NURSE ANESTHETIST, CERTIFIED REGISTERED

## 2023-08-14 PROCEDURE — 710N000009 HC RECOVERY PHASE 1, LEVEL 1, PER MIN: Performed by: PLASTIC SURGERY

## 2023-08-14 PROCEDURE — 370N000017 HC ANESTHESIA TECHNICAL FEE, PER MIN: Performed by: PLASTIC SURGERY

## 2023-08-14 DEVICE — NATRELLE TE SMOOTH 133S-FX-13-T (US)
Type: IMPLANTABLE DEVICE | Site: BREAST | Status: FUNCTIONAL
Brand: NATRELLE 133S TISSUE EXPANDERS

## 2023-08-14 RX ORDER — ONDANSETRON 2 MG/ML
4 INJECTION INTRAMUSCULAR; INTRAVENOUS EVERY 30 MIN PRN
Status: DISCONTINUED | OUTPATIENT
Start: 2023-08-14 | End: 2023-08-14 | Stop reason: HOSPADM

## 2023-08-14 RX ORDER — LIDOCAINE 40 MG/G
CREAM TOPICAL
Status: DISCONTINUED | OUTPATIENT
Start: 2023-08-14 | End: 2023-08-15 | Stop reason: HOSPADM

## 2023-08-14 RX ORDER — SODIUM CHLORIDE, SODIUM LACTATE, POTASSIUM CHLORIDE, CALCIUM CHLORIDE 600; 310; 30; 20 MG/100ML; MG/100ML; MG/100ML; MG/100ML
INJECTION, SOLUTION INTRAVENOUS CONTINUOUS
Status: DISCONTINUED | OUTPATIENT
Start: 2023-08-14 | End: 2023-08-14 | Stop reason: HOSPADM

## 2023-08-14 RX ORDER — FENTANYL CITRATE 50 UG/ML
INJECTION, SOLUTION INTRAMUSCULAR; INTRAVENOUS PRN
Status: DISCONTINUED | OUTPATIENT
Start: 2023-08-14 | End: 2023-08-14

## 2023-08-14 RX ORDER — DEXAMETHASONE SODIUM PHOSPHATE 4 MG/ML
INJECTION, SOLUTION INTRA-ARTICULAR; INTRALESIONAL; INTRAMUSCULAR; INTRAVENOUS; SOFT TISSUE PRN
Status: DISCONTINUED | OUTPATIENT
Start: 2023-08-14 | End: 2023-08-14

## 2023-08-14 RX ORDER — HYDROMORPHONE HCL IN WATER/PF 6 MG/30 ML
0.4 PATIENT CONTROLLED ANALGESIA SYRINGE INTRAVENOUS EVERY 5 MIN PRN
Status: DISCONTINUED | OUTPATIENT
Start: 2023-08-14 | End: 2023-08-14 | Stop reason: HOSPADM

## 2023-08-14 RX ORDER — HYDROMORPHONE HCL IN WATER/PF 6 MG/30 ML
0.4 PATIENT CONTROLLED ANALGESIA SYRINGE INTRAVENOUS
Status: DISCONTINUED | OUTPATIENT
Start: 2023-08-14 | End: 2023-08-15 | Stop reason: HOSPADM

## 2023-08-14 RX ORDER — AMOXICILLIN 250 MG
1 CAPSULE ORAL 2 TIMES DAILY
Status: DISCONTINUED | OUTPATIENT
Start: 2023-08-14 | End: 2023-08-15 | Stop reason: HOSPADM

## 2023-08-14 RX ORDER — ONDANSETRON 2 MG/ML
INJECTION INTRAMUSCULAR; INTRAVENOUS PRN
Status: DISCONTINUED | OUTPATIENT
Start: 2023-08-14 | End: 2023-08-14

## 2023-08-14 RX ORDER — IBUPROFEN 600 MG/1
600 TABLET, FILM COATED ORAL EVERY 6 HOURS PRN
Status: DISCONTINUED | OUTPATIENT
Start: 2023-08-14 | End: 2023-08-15 | Stop reason: HOSPADM

## 2023-08-14 RX ORDER — HYDRALAZINE HYDROCHLORIDE 20 MG/ML
2.5-5 INJECTION INTRAMUSCULAR; INTRAVENOUS EVERY 10 MIN PRN
Status: DISCONTINUED | OUTPATIENT
Start: 2023-08-14 | End: 2023-08-14 | Stop reason: HOSPADM

## 2023-08-14 RX ORDER — BUPIVACAINE HYDROCHLORIDE AND EPINEPHRINE 5; 5 MG/ML; UG/ML
INJECTION, SOLUTION PERINEURAL PRN
Status: DISCONTINUED | OUTPATIENT
Start: 2023-08-14 | End: 2023-08-14 | Stop reason: HOSPADM

## 2023-08-14 RX ORDER — DIMENHYDRINATE 50 MG/ML
25 INJECTION, SOLUTION INTRAMUSCULAR; INTRAVENOUS
Status: DISCONTINUED | OUTPATIENT
Start: 2023-08-14 | End: 2023-08-14 | Stop reason: HOSPADM

## 2023-08-14 RX ORDER — LETROZOLE 2.5 MG/1
2.5 TABLET, FILM COATED ORAL DAILY
Status: DISCONTINUED | OUTPATIENT
Start: 2023-08-15 | End: 2023-08-15 | Stop reason: HOSPADM

## 2023-08-14 RX ORDER — PROCHLORPERAZINE MALEATE 10 MG
10 TABLET ORAL EVERY 6 HOURS PRN
Status: DISCONTINUED | OUTPATIENT
Start: 2023-08-14 | End: 2023-08-15 | Stop reason: HOSPADM

## 2023-08-14 RX ORDER — FENTANYL CITRATE 0.05 MG/ML
25 INJECTION, SOLUTION INTRAMUSCULAR; INTRAVENOUS EVERY 5 MIN PRN
Status: DISCONTINUED | OUTPATIENT
Start: 2023-08-14 | End: 2023-08-14 | Stop reason: HOSPADM

## 2023-08-14 RX ORDER — BISACODYL 10 MG
10 SUPPOSITORY, RECTAL RECTAL DAILY PRN
Status: DISCONTINUED | OUTPATIENT
Start: 2023-08-14 | End: 2023-08-15 | Stop reason: HOSPADM

## 2023-08-14 RX ORDER — FENTANYL CITRATE 0.05 MG/ML
50 INJECTION, SOLUTION INTRAMUSCULAR; INTRAVENOUS EVERY 5 MIN PRN
Status: DISCONTINUED | OUTPATIENT
Start: 2023-08-14 | End: 2023-08-14 | Stop reason: HOSPADM

## 2023-08-14 RX ORDER — PROPOFOL 10 MG/ML
INJECTION, EMULSION INTRAVENOUS CONTINUOUS PRN
Status: DISCONTINUED | OUTPATIENT
Start: 2023-08-14 | End: 2023-08-14

## 2023-08-14 RX ORDER — ACETAMINOPHEN 325 MG/1
975 TABLET ORAL ONCE
Status: DISCONTINUED | OUTPATIENT
Start: 2023-08-14 | End: 2023-08-14 | Stop reason: HOSPADM

## 2023-08-14 RX ORDER — MAGNESIUM HYDROXIDE 1200 MG/15ML
LIQUID ORAL PRN
Status: DISCONTINUED | OUTPATIENT
Start: 2023-08-14 | End: 2023-08-14 | Stop reason: HOSPADM

## 2023-08-14 RX ORDER — CEFAZOLIN SODIUM/WATER 2 G/20 ML
2 SYRINGE (ML) INTRAVENOUS SEE ADMIN INSTRUCTIONS
Status: DISCONTINUED | OUTPATIENT
Start: 2023-08-14 | End: 2023-08-14 | Stop reason: HOSPADM

## 2023-08-14 RX ORDER — CEFAZOLIN SODIUM/WATER 2 G/20 ML
2 SYRINGE (ML) INTRAVENOUS
Status: COMPLETED | OUTPATIENT
Start: 2023-08-14 | End: 2023-08-14

## 2023-08-14 RX ORDER — NALOXONE HYDROCHLORIDE 0.4 MG/ML
0.2 INJECTION, SOLUTION INTRAMUSCULAR; INTRAVENOUS; SUBCUTANEOUS
Status: DISCONTINUED | OUTPATIENT
Start: 2023-08-14 | End: 2023-08-15 | Stop reason: HOSPADM

## 2023-08-14 RX ORDER — METHOCARBAMOL 750 MG/1
750 TABLET, FILM COATED ORAL EVERY 6 HOURS PRN
Status: DISCONTINUED | OUTPATIENT
Start: 2023-08-14 | End: 2023-08-15 | Stop reason: HOSPADM

## 2023-08-14 RX ORDER — OXYCODONE HYDROCHLORIDE 5 MG/1
5 TABLET ORAL EVERY 4 HOURS PRN
Status: DISCONTINUED | OUTPATIENT
Start: 2023-08-14 | End: 2023-08-15 | Stop reason: HOSPADM

## 2023-08-14 RX ORDER — ACETAMINOPHEN 325 MG/1
650 TABLET ORAL EVERY 4 HOURS PRN
Status: DISCONTINUED | OUTPATIENT
Start: 2023-08-17 | End: 2023-08-15 | Stop reason: HOSPADM

## 2023-08-14 RX ORDER — DEXMEDETOMIDINE HYDROCHLORIDE 4 UG/ML
INJECTION, SOLUTION INTRAVENOUS PRN
Status: DISCONTINUED | OUTPATIENT
Start: 2023-08-14 | End: 2023-08-14

## 2023-08-14 RX ORDER — ONDANSETRON 4 MG/1
4 TABLET, ORALLY DISINTEGRATING ORAL EVERY 30 MIN PRN
Status: DISCONTINUED | OUTPATIENT
Start: 2023-08-14 | End: 2023-08-14 | Stop reason: HOSPADM

## 2023-08-14 RX ORDER — HYDROMORPHONE HCL IN WATER/PF 6 MG/30 ML
0.2 PATIENT CONTROLLED ANALGESIA SYRINGE INTRAVENOUS EVERY 5 MIN PRN
Status: DISCONTINUED | OUTPATIENT
Start: 2023-08-14 | End: 2023-08-14 | Stop reason: HOSPADM

## 2023-08-14 RX ORDER — ONDANSETRON 4 MG/1
4 TABLET, ORALLY DISINTEGRATING ORAL EVERY 6 HOURS PRN
Status: DISCONTINUED | OUTPATIENT
Start: 2023-08-14 | End: 2023-08-15 | Stop reason: HOSPADM

## 2023-08-14 RX ORDER — HYDROXYZINE HYDROCHLORIDE 25 MG/1
25 TABLET, FILM COATED ORAL EVERY 6 HOURS PRN
Status: DISCONTINUED | OUTPATIENT
Start: 2023-08-14 | End: 2023-08-14 | Stop reason: HOSPADM

## 2023-08-14 RX ORDER — LABETALOL HYDROCHLORIDE 5 MG/ML
10 INJECTION, SOLUTION INTRAVENOUS
Status: DISCONTINUED | OUTPATIENT
Start: 2023-08-14 | End: 2023-08-14 | Stop reason: HOSPADM

## 2023-08-14 RX ORDER — POLYETHYLENE GLYCOL 3350 17 G/17G
17 POWDER, FOR SOLUTION ORAL DAILY
Status: DISCONTINUED | OUTPATIENT
Start: 2023-08-15 | End: 2023-08-15 | Stop reason: HOSPADM

## 2023-08-14 RX ORDER — ONDANSETRON 2 MG/ML
4 INJECTION INTRAMUSCULAR; INTRAVENOUS EVERY 6 HOURS PRN
Status: DISCONTINUED | OUTPATIENT
Start: 2023-08-14 | End: 2023-08-15 | Stop reason: HOSPADM

## 2023-08-14 RX ORDER — PROPOFOL 10 MG/ML
INJECTION, EMULSION INTRAVENOUS PRN
Status: DISCONTINUED | OUTPATIENT
Start: 2023-08-14 | End: 2023-08-14

## 2023-08-14 RX ORDER — ACETAMINOPHEN 325 MG/1
975 TABLET ORAL EVERY 8 HOURS
Status: DISCONTINUED | OUTPATIENT
Start: 2023-08-14 | End: 2023-08-15 | Stop reason: HOSPADM

## 2023-08-14 RX ORDER — CEFAZOLIN SODIUM 1 G/3ML
1 INJECTION, POWDER, FOR SOLUTION INTRAMUSCULAR; INTRAVENOUS EVERY 8 HOURS
Status: COMPLETED | OUTPATIENT
Start: 2023-08-14 | End: 2023-08-15

## 2023-08-14 RX ORDER — NALOXONE HYDROCHLORIDE 0.4 MG/ML
0.4 INJECTION, SOLUTION INTRAMUSCULAR; INTRAVENOUS; SUBCUTANEOUS
Status: DISCONTINUED | OUTPATIENT
Start: 2023-08-14 | End: 2023-08-15 | Stop reason: HOSPADM

## 2023-08-14 RX ORDER — DEXTROSE MONOHYDRATE, SODIUM CHLORIDE, AND POTASSIUM CHLORIDE 50; 1.49; 4.5 G/1000ML; G/1000ML; G/1000ML
INJECTION, SOLUTION INTRAVENOUS CONTINUOUS
Status: DISCONTINUED | OUTPATIENT
Start: 2023-08-14 | End: 2023-08-15 | Stop reason: HOSPADM

## 2023-08-14 RX ORDER — LIDOCAINE HYDROCHLORIDE 20 MG/ML
INJECTION, SOLUTION INFILTRATION; PERINEURAL PRN
Status: DISCONTINUED | OUTPATIENT
Start: 2023-08-14 | End: 2023-08-14

## 2023-08-14 RX ORDER — OXYCODONE HYDROCHLORIDE 5 MG/1
10 TABLET ORAL EVERY 4 HOURS PRN
Status: DISCONTINUED | OUTPATIENT
Start: 2023-08-14 | End: 2023-08-15 | Stop reason: HOSPADM

## 2023-08-14 RX ORDER — HYDROMORPHONE HCL IN WATER/PF 6 MG/30 ML
0.2 PATIENT CONTROLLED ANALGESIA SYRINGE INTRAVENOUS
Status: DISCONTINUED | OUTPATIENT
Start: 2023-08-14 | End: 2023-08-15 | Stop reason: HOSPADM

## 2023-08-14 RX ADMIN — SUGAMMADEX 200 MG: 100 INJECTION, SOLUTION INTRAVENOUS at 16:00

## 2023-08-14 RX ADMIN — ROCURONIUM BROMIDE 50 MG: 50 INJECTION, SOLUTION INTRAVENOUS at 13:32

## 2023-08-14 RX ADMIN — OXYCODONE HYDROCHLORIDE 10 MG: 5 TABLET ORAL at 21:04

## 2023-08-14 RX ADMIN — ROCURONIUM BROMIDE 10 MG: 50 INJECTION, SOLUTION INTRAVENOUS at 14:53

## 2023-08-14 RX ADMIN — ACETAMINOPHEN 975 MG: 325 TABLET, FILM COATED ORAL at 18:27

## 2023-08-14 RX ADMIN — CEFAZOLIN 1 G: 1 INJECTION, POWDER, FOR SOLUTION INTRAMUSCULAR; INTRAVENOUS at 21:04

## 2023-08-14 RX ADMIN — FENTANYL CITRATE 25 MCG: 50 INJECTION, SOLUTION INTRAMUSCULAR; INTRAVENOUS at 16:40

## 2023-08-14 RX ADMIN — FENTANYL CITRATE 50 MCG: 50 INJECTION, SOLUTION INTRAMUSCULAR; INTRAVENOUS at 13:32

## 2023-08-14 RX ADMIN — FENTANYL CITRATE 25 MCG: 50 INJECTION, SOLUTION INTRAMUSCULAR; INTRAVENOUS at 16:47

## 2023-08-14 RX ADMIN — METHOCARBAMOL 750 MG: 750 TABLET ORAL at 17:06

## 2023-08-14 RX ADMIN — HYDROMORPHONE HYDROCHLORIDE 0.5 MG: 1 INJECTION, SOLUTION INTRAMUSCULAR; INTRAVENOUS; SUBCUTANEOUS at 14:37

## 2023-08-14 RX ADMIN — POTASSIUM CHLORIDE, DEXTROSE MONOHYDRATE AND SODIUM CHLORIDE: 150; 5; 450 INJECTION, SOLUTION INTRAVENOUS at 17:36

## 2023-08-14 RX ADMIN — FENTANYL CITRATE 50 MCG: 50 INJECTION, SOLUTION INTRAMUSCULAR; INTRAVENOUS at 14:00

## 2023-08-14 RX ADMIN — MIDAZOLAM 2 MG: 1 INJECTION INTRAMUSCULAR; INTRAVENOUS at 13:24

## 2023-08-14 RX ADMIN — PROPOFOL 200 MG: 10 INJECTION, EMULSION INTRAVENOUS at 13:32

## 2023-08-14 RX ADMIN — LIDOCAINE HYDROCHLORIDE 100 MG: 20 INJECTION, SOLUTION INFILTRATION; PERINEURAL at 13:32

## 2023-08-14 RX ADMIN — HYDROMORPHONE HYDROCHLORIDE 0.5 MG: 1 INJECTION, SOLUTION INTRAMUSCULAR; INTRAVENOUS; SUBCUTANEOUS at 15:21

## 2023-08-14 RX ADMIN — Medication 2 G: at 13:24

## 2023-08-14 RX ADMIN — ROCURONIUM BROMIDE 20 MG: 50 INJECTION, SOLUTION INTRAVENOUS at 14:30

## 2023-08-14 RX ADMIN — DEXAMETHASONE SODIUM PHOSPHATE 4 MG: 4 INJECTION, SOLUTION INTRA-ARTICULAR; INTRALESIONAL; INTRAMUSCULAR; INTRAVENOUS; SOFT TISSUE at 13:49

## 2023-08-14 RX ADMIN — ONDANSETRON 4 MG: 2 INJECTION INTRAMUSCULAR; INTRAVENOUS at 15:28

## 2023-08-14 RX ADMIN — SENNOSIDES AND DOCUSATE SODIUM 1 TABLET: 50; 8.6 TABLET ORAL at 21:04

## 2023-08-14 RX ADMIN — ROCURONIUM BROMIDE 30 MG: 50 INJECTION, SOLUTION INTRAVENOUS at 14:02

## 2023-08-14 RX ADMIN — PROPOFOL 150 MCG/KG/MIN: 10 INJECTION, EMULSION INTRAVENOUS at 13:32

## 2023-08-14 RX ADMIN — SODIUM CHLORIDE, POTASSIUM CHLORIDE, SODIUM LACTATE AND CALCIUM CHLORIDE: 600; 310; 30; 20 INJECTION, SOLUTION INTRAVENOUS at 13:24

## 2023-08-14 RX ADMIN — ROCURONIUM BROMIDE 20 MG: 50 INJECTION, SOLUTION INTRAVENOUS at 14:15

## 2023-08-14 RX ADMIN — DEXMEDETOMIDINE HYDROCHLORIDE 12 MCG: 200 INJECTION INTRAVENOUS at 16:14

## 2023-08-14 ASSESSMENT — ACTIVITIES OF DAILY LIVING (ADL)
ADLS_ACUITY_SCORE: 20
ADLS_ACUITY_SCORE: 21
ADLS_ACUITY_SCORE: 20

## 2023-08-14 ASSESSMENT — LIFESTYLE VARIABLES: TOBACCO_USE: 0

## 2023-08-14 NOTE — PROGRESS NOTES
POST OPERATIVE NOTE-IMMEDIATE :    Preoperative Diagnosis:  Breast cancer (H) [C50.919]    Postoperative Diagnosis:  Same    Procedures:  Procedure(s):  LEFT LATISSIMUS DORSI FLAP BREAST RECONSTRUCTION  WITH TISSUE EXPANDER PLACEMENT    Surgeon(s) and Assistants (if any):  Surgeon(s):  Rc Zamorano MD Kunde, Adriana SOLORIO PA-C    EBL:  * No values recorded between 8/14/2023  2:02 PM and 8/14/2023  4:14 PM *    Anesthesia:  General    Complications: None    Specimen:  * No specimens in log *    Findings:  Above    Condition on discharge from OR:  Satisfactory    Adriana Doty PA-C

## 2023-08-14 NOTE — ANESTHESIA PROCEDURE NOTES
Airway       Patient location during procedure: OR ([facility] - Operating Room or Procedural Area)       Procedure Start/Stop Times: 8/14/2023 1:35 PM  Staff -        Anesthesiologist:  Shawn Mascorro MD       CRNA: Yolanda Casper APRN CRNA       Performed By: CRNA  Consent for Airway        Urgency: elective  Indications and Patient Condition       Indications for airway management: sean-procedural       Induction type:intravenous       Mask difficulty assessment: 1 - vent by mask    Final Airway Details       Final airway type: endotracheal airway       Successful airway: ETT - single  Endotracheal Airway Details        ETT size (mm): 7.0       Cuffed: yes       Successful intubation technique: direct laryngoscopy       DL Blade Type: Joseph 2       Grade View of Cords: 1       Adjucts: stylet       Position: Right       Measured from: gums/teeth       Secured at (cm): 21       Bite block used: None    Post intubation assessment        Number of attempts at approach: 1       Number of other approaches attempted: 0       Secured with: pink tape       Ease of procedure: easy       Dentition: Intact and Unchanged    Medication(s) Administered   Medication Administration Time: 8/14/2023 1:35 PM        
pt wants to leave AMA

## 2023-08-14 NOTE — OR NURSING
Sign out received from Dr Shannon. Pt sitting up in bed, tolerating PO. Pain well controlled. Given PO Robaxin 750 per floor orders, updated floor RN. Handoff of cares.

## 2023-08-14 NOTE — ANESTHESIA CARE TRANSFER NOTE
Patient: Betty Rogers    Procedure: Procedure(s):  LEFT LATISSIMUS DORSI FLAP BREAST RECONSTRUCTION  WITH TISSUE EXPANDER PLACEMENT       Diagnosis: Breast cancer (H) [C50.919]  Diagnosis Additional Information: No value filed.    Anesthesia Type:   General     Note:    Oropharynx: oropharynx clear of all foreign objects and spontaneously breathing  Level of Consciousness: drowsy  Oxygen Supplementation: face mask  Level of Supplemental Oxygen (L/min / FiO2): 6  Independent Airway: airway patency satisfactory and stable  Dentition: dentition unchanged  Vital Signs Stable: post-procedure vital signs reviewed and stable  Report to RN Given: handoff report given  Patient transferred to: PACU  Comments: Neuromuscular blockade reversed with sugammadex, spontaneous respirations, adequate tidal volumes, followed commands to voice, oropharynx suctioned with soft flexible catheter, extubated atraumatically, extubated with suction, airway patent after extubation.  Oxygen via facemask at 6 liters per minute to PACU. Oxygen tubing connected to wall O2 in PACU, SpO2, NiBP, and EKG monitors and alarms on and functioning, [place]once Hugger warmer connected to patient gown, report on patient's clinical status given to PACU RN, RN questions answered.         Handoff Report: Identifed the Patient, Identified the Reponsible Provider, Reviewed the pertinent medical history, Discussed the surgical course, Reviewed Intra-OP anesthesia mangement and issues during anesthesia, Set expectations for post-procedure period and Allowed opportunity for questions and acknowledgement of understanding      Vitals:  Vitals Value Taken Time   BP     Temp     Pulse 83 08/14/23 1621   Resp 13 08/14/23 1621   SpO2 100 % 08/14/23 1621   Vitals shown include unvalidated device data.    Electronically Signed By: HETAL Love CRNA  August 14, 2023  4:22 PM

## 2023-08-14 NOTE — ANESTHESIA POSTPROCEDURE EVALUATION
Patient: Betty Rogers    Procedure: Procedure(s):  LEFT LATISSIMUS DORSI FLAP BREAST RECONSTRUCTION  WITH TISSUE EXPANDER PLACEMENT       Anesthesia Type:  General    Note:  Disposition: Inpatient   Postop Pain Control: Uneventful            Sign Out: Well controlled pain   PONV: No   Neuro/Psych: Uneventful            Sign Out: Acceptable/Baseline neuro status   Airway/Respiratory: Uneventful            Sign Out: Acceptable/Baseline resp. status   CV/Hemodynamics: Uneventful            Sign Out: Acceptable CV status; No obvious hypovolemia; No obvious fluid overload   Other NRE: NONE   DID A NON-ROUTINE EVENT OCCUR? No           Last vitals:  Vitals Value Taken Time   /67 08/14/23 1710   Temp 36.7  C (98  F) 08/14/23 1710   Pulse 70 08/14/23 1710   Resp 11 08/14/23 1710   SpO2 98 % 08/14/23 1710   Vitals shown include unvalidated device data.    Electronically Signed By: Karan Shannon DO  August 14, 2023  6:05 PM

## 2023-08-14 NOTE — OP NOTE
Procedure Date: 08/14/2023    SURGEON:  Rc Zamorano MD    ASSISTANT:  Adriana Doty PA-C    PREOPERATIVE DIAGNOSES:    1.  Left breast cancer.  2.  Acquired absence of bilateral breasts.  3.  History of left chest wall radiation.    POSTOPERATIVE DIAGNOSES:    1.  Left breast cancer.  2.  Acquired absence of bilateral breasts.  3.  History of left chest wall radiation.    PROCEDURE:    1.  Left first stage breast reconstruction with placement of tissue expander.  2.  Left latissimus dorsi myocutaneous flap for breast reconstruction.    TECHNIQUE:  The patient was marked preoperatively.  She has a history of bilateral mastectomy and tissue expander placement as well as left chest wall radiation.  She developed fairly severe complications from the radiation, and the expander was removed in order to allow primary healing.  She now presents for resumption of her reconstruction, which will include rehabilitation of the area with a latissimus myocutaneous flap and placement of a tissue expander.  We discussed expected discomfort, time off work, activity restrictions, wound care.  We talked about the procedure in detail, and she was agreeable and all of her questions were answered.  She was placed supine on the procedure table under general LMA anesthesia and then turned into the prone position on a beanbag with an axillary roll and careful padding and positioning.  The right hemithorax and right arm were prepped and draped in a sterile fashion.  A timeout was done.  I opened a portion of the lateral inframammary mastectomy scar and the vertical aspect of her reduction pattern mastectomy scar.  The skin was extremely thin and scarred down from the radiation.  I carefully elevated this from the chest wall until the pocket dimensions were satisfactory and recreated similar to her mastectomy procedure.  I then continued the dissection laterally along the chest wall until the lateral edge of the latissimus dorsi muscle was  identified.  I lifted the muscle away from the ribs a little bit and  the skin to begin the dissection of the flap.  I turned my attention to the back and made a low transverse elliptical incision to create a small skin paddle for monitoring.  The skin was then lifted away from the underlying latissimus dorsi muscle, and the muscle was divided along its inferior and medial borders.  It was lifted away from the chest wall and  from the serratus anterior muscle and then tunneled anteriorly into the recreated mastectomy defect.  The inserting fibers of the muscle were divided. The serratus branch was left intact as was the thoracodorsal trunk.  The flap easily fit and reached all the necessary areas of the recipient wound and was sutured into the margins of the defect except for the lateral area.  This was left open for placement of the expander.  I placed a [email or web address] style 133FXS 550 mL tissue expander.  This was prepared by removing the air and rinsing it in Betadine solution.  It was filled with 200 mL of saline and placed in the prepectoral sub latissimus pocket.  It was affixed to the pectoralis fascia in multiple locations with 2-0 silk using the suture tabs.  The muscle was then draped over the lateral edge of the device, and the pocket closed and the muscle affixed with 2-0 Vicryl.  A drain was placed anteriorly and posteriorly.  Final check for hemostasis was done, which was achieved with a combination of electrocautery and hemostatic clips.  The wounds were irrigated with dilute Betadine prior to final closure. Final closure was done with 3-0 and 4-0 Vicryl.  I inset a small portion of the latissimus skin paddle in the region of the areola on the left breast for monitoring and possible later use for the reconstruction.  The skin flap and muscle all had good viability at the termination of procedure.  Prior to final closure, drains were placed, 1 anterior and 1 posterior, and these were sewn  in with 2-0 silk.  Sterile dressings were applied.  Anesthesia was reversed.  The patient was taken to the recovery room in satisfactory condition.    ESTIMATED BLOOD LOSS:  50 mL.    COUNTS:  Sponge and needle counts were correct.    SPECIMENS:  None.    FINDINGS:  Noted above.    Rc Zamorano MD        D: 2023   T: 2023   MT: Dorie    Name:     STACY KEARNS  MRN:      [id number]        Account:        [id number]   :      1975           Procedure Date: 2023     Document: [id number]

## 2023-08-14 NOTE — PLAN OF CARE
Arrived on unit at 1730.  Oriented x4.  VSS on room air.  IV fluids infusing.  Some pain with repositioning but overall comfortable.  Capno WDL.  Ace wrap on chest so ZOE incisions.  2 LENO drains, one from left breast and the other from back.  Ice applied to back.  Tolerating regular diet.  Due to void.  Not OOB yet.  Parents at bedside and very supportive.  Discharge plan pending progress.

## 2023-08-14 NOTE — ANESTHESIA PREPROCEDURE EVALUATION
Anesthesia Pre-Procedure Evaluation    Patient: Betty Rogers   MRN: [id number] : 1975        Procedure : Procedure(s):  LEFT LATISSIMUS DORSI FLAP BREAST RECONSTRUCTION  WITH TISSUE EXPANDER PLACEMENT          Past Medical History:   Diagnosis Date    Breast cancer metastasized to axillary lymph node (H) 7/15/2022    No active medical problems       Past Surgical History:   Procedure Laterality Date    C/SECTION, LOW TRANSVERSE      ,     DISSECT LYMPH NODE AXILLA Left 2022    Procedure: left axillary node dissection;  Surgeon: Janie South MD;  Location: [facility]    HYSTERECTOMY, PAP NO LONGER INDICATED N/A     INSERT PORT VASCULAR ACCESS Right 2022    Procedure: PORT PLACEMENT;  Surgeon: Janie South MD;  Location: [facility]    MASTECTOMY SIMPLE, SENTINEL NODE, COMBINED Bilateral 2022    Procedure: bilateral skin sparing mastectomies with left sentinel lymph node biopsy, tag localized left axillary node excision;  Surgeon: Janie South MD;  Location: [facility]    RECONSTRUCT BREAST, INSERT TISSUE EXPANDER BILATERAL, COMBINED Bilateral 2022    Procedure: BILATERAL TISSUE EXPANDER BREAST RECONSTRUCTION;  Surgeon: Rc Zamorano MD;  Location: [facility]    REMOVE PORT VASCULAR ACCESS N/A 2022    Procedure: possible port removal;  Surgeon: Jaine South MD;  Location: [facility]    REMOVE TISSUE EXPANDER BREAST Left 2023    Procedure: REMOVAL OF LEFT BREAST RECONSTRUCTION TISSUE EXPANDERS;  Surgeon: Rc Zamorano MD;  Location: [facility]      Allergies   Allergen Reactions    Fruit Extracts      Apple, peaches, plums, pears    Nuts     Other [Seasonal Allergies]      Tree Nuts-Lips swell and breathing problems      Social History     Tobacco Use    Smoking status: Never    Smokeless tobacco: Never   Substance Use Topics    Alcohol use: Yes     Comment: socially, approx 6-7 drinks per week      Wt Readings from Last 1 Encounters:   23 61 kg  (134 lb 6.4 oz)        Anesthesia Evaluation   Pt has had prior anesthetic. Type: General.    No history of anesthetic complications       ROS/MED HX  ENT/Pulmonary:    (-) tobacco use   Neurologic:       Cardiovascular:     (+)  - -   -  - -                                 Previous cardiac testing   Echo: Date: 10/18/22 Results:  Interpretation Summary  Global and regional left ventricular function is normal with an EF of 61%. Global right ventricular function is normal. No significant valvular abnormalities present. Pulmonary artery systolic pressure cannot be assessed. The inferior vena cava is normal. No pericardial effusion is present.      Left Ventricle  Left ventricular size is normal. Left ventricular wall thickness is normal.  Global and regional left ventricular function is normal with an EF of 60-65%.  Left ventricular diastolic function is normal. Global peak LV longitudinal  strain is averaged at -21%. This is within reported normal limits (normal <-  18%). No regional wall motion abnormalities are seen.     Right Ventricle  The right ventricle is normal size. Global right ventricular function is  normal.     Atria  Both atria appear normal. The atrial septum is intact as assessed by color  Doppler .     Mitral Valve  The mitral valve is normal. Trace to mild mitral insufficiency is present.     Aortic Valve  Aortic valve is normal in structure and function.     Tricuspid Valve  The tricuspid valve is normal. Trace tricuspid insufficiency is present. The  peak velocity of the tricuspid regurgitant jet is not obtainable. Pulmonary  artery systolic pressure cannot be assessed.     Pulmonic Valve  The pulmonic valve is normal.     Vessels  The aorta root is normal. The inferior vena cava is normal.     Pericardium  No pericardial effusion is present.     Miscellaneous  No significant valvular abnormalities present.    Stress Test:  Date: Results:    ECG Reviewed:  Date: Results:    Cath:  Date: Results:       METS/Exercise Tolerance:     Hematologic:       Musculoskeletal:       GI/Hepatic:       Renal/Genitourinary:       Endo:       Psychiatric/Substance Use:       Infectious Disease:       Malignancy:   (+) Malignancy, History of Breast.Breast CA Active status post.      Other:               OUTSIDE LABS:  CBC:   Lab Results   Component Value Date    WBC 3.1 (L) 08/09/2023    WBC 1.9 (LL) 06/21/2023    HGB 12.0 08/09/2023    HGB 12.8 06/21/2023    HCT 36.0 08/09/2023    HCT 37.5 06/21/2023     08/09/2023     06/21/2023     BMP:   Lab Results   Component Value Date     08/09/2023     06/21/2023    POTASSIUM 5.3 08/09/2023    POTASSIUM 4.1 06/21/2023    CHLORIDE 107 08/09/2023    CHLORIDE 103 06/21/2023    CO2 25 08/09/2023    CO2 26 06/21/2023    BUN 12.3 08/09/2023    BUN 10.9 06/21/2023    CR 0.95 08/09/2023    CR 1.07 (H) 06/21/2023    GLC 90 08/09/2023    GLC 94 06/21/2023     COAGS: No results found for: PTT, INR, FIBR  POC:   Lab Results   Component Value Date    HCG Negative 02/01/2023     HEPATIC:   Lab Results   Component Value Date    ALBUMIN 4.2 06/21/2023    PROTTOTAL 6.4 06/21/2023    ALT 16 06/21/2023    AST 22 06/21/2023    ALKPHOS 56 06/21/2023    BILITOTAL 0.4 06/21/2023     OTHER:   Lab Results   Component Value Date    PH 7.45 (H) 10/18/2022    LACT 1.1 10/18/2022    ABEBA 9.6 08/09/2023       Anesthesia Plan    ASA Status:  2    NPO Status:  NPO Appropriate    Anesthesia Type: General.     - Airway: LMA   Induction: Intravenous.   Maintenance: Balanced.        Consents    Anesthesia Plan(s) and associated risks, benefits, and realistic alternatives discussed. Questions answered and patient/representative(s) expressed understanding.     - Discussed:     - Discussed with:  Patient            Postoperative Care    Pain management: IV analgesics, Oral pain medications, Multi-modal analgesia.   PONV prophylaxis: Ondansetron (or other 5HT-3)     Comments:                Shawn  MD Subha

## 2023-08-15 VITALS
HEART RATE: 93 BPM | HEIGHT: 63 IN | OXYGEN SATURATION: 99 % | WEIGHT: 134.4 LBS | RESPIRATION RATE: 17 BRPM | BODY MASS INDEX: 23.81 KG/M2 | DIASTOLIC BLOOD PRESSURE: 62 MMHG | TEMPERATURE: 99.9 F | SYSTOLIC BLOOD PRESSURE: 109 MMHG

## 2023-08-15 LAB
GLUCOSE BLDC GLUCOMTR-MCNC: 118 MG/DL (ref 70–99)
GLUCOSE BLDC GLUCOMTR-MCNC: 140 MG/DL (ref 70–99)

## 2023-08-15 PROCEDURE — 258N000003 HC RX IP 258 OP 636

## 2023-08-15 PROCEDURE — 250N000011 HC RX IP 250 OP 636

## 2023-08-15 PROCEDURE — 250N000013 HC RX MED GY IP 250 OP 250 PS 637

## 2023-08-15 RX ORDER — CEPHALEXIN 500 MG/1
500 CAPSULE ORAL 3 TIMES DAILY
Qty: 21 CAPSULE | Refills: 0 | Status: SHIPPED | OUTPATIENT
Start: 2023-08-15 | End: 2023-10-09

## 2023-08-15 RX ORDER — OXYCODONE HYDROCHLORIDE 5 MG/1
5 TABLET ORAL EVERY 6 HOURS PRN
Qty: 15 TABLET | Refills: 0 | Status: SHIPPED | OUTPATIENT
Start: 2023-08-15 | End: 2023-10-09

## 2023-08-15 RX ORDER — METHOCARBAMOL 750 MG/1
750 TABLET, FILM COATED ORAL EVERY 6 HOURS PRN
Qty: 30 TABLET | Refills: 0 | Status: SHIPPED | OUTPATIENT
Start: 2023-08-15 | End: 2023-10-09

## 2023-08-15 RX ADMIN — ACETAMINOPHEN 975 MG: 325 TABLET, FILM COATED ORAL at 09:15

## 2023-08-15 RX ADMIN — POTASSIUM CHLORIDE, DEXTROSE MONOHYDRATE AND SODIUM CHLORIDE: 150; 5; 450 INJECTION, SOLUTION INTRAVENOUS at 02:02

## 2023-08-15 RX ADMIN — CEFAZOLIN 1 G: 1 INJECTION, POWDER, FOR SOLUTION INTRAMUSCULAR; INTRAVENOUS at 05:50

## 2023-08-15 RX ADMIN — POLYETHYLENE GLYCOL 3350 17 G: 17 POWDER, FOR SOLUTION ORAL at 09:16

## 2023-08-15 RX ADMIN — ACETAMINOPHEN 975 MG: 325 TABLET, FILM COATED ORAL at 18:11

## 2023-08-15 RX ADMIN — ACETAMINOPHEN 975 MG: 325 TABLET, FILM COATED ORAL at 02:02

## 2023-08-15 RX ADMIN — SENNOSIDES AND DOCUSATE SODIUM 1 TABLET: 50; 8.6 TABLET ORAL at 09:16

## 2023-08-15 RX ADMIN — OXYCODONE HYDROCHLORIDE 5 MG: 5 TABLET ORAL at 05:58

## 2023-08-15 ASSESSMENT — ACTIVITIES OF DAILY LIVING (ADL)
ADLS_ACUITY_SCORE: 21
ADLS_ACUITY_SCORE: 20
ADLS_ACUITY_SCORE: 21
ADLS_ACUITY_SCORE: 20
ADLS_ACUITY_SCORE: 21
ADLS_ACUITY_SCORE: 20

## 2023-08-15 NOTE — PROGRESS NOTES
PLASTIC SURGERY PROGRESS NOTE    POD # 1    SUBJECTIVE: Doing well. VSS overnight, no nausea. Pain controlled with PRN medications. Eating, drinking, voiding, ambulating well. No concerns.     OBJECTIVE:  GENERAL: Awake, alert, oriented.  FLAP: Incisions clean, minimal drainage. No hematoma. Flaps are warm, soft, good capillary refill. Flap is slightly pink as is typical, no excessive venous congestion. Chest wall flap are  warm, soft. Drain sites healthy, drain output serosanguineous, not excessive.   DONOR SITE: Incisions clean, dry. Dressing intact. No hematoma. Drain site healthy, drain output sanguineous/serosanguineous, not excessive.     PLAN: Doing well today. Typical stay is 2 nights, but discussed with patient if she is really anxious to discharge today, she can. Continue current cares. Fully instructed in post-operative cares if she discharges today.       Adriana Doty PA-C  Plastic and Reconstructive Surgery  [facility]  Pager: [phone]  On weekends please page on-call surgeon

## 2023-08-15 NOTE — PROGRESS NOTES
Patient discharged home in stable condition. LENO teaching completed, dressing change supplies given. Pt ambulated independently to discharge door accompanied by her mother.

## 2023-08-15 NOTE — PLAN OF CARE
Date & Time: 1900-0700.  Surgery/POD#: POD 1 from a L latissimus dorsi flap breast reconstruction w/ tissue expander placement.  Behavior & Aggression: Green - no concerns.  Fall Risk: Yes.  Orientation: A&Ox4.  ABNL VS/O2: VSS on RA.  ABNL Labs: see chart.  Pain Management: PRN Oxy, scheduled Tylenol, ice packs, & repositioning.   Bowel/Bladder: Continent, voiding via bathroom, active bowel sounds, no BM, passing little gas.  IV/Drains/ Incisions: PIV infusing @ D5W & 0.45% NACL + KCL @100ml/hr. LENO drains in place w/ Bloody/red OP. ZOE L breast incision, dressings is C/D/I.  Diet: Regular.  Activity Level: SBA.  Tests/Procedures: N/A.  Anticipated  DC Date: TBD.

## 2023-08-15 NOTE — DISCHARGE SUMMARY
DISCHARGE SUMMARY    PRINCIPAL DISCHARGE DIAGNOSIS: Breast cancer     Operative procedures: Left latissimus flap breast reconstruction with tissue expander placement    REASON FOR ADMISSION: Breast cancer    HOSPITAL COURSE: The patient was taken to the operating room on her day of admission and tolerated the procedure well. Diet and activity were gradually advanced and she was discharged on POD# 1.     DISCHARGE INSTRUCTIONS:   Diet: Regular   Activity: No heavy strenuous physical activity, 10 lb lifting limit  Wound/Drain Care: Instructions given       Review of your medicines        START taking        Dose / Directions   cephALEXin 500 MG capsule  Commonly known as: KEFLEX  Used for: Carcinoma of left breast metastatic to axillary lymph node (H)      Dose: 500 mg  Take 1 capsule (500 mg) by mouth 3 times daily  Quantity: 21 capsule  Refills: 0     methocarbamol 750 MG tablet  Commonly known as: ROBAXIN  Used for: Carcinoma of left breast metastatic to axillary lymph node (H)      Dose: 750 mg  Take 1 tablet (750 mg) by mouth every 6 hours as needed for muscle spasms  Quantity: 30 tablet  Refills: 0     oxyCODONE 5 MG tablet  Commonly known as: ROXICODONE  Used for: Carcinoma of left breast metastatic to axillary lymph node (H)      Dose: 5 mg  Take 1 tablet (5 mg) by mouth every 6 hours as needed for pain  Quantity: 15 tablet  Refills: 0            CONTINUE these medicines which have NOT CHANGED        Dose / Directions   abemaciclib 100 MG tablet  Commonly known as: VERZENIO  Used for: Carcinoma of left breast metastatic to axillary lymph node (H)      Dose: 100 mg  Take 1 tablet (100 mg) by mouth 2 times daily  Quantity: 60 tablet  Refills: 0     calcium citrate 950 (200 Ca) MG tablet  Commonly known as: CITRACAL      Dose: 1 tablet  Take 1 tablet by mouth 2 times daily  Refills: 0     letrozole 2.5 MG tablet  Commonly known as: FEMARA  Used for: Carcinoma of left breast metastatic to axillary lymph node (H)       Dose: 2.5 mg  Take 1 tablet (2.5 mg) by mouth daily  Quantity: 90 tablet  Refills: 3     multivitamin w/minerals tablet      Dose: 1 tablet  Take 1 tablet by mouth daily  Refills: 0               Where to get your medicines        These medications were sent to [facility] [place], [place] - [address]  [address], [place] [address]      Phone: [phone]   cephALEXin 500 MG capsule  methocarbamol 750 MG tablet  oxyCODONE 5 MG tablet           Condition at Discharge: Wounds clean and dry, no sign of infection or hematoma. Normal/stable vitals signs. Pt ambulatory and able to take a regular diet well. Tissues viable. Comfortable on oral meds.     Adriana Doty PA-C on 8/15/2023 at 2:21 PM

## 2023-08-17 ENCOUNTER — PATIENT OUTREACH (OUTPATIENT)
Dept: CARE COORDINATION | Facility: CLINIC | Age: 48
End: 2023-08-17
Payer: COMMERCIAL

## 2023-08-17 NOTE — PROGRESS NOTES
Connected Care Resource Center Contact  [facility]/Voicemail     Clinical Data: Post-Discharge Outreach     Outreach attempted x 2.  Left message on patient's voicemail, providing [facility]'s 24/7 scheduling and nurse triage phone number [phone] ([phone]) for questions/concerns and/or to schedule an appt with an [facility] provider, if they do not have a PCP.      Plan:  [facility] will do no further outreaches at this time.       Michael Ba  [facility], [facility]    *Connected Care Resource Team does NOT follow patient ongoing. Referrals are identified based on internal discharge reports and the outreach is to ensure patient has an understanding of their discharge instructions.

## 2023-08-18 ENCOUNTER — VIRTUAL VISIT (OUTPATIENT)
Dept: RADIATION ONCOLOGY | Facility: CLINIC | Age: 48
End: 2023-08-18
Payer: COMMERCIAL

## 2023-08-18 DIAGNOSIS — C50.812 MALIGNANT NEOPLASM OF OVERLAPPING SITES OF LEFT BREAST IN FEMALE, ESTROGEN RECEPTOR POSITIVE (H): Primary | ICD-10-CM

## 2023-08-18 DIAGNOSIS — Z17.0 MALIGNANT NEOPLASM OF OVERLAPPING SITES OF LEFT BREAST IN FEMALE, ESTROGEN RECEPTOR POSITIVE (H): Primary | ICD-10-CM

## 2023-08-18 PROCEDURE — 99212 OFFICE O/P EST SF 10 MIN: CPT | Mod: VID | Performed by: NURSE PRACTITIONER

## 2023-08-18 NOTE — PROGRESS NOTES
Radiation Oncology Follow-up Visit  2023      Betty Rogers  MRN: [id number]   : 1975     DISEASE TREATED:   Betty Rogers is a 47 year old woman with a recent diagnosis of left breast cancer, [id number], ER/TX positive HER2/percy negative s/p neoadjuvant chemo with mixed response     RADIATION THERAPY DELIVERED:   5,400 cGy 2023 and 2/15/2023 and then patient developed a breast infection requiring removal of expander and antibiotic therapy.  Resumed treatment 3/7/2023-2023      INTERVAL SINCE COMPLETION OF RADIATION THERAPY:   4 month    SUBJECTIVE:   Betty Rogers is a 48 year old female who is here today for routine follow up after completing radiation therapy.  She has seen healing of her skin reaction.  She is not moisturizing. She did have reconstruction and expander placed earlier this week.  She feels she is healing well. Denies any pain or swelling. Fatigue has improved.  She does have a little tightness and they noticed during surgery fibrosis and cording.  She has seen medical oncology. Tolerating letrozole well.  She has had GI side effects from Verzenio but is on a break from it currently.    She has reconstruction planned for .    ROS:  Complete review of systems is negative except for symptoms discussed in subjective above.    Current Outpatient Medications   Medication    abemaciclib (VERZENIO) 100 MG tablet    calcium citrate (CITRACAL) 950 (200 Ca) MG tablet    cephALEXin (KEFLEX) 500 MG capsule    letrozole (FEMARA) 2.5 MG tablet    methocarbamol (ROBAXIN) 750 MG tablet    multivitamin w/minerals (THERA-VIT-M) tablet    oxyCODONE (ROXICODONE) 5 MG tablet     No current facility-administered medications for this visit.          Allergies   Allergen Reactions    Fruit Extracts      Apple, peaches, plums, pears    Nuts     Other [Seasonal Allergies]      Tree Nuts-Lips swell and breathing problems       Past Medical History:   Diagnosis Date    Breast cancer  metastasized to axillary lymph node (H) 7/15/2022    No active medical problems          PHYSICAL EXAM:  LMP 09/10/2022   Gen: Alert, in NAD  Psychiatric: Appropriate mood and affect      LABS AND IMAGING:  Reviewed.    IMPRESSION:   Ms. Rogers is a 48 year old female with left breast cancer s/p 4 month out from radiation and doing well.    PLAN:   Patient has recovered nicely from acute side effects of radiation therapy.  Discussed long term care with continued moisturizing of the treated breast, stretching and range of motion exercises, and sun screen.  Patient will follow up with medical oncology for continued care and imaging. Discussed to come in or call with any concerns or questions that may develop.      Virtual Visit Details    Type of service:  Video Visit   Joined the call at 8/18/2023, 8:00:54 am.  Left the call at 8/18/2023, 8:08:49 am.  You were on the call for 7 minutes 54 seconds .    Originating Location (pt. Location): Home  Distant Location (provider location):  Off-site  Platform used for Video Visit: Derrick Sam NP  Radiation Oncology  [facility]

## 2023-09-08 DIAGNOSIS — C50.912 CARCINOMA OF LEFT BREAST METASTATIC TO AXILLARY LYMPH NODE (H): Primary | ICD-10-CM

## 2023-09-08 DIAGNOSIS — C77.3 CARCINOMA OF LEFT BREAST METASTATIC TO AXILLARY LYMPH NODE (H): Primary | ICD-10-CM

## 2023-09-28 DIAGNOSIS — C50.912 CARCINOMA OF LEFT BREAST METASTATIC TO AXILLARY LYMPH NODE (H): Primary | ICD-10-CM

## 2023-09-28 DIAGNOSIS — C77.3 CARCINOMA OF LEFT BREAST METASTATIC TO AXILLARY LYMPH NODE (H): Primary | ICD-10-CM

## 2023-10-09 ENCOUNTER — ONCOLOGY VISIT (OUTPATIENT)
Dept: ONCOLOGY | Facility: CLINIC | Age: 48
End: 2023-10-09
Attending: INTERNAL MEDICINE
Payer: COMMERCIAL

## 2023-10-09 ENCOUNTER — LAB (OUTPATIENT)
Dept: LAB | Facility: CLINIC | Age: 48
End: 2023-10-09
Payer: COMMERCIAL

## 2023-10-09 VITALS
BODY MASS INDEX: 24.45 KG/M2 | HEART RATE: 63 BPM | OXYGEN SATURATION: 100 % | WEIGHT: 138 LBS | DIASTOLIC BLOOD PRESSURE: 77 MMHG | SYSTOLIC BLOOD PRESSURE: 117 MMHG | HEIGHT: 63 IN

## 2023-10-09 DIAGNOSIS — C50.912 CARCINOMA OF LEFT BREAST METASTATIC TO AXILLARY LYMPH NODE (H): ICD-10-CM

## 2023-10-09 DIAGNOSIS — D70.1 CHEMOTHERAPY-INDUCED NEUTROPENIA (H): ICD-10-CM

## 2023-10-09 DIAGNOSIS — T45.1X5A CHEMOTHERAPY INDUCED DIARRHEA: ICD-10-CM

## 2023-10-09 DIAGNOSIS — C50.812 MALIGNANT NEOPLASM OF OVERLAPPING SITES OF LEFT BREAST IN FEMALE, ESTROGEN RECEPTOR POSITIVE (H): Primary | ICD-10-CM

## 2023-10-09 DIAGNOSIS — Z12.31 ENCOUNTER FOR SCREENING MAMMOGRAM FOR BREAST CANCER: ICD-10-CM

## 2023-10-09 DIAGNOSIS — T45.1X5A CHEMOTHERAPY-INDUCED NEUTROPENIA (H): ICD-10-CM

## 2023-10-09 DIAGNOSIS — C77.3 CARCINOMA OF LEFT BREAST METASTATIC TO AXILLARY LYMPH NODE (H): ICD-10-CM

## 2023-10-09 DIAGNOSIS — Z17.0 MALIGNANT NEOPLASM OF OVERLAPPING SITES OF LEFT BREAST IN FEMALE, ESTROGEN RECEPTOR POSITIVE (H): Primary | ICD-10-CM

## 2023-10-09 DIAGNOSIS — K52.1 CHEMOTHERAPY INDUCED DIARRHEA: ICD-10-CM

## 2023-10-09 LAB
ALBUMIN SERPL BCG-MCNC: 4.3 G/DL (ref 3.5–5.2)
ALP SERPL-CCNC: 46 U/L (ref 35–104)
ALT SERPL W P-5'-P-CCNC: 14 U/L (ref 0–50)
ANION GAP SERPL CALCULATED.3IONS-SCNC: 10 MMOL/L (ref 7–15)
AST SERPL W P-5'-P-CCNC: 22 U/L (ref 0–45)
BASO+EOS+MONOS # BLD AUTO: ABNORMAL 10*3/UL
BASO+EOS+MONOS NFR BLD AUTO: ABNORMAL %
BASOPHILS # BLD AUTO: 0 10E3/UL (ref 0–0.2)
BASOPHILS NFR BLD AUTO: 1 %
BILIRUB SERPL-MCNC: 0.3 MG/DL
BUN SERPL-MCNC: 12.8 MG/DL (ref 6–20)
CALCIUM SERPL-MCNC: 9.3 MG/DL (ref 8.6–10)
CHLORIDE SERPL-SCNC: 105 MMOL/L (ref 98–107)
CREAT SERPL-MCNC: 0.99 MG/DL (ref 0.51–0.95)
DEPRECATED HCO3 PLAS-SCNC: 26 MMOL/L (ref 22–29)
EGFRCR SERPLBLD CKD-EPI 2021: 69 ML/MIN/1.73M2
EOSINOPHIL # BLD AUTO: 0.1 10E3/UL (ref 0–0.7)
EOSINOPHIL NFR BLD AUTO: 5 %
ERYTHROCYTE [DISTWIDTH] IN BLOOD BY AUTOMATED COUNT: 13.2 % (ref 10–15)
GLUCOSE SERPL-MCNC: 92 MG/DL (ref 70–99)
HCT VFR BLD AUTO: 35.8 % (ref 35–47)
HGB BLD-MCNC: 12.1 G/DL (ref 11.7–15.7)
HOLD SPECIMEN: NORMAL
IMM GRANULOCYTES # BLD: 0 10E3/UL
IMM GRANULOCYTES NFR BLD: 0 %
LYMPHOCYTES # BLD AUTO: 0.8 10E3/UL (ref 0.8–5.3)
LYMPHOCYTES NFR BLD AUTO: 29 %
MCH RBC QN AUTO: 31.2 PG (ref 26.5–33)
MCHC RBC AUTO-ENTMCNC: 33.8 G/DL (ref 31.5–36.5)
MCV RBC AUTO: 92 FL (ref 78–100)
MONOCYTES # BLD AUTO: 0.3 10E3/UL (ref 0–1.3)
MONOCYTES NFR BLD AUTO: 10 %
NEUTROPHILS # BLD AUTO: 1.5 10E3/UL (ref 1.6–8.3)
NEUTROPHILS NFR BLD AUTO: 55 %
NRBC # BLD AUTO: 0 10E3/UL
NRBC BLD AUTO-RTO: 0 /100
PLATELET # BLD AUTO: 207 10E3/UL (ref 150–450)
POTASSIUM SERPL-SCNC: 4.6 MMOL/L (ref 3.4–5.3)
PROT SERPL-MCNC: 6.7 G/DL (ref 6.4–8.3)
RBC # BLD AUTO: 3.88 10E6/UL (ref 3.8–5.2)
SODIUM SERPL-SCNC: 141 MMOL/L (ref 135–145)
WBC # BLD AUTO: 2.7 10E3/UL (ref 4–11)

## 2023-10-09 PROCEDURE — 99214 OFFICE O/P EST MOD 30 MIN: CPT | Performed by: INTERNAL MEDICINE

## 2023-10-09 PROCEDURE — 36415 COLL VENOUS BLD VENIPUNCTURE: CPT

## 2023-10-09 PROCEDURE — 85025 COMPLETE CBC W/AUTO DIFF WBC: CPT

## 2023-10-09 PROCEDURE — 80053 COMPREHEN METABOLIC PANEL: CPT

## 2023-10-09 ASSESSMENT — PAIN SCALES - GENERAL: PAINLEVEL: NO PAIN (0)

## 2023-10-09 NOTE — PATIENT INSTRUCTIONS
1) BJT MG early January w/ Prolia and labs (CBC, CMP) and mammogram right breast.    Hoang Franco MD.

## 2023-10-09 NOTE — NURSING NOTE
"Oncology Rooming Note    October 9, 2023 3:03 PM   Betty Rogers is a 48 year old female who presents for:    Chief Complaint   Patient presents with    Oncology Clinic Visit     Follow up     Initial Vitals: /77 (BP Location: Left arm, Patient Position: Chair, Cuff Size: Adult Regular)   Pulse 63   Ht 1.6 m (5' 3\")   Wt 62.6 kg (138 lb)   LMP 09/10/2022   SpO2 100%   BMI 24.45 kg/m   Estimated body mass index is 24.45 kg/m  as calculated from the following:    Height as of this encounter: 1.6 m (5' 3\").    Weight as of this encounter: 62.6 kg (138 lb). Body surface area is 1.67 meters squared.  No Pain (0) Comment: Data Unavailable   Patient's last menstrual period was 09/10/2022.  Allergies reviewed: Yes  Medications reviewed: Yes    Medications: Medication refills not needed today.  Pharmacy name entered into [facility]:    [id number] - [facility] [address] [address] [address] [facility] [id number] [place] [address] SPECIALTY PHARMACY - [place], [place] - [address]    Clinical concerns: NO  Dr. Franco was notified.      Aline Monahan CMA              "

## 2023-10-09 NOTE — LETTER
10/9/2023         RE: Betty Rogers  [address]  [place] [address]        Dear Colleague,    Thank you for referring your patient, Betty Rogers, to the [facility]. Please see a copy of my visit note below.    [facility] Hematology / Oncology  Progress Note 2023  Name: Betty Rogers  :  1975    MRN:  [id number]    --------------------    Assessment / Plan:  Multifocal, locally advanced, hormone positive, HER2 negative left breast ductal carcinoma (ypT1a ypN2a):  # Neoadjuvant Oncotype Dx score 28.  # Neoadjuvant BRCA testing negative.   # Neoadjuvant taxol x 12 weeks; partial response.  # Neoadjuvant dose dense AC x 4 cycle; RCB2 present.  # Left breast mastectomy w/ sentinel node / axillary node and tissue expander recon; path w/ gr 2, 4 mm tumor, 8 mm margin, 7/13 nodes involved (1 macromet, 3 micromet, 21 mm largest, pos KAMERON).  # Left breast tissue expander removal 2/2 infection.  # Adjuvant tamoxifen changed to AI w/ LAILA/BSO  # Adjuvant radiation ongoing.  # Status post LAILA/BSO.  # Adjuvant letrozole / abemaciclib .    Continue letrozole; planning 10 years.  Continue abemaciclib; dose reduced to 100 mg twice daily for diarrhea; planning 2 years.  Planning Prolia every 6 months times minimum 2 years; next dose 2024.  Stable neutropenia on abemaciclib; no dose reductions required.  Stable chemistries.  Return to clinic 3 months with Prolia.  Planning flu shot and COVID shot.  Continue annual right breast annual mammography.    Hoang Franco MD    --------------------    Interval History:  Betty returns for follow up of breast cancer unaccompanied.  All in all, doing quite well.  Planning a fantastic trip to [facility].  No discernible side effects from letrozole are being cycled.  Loose stools manageable at current dosing.    --------------------    Physical Exam:  VS: /77 (BP Location: Left arm, Patient  "Position: Chair, Cuff Size: Adult Regular)   Pulse 63   Ht 1.6 m (5' 3\")   Wt 62.6 kg (138 lb)   LMP 09/10/2022   SpO2 100%   BMI 24.45 kg/m  .  GEN: Well appearing.    Labs / Imaging:  Reviewed CBC, CMP.    Again, thank you for allowing me to participate in the care of your patient.        Sincerely,        Hoang Franco MD  "

## 2023-10-10 NOTE — PROGRESS NOTES
"[facility] Hematology / Oncology  Progress Note 2023  Name: Betty Rogers  :  1975    MRN:  [id number]    --------------------    Assessment / Plan:  Multifocal, locally advanced, hormone positive, HER2 negative left breast ductal carcinoma (ypT1a ypN2a):  # Neoadjuvant Oncotype Dx score 28.  # Neoadjuvant BRCA testing negative.   # Neoadjuvant taxol x 12 weeks; partial response.  # Neoadjuvant dose dense AC x 4 cycle; RCB2 present.  # Left breast mastectomy w/ sentinel node / axillary node and tissue expander recon; path w/ gr 2, 4 mm tumor, 8 mm margin, 7/13 nodes involved (1 macromet, 3 micromet, 21 mm largest, pos KAMERON).  # Left breast tissue expander removal 2/2 infection.  # Adjuvant tamoxifen changed to AI w/ LAILA/BSO  # Adjuvant radiation ongoing.  # Status post LAILA/BSO.  # Adjuvant letrozole / abemaciclib .    Continue letrozole; planning 10 years.  Continue abemaciclib; dose reduced to 100 mg twice daily for diarrhea; planning 2 years.  Planning Prolia every 6 months times minimum 2 years; next dose 2024.  Stable neutropenia on abemaciclib; no dose reductions required.  Stable chemistries.  Return to clinic 3 months with Prolia.  Planning flu shot and COVID shot.  Continue annual right breast annual mammography.    Hoang Franco MD    --------------------    Interval History:  Betty returns for follow up of breast cancer unaccompanied.  All in all, doing quite well.  Planning a fantastic trip to [facility].  No discernible side effects from letrozole are being cycled.  Loose stools manageable at current dosing.    --------------------    Physical Exam:  VS: /77 (BP Location: Left arm, Patient Position: Chair, Cuff Size: Adult Regular)   Pulse 63   Ht 1.6 m (5' 3\")   Wt 62.6 kg (138 lb)   LMP 09/10/2022   SpO2 100%   BMI 24.45 kg/m  .  GEN: Well appearing.    Labs / Imaging:  Reviewed CBC, CMP.  "

## 2023-10-25 DIAGNOSIS — C50.912 CARCINOMA OF LEFT BREAST METASTATIC TO AXILLARY LYMPH NODE (H): Primary | ICD-10-CM

## 2023-10-25 DIAGNOSIS — C77.3 CARCINOMA OF LEFT BREAST METASTATIC TO AXILLARY LYMPH NODE (H): Primary | ICD-10-CM

## 2023-10-31 ENCOUNTER — ALLIED HEALTH/NURSE VISIT (OUTPATIENT)
Dept: FAMILY MEDICINE | Facility: CLINIC | Age: 48
End: 2023-10-31
Payer: COMMERCIAL

## 2023-10-31 DIAGNOSIS — Z23 ENCOUNTER FOR IMMUNIZATION: Primary | ICD-10-CM

## 2023-10-31 PROCEDURE — 99207 PR NO CHARGE NURSE ONLY: CPT

## 2023-10-31 PROCEDURE — 90746 HEPB VACCINE 3 DOSE ADULT IM: CPT

## 2023-10-31 PROCEDURE — 90471 IMMUNIZATION ADMIN: CPT

## 2023-10-31 NOTE — PROGRESS NOTES
Prior to immunization administration, verified patients identity using patient s name and date of birth. Please see Immunization Activity for additional information.     Screening Questionnaire for Adult Immunization    Are you sick today?   No   Do you have allergies to medications, food, a vaccine component or latex?   No   Have you ever had a serious reaction after receiving a vaccination?   No   Do you have a long-term health problem with heart, lung, kidney, or metabolic disease (e.g., diabetes), asthma, a blood disorder, no spleen, complement component deficiency, a cochlear implant, or a spinal fluid leak?  Are you on long-term aspirin therapy?   No   Do you have cancer, leukemia, HIV/AIDS, or any other immune system problem?   No   Do you have a parent, brother, or sister with an immune system problem?   No   In the past 3 months, have you taken medications that affect  your immune system, such as prednisone, other steroids, or anticancer drugs; drugs for the treatment of rheumatoid arthritis, Crohn s disease, or psoriasis; or have you had radiation treatments?   No   Have you had a seizure, or a brain or other nervous system problem?   No   During the past year, have you received a transfusion of blood or blood    products, or been given immune (gamma) globulin or antiviral drug?   No   For women: Are you pregnant or is there a chance you could become       pregnant during the next month?   No   Have you received any vaccinations in the past 4 weeks?   No     Immunization questionnaire answers were all negative.    I have reviewed the following standing orders:   This patient is due and qualifies for the Hepatitis B vaccine.    Click here for Hepatitis B Standing Order    I have reviewed the vaccines inclusion and exclusion criteria; No concerns regarding eligibility.     Patient instructed to remain in clinic for 15 minutes afterwards, and to report any adverse reactions.     Screening performed by Britney  RY Donahue on 10/31/2023 at 9:37 AM.

## 2023-11-24 DIAGNOSIS — C77.3 CARCINOMA OF LEFT BREAST METASTATIC TO AXILLARY LYMPH NODE (H): Primary | ICD-10-CM

## 2023-11-24 DIAGNOSIS — C50.912 CARCINOMA OF LEFT BREAST METASTATIC TO AXILLARY LYMPH NODE (H): Primary | ICD-10-CM

## 2023-12-02 ENCOUNTER — HEALTH MAINTENANCE LETTER (OUTPATIENT)
Age: 48
End: 2023-12-02

## 2023-12-21 ENCOUNTER — OFFICE VISIT (OUTPATIENT)
Dept: SURGERY | Facility: CLINIC | Age: 48
End: 2023-12-21
Payer: COMMERCIAL

## 2023-12-21 DIAGNOSIS — C50.912 CARCINOMA OF LEFT BREAST METASTATIC TO AXILLARY LYMPH NODE (H): Primary | ICD-10-CM

## 2023-12-21 DIAGNOSIS — C77.3 CARCINOMA OF LEFT BREAST METASTATIC TO AXILLARY LYMPH NODE (H): Primary | ICD-10-CM

## 2023-12-21 DIAGNOSIS — I89.0 LYMPHEDEMA: Primary | ICD-10-CM

## 2023-12-21 PROCEDURE — 99213 OFFICE O/P EST LOW 20 MIN: CPT | Performed by: SURGERY

## 2023-12-21 NOTE — LETTER
12/21/2023         RE: Betty Rogers  [address]  [facility] [address]        Dear Colleague,    Thank you for referring your patient, Betyt Rogers, to the [facility]. Please see a copy of my visit note below.    [facility] Follow Up Note    CHIEF COMPLAINT:  H/o left breast cancer    HISTORY OF PRESENT ILLNESS:  Betty Rogers is a 48 year old female who is seen in follow up for left breast cancer.    She is s/p bilateral skin sparing mastectomies with left sentinel lymph node biopsy and excision of tag localized lymph node along with left axillary node dissection and port removal on 12/21/2022.  She had immediate reconstruction with expanders with Dr. Padron..  Her pathology revealed left breast invasive ductal carcinoma grade 2 measuring 4 mm.  She had 4 of 13 lymph nodes positive.      She unfortunately developed infection on the left expander after radiation and required expander removal. She then had a latissimus flap reconstruction with expander placement on 8/4/2023.    She is on letrozole and verzenio. She is tolerating both well overall. She is scheduled for implant placement in January with Dr. Zamorano.     Past Medical History:   Diagnosis Date     Breast cancer metastasized to axillary lymph node (H) 7/15/2022     No active medical problems        Past Surgical History:   Procedure Laterality Date     C/SECTION, LOW TRANSVERSE      2010, 2007     DISSECT LYMPH NODE AXILLA Left 12/21/2022    Procedure: left axillary node dissection;  Surgeon: Janie South MD;  Location: [facility]     HYSTERECTOMY, PAP NO LONGER INDICATED N/A      INSERT PORT VASCULAR ACCESS Right 07/25/2022    Procedure: PORT PLACEMENT;  Surgeon: Janie South MD;  [facility]     INSERT TISSUE EXPANDER BREAST Left 8/14/2023    Procedure: WITH TISSUE EXPANDER PLACEMENT;  Surgeon: Rc Zamorano MD;  [facility]     MASTECTOMY SIMPLE, SENTINEL NODE,  COMBINED Bilateral 12/21/2022    Procedure: bilateral skin sparing mastectomies with left sentinel lymph node biopsy, tag localized left axillary node excision;  Surgeon: Janie South MD;  [facility]     RECONSTRUCT BREAST LATISSIMUS DORSI PEDICLE Left 8/14/2023    Procedure: LEFT LATISSIMUS DORSI FLAP BREAST RECONSTRUCTION;  Surgeon: Rc Zamorano MD;  [facility]     RECONSTRUCT BREAST, INSERT TISSUE EXPANDER BILATERAL, COMBINED Bilateral 12/21/2022    Procedure: BILATERAL TISSUE EXPANDER BREAST RECONSTRUCTION;  Surgeon: Rc Zamorano MD;  [facility]     REMOVE PORT VASCULAR ACCESS N/A 12/21/2022    Procedure: possible port removal;  Surgeon: Janie South MD;  [facility]     REMOVE TISSUE EXPANDER BREAST Left 02/18/2023    Procedure: REMOVAL OF LEFT BREAST RECONSTRUCTION TISSUE EXPANDERS;  Surgeon: Rc Zamorano MD;  [facility]       Family History   Problem Relation Age of Onset     Breast Cancer Mother 40        atypical lobular hyperplasia breast cancer     Unknown/Adopted Brother         Sucide     Heart Disease Maternal Grandfather         Heart Issues     Breast Cancer Maternal Aunt 50     Breast Cancer Maternal Cousin      Mental Illness Brother        Social History     Tobacco Use     Smoking status: Never     Smokeless tobacco: Never   Substance Use Topics     Alcohol use: Yes     Comment: socially, approx 6-7 drinks per week       Patient Active Problem List   Diagnosis     CARDIOVASCULAR SCREENING; LDL GOAL LESS THAN 160     Family history of breast cancer- mother     Carcinoma of left breast metastatic to axillary lymph node (H)     Ductal carcinoma in situ (DCIS) of left breast     Anemia associated with chemotherapy     Chemotherapy-induced neutropenia      Adverse effect of antineoplastic and immunosuppressive drugs, sequela     Osteoporosis due to aromatase inhibitor     Use of aromatase inhibitors     Breast cancer (H)      Allergies   Allergen Reactions     Fruit Extracts      Apple, peaches, plums, pears     Nuts      Other [Seasonal Allergies]      Tree Nuts-Lips swell and breathing problems     Current Outpatient Medications   Medication Sig Dispense Refill     abemaciclib (VERZENIO) 100 MG tablet Take 1 tablet (100 mg) by mouth 2 times daily 60 tablet 0     abemaciclib (VERZENIO) 100 MG tablet Take 1 tablet (100 mg) by mouth 2 times daily 60 tablet 0     calcium citrate (CITRACAL) 950 (200 Ca) MG tablet Take 1 tablet by mouth 2 times daily       letrozole (FEMARA) 2.5 MG tablet Take 1 tablet (2.5 mg) by mouth daily 90 tablet 3     multivitamin w/minerals (THERA-VIT-M) tablet Take 1 tablet by mouth daily       Vitals: LMP 09/10/2022   BMI= There is no height or weight on file to calculate BMI.    EXAM:  GENERAL: healthy, alert and no distress   BREAST:  breasts are surgically absent. Expanders in place. Latisimus flap reconstruction on left. Mild flap edema inferiorly. Moderate radiation changes on left. Tight along pectoralis. No lymphedema noted.   There is no axillary or supraclavicular lymphadenopathy.  CARDIOVASCULAR:  RRR  RESPIRATORY: nonlabored breathing  NECK: Neck supple. No adenopathy. Thyroid symmetric, normal size  SKIN: No suspicious lesions or rashes  LYMPH: Normal cervical lymph nodes      ASSESSMENT/PLAN:  Betty Rogers one year follow up s/p bilateral SSM with left axillary node dissection and adjuvant radiation after neoadjuvant chemotherapy. Her chest wall exam is benign. She is tight along the pectoralis and is at increased risk for lymphedema given axillary node dissection and radiation. I would recommend she meet back with OT after her next surgery.   - lymphedema referral for prevention, tightness, scar adherence  - she should have a chest wall exam annually going forward.     Janie South MD  [facility]  [phone]        Please route or send letter to:  Primary Care Provider  (PCP) and Referring Provider         Again, thank you for allowing me to participate in the care of your patient.        Sincerely,        Janie South MD

## 2023-12-21 NOTE — PROGRESS NOTES
[facility] Follow Up Note    CHIEF COMPLAINT:  H/o left breast cancer    HISTORY OF PRESENT ILLNESS:  Betty Rogers is a 48 year old female who is seen in follow up for left breast cancer.    She is s/p bilateral skin sparing mastectomies with left sentinel lymph node biopsy and excision of tag localized lymph node along with left axillary node dissection and port removal on 12/21/2022.  She had immediate reconstruction with expanders with Dr. Padron..  Her pathology revealed left breast invasive ductal carcinoma grade 2 measuring 4 mm.  She had 4 of 13 lymph nodes positive.      She unfortunately developed infection on the left expander after radiation and required expander removal. She then had a latissimus flap reconstruction with expander placement on 8/4/2023.    She is on letrozole and verzenio. She is tolerating both well overall. She is scheduled for implant placement in January with Dr. Zamorano.     Past Medical History:   Diagnosis Date    Breast cancer metastasized to axillary lymph node (H) 7/15/2022    No active medical problems        Past Surgical History:   Procedure Laterality Date    C/SECTION, LOW TRANSVERSE      2010, 2007    DISSECT LYMPH NODE AXILLA Left 12/21/2022    Procedure: left axillary node dissection;  Surgeon: Janie South MD;  [facility]    HYSTERECTOMY, PAP NO LONGER INDICATED N/A     INSERT PORT VASCULAR ACCESS Right 07/25/2022    Procedure: PORT PLACEMENT;  Surgeon: Janie South MD;  [facility]    INSERT TISSUE EXPANDER BREAST Left 8/14/2023    Procedure: WITH TISSUE EXPANDER PLACEMENT;  Surgeon: Rc Zamorano MD;  [facility]    MASTECTOMY SIMPLE, SENTINEL NODE, COMBINED Bilateral 12/21/2022    Procedure: bilateral skin sparing mastectomies with left sentinel lymph node biopsy, tag localized left axillary node excision;  Surgeon: Janie South MD;  [facility]    RECONSTRUCT BREAST LATISSIMUS DORSI PEDICLE Left  8/14/2023    Procedure: LEFT LATISSIMUS DORSI FLAP BREAST RECONSTRUCTION;  Surgeon: Rc Zamorano MD;  [facility]    RECONSTRUCT BREAST, INSERT TISSUE EXPANDER BILATERAL, COMBINED Bilateral 12/21/2022    Procedure: BILATERAL TISSUE EXPANDER BREAST RECONSTRUCTION;  Surgeon: Rc Zamorano MD;  [facility]    REMOVE PORT VASCULAR ACCESS N/A 12/21/2022    Procedure: possible port removal;  Surgeon: Janie South MD;  [facility]    REMOVE TISSUE EXPANDER BREAST Left 02/18/2023    Procedure: REMOVAL OF LEFT BREAST RECONSTRUCTION TISSUE EXPANDERS;  Surgeon: Rc Zamorano MD;  [facility]       Family History   Problem Relation Age of Onset    Breast Cancer Mother 40        atypical lobular hyperplasia breast cancer    Unknown/Adopted Brother         Sucide    Heart Disease Maternal Grandfather         Heart Issues    Breast Cancer Maternal Aunt 50    Breast Cancer Maternal Cousin     Mental Illness Brother        Social History     Tobacco Use    Smoking status: Never    Smokeless tobacco: Never   Substance Use Topics    Alcohol use: Yes     Comment: socially, approx 6-7 drinks per week       Patient Active Problem List   Diagnosis    CARDIOVASCULAR SCREENING; LDL GOAL LESS THAN 160    Family history of breast cancer- mother    Carcinoma of left breast metastatic to axillary lymph node (H)    Ductal carcinoma in situ (DCIS) of left breast    Anemia associated with chemotherapy    Chemotherapy-induced neutropenia     Adverse effect of antineoplastic and immunosuppressive drugs, sequela    Osteoporosis due to aromatase inhibitor    Use of aromatase inhibitors    Breast cancer (H)     Allergies   Allergen Reactions    Fruit Extracts      Apple, peaches, plums, pears    Nuts     Other [Seasonal Allergies]      Tree Nuts-Lips swell and breathing problems     Current Outpatient Medications   Medication Sig Dispense Refill    abemaciclib (VERZENIO) 100 MG tablet Take 1  tablet (100 mg) by mouth 2 times daily 60 tablet 0    abemaciclib (VERZENIO) 100 MG tablet Take 1 tablet (100 mg) by mouth 2 times daily 60 tablet 0    calcium citrate (CITRACAL) 950 (200 Ca) MG tablet Take 1 tablet by mouth 2 times daily      letrozole (FEMARA) 2.5 MG tablet Take 1 tablet (2.5 mg) by mouth daily 90 tablet 3    multivitamin w/minerals (THERA-VIT-M) tablet Take 1 tablet by mouth daily       Vitals: LMP 09/10/2022   BMI= There is no height or weight on file to calculate BMI.    EXAM:  GENERAL: healthy, alert and no distress   BREAST:  breasts are surgically absent. Expanders in place. Latisimus flap reconstruction on left. Mild flap edema inferiorly. Moderate radiation changes on left. Tight along pectoralis. No lymphedema noted.   There is no axillary or supraclavicular lymphadenopathy.  CARDIOVASCULAR:  RRR  RESPIRATORY: nonlabored breathing  NECK: Neck supple. No adenopathy. Thyroid symmetric, normal size  SKIN: No suspicious lesions or rashes  LYMPH: Normal cervical lymph nodes      ASSESSMENT/PLAN:  Betty Rogers one year follow up s/p bilateral SSM with left axillary node dissection and adjuvant radiation after neoadjuvant chemotherapy. Her chest wall exam is benign. She is tight along the pectoralis and is at increased risk for lymphedema given axillary node dissection and radiation. I would recommend she meet back with OT after her next surgery.   - lymphedema referral for prevention, tightness, scar adherence  - she should have a chest wall exam annually going forward.     Janie South MD  [facility]  [phone]        Please route or send letter to:  Primary Care Provider (PCP) and Referring Provider

## 2023-12-21 NOTE — NURSING NOTE
Betty Rogers's goals for this visit include:   Chief Complaint   Patient presents with    RECHECK     6 month exam, hx of breast cancer       She requests these members of her care team be copied on today's visit information: no    PCP: Diana Luevano    Referring Provider:  No referring provider defined for this encounter.    LMP 09/10/2022     Do you need any medication refills at today's visit? No    Peace Shannon LPN

## 2023-12-28 ENCOUNTER — TELEPHONE (OUTPATIENT)
Dept: ONCOLOGY | Facility: CLINIC | Age: 48
End: 2023-12-28
Payer: COMMERCIAL

## 2023-12-28 NOTE — TELEPHONE ENCOUNTER
PA Initiation    Medication: VERZENIO 100 MG PO TABS  Insurance Company: [employer] - Phone [phone] Fax [phone]  Pharmacy Filling the Rx: [facility] - [place], [place] - [address]  Filling Pharmacy Phone:    Filling Pharmacy Fax:    Start Date: 12/28/2023  Ref.# [id number]    Fax sent in to start PA

## 2023-12-29 NOTE — TELEPHONE ENCOUNTER
Prior Authorization Approval    Medication: VERZENIO 100 MG PO TABS  Authorization Effective Date: 12/29/2023  Authorization Expiration Date: 12/29/2024  Approved Dose/Quantity: 56/28 days  Reference #: [id number]   Insurance Company: [employer] - Phone [phone] Fax [phone]  Expected CoPay: $    CoPay Card Available:      Financial Assistance Needed: yes  Which Pharmacy is filling the prescription: [facility] - [place], [place] - [address] [place] [address]  Pharmacy Notified: yes  Patient Notified: yes

## 2024-01-12 NOTE — COMMUNITY RESOURCES LIST (ENGLISH)
01/12/2024   [facility]  N/A  For questions about this resource list or additional care needs, please contact your primary care clinic or care manager.  Phone: [phone]   Email: N/A   Address: [facility] [place] [address]   Hours: N/A        Hotlines and Helplines       Hotline - Housing crisis  1  [facility] Distance: 14.47 miles      Phone/Virtual   [address] [place] [address]  Language: English  Hours: Mon - Sun Open 24 Hours   Phone: (116) 310-3307 Email: info@[employer] Website: http://www.[employer]     2  [facility] Distance: 14.5 miles      Phone/Virtual   [address] [place] [address]  Language: English  Hours: Mon - Sun Open 24 Hours  Fees: Free   Phone: (822) 553-4934 Email: info@saintolaf.org Website: http://www.saintolaf.org/          Housing       Coordinated Entry access point  3  [facility] Distance: 12.18 miles      Phone/Virtual   [address] [place] [address]  Language: English  Hours: Mon - Fri 8:30 AM - 12:00 PM , Mon - Fri 1:00 PM - 4:00 PM  Fees: Free   Phone: (719) 936-2717 Ext.2 Email: ellyn@[facility].[employer]attle.org Website: https://www.[employer]attleusa.org/usn/     4  [facility] Distance: 13.73 miles      In-Person, Phone/Virtual   [address] [place] [address]  Language: English, [place] citizen  Hours: Mon - Fri 10:00 AM - 5:30 PM , Mon - Sun 7:30 PM - 10:20 PM , Sat - Sun 1:00 PM - 5:30 PM  Fees: Free   Phone: (546) 862-6130 Email: info@[employer] Website: https://www.[employer].org/our-programs/adult-shelter-connect-reeves-shelter/     Drop-in center or day shelter  5  [facility] Distance: 13.64 miles      In-Person   [address] [place] [address]  Language: English, Hmong, Haitian, [place] citizen  Hours: Mon - Thu 8:30 AM - 4:30 PM , Sat - Sun 9:00 AM - 12:00 PM   Fees: Free   Phone: (928) 110-6209 Email: info@[employer]ClearAccess Website: https://Beststudy.org/     6  [facility] Distance: 15.05 miles      In-Person   [address] [place] [address]  Language: English  Hours: Mon - Fri 12:00 PM - 3:00 PM  Fees: Free   Phone: (456) 932-2791 Email: Bio[employer] Website: http://[employer].ClearAccess/     Housing search assistance  7  [facility] Distance: 5.31 miles      In-Person   [address] [place] [address]  Language: English  Hours: Mon - Fri 8:00 AM - 4:00 PM  Fees: Free   Phone: (710) 566-3577 Email: info@[facility].ClearAccess Website: https://AdXpose.ClearAccess/     8  [facility] [employer] Distance: 9.52 miles      Phone/Virtual   [address] [place] [address]  Language: English, Israeli  Hours: Mon - Fri 9:00 AM - 5:00 PM  Fees: Free   Phone: (407) 120-7510 Email: services@[employer] Website: https://www.Snipshot     Shelter for families  9  [facility] Distance: 24.31 miles      In-Person   [address] [place] [address]  Language: English  Hours: Mon - Fri 3:00 PM - 9:00 AM , Sat - Sun Open 24 Hours  Fees: Free   Phone: (773) 935-8649 Ext.1 Website: https://www.saintandrews.org/2020/07/03/emergency-family-shelter/     Shelter for individuals  10  [facility] Distance: 13.98 miles      In-Person   [address] [place] [address]  Language: English  Hours: Mon - Fri 4:00 PM - 9:00 AM  Fees: Free   Phone: (608) 177-8633 Email: byron@[facility].org Website: https://centralusa.[facility].org/northern/[facility]/     11  [facility] Distance: 15.46 miles      In-Person   [address] [place] [address]  Language: English  Hours: Mon - Sun Open 24 Hours  Fees: Free   Phone: (306) 754-8773 Email:  communications@[facility].org Website: https://[facility].org/oursaviourshousing/          Important Numbers & Websites       Emergency Services   911  [facility] Services   311  Poison Control   (678) 811-4619  Suicide Prevention Lifeline   (831) 769-8683 (TALK)  Child Abuse Hotline   (924) 743-9935 (4-A-Child)  Sexual Assault Hotline   (185) 721-4448 (HOPE)  National Runaway Safeline   (363) 263-3189 (RUNAWAY)  All-Options Talkline   (745) 577-2160  Substance Abuse Referral   (834) 447-4959 (HELP)

## 2024-01-15 ENCOUNTER — ONCOLOGY VISIT (OUTPATIENT)
Dept: ONCOLOGY | Facility: CLINIC | Age: 49
End: 2024-01-15
Attending: INTERNAL MEDICINE
Payer: COMMERCIAL

## 2024-01-15 ENCOUNTER — INFUSION THERAPY VISIT (OUTPATIENT)
Dept: INFUSION THERAPY | Facility: CLINIC | Age: 49
End: 2024-01-15
Attending: INTERNAL MEDICINE
Payer: COMMERCIAL

## 2024-01-15 VITALS
OXYGEN SATURATION: 98 % | WEIGHT: 141 LBS | TEMPERATURE: 97.6 F | DIASTOLIC BLOOD PRESSURE: 78 MMHG | BODY MASS INDEX: 24.98 KG/M2 | SYSTOLIC BLOOD PRESSURE: 126 MMHG | HEART RATE: 74 BPM

## 2024-01-15 DIAGNOSIS — T45.1X5A CHEMOTHERAPY INDUCED DIARRHEA: ICD-10-CM

## 2024-01-15 DIAGNOSIS — C77.3 CARCINOMA OF LEFT BREAST METASTATIC TO AXILLARY LYMPH NODE (H): ICD-10-CM

## 2024-01-15 DIAGNOSIS — C50.912 CARCINOMA OF LEFT BREAST METASTATIC TO AXILLARY LYMPH NODE (H): ICD-10-CM

## 2024-01-15 DIAGNOSIS — D70.1 CHEMOTHERAPY-INDUCED NEUTROPENIA (H): ICD-10-CM

## 2024-01-15 DIAGNOSIS — M81.8 OSTEOPOROSIS DUE TO AROMATASE INHIBITOR: ICD-10-CM

## 2024-01-15 DIAGNOSIS — C50.912 CARCINOMA OF LEFT BREAST METASTATIC TO AXILLARY LYMPH NODE (H): Primary | ICD-10-CM

## 2024-01-15 DIAGNOSIS — K52.1 CHEMOTHERAPY INDUCED DIARRHEA: ICD-10-CM

## 2024-01-15 DIAGNOSIS — C50.812 MALIGNANT NEOPLASM OF OVERLAPPING SITES OF LEFT BREAST IN FEMALE, ESTROGEN RECEPTOR POSITIVE (H): Primary | ICD-10-CM

## 2024-01-15 DIAGNOSIS — T38.6X5A OSTEOPOROSIS DUE TO AROMATASE INHIBITOR: ICD-10-CM

## 2024-01-15 DIAGNOSIS — C77.3 CARCINOMA OF LEFT BREAST METASTATIC TO AXILLARY LYMPH NODE (H): Primary | ICD-10-CM

## 2024-01-15 DIAGNOSIS — Z79.811 USE OF AROMATASE INHIBITORS: ICD-10-CM

## 2024-01-15 DIAGNOSIS — Z17.0 MALIGNANT NEOPLASM OF OVERLAPPING SITES OF LEFT BREAST IN FEMALE, ESTROGEN RECEPTOR POSITIVE (H): Primary | ICD-10-CM

## 2024-01-15 DIAGNOSIS — T45.1X5A CHEMOTHERAPY-INDUCED NEUTROPENIA (H): ICD-10-CM

## 2024-01-15 LAB
ALBUMIN SERPL BCG-MCNC: 4.6 G/DL (ref 3.5–5.2)
ALP SERPL-CCNC: 42 U/L (ref 40–150)
ALT SERPL W P-5'-P-CCNC: 28 U/L (ref 0–50)
ANION GAP SERPL CALCULATED.3IONS-SCNC: 10 MMOL/L (ref 7–15)
AST SERPL W P-5'-P-CCNC: 33 U/L (ref 0–45)
BASOPHILS # BLD AUTO: 0.1 10E3/UL (ref 0–0.2)
BASOPHILS NFR BLD AUTO: 1 %
BILIRUB SERPL-MCNC: 0.4 MG/DL
BUN SERPL-MCNC: 17.4 MG/DL (ref 6–20)
CALCIUM SERPL-MCNC: 10.1 MG/DL (ref 8.6–10)
CHLORIDE SERPL-SCNC: 105 MMOL/L (ref 98–107)
CREAT SERPL-MCNC: 1.07 MG/DL (ref 0.51–0.95)
DEPRECATED HCO3 PLAS-SCNC: 26 MMOL/L (ref 22–29)
EGFRCR SERPLBLD CKD-EPI 2021: 63 ML/MIN/1.73M2
EOSINOPHIL # BLD AUTO: 0.2 10E3/UL (ref 0–0.7)
EOSINOPHIL NFR BLD AUTO: 5 %
ERYTHROCYTE [DISTWIDTH] IN BLOOD BY AUTOMATED COUNT: 12.8 % (ref 10–15)
GLUCOSE SERPL-MCNC: 92 MG/DL (ref 70–99)
HCT VFR BLD AUTO: 36.6 % (ref 35–47)
HGB BLD-MCNC: 12.6 G/DL (ref 11.7–15.7)
HOLD SPECIMEN: NORMAL
IMM GRANULOCYTES # BLD: 0 10E3/UL
IMM GRANULOCYTES NFR BLD: 0 %
LYMPHOCYTES # BLD AUTO: 1 10E3/UL (ref 0.8–5.3)
LYMPHOCYTES NFR BLD AUTO: 27 %
MCH RBC QN AUTO: 32.2 PG (ref 26.5–33)
MCHC RBC AUTO-ENTMCNC: 34.4 G/DL (ref 31.5–36.5)
MCV RBC AUTO: 94 FL (ref 78–100)
MONOCYTES # BLD AUTO: 0.3 10E3/UL (ref 0–1.3)
MONOCYTES NFR BLD AUTO: 8 %
NEUTROPHILS # BLD AUTO: 2.2 10E3/UL (ref 1.6–8.3)
NEUTROPHILS NFR BLD AUTO: 59 %
NRBC # BLD AUTO: 0 10E3/UL
NRBC BLD AUTO-RTO: 0 /100
PLATELET # BLD AUTO: 188 10E3/UL (ref 150–450)
POTASSIUM SERPL-SCNC: 4.4 MMOL/L (ref 3.4–5.3)
PROT SERPL-MCNC: 7.2 G/DL (ref 6.4–8.3)
RBC # BLD AUTO: 3.91 10E6/UL (ref 3.8–5.2)
SODIUM SERPL-SCNC: 141 MMOL/L (ref 135–145)
WBC # BLD AUTO: 3.7 10E3/UL (ref 4–11)

## 2024-01-15 PROCEDURE — 80053 COMPREHEN METABOLIC PANEL: CPT

## 2024-01-15 PROCEDURE — 99214 OFFICE O/P EST MOD 30 MIN: CPT | Performed by: INTERNAL MEDICINE

## 2024-01-15 PROCEDURE — 99207 PR NO CHARGE LOS: CPT

## 2024-01-15 PROCEDURE — 96372 THER/PROPH/DIAG INJ SC/IM: CPT | Performed by: INTERNAL MEDICINE

## 2024-01-15 PROCEDURE — 99213 OFFICE O/P EST LOW 20 MIN: CPT | Mod: 25 | Performed by: INTERNAL MEDICINE

## 2024-01-15 PROCEDURE — 36415 COLL VENOUS BLD VENIPUNCTURE: CPT

## 2024-01-15 PROCEDURE — 250N000011 HC RX IP 250 OP 636: Mod: JZ | Performed by: INTERNAL MEDICINE

## 2024-01-15 PROCEDURE — 85025 COMPLETE CBC W/AUTO DIFF WBC: CPT

## 2024-01-15 RX ADMIN — DENOSUMAB 60 MG: 60 INJECTION SUBCUTANEOUS at 14:36

## 2024-01-15 ASSESSMENT — PAIN SCALES - GENERAL: PAINLEVEL: NO PAIN (0)

## 2024-01-15 NOTE — PROGRESS NOTES
Infusion Nursing Note:  Betty Rogers presents today for Prolia.    Patient seen by provider today: Yes: Dr Franco   present during visit today: Not Applicable.    Note: N/A.      Intravenous Access:  No Intravenous access at this visit.    Treatment Conditions:  Lab Results   Component Value Date     01/15/2024    POTASSIUM 4.4 01/15/2024    CR 1.07 (H) 01/15/2024    ABEBA 10.1 (H) 01/15/2024    BILITOTAL 0.4 01/15/2024    ALBUMIN 4.6 01/15/2024    ALT 28 01/15/2024    AST 33 01/15/2024       Results reviewed, labs MET treatment parameters, ok to proceed with treatment.      Post Infusion Assessment:  Patient tolerated injection without incident.       Discharge Plan:   [facility]  Patient will return in 3 months for labs and 6 months for Dr Franco for next appointment.   Patient discharged in stable condition accompanied by: self.      Kelsea Ramon RN

## 2024-01-15 NOTE — LETTER
1/15/2024         RE: Betty Rogers  [address]  [facility] [address]        Dear Colleague,    Thank you for referring your patient, Betty Rogers, to the [facility]. Please see a copy of my visit note below.    [facility] Hematology / Oncology  Progress Note Jey 15, 2024  Name: Betty Rogers  :  1975    MRN:  [id number]    --------------------    Assessment / Plan:  Multifocal, locally advanced, hormone positive, HER2 negative left breast ductal carcinoma (ypT1a ypN2a):  # Neoadjuvant Oncotype Dx score 28.  # Neoadjuvant BRCA testing negative.   # Neoadjuvant taxol x 12 weeks; partial response.  # Neoadjuvant dose dense AC x 4 cycle; RCB2 present.  # Bilateral breast mastectomy w/ sentinel node / axillary node and tissue expander recon; path w/ gr 2, 4 mm tumor, 8 mm margin, 7/13 nodes involved (1 macromet, 3 micromet, 21 mm largest, pos KAMERON).  # Left breast tissue expander removal 2/2 infection.  # Adjuvant tamoxifen changed to AI w/ LAILA/BSO  # Status post adjuvant radiation ongoing.  # Status post LAILA/BSO.  # Adjuvant letrozole / abemaciclib ongoing.    Continue letrozole; planning 10 years.  Continue abemaciclib; dose reduced to 100 mg twice daily for diarrhea; planning 2 years.  Hold abema about a week prior to surgery and then resume 3-5 days post-op.  Planning Prolia every 6 months times minimum 2 years; dose today and then 2024.  Stable neutropenia on abemaciclib; no dose reductions required.  Stable chemistries; just took calcium supplement prior to labs.  Return to clinic 6 months with Prolia.  Planning flu shot and COVID shot.    Hoang Franco MD    --------------------    Interval History:  Betty returns for follow up of breast cancer unaccompanied.  All in all, doing quite well.  Amazing trip to [place].  No discernible side effects from letrozole are being cycled.  Loose stools manageable at current dosing of  Verzenio.  Some back / hip pain that resolved.    Social History:  Social History     Tobacco Use     Smoking status: Never     Smokeless tobacco: Never   Substance Use Topics     Alcohol use: Yes     Comment: socially, approx 6-7 drinks per week     Family History:  Family History   Problem Relation Age of Onset     Breast Cancer Mother 40        atypical lobular hyperplasia breast cancer     Unknown/Adopted Brother         Sucide     Heart Disease Maternal Grandfather         Heart Issues     Breast Cancer Maternal Aunt 50     Breast Cancer Maternal Cousin      Mental Illness Brother      Immunizations:  Immunization History   Administered Date(s) Administered     COVID-19 12+ (2023-24) (Pfizer) 10/13/2023     COVID-19 Bivalent 12+ (Pfizer) 01/09/2023     COVID-19 MONOVALENT 12+ (Pfizer) 04/08/2021, 04/29/2021, 12/23/2021     COVID-19 Monovalent 12+ (Pfizer 2022) 07/08/2022     Hepatitis B, Adult 04/25/2023, 06/26/2023, 10/31/2023     Influenza (H1N1) 01/19/2010     Influenza (IIV3) PF 12/19/2012     Influenza Vaccine >6 months,quad, PF 12/11/2015, 10/11/2017, 12/28/2018, 09/24/2020, 10/29/2021, 10/07/2022, 10/13/2023     Nasal Influenza Vaccine 2-49 (FluMist) 12/09/2014     TDAP Vaccine (Adacel) 05/01/2015     Td (Adult), Adsorbed 12/18/2000     Zoster recombinant adjuvanted (SHINGRIX) 04/25/2023     Health Maintenance Due   Topic Date Due     Pneumococcal Vaccine: Pediatrics (0 to 5 Years) and At-Risk Patients (6 to 64 Years) (1 of 2 - PCV) Never done     COVID-19 Vaccine (7 - 2023-24 season) 12/08/2023     ZOSTER IMMUNIZATION (2 of 2) 06/20/2023     --------------------    Physical Exam:  VS: /78   Pulse 74   Temp 97.6  F (36.4  C)   Wt 64 kg (141 lb)   LMP 09/10/2022   SpO2 98%   BMI 24.98 kg/m  .  GEN: Well appearing.    Labs / Imaging:  Reviewed CBC, CMP.      Again, thank you for allowing me to participate in the care of your patient.        Sincerely,        Hoang Franco MD

## 2024-01-15 NOTE — PROGRESS NOTES
[facility] Hematology / Oncology  Progress Note Jey 15, 2024  Name: Betty Rogers  :  1975    MRN:  [id number]    --------------------    Assessment / Plan:  Multifocal, locally advanced, hormone positive, HER2 negative left breast ductal carcinoma (ypT1a ypN2a):  # Neoadjuvant Oncotype Dx score 28.  # Neoadjuvant BRCA testing negative.   # Neoadjuvant taxol x 12 weeks; partial response.  # Neoadjuvant dose dense AC x 4 cycle; RCB2 present.  # Bilateral breast mastectomy w/ sentinel node / axillary node and tissue expander recon; path w/ gr 2, 4 mm tumor, 8 mm margin, 7/13 nodes involved (1 macromet, 3 micromet, 21 mm largest, pos KAMERON).  # Left breast tissue expander removal 2/2 infection.  # Adjuvant tamoxifen changed to AI w/ LAILA/BSO  # Status post adjuvant radiation ongoing.  # Status post LAILA/BSO.  # Adjuvant letrozole / abemaciclib ongoing.    Continue letrozole; planning 10 years.  Continue abemaciclib; dose reduced to 100 mg twice daily for diarrhea; planning 2 years.  Hold abema about a week prior to surgery and then resume 3-5 days post-op.  Planning Prolia every 6 months times minimum 2 years; dose today and then 2024.  Stable neutropenia on abemaciclib; no dose reductions required.  Stable chemistries; just took calcium supplement prior to labs.  Return to clinic 6 months with Prolia.  Planning flu shot and COVID shot.    Hoang Franco MD    --------------------    Interval History:  Betty returns for follow up of breast cancer unaccompanied.  All in all, doing quite well.  Amazing trip to [place].  No discernible side effects from letrozole are being cycled.  Loose stools manageable at current dosing of Verzenio.  Some back / hip pain that resolved.    Social History:  Social History     Tobacco Use    Smoking status: Never    Smokeless tobacco: Never   Substance Use Topics    Alcohol use: Yes     Comment: socially, approx 6-7 drinks per week     Family History:  Family  History   Problem Relation Age of Onset    Breast Cancer Mother 40        atypical lobular hyperplasia breast cancer    Unknown/Adopted Brother         Sucide    Heart Disease Maternal Grandfather         Heart Issues    Breast Cancer Maternal Aunt 50    Breast Cancer Maternal Cousin     Mental Illness Brother      Immunizations:  Immunization History   Administered Date(s) Administered    COVID-19 12+ (2023-24) (Pfizer) 10/13/2023    COVID-19 Bivalent 12+ (Pfizer) 01/09/2023    COVID-19 MONOVALENT 12+ (Pfizer) 04/08/2021, 04/29/2021, 12/23/2021    COVID-19 Monovalent 12+ (Pfizer 2022) 07/08/2022    Hepatitis B, Adult 04/25/2023, 06/26/2023, 10/31/2023    Influenza (H1N1) 01/19/2010    Influenza (IIV3) PF 12/19/2012    Influenza Vaccine >6 months,quad, PF 12/11/2015, 10/11/2017, 12/28/2018, 09/24/2020, 10/29/2021, 10/07/2022, 10/13/2023    Nasal Influenza Vaccine 2-49 (FluMist) 12/09/2014    TDAP Vaccine (Adacel) 05/01/2015    Td (Adult), Adsorbed 12/18/2000    Zoster recombinant adjuvanted (SHINGRIX) 04/25/2023     Health Maintenance Due   Topic Date Due    Pneumococcal Vaccine: Pediatrics (0 to 5 Years) and At-Risk Patients (6 to 64 Years) (1 of 2 - PCV) Never done    COVID-19 Vaccine (7 - 2023-24 season) 12/08/2023    ZOSTER IMMUNIZATION (2 of 2) 06/20/2023     --------------------    Physical Exam:  VS: /78   Pulse 74   Temp 97.6  F (36.4  C)   Wt 64 kg (141 lb)   LMP 09/10/2022   SpO2 98%   BMI 24.98 kg/m  .  GEN: Well appearing.    Labs / Imaging:  Reviewed CBC, CMP.

## 2024-01-15 NOTE — NURSING NOTE
"Oncology Rooming Note    January 15, 2024 2:02 PM   Betty Rogers is a 48 year old female who presents for:    Chief Complaint   Patient presents with    Oncology Clinic Visit     Follow Up     Initial Vitals: /78   Pulse 74   Temp 97.6  F (36.4  C)   Wt 64 kg (141 lb)   LMP 09/10/2022   SpO2 98%   BMI 24.98 kg/m   Estimated body mass index is 24.98 kg/m  as calculated from the following:    Height as of 10/9/23: 1.6 m (5' 3\").    Weight as of this encounter: 64 kg (141 lb). Body surface area is 1.69 meters squared.  No Pain (0) Comment: Data Unavailable   Patient's last menstrual period was 09/10/2022.  Allergies reviewed: Yes  Medications reviewed: Yes    Medications: Medication refills not needed today.  Pharmacy name entered into [facility]:    [id number] - [place], [place] - [address] [address] [address] [place] [facility] [id number] [place] [address] [facility]TY [facility] - [place], [place] [facility] [address] [facility] [address]    Frailty Screening:   Is the patient here for a new oncology consult visit in cancer care? 2. No      Clinical concerns: No concerns        Leana Shannon MA            "

## 2024-01-19 ENCOUNTER — OFFICE VISIT (OUTPATIENT)
Dept: FAMILY MEDICINE | Facility: CLINIC | Age: 49
End: 2024-01-19
Payer: COMMERCIAL

## 2024-01-19 VITALS
WEIGHT: 142.4 LBS | BODY MASS INDEX: 24.31 KG/M2 | TEMPERATURE: 97.9 F | OXYGEN SATURATION: 100 % | HEART RATE: 73 BPM | HEIGHT: 64 IN | DIASTOLIC BLOOD PRESSURE: 67 MMHG | SYSTOLIC BLOOD PRESSURE: 108 MMHG

## 2024-01-19 DIAGNOSIS — C50.912 CARCINOMA OF LEFT BREAST METASTATIC TO AXILLARY LYMPH NODE (H): Primary | ICD-10-CM

## 2024-01-19 DIAGNOSIS — D05.12 DUCTAL CARCINOMA IN SITU (DCIS) OF LEFT BREAST: ICD-10-CM

## 2024-01-19 DIAGNOSIS — Z01.818 PREOP GENERAL PHYSICAL EXAM: Primary | ICD-10-CM

## 2024-01-19 DIAGNOSIS — C77.3 CARCINOMA OF LEFT BREAST METASTATIC TO AXILLARY LYMPH NODE (H): Primary | ICD-10-CM

## 2024-01-19 PROCEDURE — 99214 OFFICE O/P EST MOD 30 MIN: CPT | Mod: 25 | Performed by: INTERNAL MEDICINE

## 2024-01-19 PROCEDURE — 90750 HZV VACC RECOMBINANT IM: CPT | Performed by: INTERNAL MEDICINE

## 2024-01-19 PROCEDURE — 90472 IMMUNIZATION ADMIN EACH ADD: CPT | Performed by: INTERNAL MEDICINE

## 2024-01-19 PROCEDURE — 90471 IMMUNIZATION ADMIN: CPT | Performed by: INTERNAL MEDICINE

## 2024-01-19 PROCEDURE — 90677 PCV20 VACCINE IM: CPT | Performed by: INTERNAL MEDICINE

## 2024-01-19 ASSESSMENT — PAIN SCALES - GENERAL: PAINLEVEL: NO PAIN (0)

## 2024-01-19 NOTE — NURSING NOTE
Prior to immunization administration, verified patients identity using patient s name and date of birth. Please see Immunization Activity for additional information.     Screening Questionnaire for Adult Immunization    Are you sick today?   No   Do you have allergies to medications, food, a vaccine component or latex?   No   Have you ever had a serious reaction after receiving a vaccination?   No   Do you have a long-term health problem with heart, lung, kidney, or metabolic disease (e.g., diabetes), asthma, a blood disorder, no spleen, complement component deficiency, a cochlear implant, or a spinal fluid leak?  Are you on long-term aspirin therapy?   No   Do you have cancer, leukemia, HIV/AIDS, or any other immune system problem?   No   Do you have a parent, brother, or sister with an immune system problem?   No   In the past 3 months, have you taken medications that affect  your immune system, such as prednisone, other steroids, or anticancer drugs; drugs for the treatment of rheumatoid arthritis, Crohn s disease, or psoriasis; or have you had radiation treatments?   No   Have you had a seizure, or a brain or other nervous system problem?   No   During the past year, have you received a transfusion of blood or blood    products, or been given immune (gamma) globulin or antiviral drug?   No   For women: Are you pregnant or is there a chance you could become       pregnant during the next month?   No   Have you received any vaccinations in the past 4 weeks?   No     Immunization questionnaire answers were all negative.      Patient instructed to remain in clinic for 15 minutes afterwards, and to report any adverse reactions.     Screening performed by Riddhi Ramirez MA on 1/19/2024 at 11:52 AM.

## 2024-01-19 NOTE — PATIENT INSTRUCTIONS
Preparing for Your Surgery  Getting started  A nurse will call you to review your health history and instructions. They will give you an arrival time based on your scheduled surgery time. Please be ready to share:  Your doctor's clinic name and phone number  Your medical, surgical, and anesthesia history  A list of allergies and sensitivities  A list of medicines, including herbal treatments and over-the-counter drugs  Whether the patient has a legal guardian (ask how to send us the papers in advance)  Please tell us if you're pregnant--or if there's any chance you might be pregnant. Some surgeries may injure a fetus (unborn baby), so they require a pregnancy test. Surgeries that are safe for a fetus don't always need a test, and you can choose whether to have one.   If you have a child who's having surgery, please ask for a copy of Preparing for Your Child's Surgery.    Preparing for surgery  Within 10 to 30 days of surgery: Have a pre-op exam (sometimes called an H&P, or History and Physical). This can be done at a clinic or pre-operative center.  If you're having a , you may not need this exam. Talk to your care team.  At your pre-op exam, talk to your care team about all medicines you take. If you need to stop any medicines before surgery, ask when to start taking them again.  We do this for your safety. Many medicines can make you bleed too much during surgery. Some change how well surgery (anesthesia) drugs work.  Call your insurance company to let them know you're having surgery. (If you don't have insurance, call [phone].)  Call your clinic if there's any change in your health. This includes signs of a cold or flu (sore throat, runny nose, cough, rash, fever). It also includes a scrape or scratch near the surgery site.  If you have questions on the day of surgery, call your hospital or surgery center.  Eating and drinking guidelines  For your safety: Unless your surgeon tells you otherwise,  follow the guidelines below.  Eat and drink as usual until 8 hours before you arrive for surgery. After that, no food or milk.  Drink clear liquids until 2 hours before you arrive. These are liquids you can see through, like water, Gatorade, and Propel Water. They also include plain black coffee and tea (no cream or milk), candy, and breath mints. You can spit out gum when you arrive.  If you drink alcohol: Stop drinking it the night before surgery.  If your care team tells you to take medicine on the morning of surgery, it's okay to take it with a sip of water.  Preventing infection  Shower or bathe the night before and morning of your surgery. Follow the instructions your clinic gave you. (If no instructions, use regular soap.)  Don't shave or clip hair near your surgery site. We'll remove the hair if needed.  Don't smoke or vape the morning of surgery. You may chew nicotine gum up to 2 hours before surgery. A nicotine patch is okay.  Note: Some surgeries require you to completely quit smoking and nicotine. Check with your surgeon.  Your care team will make every effort to keep you safe from infection. We will:  Clean our hands often with soap and water (or an alcohol-based hand rub).  Clean the skin at your surgery site with a special soap that kills germs.  Give you a special gown to keep you warm. (Cold raises the risk of infection.)  Wear special hair covers, masks, gowns and gloves during surgery.  Give antibiotic medicine, if prescribed. Not all surgeries need antibiotics.  What to bring on the day of surgery  Photo ID and insurance card  Copy of your health care directive, if you have one  Glasses and hearing aids (bring cases)  You can't wear contacts during surgery  Inhaler and eye drops, if you use them (tell us about these when you arrive)  CPAP machine or breathing device, if you use them  A few personal items, if spending the night  If you have . . .  A pacemaker, ICD (cardiac defibrillator) or other  implant: Bring the ID card.  An implanted stimulator: Bring the remote control.  A legal guardian: Bring a copy of the certified (court-stamped) guardianship papers.  Please remove any jewelry, including body piercings. Leave jewelry and other valuables at home.  If you're going home the day of surgery  You must have a responsible adult drive you home. They should stay with you overnight as well.  If you don't have someone to stay with you, and you aren't safe to go home alone, we may keep you overnight. Insurance often won't pay for this.  After surgery  If it's hard to control your pain or you need more pain medicine, please call your surgeon's office.  Questions?   If you have any questions for your care team, list them here: _________________________________________________________________________________________________________________________________________________________________________ ____________________________________ ____________________________________ ____________________________________  For informational purposes only. Not to replace the advice of your health care provider. Copyright   2003, 2019 [facility]. All rights reserved. Clinically reviewed by Gloria Hammond MD. [employer] [id number] - REV 12/22.

## 2024-01-19 NOTE — PROGRESS NOTES
Preoperative Evaluation  [address] [address]  Phone: [phone]  Primary Provider: Diana Hernandez  Pre-op Performing Provider: DIANA HERNANDEZ  Jan 19, 2024       Betty is a 48 year old, presenting for the following:  Pre-Op Exam        1/19/2024    11:20 AM   Additional Questions   Roomed by [facility]     Surgical Information  Surgery/Procedure: Removal of breast, silicone implant  Surgery Location: [facility]  Surgeon: Dr. Bolanos  Surgery Date: 1/26/2024  Time of Surgery: 10:15  Where patient plans to recover: At home with family  Fax number for surgical facility: [phone]    Assessment & Plan     Betty was seen today for pre-op exam.    Diagnoses and all orders for this visit:    Preop general physical exam    Ductal carcinoma in situ (DCIS) of left breast    Other orders  -     Pneumococcal 20 Valent Conjugate (Prevnar 20)  -     ZOSTER RECOMBINANT ADJUVANTED (SHINGRIX)        The proposed surgical procedure is considered INTERMEDIATE risk.          - No identified additional risk factors other than previously addressed    Antiplatelet or Anticoagulation Medication Instructions   - Patient is on no antiplatelet or anticoagulation medications.    Additional Medication Instructions  -Hold Verzenio for 1 week prior to surgery, and restart 2 days later per oncology instruction    Recommendation  APPROVAL GIVEN to proceed with proposed procedure, without further diagnostic evaluation.          Subjective       HPI related to upcoming procedure: History of breast cancer, bilateral mastectomy with expanders.  She is scheduled for expanders removal and silicone implant insertion.        1/12/2024     9:55 AM   Preop Questions   1. Have you ever had a heart attack or stroke? No   2. Have you ever had surgery on your heart or blood vessels, such as a stent placement, a coronary artery bypass, or surgery on an artery in your head, neck, heart, or legs?  No   3. Do you have chest pain with activity? No   4. Do you have a history of  heart failure? No   5. Do you currently have a cold, bronchitis or symptoms of other infection? No   6. Do you have a cough, shortness of breath, or wheezing? No   7. Do you or anyone in your family have previous history of blood clots? No   8. Do you or does anyone in your family have a serious bleeding problem such as prolonged bleeding following surgeries or cuts? No   9. Have you ever had problems with anemia or been told to take iron pills? No   10. Have you had any abnormal blood loss such as black, tarry or bloody stools, or abnormal vaginal bleeding? No   11. Have you ever had a blood transfusion? No   12. Are you willing to have a blood transfusion if it is medically needed before, during, or after your surgery? NO    13. Have you or any of your relatives ever had problems with anesthesia? No   14. Do you have sleep apnea, excessive snoring or daytime drowsiness? No   15. Do you have any artifical heart valves or other implanted medical devices like a pacemaker, defibrillator, or continuous glucose monitor? No   16. Do you have artificial joints? No   17. Are you allergic to latex? No   18. Is there any chance that you may be pregnant? No       Health Care Directive  Patient does not have a Health Care Directive or Living Will: Discussed advance care planning with patient; however, patient declined at this time.    Preoperative Review of    reviewed - controlled substances reflected in medication list.      Patient Active Problem List    Diagnosis Date Noted    Breast cancer (H) 08/14/2023     Priority: Medium    Use of aromatase inhibitors 06/20/2023     Priority: Medium    Osteoporosis due to aromatase inhibitor 05/26/2023     Priority: Medium    Adverse effect of antineoplastic and immunosuppressive drugs, sequela 10/07/2022     Priority: Medium    Anemia associated with chemotherapy 09/12/2022     Priority: Medium     Chemotherapy-induced neutropenia  (H24) 09/12/2022     Priority: Medium    Ductal carcinoma in situ (DCIS) of left breast 07/20/2022     Priority: Medium    Carcinoma of left breast metastatic to axillary lymph node (H) 07/15/2022     Priority: Medium     EOTD 5/6/2025 - [place]       CARDIOVASCULAR SCREENING; LDL GOAL LESS THAN 160 01/03/2012     Priority: Medium    Family history of breast cancer- mother 01/03/2012     Priority: Medium      Past Medical History:   Diagnosis Date    Breast cancer metastasized to axillary lymph node (H) 7/15/2022    No active medical problems      Past Surgical History:   Procedure Laterality Date    C/SECTION, LOW TRANSVERSE      2010, 2007    DISSECT LYMPH NODE AXILLA Left 12/21/2022    Procedure: left axillary node dissection;  Surgeon: Janie South MD;  Location: [facility] OR    HYSTERECTOMY, PAP NO LONGER INDICATED N/A     INSERT PORT VASCULAR ACCESS Right 07/25/2022    Procedure: PORT PLACEMENT;  Surgeon: Janie South MD;  Location: [facility] OR    INSERT TISSUE EXPANDER BREAST Left 8/14/2023    Procedure: WITH TISSUE EXPANDER PLACEMENT;  Surgeon: Rc Zamorano MD;  Location: [facility] OR    MASTECTOMY SIMPLE, SENTINEL NODE, COMBINED Bilateral 12/21/2022    Procedure: bilateral skin sparing mastectomies with left sentinel lymph node biopsy, tag localized left axillary node excision;  Surgeon: Janie South MD;  Location: [facility]    RECONSTRUCT BREAST LATISSIMUS DORSI PEDICLE Left 8/14/2023    Procedure: LEFT LATISSIMUS DORSI FLAP BREAST RECONSTRUCTION;  Surgeon: Rc Zamorano MD;  Location: [facility] OR    RECONSTRUCT BREAST, INSERT TISSUE EXPANDER BILATERAL, COMBINED Bilateral 12/21/2022    Procedure: BILATERAL TISSUE EXPANDER BREAST RECONSTRUCTION;  Surgeon: Rc Zamorano MD;  Location: [facility] OR    REMOVE PORT VASCULAR ACCESS N/A 12/21/2022    Procedure: possible port removal;  Surgeon: Janie South MD;  Location: [facility]    REMOVE TISSUE EXPANDER BREAST  "Left 02/18/2023    Procedure: REMOVAL OF LEFT BREAST RECONSTRUCTION TISSUE EXPANDERS;  Surgeon: Rc Zamorano MD;  Location:  OR     Current Outpatient Medications   Medication Sig Dispense Refill    [START ON 2/11/2024] abemaciclib (VERZENIO) 100 MG tablet Take 1 tablet (100 mg) by mouth 2 times daily 60 tablet 0    calcium citrate (CITRACAL) 950 (200 Ca) MG tablet Take 1 tablet by mouth 2 times daily      letrozole (FEMARA) 2.5 MG tablet Take 1 tablet (2.5 mg) by mouth daily 90 tablet 3    multivitamin w/minerals (THERA-VIT-M) tablet Take 1 tablet by mouth daily         Allergies   Allergen Reactions    Fruit Extracts      Apple, peaches, plums, pears    Nuts     Other [Seasonal Allergies]      Tree Nuts-Lips swell and breathing problems        Social History     Tobacco Use    Smoking status: Never    Smokeless tobacco: Never   Substance Use Topics    Alcohol use: Yes     Comment: socially, approx 6-7 drinks per week     Family History   Problem Relation Age of Onset    Breast Cancer Mother 40        atypical lobular hyperplasia breast cancer    Unknown/Adopted Brother         Sucide    Heart Disease Maternal Grandfather         Heart Issues    Breast Cancer Maternal Aunt 50    Breast Cancer Maternal Cousin     Mental Illness Brother      History   Drug Use No         Review of Systems  Constitutional, HEENT, cardiovascular, pulmonary, gi and gu systems are negative, except as otherwise noted.     Objective    /67 (BP Location: Right arm, Patient Position: Sitting, Cuff Size: Adult Regular)   Pulse 73   Temp 97.9  F (36.6  C) (Oral)   Ht 1.626 m (5' 4\")   Wt 64.6 kg (142 lb 6.4 oz)   LMP 09/10/2022   SpO2 100%   BMI 24.44 kg/m     Estimated body mass index is 24.44 kg/m  as calculated from the following:    Height as of this encounter: 1.626 m (5' 4\").    Weight as of this encounter: 64.6 kg (142 lb 6.4 oz).  Physical Exam  GENERAL: alert and no distress  EYES: Eyes grossly normal to " inspection, PERRL and conjunctivae and sclerae normal  HENT: ear canals and TM's normal, nose and mouth without ulcers or lesions  NECK: no adenopathy, no asymmetry, masses, or scars  RESP: lungs clear to auscultation - no rales, rhonchi or wheezes  CV: regular rate and rhythm, normal S1 S2, no S3 or S4, no murmur, click or rub, no peripheral edema   ABDOMEN: soft, nontender, no hepatosplenomegaly, no masses and bowel sounds normal  MS: no gross musculoskeletal defects noted, no edema  NEURO: Normal strength and tone, mentation intact and speech normal  PSYCH: mentation appears normal, affect normal/bright      Diagnostics  Lab on 01/15/2024   Component Date Value Ref Range Status    Sodium 01/15/2024 141  135 - 145 mmol/L Final    Reference intervals for this test were updated on 09/26/2023 to more accurately reflect our healthy population. There may be differences in the flagging of prior results with similar values performed with this method. Interpretation of those prior results can be made in the context of the updated reference intervals.     Potassium 01/15/2024 4.4  3.4 - 5.3 mmol/L Final    Carbon Dioxide (CO2) 01/15/2024 26  22 - 29 mmol/L Final    Anion Gap 01/15/2024 10  7 - 15 mmol/L Final    Urea Nitrogen 01/15/2024 17.4  6.0 - 20.0 mg/dL Final    Creatinine 01/15/2024 1.07 (H)  0.51 - 0.95 mg/dL Final    GFR Estimate 01/15/2024 63  >60 mL/min/1.73m2 Final    Calcium 01/15/2024 10.1 (H)  8.6 - 10.0 mg/dL Final    Chloride 01/15/2024 105  98 - 107 mmol/L Final    Glucose 01/15/2024 92  70 - 99 mg/dL Final    Alkaline Phosphatase 01/15/2024 42  40 - 150 U/L Final    Reference intervals for this test were updated on 11/14/2023 to more accurately reflect our healthy population. There may be differences in the flagging of prior results with similar values performed with this method. Interpretation of those prior results can be made in the context of the updated reference intervals.    AST 01/15/2024 33  0 -  45 U/L Final    Reference intervals for this test were updated on 6/12/2023 to more accurately reflect our healthy population. There may be differences in the flagging of prior results with similar values performed with this method. Interpretation of those prior results can be made in the context of the updated reference intervals.    ALT 01/15/2024 28  0 - 50 U/L Final    Reference intervals for this test were updated on 6/12/2023 to more accurately reflect our healthy population. There may be differences in the flagging of prior results with similar values performed with this method. Interpretation of those prior results can be made in the context of the updated reference intervals.      Protein Total 01/15/2024 7.2  6.4 - 8.3 g/dL Final    Albumin 01/15/2024 4.6  3.5 - 5.2 g/dL Final    Bilirubin Total 01/15/2024 0.4  <=1.2 mg/dL Final    WBC Count 01/15/2024 3.7 (L)  4.0 - 11.0 10e3/uL Final    RBC Count 01/15/2024 3.91  3.80 - 5.20 10e6/uL Final    Hemoglobin 01/15/2024 12.6  11.7 - 15.7 g/dL Final    Hematocrit 01/15/2024 36.6  35.0 - 47.0 % Final    MCV 01/15/2024 94  78 - 100 fL Final    MCH 01/15/2024 32.2  26.5 - 33.0 pg Final    MCHC 01/15/2024 34.4  31.5 - 36.5 g/dL Final    RDW 01/15/2024 12.8  10.0 - 15.0 % Final    Platelet Count 01/15/2024 188  150 - 450 10e3/uL Final    % Neutrophils 01/15/2024 59  % Final    % Lymphocytes 01/15/2024 27  % Final    % Monocytes 01/15/2024 8  % Final    % Eosinophils 01/15/2024 5  % Final    % Basophils 01/15/2024 1  % Final    % Immature Granulocytes 01/15/2024 0  % Final    NRBCs per 100 WBC 01/15/2024 0  <1 /100 Final    Absolute Neutrophils 01/15/2024 2.2  1.6 - 8.3 10e3/uL Final    Absolute Lymphocytes 01/15/2024 1.0  0.8 - 5.3 10e3/uL Final    Absolute Monocytes 01/15/2024 0.3  0.0 - 1.3 10e3/uL Final    Absolute Eosinophils 01/15/2024 0.2  0.0 - 0.7 10e3/uL Final    Absolute Basophils 01/15/2024 0.1  0.0 - 0.2 10e3/uL Final    Absolute Immature Granulocytes  01/15/2024 0.0  <=0.4 10e3/uL Final    Absolute NRBCs 01/15/2024 0.0  10e3/uL Final    Hold Specimen 01/15/2024 JIC   Final      No EKG required for low risk surgery (cataract, skin procedure, breast biopsy, etc).    Revised Cardiac Risk Index (RCRI)  The patient has the following serious cardiovascular risks for perioperative complications:   - No serious cardiac risks = 0 points     RCRI Interpretation: 0 points: Class I (very low risk - 0.4% complication rate)         Signed Electronically by: Diana Luevano MD PhD  Copy of this evaluation report is provided to requesting physician.

## 2024-02-19 ENCOUNTER — THERAPY VISIT (OUTPATIENT)
Dept: OCCUPATIONAL THERAPY | Facility: CLINIC | Age: 49
End: 2024-02-19
Attending: SURGERY
Payer: COMMERCIAL

## 2024-02-19 DIAGNOSIS — C50.912 CARCINOMA OF LEFT BREAST METASTATIC TO AXILLARY LYMPH NODE (H): Primary | ICD-10-CM

## 2024-02-19 DIAGNOSIS — I89.0 LYMPHEDEMA: ICD-10-CM

## 2024-02-19 DIAGNOSIS — C77.3 CARCINOMA OF LEFT BREAST METASTATIC TO AXILLARY LYMPH NODE (H): Primary | ICD-10-CM

## 2024-02-19 PROCEDURE — 97140 MANUAL THERAPY 1/> REGIONS: CPT | Mod: GO

## 2024-02-19 PROCEDURE — 97110 THERAPEUTIC EXERCISES: CPT | Mod: GO

## 2024-02-19 PROCEDURE — 97165 OT EVAL LOW COMPLEX 30 MIN: CPT | Mod: GO

## 2024-02-19 NOTE — PROGRESS NOTES
OCCUPATIONAL THERAPY EVALUATION  Type of Visit: Evaluation    See electronic medical record for Abuse and Falls Screening details.    Subjective      Presenting condition or subjective complaint: Assessment of lymphodema risk and steps needed to reduce risk. Also assessment to ensure optimal mobility.  Date of onset: 12/21/22    Relevant medical history:   B mastectomies with L ALND 12/21/22 with expander placement, post-op infection and seroma complicated healing.  Dates & types of surgery: 1.26.24 was most recent. Removed expanders and placed implants, latissimus flap reconstruction with expander placement on 8/4/2023.     Prior diagnostic imaging/testing results:       Prior therapy history for the same diagnosis, illness or injury: No      Prior Level of Function  Transfers: Independent  Ambulation: Independent  ADL: Independent  IADL:  indep with all    Living Environment  Social support: With family members   Type of home: House   Stairs to enter the home: Yes 14 Is there a railing: Yes   Ramp: No   Stairs inside the home: Yes 14 Is there a railing: Yes   Help at home:    Equipment owned:       Employment: Yes Sales  Hobbies/Interests: Reading, walking, being activr    Patient goals for therapy:  learn how to manage/prevent lymphedema    Pain assessment: Pain denied     Objective       EDEMA EVALUATION  Additional history:  Body part affected by edema: Arm has risk but no signs of concern  If cancer related, treatment: Chemo and radiation plus surgeries  If not cancer related, problems with veins or cause of swelling:    Distance able to walk: No limits  Time able to stand: No limits  Sensation problems in hands/feet: No    Edema etiology: Cancer with lymph node dissection, Radiation, Surgery    Cognitive Status Examination  Orientation: Oriented to person, place and time   Level of Consciousness: Alert  Follows Commands and Answers Questions: 100% of the time  Personal Safety and Judgement: Intact  Memory:  Intact    EDEMA  Skin Condition:  pitting edema present on inner lower quadrant of the L breast, enlarged pores/orange peel texture in this area. Incision scars from mid breast to lateral trunk healing from surgery 4 weeks ago, still pink and bumpy. Other half of the scar as well as lat flap incision scar healed well. No adhesion on lat flap scar, scar tissue adhesion noted on inferior breast  Scar: Yes  Ulceration: No    GIRTH MEASUREMENTS: Refer to separate girth measurement flowsheet.     VOLUME UE  Right UE (mL) 1697   Right UE (mL) measurements from last eval 2/15/23 1634   UE Volume Comparison    % Difference Minimal increase in volume     RANGE OF MOTION: UE ROM WNL pec tightness at end range of flexion but does not impair pt  STRENGTH: UE Strength WNL  POSTURE: WNL  PALPATION: pitting edema on inner lower quadrant of L breast  ACTIVITIES OF DAILY LIVING: indep  BED MOBILITY: Independent  TRANSFERS: Independent  GAIT/LOCOMOTION: indep with no device  BALANCE: WNL  SENSATION:  decreased sensation in L axilla/around scars    Assessment & Plan   CLINICAL IMPRESSIONS  Medical Diagnosis: I89.0 (ICD-10-CM) - Lymphedema    Treatment Diagnosis: Lymphedema, radiation fibrosis    Impression/Assessment: Pt is a 49 year old female presenting to Occupational Therapy due to risk of lymphedema following treatment and surgery for breast cancer. Pt was initially seen in Feb 2023 when she had expanders and just started radiation. Since then she has had lat flap reconstruction surgery and most recently (Jan 2024) had expanders switched for implants.  The following significant findings have been identified:  impaired ROM, risk of symptom progression .  These identified deficits interfere with their ability to perform self care tasks and household chores as compared to previous level of function.     Clinical Decision Making (Complexity):  Assessment of Occupational Performance: 1-3 Performance Deficits  Occupational Performance  Limitations: dressing and skin integrity  Clinical Decision Making (Complexity): Low complexity    PLAN OF CARE  Treatment Interventions:  Interventions: Self-Care/Home Management, Therapeutic Exercise, Manual Therapy    Long Term Goals   OT Goal 1  Goal Identifier: Edema management/prevention  Goal Description: Pt will verbalize understanding of long term management/prevention of lymphedema, including wear schedule for recommended compression garments, manual techniques, skin care, elevation and exercise.  Rationale: In order to maximize safety and independence with performance of self-care activities  Target Date: 05/13/24  OT Goal 2  Goal Identifier: Compression  Goal Description: Pt will be fit for and demonstrate independence using new compression garments for long term edema management of breast lymphedema.  Rationale: In order to maximize safety and independence with performance of self-care activities  Target Date: 05/13/24      Frequency of Treatment: 1x a week  Duration of Treatment: 12 weeks     Recommended Referrals to Other Professionals: none at this time  Education Assessment: Learner/Method: Patient;Listening;Reading;Demonstration;Pictures/Video;No Barriers to Learning     Risks and benefits of evaluation/treatment have been explained.   Patient/Family/caregiver agrees with Plan of Care.     Evaluation Time:    OT Eval, Low Complexity Minutes (11094): 15    Signing Clinician: Val Corado OT

## 2024-03-01 ENCOUNTER — THERAPY VISIT (OUTPATIENT)
Dept: OCCUPATIONAL THERAPY | Facility: CLINIC | Age: 49
End: 2024-03-01
Attending: SURGERY
Payer: COMMERCIAL

## 2024-03-01 DIAGNOSIS — I89.0 LYMPHEDEMA: Primary | Chronic | ICD-10-CM

## 2024-03-01 PROCEDURE — 97140 MANUAL THERAPY 1/> REGIONS: CPT | Mod: GO

## 2024-03-19 ENCOUNTER — THERAPY VISIT (OUTPATIENT)
Dept: OCCUPATIONAL THERAPY | Facility: CLINIC | Age: 49
End: 2024-03-19
Attending: SURGERY
Payer: COMMERCIAL

## 2024-03-19 DIAGNOSIS — I89.0 LYMPHEDEMA: Primary | ICD-10-CM

## 2024-03-19 DIAGNOSIS — C77.3 CARCINOMA OF LEFT BREAST METASTATIC TO AXILLARY LYMPH NODE (H): Primary | ICD-10-CM

## 2024-03-19 DIAGNOSIS — C50.912 CARCINOMA OF LEFT BREAST METASTATIC TO AXILLARY LYMPH NODE (H): Primary | ICD-10-CM

## 2024-03-19 PROCEDURE — 97140 MANUAL THERAPY 1/> REGIONS: CPT | Mod: GO

## 2024-03-29 ENCOUNTER — THERAPY VISIT (OUTPATIENT)
Dept: OCCUPATIONAL THERAPY | Facility: CLINIC | Age: 49
End: 2024-03-29
Attending: SURGERY
Payer: COMMERCIAL

## 2024-03-29 DIAGNOSIS — I89.0 LYMPHEDEMA: Primary | ICD-10-CM

## 2024-03-29 PROCEDURE — 97140 MANUAL THERAPY 1/> REGIONS: CPT | Mod: GO

## 2024-04-12 ENCOUNTER — THERAPY VISIT (OUTPATIENT)
Dept: OCCUPATIONAL THERAPY | Facility: CLINIC | Age: 49
End: 2024-04-12
Attending: SURGERY
Payer: COMMERCIAL

## 2024-04-12 DIAGNOSIS — I89.0 LYMPHEDEMA: Primary | ICD-10-CM

## 2024-04-12 PROCEDURE — 97140 MANUAL THERAPY 1/> REGIONS: CPT | Mod: GO

## 2024-04-16 ENCOUNTER — LAB (OUTPATIENT)
Dept: INFUSION THERAPY | Facility: CLINIC | Age: 49
End: 2024-04-16
Attending: INTERNAL MEDICINE
Payer: COMMERCIAL

## 2024-04-16 DIAGNOSIS — C50.812 MALIGNANT NEOPLASM OF OVERLAPPING SITES OF LEFT BREAST IN FEMALE, ESTROGEN RECEPTOR POSITIVE (H): ICD-10-CM

## 2024-04-16 DIAGNOSIS — T45.1X5A CHEMOTHERAPY-INDUCED NEUTROPENIA (H): ICD-10-CM

## 2024-04-16 DIAGNOSIS — Z01.818 PREOP GENERAL PHYSICAL EXAM: ICD-10-CM

## 2024-04-16 DIAGNOSIS — C50.912 CARCINOMA OF LEFT BREAST METASTATIC TO AXILLARY LYMPH NODE (H): ICD-10-CM

## 2024-04-16 DIAGNOSIS — D70.1 CHEMOTHERAPY-INDUCED NEUTROPENIA (H): ICD-10-CM

## 2024-04-16 DIAGNOSIS — D64.81 ANEMIA ASSOCIATED WITH CHEMOTHERAPY: ICD-10-CM

## 2024-04-16 DIAGNOSIS — Z17.0 MALIGNANT NEOPLASM OF OVERLAPPING SITES OF LEFT BREAST IN FEMALE, ESTROGEN RECEPTOR POSITIVE (H): ICD-10-CM

## 2024-04-16 DIAGNOSIS — T45.1X5A ANEMIA ASSOCIATED WITH CHEMOTHERAPY: ICD-10-CM

## 2024-04-16 DIAGNOSIS — C77.3 CARCINOMA OF LEFT BREAST METASTATIC TO AXILLARY LYMPH NODE (H): ICD-10-CM

## 2024-04-16 LAB
BASOPHILS # BLD AUTO: 0.1 10E3/UL (ref 0–0.2)
BASOPHILS NFR BLD AUTO: 2 %
EOSINOPHIL # BLD AUTO: 0.3 10E3/UL (ref 0–0.7)
EOSINOPHIL NFR BLD AUTO: 8 %
ERYTHROCYTE [DISTWIDTH] IN BLOOD BY AUTOMATED COUNT: 13.1 % (ref 10–15)
HCT VFR BLD AUTO: 38.9 % (ref 35–47)
HGB BLD-MCNC: 13 G/DL (ref 11.7–15.7)
HOLD SPECIMEN: NORMAL
HOLD SPECIMEN: NORMAL
IMM GRANULOCYTES # BLD: 0 10E3/UL
IMM GRANULOCYTES NFR BLD: 0 %
LYMPHOCYTES # BLD AUTO: 1.1 10E3/UL (ref 0.8–5.3)
LYMPHOCYTES NFR BLD AUTO: 28 %
MCH RBC QN AUTO: 31.3 PG (ref 26.5–33)
MCHC RBC AUTO-ENTMCNC: 33.4 G/DL (ref 31.5–36.5)
MCV RBC AUTO: 94 FL (ref 78–100)
MONOCYTES # BLD AUTO: 0.3 10E3/UL (ref 0–1.3)
MONOCYTES NFR BLD AUTO: 7 %
NEUTROPHILS # BLD AUTO: 2.3 10E3/UL (ref 1.6–8.3)
NEUTROPHILS NFR BLD AUTO: 56 %
NRBC # BLD AUTO: 0 10E3/UL
NRBC BLD AUTO-RTO: 0 /100
PLATELET # BLD AUTO: 198 10E3/UL (ref 150–450)
RBC # BLD AUTO: 4.15 10E6/UL (ref 3.8–5.2)
WBC # BLD AUTO: 4.1 10E3/UL (ref 4–11)

## 2024-04-16 PROCEDURE — 85025 COMPLETE CBC W/AUTO DIFF WBC: CPT

## 2024-04-16 PROCEDURE — 80053 COMPREHEN METABOLIC PANEL: CPT

## 2024-04-16 PROCEDURE — 36415 COLL VENOUS BLD VENIPUNCTURE: CPT

## 2024-04-17 ENCOUNTER — DOCUMENTATION ONLY (OUTPATIENT)
Dept: ONCOLOGY | Facility: CLINIC | Age: 49
End: 2024-04-17
Payer: COMMERCIAL

## 2024-04-17 DIAGNOSIS — Z17.0 MALIGNANT NEOPLASM OF OVERLAPPING SITES OF LEFT BREAST IN FEMALE, ESTROGEN RECEPTOR POSITIVE (H): Primary | ICD-10-CM

## 2024-04-17 DIAGNOSIS — C50.812 MALIGNANT NEOPLASM OF OVERLAPPING SITES OF LEFT BREAST IN FEMALE, ESTROGEN RECEPTOR POSITIVE (H): Primary | ICD-10-CM

## 2024-04-17 LAB
ALBUMIN SERPL BCG-MCNC: 4.7 G/DL (ref 3.5–5.2)
ALP SERPL-CCNC: 38 U/L (ref 40–150)
ALT SERPL W P-5'-P-CCNC: 26 U/L (ref 0–50)
ANION GAP SERPL CALCULATED.3IONS-SCNC: 9 MMOL/L (ref 7–15)
AST SERPL W P-5'-P-CCNC: 25 U/L (ref 0–45)
BILIRUB SERPL-MCNC: 0.3 MG/DL
BUN SERPL-MCNC: 16.6 MG/DL (ref 6–20)
CALCIUM SERPL-MCNC: 9.5 MG/DL (ref 8.6–10)
CHLORIDE SERPL-SCNC: 103 MMOL/L (ref 98–107)
CREAT SERPL-MCNC: 1.13 MG/DL (ref 0.51–0.95)
DEPRECATED HCO3 PLAS-SCNC: 28 MMOL/L (ref 22–29)
EGFRCR SERPLBLD CKD-EPI 2021: 59 ML/MIN/1.73M2
GLUCOSE SERPL-MCNC: 89 MG/DL (ref 70–99)
POTASSIUM SERPL-SCNC: 4.2 MMOL/L (ref 3.4–5.3)
PROT SERPL-MCNC: 7 G/DL (ref 6.4–8.3)
SODIUM SERPL-SCNC: 140 MMOL/L (ref 135–145)

## 2024-04-17 NOTE — PROGRESS NOTES
CMP order placed per provider order:    Check Out Appointment Request  Expected 1/15/2024  1) Labs 3 months (CMP).        Della Kc RN

## 2024-04-18 DIAGNOSIS — C77.3 CARCINOMA OF LEFT BREAST METASTATIC TO AXILLARY LYMPH NODE (H): Primary | ICD-10-CM

## 2024-04-18 DIAGNOSIS — C50.912 CARCINOMA OF LEFT BREAST METASTATIC TO AXILLARY LYMPH NODE (H): Primary | ICD-10-CM

## 2024-04-18 RX ORDER — LETROZOLE 2.5 MG/1
2.5 TABLET, FILM COATED ORAL DAILY
Qty: 90 TABLET | Refills: 3 | Status: SHIPPED | OUTPATIENT
Start: 2024-04-18

## 2024-04-29 ENCOUNTER — THERAPY VISIT (OUTPATIENT)
Dept: OCCUPATIONAL THERAPY | Facility: CLINIC | Age: 49
End: 2024-04-29
Attending: SURGERY
Payer: COMMERCIAL

## 2024-04-29 DIAGNOSIS — I89.0 LYMPHEDEMA: Primary | ICD-10-CM

## 2024-04-29 PROCEDURE — 97140 MANUAL THERAPY 1/> REGIONS: CPT | Mod: GO

## 2024-05-10 ENCOUNTER — THERAPY VISIT (OUTPATIENT)
Dept: OCCUPATIONAL THERAPY | Facility: CLINIC | Age: 49
End: 2024-05-10
Attending: SURGERY
Payer: COMMERCIAL

## 2024-05-10 DIAGNOSIS — I89.0 LYMPHEDEMA: Primary | ICD-10-CM

## 2024-05-10 PROCEDURE — 97140 MANUAL THERAPY 1/> REGIONS: CPT | Mod: GO

## 2024-05-17 DIAGNOSIS — C77.3 CARCINOMA OF LEFT BREAST METASTATIC TO AXILLARY LYMPH NODE (H): Primary | ICD-10-CM

## 2024-05-17 DIAGNOSIS — C50.912 CARCINOMA OF LEFT BREAST METASTATIC TO AXILLARY LYMPH NODE (H): Primary | ICD-10-CM

## 2024-05-20 NOTE — PROGRESS NOTES
Lymphedema Therapy Discharge Note   05/10/24 0500   Appointment Info   Treating Provider Luisana Corado OTR/L, CLT-KAREN   Visits Used 7   Medical Diagnosis I89.0 (ICD-10-CM) - Lymphedema   OT Tx Diagnosis Lymphedema, radiation fibrosis   Progress Note/Certification   Start Of Care Date 02/19/24   Onset of Illness/Injury or Date of Surgery 12/21/22   Therapy Frequency 1x a week   Predicted Duration 12 weeks   Goals   OT Goals 1;2   OT Goal 1   Goal Identifier Edema management/prevention   Goal Description Pt will verbalize understanding of long term management/prevention of lymphedema, including wear schedule for recommended compression garments, manual techniques, skin care, elevation and exercise.   Rationale In order to maximize safety and independence with performance of self-care activities   Goal Progress Pt verbalized understanding of education on edema prevention/management and when to retun to clinic if symptoms progress.   Target Date 05/13/24   Date Met 05/10/24   OT Goal 2   Goal Identifier Compression   Goal Description Pt will be fit for and demonstrate independence using new compression garments for long term edema management of breast lymphedema.   Rationale In order to maximize safety and independence with performance of self-care activities   Goal Progress Pt purchased recommended garments and swell spot and has been using them as directed with a noted softening of fibrotic tissue. Goal met   Target Date 05/13/24   Date Met 05/10/24   Treatment Interventions (OT)   Interventions Manual Therapy;Therapeutic Procedure/Exercise   Manual Therapy   Manual Therapy Minutes (35806) 60   Skilled Intervention Pt had no new concerns to report today, she continues to do her self massage and ROM exercises as well as use the swell spot to manage lymphedema. CLT assessed LUQ noting some increased congestion superior to incision scar on the L breast, dimas laterally. Truncal edema remained reduced/softer. With pt lying  supine CLT completed the following MLD sequence to clear LUQ...deep breathing, clearing of SC nodes, clearing of R axillary LNs, clear pathway from L breast to R axilla across upper chest, clearing of L inguinal nodes, clear pathway from L axilla to L groin down lateral trunk, ending with reclearing of inguinal, axillary and SC nodes and deep breathing. CLT  also completed deeper manual tissue manipulation techniques (C strokes, S strokes, circular mobilization) around the L breast and in the L axilla to work on softening tissue as well as scar massage. Pt tolerated treatment well with a noticable softening of lymphedema and scar tissue and decreased adhesion on the upper outer quadrant of the breast. Deeper tissue bending and massage completed to pec muscle which also remained looser.  MFR completed along L lateral trunk, upper traps and L upper back.   Patient Response/Progress Since evaluation, pts LUQ lymphedema has reduced and tissue fibrosis has improved through the use of manual lymphatic drainage massage and compression. Her shoulder ROM has improved and she no longer notices any restriction with I/ADLs. She has demonstrated understanding of long term management techniques by continuing to make gains between visits. Pt has met all goals and is ready for discharge today.   Education   Learner/Method Patient;Listening;Reading;Demonstration;Pictures/Video;No Barriers to Learning   Plan   Updates to plan of care Pt discharged with goals met.   Total Session Time   Timed Code Treatment Minutes 60   Total Treatment Time (sum of timed and untimed services) 60         DISCHARGE  Reason for Discharge: Patient has met all goals.    Equipment Issued: compression bra, swell spot    Discharge Plan: Patient to continue home program, return to clinic if symptoms worsen or home management is no longer effective.     Referring Provider:  Janie South

## 2024-06-18 DIAGNOSIS — C50.912 CARCINOMA OF LEFT BREAST METASTATIC TO AXILLARY LYMPH NODE (H): Primary | ICD-10-CM

## 2024-06-18 DIAGNOSIS — C77.3 CARCINOMA OF LEFT BREAST METASTATIC TO AXILLARY LYMPH NODE (H): Primary | ICD-10-CM

## 2024-07-16 ENCOUNTER — LAB (OUTPATIENT)
Dept: INFUSION THERAPY | Facility: CLINIC | Age: 49
End: 2024-07-16
Attending: INTERNAL MEDICINE
Payer: COMMERCIAL

## 2024-07-16 ENCOUNTER — ONCOLOGY VISIT (OUTPATIENT)
Dept: ONCOLOGY | Facility: CLINIC | Age: 49
End: 2024-07-16
Attending: INTERNAL MEDICINE
Payer: COMMERCIAL

## 2024-07-16 VITALS
SYSTOLIC BLOOD PRESSURE: 112 MMHG | OXYGEN SATURATION: 100 % | DIASTOLIC BLOOD PRESSURE: 68 MMHG | BODY MASS INDEX: 24.79 KG/M2 | HEIGHT: 64 IN | HEART RATE: 70 BPM | WEIGHT: 145.2 LBS

## 2024-07-16 DIAGNOSIS — C50.912 CARCINOMA OF LEFT BREAST METASTATIC TO AXILLARY LYMPH NODE (H): ICD-10-CM

## 2024-07-16 DIAGNOSIS — C77.3 CARCINOMA OF LEFT BREAST METASTATIC TO AXILLARY LYMPH NODE (H): ICD-10-CM

## 2024-07-16 DIAGNOSIS — D70.1 CHEMOTHERAPY-INDUCED NEUTROPENIA (H): ICD-10-CM

## 2024-07-16 DIAGNOSIS — Z79.811 USE OF AROMATASE INHIBITORS: ICD-10-CM

## 2024-07-16 DIAGNOSIS — Z17.0 MALIGNANT NEOPLASM OF OVERLAPPING SITES OF LEFT BREAST IN FEMALE, ESTROGEN RECEPTOR POSITIVE (H): ICD-10-CM

## 2024-07-16 DIAGNOSIS — T38.6X5A OSTEOPOROSIS DUE TO AROMATASE INHIBITOR: ICD-10-CM

## 2024-07-16 DIAGNOSIS — T45.1X5A CHEMOTHERAPY INDUCED DIARRHEA: ICD-10-CM

## 2024-07-16 DIAGNOSIS — C50.812 MALIGNANT NEOPLASM OF OVERLAPPING SITES OF LEFT BREAST IN FEMALE, ESTROGEN RECEPTOR POSITIVE (H): ICD-10-CM

## 2024-07-16 DIAGNOSIS — M81.8 OSTEOPOROSIS DUE TO AROMATASE INHIBITOR: ICD-10-CM

## 2024-07-16 DIAGNOSIS — K52.1 CHEMOTHERAPY INDUCED DIARRHEA: ICD-10-CM

## 2024-07-16 DIAGNOSIS — C77.3 CARCINOMA OF LEFT BREAST METASTATIC TO AXILLARY LYMPH NODE (H): Primary | ICD-10-CM

## 2024-07-16 DIAGNOSIS — Z17.0 MALIGNANT NEOPLASM OF OVERLAPPING SITES OF LEFT BREAST IN FEMALE, ESTROGEN RECEPTOR POSITIVE (H): Primary | ICD-10-CM

## 2024-07-16 DIAGNOSIS — C50.912 CARCINOMA OF LEFT BREAST METASTATIC TO AXILLARY LYMPH NODE (H): Primary | ICD-10-CM

## 2024-07-16 DIAGNOSIS — C50.812 MALIGNANT NEOPLASM OF OVERLAPPING SITES OF LEFT BREAST IN FEMALE, ESTROGEN RECEPTOR POSITIVE (H): Primary | ICD-10-CM

## 2024-07-16 DIAGNOSIS — T45.1X5A CHEMOTHERAPY-INDUCED NEUTROPENIA (H): ICD-10-CM

## 2024-07-16 LAB
ALBUMIN SERPL BCG-MCNC: 4.4 G/DL (ref 3.5–5.2)
ALP SERPL-CCNC: 48 U/L (ref 40–150)
ALT SERPL W P-5'-P-CCNC: 26 U/L (ref 0–50)
ANION GAP SERPL CALCULATED.3IONS-SCNC: 9 MMOL/L (ref 7–15)
AST SERPL W P-5'-P-CCNC: 31 U/L (ref 0–45)
BASOPHILS # BLD AUTO: 0.1 10E3/UL (ref 0–0.2)
BASOPHILS NFR BLD AUTO: 2 %
BILIRUB SERPL-MCNC: 0.3 MG/DL
BUN SERPL-MCNC: 18.3 MG/DL (ref 6–20)
CALCIUM SERPL-MCNC: 9.7 MG/DL (ref 8.8–10.4)
CHLORIDE SERPL-SCNC: 106 MMOL/L (ref 98–107)
CREAT SERPL-MCNC: 0.99 MG/DL (ref 0.51–0.95)
EGFRCR SERPLBLD CKD-EPI 2021: 69 ML/MIN/1.73M2
EOSINOPHIL # BLD AUTO: 0.3 10E3/UL (ref 0–0.7)
EOSINOPHIL NFR BLD AUTO: 10 %
ERYTHROCYTE [DISTWIDTH] IN BLOOD BY AUTOMATED COUNT: 13.2 % (ref 10–15)
GLUCOSE SERPL-MCNC: 92 MG/DL (ref 70–99)
HCO3 SERPL-SCNC: 26 MMOL/L (ref 22–29)
HCT VFR BLD AUTO: 33 % (ref 35–47)
HGB BLD-MCNC: 11.1 G/DL (ref 11.7–15.7)
HOLD SPECIMEN: NORMAL
IMM GRANULOCYTES # BLD: 0 10E3/UL
IMM GRANULOCYTES NFR BLD: 0 %
LYMPHOCYTES # BLD AUTO: 0.9 10E3/UL (ref 0.8–5.3)
LYMPHOCYTES NFR BLD AUTO: 30 %
MCH RBC QN AUTO: 32.2 PG (ref 26.5–33)
MCHC RBC AUTO-ENTMCNC: 33.6 G/DL (ref 31.5–36.5)
MCV RBC AUTO: 96 FL (ref 78–100)
MONOCYTES # BLD AUTO: 0.2 10E3/UL (ref 0–1.3)
MONOCYTES NFR BLD AUTO: 8 %
NEUTROPHILS # BLD AUTO: 1.5 10E3/UL (ref 1.6–8.3)
NEUTROPHILS NFR BLD AUTO: 50 %
NRBC # BLD AUTO: 0 10E3/UL
NRBC BLD AUTO-RTO: 0 /100
PLATELET # BLD AUTO: 201 10E3/UL (ref 150–450)
POTASSIUM SERPL-SCNC: 4.3 MMOL/L (ref 3.4–5.3)
PROT SERPL-MCNC: 7 G/DL (ref 6.4–8.3)
RBC # BLD AUTO: 3.45 10E6/UL (ref 3.8–5.2)
SODIUM SERPL-SCNC: 141 MMOL/L (ref 135–145)
WBC # BLD AUTO: 3.1 10E3/UL (ref 4–11)

## 2024-07-16 PROCEDURE — 99214 OFFICE O/P EST MOD 30 MIN: CPT | Performed by: INTERNAL MEDICINE

## 2024-07-16 PROCEDURE — 80053 COMPREHEN METABOLIC PANEL: CPT

## 2024-07-16 PROCEDURE — 85025 COMPLETE CBC W/AUTO DIFF WBC: CPT

## 2024-07-16 PROCEDURE — 96372 THER/PROPH/DIAG INJ SC/IM: CPT | Mod: XS | Performed by: INTERNAL MEDICINE

## 2024-07-16 PROCEDURE — 99207 PR NO CHARGE LOS: CPT

## 2024-07-16 PROCEDURE — 36415 COLL VENOUS BLD VENIPUNCTURE: CPT

## 2024-07-16 PROCEDURE — 250N000011 HC RX IP 250 OP 636: Performed by: INTERNAL MEDICINE

## 2024-07-16 RX ADMIN — DENOSUMAB 60 MG: 60 INJECTION SUBCUTANEOUS at 15:01

## 2024-07-16 ASSESSMENT — PAIN SCALES - GENERAL: PAINLEVEL: NO PAIN (0)

## 2024-07-16 NOTE — LETTER
2024      Betty Rogers  [address]  [facility] [address]      Dear Colleague,    Thank you for referring your patient, Betty Rogers, to the [facility] [facility]. Please see a copy of my visit note below.    [facility] Hematology / Oncology  Progress Note  Name: Betty Rogers  :  1975    MRN:  [id number]    --------------------    Assessment / Plan:  Multifocal, locally advanced, hormone positive, HER2 negative left breast ductal carcinoma (ypT1a ypN2a):  # Neoadjuvant Oncotype Dx score 28.  # Neoadjuvant BRCA testing negative.   # Neoadjuvant taxol x 12 weeks; partial response.  # Neoadjuvant dose dense AC x 4 cycle; RCB2 present.  # Bilateral breast mastectomy w/ sentinel node / axillary node and tissue expander recon; path w/ gr 2, 4 mm tumor, 8 mm margin, 7/13 nodes involved (1 macromet, 3 micromet, 21 mm largest, pos KAMERON).  # Left breast tissue expander removal 2/2 infection.  # Adjuvant tamoxifen changed to AI w/ LAILA/BSO  # Status post adjuvant radiation ongoing.  # Status post LAILA/BSO.  # Adjuvant letrozole / abemaciclib ongoing.    Continue letrozole; planning 10 years.  Continue abemaciclib; dose reduced to 100 mg twice daily for diarrhea; planning 2 years.  Continue Prolia every 6 months times minimum 2 years; dose today.  Stable neutropenia on abemaciclib; no dose reductions required.  Stable chemistries.  Return to clinic 6 months with Prolia.  Planning flu shot and COVID shot.    Hoang Franco MD    --------------------    Interval History:  Betty returns for follow up of breast cancer unaccompanied.  All in all, doing quite well.  [place]  Health is great!  No discernible side effects from letrozole are being cycled.  Loose stools manageable at current dosing of Verzenio.    Social History:  Social History     Tobacco Use     Smoking status: Never     Smokeless tobacco: Never   Substance Use Topics  "    Alcohol use: Yes     Comment: socially, approx 6-7 drinks per week     Family History:  Family History   Problem Relation Age of Onset     Breast Cancer Mother 40        atypical lobular hyperplasia breast cancer     Unknown/Adopted Brother         Sucide     Heart Disease Maternal Grandfather         Heart Issues     Breast Cancer Maternal Aunt 50     Breast Cancer Maternal Cousin      Mental Illness Brother      Immunizations:  Immunization History   Administered Date(s) Administered     COVID-19 12+ (2023-24) (Pfizer) 10/13/2023     COVID-19 Bivalent 12+ (Pfizer) 01/09/2023     COVID-19 MONOVALENT 12+ (Pfizer) 04/08/2021, 04/29/2021, 12/23/2021     COVID-19 Monovalent 12+ (Pfizer 2022) 07/08/2022     Hepatitis B, Adult 04/25/2023, 06/26/2023, 10/31/2023     Influenza (H1N1) 01/19/2010     Influenza (IIV3) PF 12/19/2012     Influenza Vaccine >6 months,quad, PF 12/11/2015, 10/11/2017, 12/28/2018, 09/24/2020, 10/29/2021, 10/07/2022, 10/13/2023     Nasal Influenza Vaccine 2-49 (FluMist) 12/09/2014     Pneumococcal 20 valent Conjugate (Prevnar 20) 01/19/2024     TDAP Vaccine (Adacel) 05/01/2015     Td (Adult), Adsorbed 12/18/2000     Zoster recombinant adjuvanted (SHINGRIX) 04/25/2023, 01/19/2024     Health Maintenance Due   Topic Date Due     Pneumococcal Vaccine: Pediatrics (0 to 5 Years) and At-Risk Patients (6 to 64 Years) (1 of 2 - PCV) Never done     COVID-19 Vaccine (7 - 2023-24 season) 12/08/2023     ZOSTER IMMUNIZATION (2 of 2) 06/20/2023     --------------------    Physical Exam:  VS: /68 (BP Location: Right arm)   Pulse 70   Ht 1.626 m (5' 4\")   Wt 65.9 kg (145 lb 3.2 oz)   LMP 09/10/2022   SpO2 100%   BMI 24.92 kg/m  .  GEN: Well appearing.    Labs / Imaging:  Reviewed CBC, CMP.      Again, thank you for allowing me to participate in the care of your patient.        Sincerely,        Hoang Franco MD  "

## 2024-07-16 NOTE — NURSING NOTE
"Oncology Rooming Note    July 16, 2024 2:36 PM   Betty Rogers is a 49 year old female who presents for:    Chief Complaint   Patient presents with    Oncology Clinic Visit     Initial Vitals: /68 (BP Location: Right arm)   Pulse 70   Ht 1.626 m (5' 4\")   Wt 65.9 kg (145 lb 3.2 oz)   LMP 09/10/2022   SpO2 100%   BMI 24.92 kg/m   Estimated body mass index is 24.92 kg/m  as calculated from the following:    Height as of this encounter: 1.626 m (5' 4\").    Weight as of this encounter: 65.9 kg (145 lb 3.2 oz). Body surface area is 1.73 meters squared.  No Pain (0) Comment: Data Unavailable   Patient's last menstrual period was 09/10/2022.  Allergies reviewed: Yes  Medications reviewed: Yes    Medications: Medication refills not needed today.  Pharmacy name entered into [facility]:    [id number] - [facility] [address] [id number] [place] [id number] [facility] - [place], [place] - [address]    Frailty Screening:   Is the patient here for a new oncology consult visit in cancer care? 2. No      Clinical concerns: No Concerns        Leana Shannon MA            "

## 2024-07-16 NOTE — PROGRESS NOTES
[facility] Hematology / Oncology  Progress Note  Name: Betty Rogers  :  1975    MRN:  [id number]    --------------------    Assessment / Plan:  Multifocal, locally advanced, hormone positive, HER2 negative left breast ductal carcinoma (ypT1a ypN2a):  # Neoadjuvant Oncotype Dx score 28.  # Neoadjuvant BRCA testing negative.   # Neoadjuvant taxol x 12 weeks; partial response.  # Neoadjuvant dose dense AC x 4 cycle; RCB2 present.  # Bilateral breast mastectomy w/ sentinel node / axillary node and tissue expander recon; path w/ gr 2, 4 mm tumor, 8 mm margin, 7/13 nodes involved (1 macromet, 3 micromet, 21 mm largest, pos KAMERON).  # Left breast tissue expander removal 2/2 infection.  # Adjuvant tamoxifen changed to AI w/ LAILA/BSO  # Status post adjuvant radiation ongoing.  # Status post LAILA/BSO.  # Adjuvant letrozole / abemaciclib ongoing.    Continue letrozole; planning 10 years.  Continue abemaciclib; dose reduced to 100 mg twice daily for diarrhea; planning 2 years.  Continue Prolia every 6 months times minimum 2 years; dose today.  Stable neutropenia on abemaciclib; no dose reductions required.  Stable chemistries.  Return to clinic 6 months with Prolia.  Planning flu shot and COVID shot.    Hoang Franco MD    --------------------    Interval History:  Betty returns for follow up of breast cancer unaccompanied.  All in all, doing quite well.  [place]  Health is great!  No discernible side effects from letrozole are being cycled.  Loose stools manageable at current dosing of Verzenio.    Social History:  Social History     Tobacco Use    Smoking status: Never    Smokeless tobacco: Never   Substance Use Topics    Alcohol use: Yes     Comment: socially, approx 6-7 drinks per week     Family History:  Family History   Problem Relation Age of Onset    Breast Cancer Mother 40        atypical lobular hyperplasia breast cancer    Unknown/Adopted Brother          "Sucide    Heart Disease Maternal Grandfather         Heart Issues    Breast Cancer Maternal Aunt 50    Breast Cancer Maternal Cousin     Mental Illness Brother      Immunizations:  Immunization History   Administered Date(s) Administered    COVID-19 12+ (2023-24) (Pfizer) 10/13/2023    COVID-19 Bivalent 12+ (Pfizer) 01/09/2023    COVID-19 MONOVALENT 12+ (Pfizer) 04/08/2021, 04/29/2021, 12/23/2021    COVID-19 Monovalent 12+ (Pfizer 2022) 07/08/2022    Hepatitis B, Adult 04/25/2023, 06/26/2023, 10/31/2023    Influenza (H1N1) 01/19/2010    Influenza (IIV3) PF 12/19/2012    Influenza Vaccine >6 months,quad, PF 12/11/2015, 10/11/2017, 12/28/2018, 09/24/2020, 10/29/2021, 10/07/2022, 10/13/2023    Nasal Influenza Vaccine 2-49 (FluMist) 12/09/2014    Pneumococcal 20 valent Conjugate (Prevnar 20) 01/19/2024    TDAP Vaccine (Adacel) 05/01/2015    Td (Adult), Adsorbed 12/18/2000    Zoster recombinant adjuvanted (SHINGRIX) 04/25/2023, 01/19/2024     Health Maintenance Due   Topic Date Due    Pneumococcal Vaccine: Pediatrics (0 to 5 Years) and At-Risk Patients (6 to 64 Years) (1 of 2 - PCV) Never done    COVID-19 Vaccine (7 - 2023-24 season) 12/08/2023    ZOSTER IMMUNIZATION (2 of 2) 06/20/2023     --------------------    Physical Exam:  VS: /68 (BP Location: Right arm)   Pulse 70   Ht 1.626 m (5' 4\")   Wt 65.9 kg (145 lb 3.2 oz)   LMP 09/10/2022   SpO2 100%   BMI 24.92 kg/m  .  GEN: Well appearing.    Labs / Imaging:  Reviewed CBC, CMP.  "

## 2024-07-16 NOTE — PROGRESS NOTES
Infusion Nursing Note:  Betty Rogers presents today for Prolia.    Patient seen by provider today: Yes    present during visit today: Not Applicable.        Intravenous Access:  No Intravenous access/labs at this visit.    Treatment Conditions:  Lab Results   Component Value Date     07/16/2024    POTASSIUM 4.3 07/16/2024    CR 0.99 (H) 07/16/2024    ABEBA 9.7 07/16/2024    BILITOTAL 0.3 07/16/2024    ALBUMIN 4.4 07/16/2024    ALT 26 07/16/2024    AST 31 07/16/2024       Results reviewed, labs MET treatment parameters, ok to proceed with treatment.      Post Infusion Assessment:  Patient tolerated injection without incident.  Site patent and intact, free from redness, edema or discomfort.       Discharge Plan:   Departure Mode: Ambulatory.      Leana Shannon MA

## 2024-07-19 DIAGNOSIS — C50.912 CARCINOMA OF LEFT BREAST METASTATIC TO AXILLARY LYMPH NODE (H): Primary | ICD-10-CM

## 2024-07-19 DIAGNOSIS — C77.3 CARCINOMA OF LEFT BREAST METASTATIC TO AXILLARY LYMPH NODE (H): Primary | ICD-10-CM

## 2024-08-15 DIAGNOSIS — C77.3 CARCINOMA OF LEFT BREAST METASTATIC TO AXILLARY LYMPH NODE (H): Primary | ICD-10-CM

## 2024-08-15 DIAGNOSIS — C50.912 CARCINOMA OF LEFT BREAST METASTATIC TO AXILLARY LYMPH NODE (H): Primary | ICD-10-CM

## 2024-09-07 ENCOUNTER — HEALTH MAINTENANCE LETTER (OUTPATIENT)
Age: 49
End: 2024-09-07

## 2024-09-13 DIAGNOSIS — C77.3 CARCINOMA OF LEFT BREAST METASTATIC TO AXILLARY LYMPH NODE (H): Primary | ICD-10-CM

## 2024-09-13 DIAGNOSIS — C50.912 CARCINOMA OF LEFT BREAST METASTATIC TO AXILLARY LYMPH NODE (H): Primary | ICD-10-CM

## 2024-10-16 DIAGNOSIS — C50.912 CARCINOMA OF LEFT BREAST METASTATIC TO AXILLARY LYMPH NODE (H): Primary | ICD-10-CM

## 2024-10-16 DIAGNOSIS — C77.3 CARCINOMA OF LEFT BREAST METASTATIC TO AXILLARY LYMPH NODE (H): Primary | ICD-10-CM

## 2024-11-12 DIAGNOSIS — C77.3 CARCINOMA OF LEFT BREAST METASTATIC TO AXILLARY LYMPH NODE (H): Primary | ICD-10-CM

## 2024-11-12 DIAGNOSIS — C50.912 CARCINOMA OF LEFT BREAST METASTATIC TO AXILLARY LYMPH NODE (H): Primary | ICD-10-CM

## 2024-12-09 DIAGNOSIS — C50.912 CARCINOMA OF LEFT BREAST METASTATIC TO AXILLARY LYMPH NODE (H): Primary | ICD-10-CM

## 2024-12-09 DIAGNOSIS — C77.3 CARCINOMA OF LEFT BREAST METASTATIC TO AXILLARY LYMPH NODE (H): Primary | ICD-10-CM

## 2025-01-07 DIAGNOSIS — C77.3 CARCINOMA OF LEFT BREAST METASTATIC TO AXILLARY LYMPH NODE (H): Primary | ICD-10-CM

## 2025-01-07 DIAGNOSIS — C50.912 CARCINOMA OF LEFT BREAST METASTATIC TO AXILLARY LYMPH NODE (H): Primary | ICD-10-CM

## 2025-01-16 ENCOUNTER — TELEPHONE (OUTPATIENT)
Dept: ONCOLOGY | Facility: CLINIC | Age: 50
End: 2025-01-16
Payer: COMMERCIAL

## 2025-01-16 NOTE — ORAL ONC MGMT
Oral Chemotherapy Monitoring Program    Placed call to patient to follow up on difficulty obtaining new supply of abemaciclib with 2025 copay card. I shared that while never ideal to miss several days of treatment, abemaciclib has a moderate half-life (18 hours) and thus should have some level of extended duration of action in the body despite missed doses.     I also answered her previous question regarding duration of adjuvant treatment - per Dr. Franco, planning on 2 years of abemaciclib which would put her end date around June 2025.     Betty will continue trying to sort out her copay card with [facility]; I asked her to let us know around the end of next week if she is still unable to process her refill.      Laci Bran, PharmD, BCOP  Hematology/Oncology Clinical Pharmacist  [facility]  [phone]

## 2025-01-21 DIAGNOSIS — C50.912 CARCINOMA OF LEFT BREAST METASTATIC TO AXILLARY LYMPH NODE (H): Primary | ICD-10-CM

## 2025-01-21 DIAGNOSIS — Z79.811 USE OF AROMATASE INHIBITORS: ICD-10-CM

## 2025-01-21 DIAGNOSIS — T38.6X5A OSTEOPOROSIS DUE TO AROMATASE INHIBITOR: ICD-10-CM

## 2025-01-21 DIAGNOSIS — M81.8 OSTEOPOROSIS DUE TO AROMATASE INHIBITOR: ICD-10-CM

## 2025-01-21 DIAGNOSIS — C77.3 CARCINOMA OF LEFT BREAST METASTATIC TO AXILLARY LYMPH NODE (H): Primary | ICD-10-CM

## 2025-01-21 RX ORDER — ALBUTEROL SULFATE 0.83 MG/ML
2.5 SOLUTION RESPIRATORY (INHALATION)
OUTPATIENT
Start: 2026-01-17

## 2025-01-21 RX ORDER — DIPHENHYDRAMINE HYDROCHLORIDE 50 MG/ML
50 INJECTION INTRAMUSCULAR; INTRAVENOUS
Start: 2025-07-21

## 2025-01-21 RX ORDER — METHYLPREDNISOLONE SODIUM SUCCINATE 125 MG/2ML
125 INJECTION INTRAMUSCULAR; INTRAVENOUS
Start: 2026-01-17

## 2025-01-21 RX ORDER — ALBUTEROL SULFATE 0.83 MG/ML
2.5 SOLUTION RESPIRATORY (INHALATION)
OUTPATIENT
Start: 2025-01-22

## 2025-01-21 RX ORDER — EPINEPHRINE 1 MG/ML
0.3 INJECTION, SOLUTION INTRAMUSCULAR; SUBCUTANEOUS EVERY 5 MIN PRN
OUTPATIENT
Start: 2025-01-22

## 2025-01-21 RX ORDER — EPINEPHRINE 1 MG/ML
0.3 INJECTION, SOLUTION INTRAMUSCULAR; SUBCUTANEOUS EVERY 5 MIN PRN
OUTPATIENT
Start: 2025-07-21

## 2025-01-21 RX ORDER — DIPHENHYDRAMINE HYDROCHLORIDE 50 MG/ML
50 INJECTION INTRAMUSCULAR; INTRAVENOUS
Start: 2026-01-17

## 2025-01-21 RX ORDER — DIPHENHYDRAMINE HYDROCHLORIDE 50 MG/ML
50 INJECTION INTRAMUSCULAR; INTRAVENOUS
Start: 2025-01-22

## 2025-01-21 RX ORDER — ALBUTEROL SULFATE 0.83 MG/ML
2.5 SOLUTION RESPIRATORY (INHALATION)
OUTPATIENT
Start: 2025-07-21

## 2025-01-21 RX ORDER — METHYLPREDNISOLONE SODIUM SUCCINATE 125 MG/2ML
125 INJECTION INTRAMUSCULAR; INTRAVENOUS
Start: 2025-07-21

## 2025-01-21 RX ORDER — ALBUTEROL SULFATE 90 UG/1
1-2 INHALANT RESPIRATORY (INHALATION)
Start: 2026-01-17

## 2025-01-21 RX ORDER — MEPERIDINE HYDROCHLORIDE 25 MG/ML
25 INJECTION INTRAMUSCULAR; INTRAVENOUS; SUBCUTANEOUS EVERY 30 MIN PRN
OUTPATIENT
Start: 2025-01-22

## 2025-01-21 RX ORDER — EPINEPHRINE 1 MG/ML
0.3 INJECTION, SOLUTION INTRAMUSCULAR; SUBCUTANEOUS EVERY 5 MIN PRN
OUTPATIENT
Start: 2026-01-17

## 2025-01-21 RX ORDER — ALBUTEROL SULFATE 90 UG/1
1-2 INHALANT RESPIRATORY (INHALATION)
Start: 2025-01-22

## 2025-01-21 RX ORDER — METHYLPREDNISOLONE SODIUM SUCCINATE 125 MG/2ML
125 INJECTION INTRAMUSCULAR; INTRAVENOUS
Start: 2025-01-22

## 2025-01-21 RX ORDER — MEPERIDINE HYDROCHLORIDE 25 MG/ML
25 INJECTION INTRAMUSCULAR; INTRAVENOUS; SUBCUTANEOUS EVERY 30 MIN PRN
OUTPATIENT
Start: 2025-07-21

## 2025-01-21 RX ORDER — MEPERIDINE HYDROCHLORIDE 25 MG/ML
25 INJECTION INTRAMUSCULAR; INTRAVENOUS; SUBCUTANEOUS EVERY 30 MIN PRN
OUTPATIENT
Start: 2026-01-17

## 2025-01-21 RX ORDER — ALBUTEROL SULFATE 90 UG/1
1-2 INHALANT RESPIRATORY (INHALATION)
Start: 2025-07-21

## 2025-01-22 ENCOUNTER — INFUSION THERAPY VISIT (OUTPATIENT)
Dept: INFUSION THERAPY | Facility: CLINIC | Age: 50
End: 2025-01-22
Attending: INTERNAL MEDICINE
Payer: COMMERCIAL

## 2025-01-22 ENCOUNTER — VIRTUAL VISIT (OUTPATIENT)
Dept: ONCOLOGY | Facility: CLINIC | Age: 50
End: 2025-01-22
Attending: INTERNAL MEDICINE
Payer: COMMERCIAL

## 2025-01-22 ENCOUNTER — DOCUMENTATION ONLY (OUTPATIENT)
Dept: ONCOLOGY | Facility: CLINIC | Age: 50
End: 2025-01-22

## 2025-01-22 VITALS
OXYGEN SATURATION: 99 % | HEART RATE: 65 BPM | DIASTOLIC BLOOD PRESSURE: 75 MMHG | SYSTOLIC BLOOD PRESSURE: 116 MMHG | RESPIRATION RATE: 16 BRPM

## 2025-01-22 VITALS — BODY MASS INDEX: 25.72 KG/M2 | HEIGHT: 63 IN

## 2025-01-22 DIAGNOSIS — C50.812 MALIGNANT NEOPLASM OF OVERLAPPING SITES OF LEFT BREAST IN FEMALE, ESTROGEN RECEPTOR POSITIVE (H): ICD-10-CM

## 2025-01-22 DIAGNOSIS — Z79.811 USE OF AROMATASE INHIBITORS: ICD-10-CM

## 2025-01-22 DIAGNOSIS — C77.3 CARCINOMA OF LEFT BREAST METASTATIC TO AXILLARY LYMPH NODE (H): Primary | ICD-10-CM

## 2025-01-22 DIAGNOSIS — C77.3 CARCINOMA OF LEFT BREAST METASTATIC TO AXILLARY LYMPH NODE (H): ICD-10-CM

## 2025-01-22 DIAGNOSIS — C50.912 CARCINOMA OF LEFT BREAST METASTATIC TO AXILLARY LYMPH NODE (H): ICD-10-CM

## 2025-01-22 DIAGNOSIS — Z79.811 AROMATASE INHIBITOR USE: ICD-10-CM

## 2025-01-22 DIAGNOSIS — C50.912 CARCINOMA OF LEFT BREAST METASTATIC TO AXILLARY LYMPH NODE (H): Primary | ICD-10-CM

## 2025-01-22 DIAGNOSIS — Z17.0 MALIGNANT NEOPLASM OF OVERLAPPING SITES OF LEFT BREAST IN FEMALE, ESTROGEN RECEPTOR POSITIVE (H): ICD-10-CM

## 2025-01-22 DIAGNOSIS — T38.6X5A OSTEOPOROSIS DUE TO AROMATASE INHIBITOR: ICD-10-CM

## 2025-01-22 DIAGNOSIS — M81.8 OSTEOPOROSIS DUE TO AROMATASE INHIBITOR: ICD-10-CM

## 2025-01-22 LAB
ALBUMIN SERPL BCG-MCNC: 4.1 G/DL (ref 3.5–5.2)
ALP SERPL-CCNC: 43 U/L (ref 40–150)
ALT SERPL W P-5'-P-CCNC: 13 U/L (ref 0–50)
ANION GAP SERPL CALCULATED.3IONS-SCNC: 12 MMOL/L (ref 7–15)
AST SERPL W P-5'-P-CCNC: 21 U/L (ref 0–45)
BASOPHILS # BLD AUTO: 0.1 10E3/UL (ref 0–0.2)
BASOPHILS NFR BLD AUTO: 2 %
BILIRUB SERPL-MCNC: 0.3 MG/DL
BUN SERPL-MCNC: 11.7 MG/DL (ref 6–20)
CALCIUM SERPL-MCNC: 9.7 MG/DL (ref 8.8–10.4)
CHLORIDE SERPL-SCNC: 104 MMOL/L (ref 98–107)
CREAT SERPL-MCNC: 1.11 MG/DL (ref 0.51–0.95)
EGFRCR SERPLBLD CKD-EPI 2021: 60 ML/MIN/1.73M2
EOSINOPHIL # BLD AUTO: 0.3 10E3/UL (ref 0–0.7)
EOSINOPHIL NFR BLD AUTO: 8 %
ERYTHROCYTE [DISTWIDTH] IN BLOOD BY AUTOMATED COUNT: 12 % (ref 10–15)
GLUCOSE SERPL-MCNC: 86 MG/DL (ref 70–99)
HCO3 SERPL-SCNC: 23 MMOL/L (ref 22–29)
HCT VFR BLD AUTO: 34.9 % (ref 35–47)
HGB BLD-MCNC: 12.3 G/DL (ref 11.7–15.7)
HOLD SPECIMEN: NORMAL
IMM GRANULOCYTES # BLD: 0 10E3/UL
IMM GRANULOCYTES NFR BLD: 0 %
LYMPHOCYTES # BLD AUTO: 1.3 10E3/UL (ref 0.8–5.3)
LYMPHOCYTES NFR BLD AUTO: 40 %
MCH RBC QN AUTO: 32.5 PG (ref 26.5–33)
MCHC RBC AUTO-ENTMCNC: 35.2 G/DL (ref 31.5–36.5)
MCV RBC AUTO: 92 FL (ref 78–100)
MONOCYTES # BLD AUTO: 0.3 10E3/UL (ref 0–1.3)
MONOCYTES NFR BLD AUTO: 9 %
NEUTROPHILS # BLD AUTO: 1.4 10E3/UL (ref 1.6–8.3)
NEUTROPHILS NFR BLD AUTO: 42 %
NRBC # BLD AUTO: 0 10E3/UL
NRBC BLD AUTO-RTO: 0 /100
PLATELET # BLD AUTO: 230 10E3/UL (ref 150–450)
POTASSIUM SERPL-SCNC: 4.6 MMOL/L (ref 3.4–5.3)
PROT SERPL-MCNC: 6.4 G/DL (ref 6.4–8.3)
RBC # BLD AUTO: 3.79 10E6/UL (ref 3.8–5.2)
SODIUM SERPL-SCNC: 139 MMOL/L (ref 135–145)
WBC # BLD AUTO: 3.4 10E3/UL (ref 4–11)

## 2025-01-22 PROCEDURE — 99207 PR NO CHARGE LOS: CPT

## 2025-01-22 PROCEDURE — 98006 SYNCH AUDIO-VIDEO EST MOD 30: CPT | Performed by: PHYSICIAN ASSISTANT

## 2025-01-22 PROCEDURE — 250N000011 HC RX IP 250 OP 636: Mod: JZ | Performed by: INTERNAL MEDICINE

## 2025-01-22 PROCEDURE — 80053 COMPREHEN METABOLIC PANEL: CPT

## 2025-01-22 PROCEDURE — 96372 THER/PROPH/DIAG INJ SC/IM: CPT | Performed by: INTERNAL MEDICINE

## 2025-01-22 PROCEDURE — 36415 COLL VENOUS BLD VENIPUNCTURE: CPT

## 2025-01-22 PROCEDURE — 85004 AUTOMATED DIFF WBC COUNT: CPT

## 2025-01-22 RX ADMIN — DENOSUMAB 60 MG: 60 INJECTION SUBCUTANEOUS at 15:47

## 2025-01-22 ASSESSMENT — PAIN SCALES - GENERAL
PAINLEVEL_OUTOF10: NO PAIN (0)
PAINLEVEL_OUTOF10: NO PAIN (0)

## 2025-01-22 NOTE — LETTER
1/22/2025      Betty Rogers  [address]  [facility] [address]      Dear Colleague,    Thank you for referring your patient, Betty Rogers, to the [facility]. Please see a copy of my visit note below.    Virtual Visit Details    Type of service:  Video Visit     Originating Location (pt. Location): Home  Distant Location (provider location):  On-site  Platform used for Video Visit: [facility]      Oncology/Hematology Visit Note    Jan 22, 2025    Reason for visit: Follow-up breast cancer    Oncology HPI: Betty Rogers is a 49 year old female with multifocal, locally advanced, hormone positive, HER2 negative left-sided breast cancer, ductal carcinoma.  BRCA testing negative,  Oncotype 28 and s/p Taxol x 12 weeks with partial response, then completed ddAC x 4 cycles.  She underwent bilateral mastectomy with sentinel lymph node/axillary lymph node biopsy and final pathology with grade 2, 4 mm tumor, 8 mm margin, 7/13 nodes involved and then underwent tissue expander reconstruction.  She started adjuvant tamoxifen, then changed to AI with LAILA/BSO, radiation.    She continued on letrozole and abemaciclib and plan for 10 years.  Abemaciclib dose was reduced to 100 mg BID for diarrhea, plan for 2 years total.  She has also been on Prolia every 6 months x 2 years.    Video visit today for close follow-up.    Interval History: Betty is on video this morning.  She continues to tolerate abemaciclib and letrozole really well.  She is now on the reduced dose of abemaciclib 100 mg twice daily and her diarrhea is much better.  She will still occasionally have some softer stools, but nothing explosive.  She will occasionally have some hot flashes with the letrozole, but very tolerable.  No joint or bone aches.  No headache, vision changes, vomiting, dizziness.  No other new complaints.    Review of Systems: See interval hx. Denies fevers, chills, HA, dizziness, changes in vision, cough,  "CP, SOB, abdominal pain, N/V, diarrhea, changes in urination, bleeding, bruising.     PMHx and Social Hx reviewed per EPIC.      Medications:  Current Outpatient Medications   Medication Sig Dispense Refill     abemaciclib (VERZENIO) 100 MG tablet Take 1 tablet (100 mg) by mouth 2 times daily 60 tablet 0     abemaciclib (VERZENIO) 100 MG tablet Take 1 tablet (100 mg) by mouth 2 times daily 60 tablet 0     calcium citrate (CITRACAL) 950 (200 Ca) MG tablet Take 1 tablet by mouth 2 times daily       letrozole (FEMARA) 2.5 MG tablet Take 1 tablet (2.5 mg) by mouth daily 90 tablet 3     multivitamin w/minerals (THERA-VIT-M) tablet Take 1 tablet by mouth daily         Allergies   Allergen Reactions     Fruit Extracts      Apple, peaches, plums, pears     Nuts      Other [Seasonal Allergies]      Tree Nuts-Lips swell and breathing problems       EXAM:    Ht 1.6 m (5' 3\")   LMP 09/10/2022   BMI 25.72 kg/m   Video visit, no full VS.    GENERAL:  Female, in no acute distress.  Alert and oriented x3. Well groomed.   HEENT:  Normocephalic, atraumatic. No conjunctival injection or eye swelling.   LUNGS:  Nonlabored breathing, no cough or audible wheezing, able to speak full sentences.  MSK: Full ROM UE.    SKIN: No rash on exposed skin.   NEURO: CN grossly intact, speech normal  PSYCH: Mentation appears normal, insight and judgement intact      Labs: PENDING    Imaging: n/a    Impression/Plan: Betty Rogers is a 49 year old female with ER positive, HER2 negative left-sided breast cancer s/p Taxol/AC, bilateral mastectomies, 7/13 lymph nodes positive and now on letrozole and abemaciclib.    Breast cancer: Invasive ductal carcinoma, BRCA test negative, Oncotype 28 and s/p  neoadjuvant chemotherapy with Taxol/AC, bilateral mastectomies and was found to have 7/13 lymph nodes positive and currently on abemaciclib and letrozole.  She is tolerating both of these medications pretty well.  Her abemaciclib was dosed reduced pretty " quickly due to diarrhea, now 100 mg twice daily, she is tolerating this well.  Letrozole is also going well.  Labs were not checked today, but she does have them scheduled for later today in infusion.  Given this was a video visit, unable to a physical exam, we have discussed self checks at home.  She will call sooner if needed.  -Continue abemaciclib and letrozole  -Labs and Prolia later today  -Dr Franco, labs and Prolia in 6 months    Bone health: Currently on letrozole, bone density in March 2023 with normal bone density, she is due for repeat DEXA scan in March 2025 and this is ordered.  Recommended she start calcium and vitamin D and we also discussed weightbearing exercises.  She will continue Prolia and will be given later today after labs.      Chart documentation with Dragon Voice recognition Software. Although reviewed after completion, some words and grammatical errors may remain.    30 minutes spent on the date of the encounter doing chart review, review of test results, interpretation of tests, patient visit, and documentation     Itzel Cesar PA-C  Hematology/Oncology  [facility]                  Again, thank you for allowing me to participate in the care of your patient.        Sincerely,        Itzel Cesar PA-C    Electronically signed

## 2025-01-22 NOTE — PROGRESS NOTES
Infusion Nursing Note:  Betty Rogers presents today for Prolia.    Patient seen by provider today: Yes:   Itzel Aguiar, EMMETT    present during visit today: Not Applicable.    Note: N/A.      Intravenous Access:  No Intravenous access/labs at this visit.    Treatment Conditions:  Lab Results   Component Value Date     01/22/2025    POTASSIUM 4.6 01/22/2025    CR 1.11 (H) 01/22/2025    ABEBA 9.7 01/22/2025    BILITOTAL 0.3 01/22/2025    ALBUMIN 4.1 01/22/2025    ALT 13 01/22/2025    AST 21 01/22/2025       Results reviewed, labs MET treatment parameters, ok to proceed with treatment.      Post Infusion Assessment:  Patient tolerated injection without incident.  Site patent and intact, free from redness, edema or discomfort.       Discharge Plan:   Patient discharged in stable condition accompanied by: self.  Departure Mode: Ambulatory.      Sarina Hughes LPN

## 2025-01-22 NOTE — PROGRESS NOTES
Virtual Visit Details    Type of service:  Video Visit     Originating Location (pt. 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  BRCA testing negative,  Oncotype 28 and s/p Taxol x 12 weeks with partial response, then completed ddAC x 4 cycles.  She underwent bilateral mastectomy with sentinel lymph node/axillary lymph node biopsy and final pathology with grade 2, 4 mm tumor, 8 mm margin, 7/13 nodes involved and then underwent tissue expander reconstruction.  She started adjuvant tamoxifen, then changed to AI with LAILA/BSO, radiation.    She continued on letrozole and abemaciclib and plan for 10 years.  Abemaciclib dose was reduced to 100 mg BID for diarrhea, plan for 2 years total.  She has also been on Prolia every 6 months x 2 years.    Video visit today for close follow-up.    Interval History: Betty is on video this morning.  She continues to tolerate abemaciclib and letrozole really well.  She is now on the reduced dose of abemaciclib 100 mg twice daily and her diarrhea is much better.  She will still occasionally have some softer stools, but nothing explosive.  She will occasionally have some hot flashes with the letrozole, but very tolerable.  No joint or bone aches.  No headache, vision changes, vomiting, dizziness.  No other new complaints.    Review of Systems: See interval hx. Denies fevers, chills, HA, dizziness, changes in vision, cough, CP, SOB, abdominal pain, N/V, diarrhea, changes in urination, bleeding, bruising.     PMHx and Social Hx reviewed per EPIC.      Medications:  Current Outpatient Medications   Medication Sig Dispense Refill    abemaciclib (VERZENIO) 100 MG tablet  "Take 1 tablet (100 mg) by mouth 2 times daily 60 tablet 0    abemaciclib (VERZENIO) 100 MG tablet Take 1 tablet (100 mg) by mouth 2 times daily 60 tablet 0    calcium citrate (CITRACAL) 950 (200 Ca) MG tablet Take 1 tablet by mouth 2 times daily      letrozole (FEMARA) 2.5 MG tablet Take 1 tablet (2.5 mg) by mouth daily 90 tablet 3    multivitamin w/minerals (THERA-VIT-M) tablet Take 1 tablet by mouth daily         Allergies   Allergen Reactions    Fruit Extracts      Apple, peaches, plums, pears    Nuts     Other [Seasonal Allergies]      Tree Nuts-Lips swell and breathing problems       EXAM:    Ht 1.6 m (5' 3\")   LMP 09/10/2022   BMI 25.72 kg/m   Video visit, no full VS.    GENERAL:  Female, in no acute distress.  Alert and oriented x3. Well groomed.   HEENT:  Normocephalic, atraumatic. No conjunctival injection or eye swelling.   LUNGS:  Nonlabored breathing, no cough or audible wheezing, able to speak full sentences.  MSK: Full ROM UE.    SKIN: No rash on exposed skin.   NEURO: CN grossly intact, speech normal  PSYCH: Mentation appears normal, insight and judgement intact      Labs: PENDING    Imaging: n/a    Impression/Plan: Betty Rogers is a 49 year old female with ER positive, HER2 negative left-sided breast cancer s/p Taxol/AC, bilateral mastectomies, 7/13 lymph nodes positive and now on letrozole and abemaciclib.    Breast cancer: Invasive ductal carcinoma, BRCA test negative, Oncotype 28 and s/p  neoadjuvant chemotherapy with Taxol/AC, bilateral mastectomies and was found to have 7/13 lymph nodes positive and currently on abemaciclib and letrozole.  She is tolerating both of these medications pretty well.  Her abemaciclib was dosed reduced pretty quickly due to diarrhea, now 100 mg twice daily, she is tolerating this well.  Letrozole is also going well.  Labs were not checked today, but she does have them scheduled for later today in infusion.  Given this was a video visit, unable to a physical " exam, we have discussed self checks at home.  She will call sooner if needed.  -Continue abemaciclib and letrozole  -Labs and Prolia later today  -Dr Franco, labs and Prolia in 6 months    Bone health: Currently on letrozole, bone density in March 2023 with normal bone density, she is due for repeat DEXA scan in March 2025 and this is ordered.  Recommended she start calcium and vitamin D and we also discussed weightbearing exercises.  She will continue Prolia and will be given later today after labs.      Chart documentation with Dragon Voice recognition Software. Although reviewed after completion, some words and grammatical errors may remain.    30 minutes spent on the date of the encounter doing chart review, review of test results, interpretation of tests, patient visit, and documentation     Itzel Cesar PA-C  Hematology/Oncology  [facility]

## 2025-01-22 NOTE — NURSING NOTE
Current patient location: [address] [place] [id number]    Is the patient currently in the state of [place]? YES    Visit mode: VIDEO    If the visit is dropped, the patient can be reconnected by:VIDEO VISIT: Text to cell phone:   Telephone Information:   Mobile [phone]       Will anyone else be joining the visit? NO  (If patient encounters technical issues they should call [phone])    Are changes needed to the allergy or medication list? No, Pt stated no changes to allergies, and Pt stated no med changes    Are refills needed on medications prescribed by this physician? NO    Rooming Documentation:  Unable to complete questionnaire(s) due to time    Reason for visit: RECHECK (Return [facility])    Tammy ASHBY

## 2025-01-22 NOTE — LETTER
1/22/2025      Betty Rogers  [address]  [facility] [address]      Dear Colleague,    Thank you for referring your patient, Btety Rogers, to the [facility]. Please see a copy of my visit note below.    Virtual Visit Details    Type of service:  Video Visit     Originating Location (pt. Location): Home  Distant Location (provider location):  On-site  Platform used for Video Visit: 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  BRCA testing negative,  Oncotype 28 and s/p Taxol x 12 weeks with partial response, then completed ddAC x 4 cycles.  She underwent bilateral mastectomy with sentinel lymph node/axillary lymph node biopsy and final pathology with grade 2, 4 mm tumor, 8 mm margin, 7/13 nodes involved and then underwent tissue expander reconstruction.  She started adjuvant tamoxifen, then changed to AI with LAILA/BSO, radiation.    She continued on letrozole and abemaciclib and plan for 10 years.  Abemaciclib dose was reduced to 100 mg BID for diarrhea, plan for 2 years total.  She has also been on Prolia every 6 months x 2 years.    Video visit today for close follow-up.    Interval History: Betty is on video this morning.  She continues to tolerate abemaciclib and letrozole really well.  She is now on the reduced dose of abemaciclib 100 mg twice daily and her diarrhea is much better.  She will still occasionally have some softer stools, but nothing explosive.  She will occasionally have some hot flashes with the letrozole, but very tolerable.  No joint or bone aches.  No headache, vision changes, vomiting, dizziness.  No other new complaints.    Review of Systems: See interval hx. Denies fevers, chills, HA, dizziness, changes in vision, cough,  "CP, SOB, abdominal pain, N/V, diarrhea, changes in urination, bleeding, bruising.     PMHx and Social Hx reviewed per EPIC.      Medications:  Current Outpatient Medications   Medication Sig Dispense Refill     abemaciclib (VERZENIO) 100 MG tablet Take 1 tablet (100 mg) by mouth 2 times daily 60 tablet 0     abemaciclib (VERZENIO) 100 MG tablet Take 1 tablet (100 mg) by mouth 2 times daily 60 tablet 0     calcium citrate (CITRACAL) 950 (200 Ca) MG tablet Take 1 tablet by mouth 2 times daily       letrozole (FEMARA) 2.5 MG tablet Take 1 tablet (2.5 mg) by mouth daily 90 tablet 3     multivitamin w/minerals (THERA-VIT-M) tablet Take 1 tablet by mouth daily         Allergies   Allergen Reactions     Fruit Extracts      Apple, peaches, plums, pears     Nuts      Other [Seasonal Allergies]      Tree Nuts-Lips swell and breathing problems       EXAM:    Ht 1.6 m (5' 3\")   LMP 09/10/2022   BMI 25.72 kg/m   Video visit, no full VS.    GENERAL:  Female, in no acute distress.  Alert and oriented x3. Well groomed.   HEENT:  Normocephalic, atraumatic. No conjunctival injection or eye swelling.   LUNGS:  Nonlabored breathing, no cough or audible wheezing, able to speak full sentences.  MSK: Full ROM UE.    SKIN: No rash on exposed skin.   NEURO: CN grossly intact, speech normal  PSYCH: Mentation appears normal, insight and judgement intact      Labs: PENDING    Imaging: n/a    Impression/Plan: Betty Rogers is a 49 year old female with ER positive, HER2 negative left-sided breast cancer s/p Taxol/AC, bilateral mastectomies, 7/13 lymph nodes positive and now on letrozole and abemaciclib.    Breast cancer: Invasive ductal carcinoma, BRCA test negative, Oncotype 28 and s/p  neoadjuvant chemotherapy with Taxol/AC, bilateral mastectomies and was found to have 7/13 lymph nodes positive and currently on abemaciclib and letrozole.  She is tolerating both of these medications pretty well.  Her abemaciclib was dosed reduced pretty " quickly due to diarrhea, now 100 mg twice daily, she is tolerating this well.  Letrozole is also going well.  Labs were not checked today, but she does have them scheduled for later today in infusion.  Given this was a video visit, unable to a physical exam, we have discussed self checks at home.  She will call sooner if needed.  -Continue abemaciclib and letrozole  -Labs and Prolia later today  -Dr Franco, labs and Prolia in 6 months    Bone health: Currently on letrozole, bone density in March 2023 with normal bone density, she is due for repeat DEXA scan in March 2025 and this is ordered.  Recommended she start calcium and vitamin D and we also discussed weightbearing exercises.  She will continue Prolia and will be given later today after labs.      Chart documentation with Dragon Voice recognition Software. Although reviewed after completion, some words and grammatical errors may remain.    30 minutes spent on the date of the encounter doing chart review, review of test results, interpretation of tests, patient visit, and documentation     Itzel Cesar PA-C  Hematology/Oncology  [facility]                  Again, thank you for allowing me to participate in the care of your patient.        Sincerely,        Itzel Cesar PA-C    Electronically signed

## 2025-02-06 DIAGNOSIS — C50.912 CARCINOMA OF LEFT BREAST METASTATIC TO AXILLARY LYMPH NODE (H): Primary | ICD-10-CM

## 2025-02-06 DIAGNOSIS — C77.3 CARCINOMA OF LEFT BREAST METASTATIC TO AXILLARY LYMPH NODE (H): Primary | ICD-10-CM

## 2025-02-12 ENCOUNTER — VIRTUAL VISIT (OUTPATIENT)
Dept: FAMILY MEDICINE | Facility: CLINIC | Age: 50
End: 2025-02-12
Payer: COMMERCIAL

## 2025-02-12 ENCOUNTER — NURSE TRIAGE (OUTPATIENT)
Dept: FAMILY MEDICINE | Facility: CLINIC | Age: 50
End: 2025-02-12

## 2025-02-12 DIAGNOSIS — H01.111 ALLERGIC DERMATITIS OF BOTH UPPER EYELIDS: Primary | ICD-10-CM

## 2025-02-12 DIAGNOSIS — H01.114 ALLERGIC DERMATITIS OF BOTH UPPER EYELIDS: Primary | ICD-10-CM

## 2025-02-12 DIAGNOSIS — H10.13 ALLERGIC CONJUNCTIVITIS, BILATERAL: ICD-10-CM

## 2025-02-12 PROCEDURE — 98005 SYNCH AUDIO-VIDEO EST LOW 20: CPT | Performed by: FAMILY MEDICINE

## 2025-02-12 RX ORDER — OLOPATADINE HYDROCHLORIDE 1 MG/ML
1 SOLUTION/ DROPS OPHTHALMIC 2 TIMES DAILY
Qty: 5 ML | Refills: 0 | Status: SHIPPED | OUTPATIENT
Start: 2025-02-12

## 2025-02-12 RX ORDER — TRIAMCINOLONE ACETONIDE 1 MG/G
CREAM TOPICAL 2 TIMES DAILY PRN
Qty: 30 G | Refills: 0 | Status: SHIPPED | OUTPATIENT
Start: 2025-02-12

## 2025-02-12 ASSESSMENT — ENCOUNTER SYMPTOMS: EYE PAIN: 1

## 2025-02-12 NOTE — PROGRESS NOTES
Betty is a 50 year old who is being evaluated via a billable video visit.    How would you like to obtain your AVS? MyChart  If the video visit is dropped, the invitation should be resent by: Text to cell phone: [phone]  Will anyone else be joining your video visit? No      Assessment & Plan     Allergic dermatitis of both upper eyelids  Recommended to stop using the new mascara and facial moisturizer, avoid any new topical cosmetic or other products on the skin  Start using topical triamcinolone cream sparingly twice daily over the concerned area for maximum of 2 weeks  If no improvement noted in 7 to 10 days, patient will call to schedule for dermatology consult for further evaluation  Also recommended patient take a picture of her eyelid rash for reference  Dosing and potential medication side effects discussed.  Patient verbalised understanding and is agreeable to the plan.    - triamcinolone (KENALOG) 0.1 % external cream; Apply topically 2 times daily as needed for irritation.  - Adult Dermatology  Referral; Future    Allergic conjunctivitis, bilateral  Will start using Patanol eyedrops for both eyes for the next 2 weeks while treating the eyelid dermatitis as mentioned above  - olopatadine (PATANOL) 0.1 % ophthalmic solution; Place 1 drop into both eyes 2 times daily.            Chart documentation done in part with Dragon Voice recognition Software. Although reviewed after completion, some word and grammatical error may remain.    See Patient Instructions    Subjective   Betty is a 50 year old, presenting for the following health issues:  Patient is here for a video visit instead of in person visit due to the current COVID-19 pandemic.    Eye Problem  Patient with past medical history significant for breast malignancy is here with concerns of having severe itching of the skin over the eyelids on both sides associated with mild drainage from both eyes but with no red eyes, blurred or double  vision.  Patient denies previous similar symptoms in the past.  Duration of symptoms-6 weeks  Patient reported using new mascara and a new facial moisturizer in the past few months.  Denies previous history of contact dermatitis or allergies.  Has no other abnormal skin rashes or concerns at this time  Patient reported trying over-the-counter hydrocortisone cream mixed with Vaseline which made her symptoms significantly better but using the steroid cream alone gave her some burning sensation over the skin  Other HPI is as mentioned below  Reviewed nurse triage note from today regarding the concern    Eye Problem     History of Present Illness       Reason for visit:  Eye lid concern   She is taking medications regularly.               Review of Systems  CONSTITUTIONAL: NEGATIVE for fever, chills, change in weight  INTEGUMENTARY/SKIN: as above  EYES: as above      Objective           Vitals:  No vitals were obtained today due to virtual visit.    Physical Exam   GENERAL: alert and no distress  EYES: Eyes grossly normal to inspection  RESP: No audible wheeze, cough, or visible cyanosis.    NEURO: Cranial nerves grossly intact.  Mentation and speech appropriate for age.  PSYCH: Appropriate affect, tone, and pace of words          Video-Visit Details    Type of service:  Video Visit   Originating Location (pt. Location): Home    Distant Location (provider location):  Off-site  Platform used for Video Visit: [employer]  Signed Electronically by: Regino Flowers MD    Chart forwarded to PCP for JT

## 2025-02-12 NOTE — TELEPHONE ENCOUNTER
Nurse Triage SBAR    Is this a 2nd Level Triage? NO    Situation: patient reports eyelids are red and itchy    Background: reports noticing her eyelids started getting red and itchy around the beginning of January. Reports that she doesn't notice it as much during the day while busy but when gets home, will notice that her eye lids are itchy. Patient reports that her son asked her why she was wearing red eye shadow.    Assessment: bilateral eye lids are red, have some flaking skin, burning type of itching, and mild discharge. Denies fever, sinus symptoms, and visual changes    Protocol Recommended Disposition:   Go To Office Now    Recommendation: reviewed disposition. Appointment scheduled for today. Patient verbalized understanding and agrees with plan.      Appointment scheduled    Does the patient meet one of the following criteria for ADS visit consideration? 16+ years old, with an [facility] PCP     TIP  Providers, please consider if this condition is appropriate for management at one of our Acute and Diagnostic Services sites.     If patient is a good candidate, please use dotphrase <dot>triageresponse and select Refer to ADS to document.      Reason for Disposition   Eyelid (outer) is very red    Additional Information   Negative: SEVERE eye pain (e.g., excruciating pain and patient unable to do normal activities)   Negative: Eyelid is very swollen (shut or almost) and fever   Negative: Recent eye surgery and has increasing eye pain   Negative: Patient sounds very sick or weak to the triager   Negative: Eyelid is very swollen (shut or almost)   Negative: Looking at light causes MODERATE to SEVERE eye pain (i.e., photophobia)   Negative: Blurred vision AND new-onset or getting worse   Negative: Cloudy spot or sore seen on the cornea (clear part of the eye)   Negative: MODERATE eye pain or discomfort (e.g., interferes with normal activities or awakens from sleep; more than mild)    Answer Assessment - Initial  "Assessment Questions  1. LOCATION: \"Where is the redness?\" (e.g., eyeball or outer eyelids) Note: When callers say the eye is red, they usually mean the sclera (white of the eye) is red.        Full eye lid and up to the eyebrow     2. ONE OR BOTH EYES: \"Is the redness in one or both eyes?\"       Both    3. ONSET: \"When did the redness start?\" (e.g., hours, days)       Has been ongoing since beginning of January    4. EYELIDS: \"Are the eyelids red or swollen?\" If Yes, ask: \"How much?\"       Red, intermittent swelling, and a little flaky     5. VISION: \"Do you have blurred vision?\"      No    6. ITCHING: \"Does it feel itchy?\" If so ask: \"How bad is it\" (Scale 1-10; or mild, moderate, severe)      Comes and goes    7. PAIN: \"Is it painful?\" If Yes, ask: \"How bad is the pain?\" (Scale 0-10; or none, mild, moderate, severe)      Intermittently will get a burning type sensation    8. CONTACT LENS: \"Do you wear contacts?\"      Yes    9. CAUSE: \"What do you think is causing the redness?\"      Unknown    10. OTHER SYMPTOMS: \"Do you have any other symptoms?\" (e.g., fever, runny nose, cough, vomiting)        Mild amount of discharge on one side    Protocols used: Eye - Redness-A-OH    "

## 2025-02-12 NOTE — TELEPHONE ENCOUNTER
This writer attempted to contact the pt on 02/12/25    Reason for call: triage eye sx as mentioned below:         There was no answer at phone number [phone]  provided in the patient's chart, so left message.      If patient calls back:   Registered Nurse called. Follow Triage Call workflow        Yu Peralta RN

## 2025-03-06 DIAGNOSIS — C77.3 CARCINOMA OF LEFT BREAST METASTATIC TO AXILLARY LYMPH NODE (H): Primary | ICD-10-CM

## 2025-03-06 DIAGNOSIS — C50.912 CARCINOMA OF LEFT BREAST METASTATIC TO AXILLARY LYMPH NODE (H): Primary | ICD-10-CM

## 2025-04-02 DIAGNOSIS — C50.912 CARCINOMA OF LEFT BREAST METASTATIC TO AXILLARY LYMPH NODE (H): Primary | ICD-10-CM

## 2025-04-02 DIAGNOSIS — C77.3 CARCINOMA OF LEFT BREAST METASTATIC TO AXILLARY LYMPH NODE (H): Primary | ICD-10-CM

## 2025-04-11 ENCOUNTER — ANCILLARY PROCEDURE (OUTPATIENT)
Dept: BONE DENSITY | Facility: CLINIC | Age: 50
End: 2025-04-11
Attending: PHYSICIAN ASSISTANT
Payer: COMMERCIAL

## 2025-04-11 DIAGNOSIS — Z79.811 AROMATASE INHIBITOR USE: ICD-10-CM

## 2025-04-11 PROCEDURE — 77080 DXA BONE DENSITY AXIAL: CPT | Performed by: RADIOLOGY

## 2025-05-02 DIAGNOSIS — C77.3 CARCINOMA OF LEFT BREAST METASTATIC TO AXILLARY LYMPH NODE (H): ICD-10-CM

## 2025-05-02 DIAGNOSIS — C50.912 CARCINOMA OF LEFT BREAST METASTATIC TO AXILLARY LYMPH NODE (H): ICD-10-CM

## 2025-05-05 DIAGNOSIS — C77.3 CARCINOMA OF LEFT BREAST METASTATIC TO AXILLARY LYMPH NODE (H): Primary | ICD-10-CM

## 2025-05-05 DIAGNOSIS — C50.912 CARCINOMA OF LEFT BREAST METASTATIC TO AXILLARY LYMPH NODE (H): Primary | ICD-10-CM

## 2025-05-13 ENCOUNTER — PATIENT OUTREACH (OUTPATIENT)
Dept: ONCOLOGY | Facility: CLINIC | Age: 50
End: 2025-05-13
Payer: COMMERCIAL

## 2025-05-13 NOTE — PROGRESS NOTES
Confirmed with pharmacy that Verzenio therapy will be complete following this cycle.  [employer] message sent to patient confirming.       Della Kc RN

## 2025-05-13 NOTE — PROGRESS NOTES
Was requested to check into reason for upcoming visit.  Attempted to reach patient, message left requesting return call.       Patient returned call, inquired if there was a specific reason for her appointment with Dr. Franco next week.  Patient states she thought it may be because she will be discontinuing Verzenio (states she just started a new cycle and thinks she will be done after completing).  Advised patient that there should be no need to see Dr. Franco for treatment completion.  Patient will discontinue Verzenio unless she hears otherwise from pharmacy.  Her August visit will be moved up to July for her next Prolia.      Della Kc RN

## 2025-05-21 ENCOUNTER — TELEPHONE (OUTPATIENT)
Dept: ONCOLOGY | Facility: CLINIC | Age: 50
End: 2025-05-21
Payer: COMMERCIAL

## 2025-05-21 NOTE — ORAL ONC MGMT
Thank you for the opportunity to be a part in the care of this patient's oral chemotherapy. The oncology pharmacy will no longer be following this patient for oral chemotherapy. If there are any questions or the plan changes, feel free to contact us.    Petros Carmona, PharmD  Oral Chemotherapy Pharmacist  [phone]

## 2025-07-23 ENCOUNTER — ONCOLOGY VISIT (OUTPATIENT)
Dept: ONCOLOGY | Facility: CLINIC | Age: 50
End: 2025-07-23
Attending: PHYSICIAN ASSISTANT
Payer: COMMERCIAL

## 2025-07-23 ENCOUNTER — INFUSION THERAPY VISIT (OUTPATIENT)
Dept: INFUSION THERAPY | Facility: CLINIC | Age: 50
End: 2025-07-23
Attending: INTERNAL MEDICINE
Payer: COMMERCIAL

## 2025-07-23 VITALS
DIASTOLIC BLOOD PRESSURE: 79 MMHG | WEIGHT: 140 LBS | BODY MASS INDEX: 24.8 KG/M2 | RESPIRATION RATE: 16 BRPM | SYSTOLIC BLOOD PRESSURE: 118 MMHG | OXYGEN SATURATION: 98 % | HEART RATE: 61 BPM

## 2025-07-23 VITALS
RESPIRATION RATE: 16 BRPM | DIASTOLIC BLOOD PRESSURE: 79 MMHG | HEART RATE: 61 BPM | BODY MASS INDEX: 24.8 KG/M2 | WEIGHT: 140 LBS | SYSTOLIC BLOOD PRESSURE: 118 MMHG | OXYGEN SATURATION: 98 %

## 2025-07-23 DIAGNOSIS — Z79.811 USE OF AROMATASE INHIBITORS: Primary | ICD-10-CM

## 2025-07-23 DIAGNOSIS — C50.912 CARCINOMA OF LEFT BREAST METASTATIC TO AXILLARY LYMPH NODE (H): ICD-10-CM

## 2025-07-23 DIAGNOSIS — Z79.811 AROMATASE INHIBITOR USE: ICD-10-CM

## 2025-07-23 DIAGNOSIS — Z79.811 USE OF AROMATASE INHIBITORS: ICD-10-CM

## 2025-07-23 DIAGNOSIS — T38.6X5A OSTEOPOROSIS DUE TO AROMATASE INHIBITOR: ICD-10-CM

## 2025-07-23 DIAGNOSIS — M81.8 OSTEOPOROSIS DUE TO AROMATASE INHIBITOR: ICD-10-CM

## 2025-07-23 DIAGNOSIS — C77.3 CARCINOMA OF LEFT BREAST METASTATIC TO AXILLARY LYMPH NODE (H): ICD-10-CM

## 2025-07-23 DIAGNOSIS — C50.912 CARCINOMA OF LEFT BREAST METASTATIC TO AXILLARY LYMPH NODE (H): Primary | ICD-10-CM

## 2025-07-23 DIAGNOSIS — C77.3 CARCINOMA OF LEFT BREAST METASTATIC TO AXILLARY LYMPH NODE (H): Primary | ICD-10-CM

## 2025-07-23 DIAGNOSIS — R74.01 TRANSAMINITIS: ICD-10-CM

## 2025-07-23 LAB
ALBUMIN SERPL BCG-MCNC: 4.4 G/DL (ref 3.5–5.2)
ALP SERPL-CCNC: 57 U/L (ref 40–150)
ALT SERPL W P-5'-P-CCNC: 137 U/L (ref 0–50)
ANION GAP SERPL CALCULATED.3IONS-SCNC: 11 MMOL/L (ref 7–15)
AST SERPL W P-5'-P-CCNC: 123 U/L (ref 0–45)
BASOPHILS # BLD AUTO: 0 10E3/UL (ref 0–0.2)
BASOPHILS NFR BLD AUTO: 1 %
BILIRUB SERPL-MCNC: 0.5 MG/DL
BUN SERPL-MCNC: 12.5 MG/DL (ref 6–20)
CALCIUM SERPL-MCNC: 10.1 MG/DL (ref 8.8–10.4)
CHLORIDE SERPL-SCNC: 104 MMOL/L (ref 98–107)
CREAT SERPL-MCNC: 0.94 MG/DL (ref 0.51–0.95)
EGFRCR SERPLBLD CKD-EPI 2021: 73 ML/MIN/1.73M2
EOSINOPHIL # BLD AUTO: 0.4 10E3/UL (ref 0–0.7)
EOSINOPHIL NFR BLD AUTO: 11 %
ERYTHROCYTE [DISTWIDTH] IN BLOOD BY AUTOMATED COUNT: 11.3 % (ref 10–15)
GLUCOSE SERPL-MCNC: 87 MG/DL (ref 70–99)
HCO3 SERPL-SCNC: 26 MMOL/L (ref 22–29)
HCT VFR BLD AUTO: 40.6 % (ref 35–47)
HGB BLD-MCNC: 14 G/DL (ref 11.7–15.7)
IMM GRANULOCYTES # BLD: 0 10E3/UL
IMM GRANULOCYTES NFR BLD: 0 %
LYMPHOCYTES # BLD AUTO: 1.2 10E3/UL (ref 0.8–5.3)
LYMPHOCYTES NFR BLD AUTO: 31 %
MCH RBC QN AUTO: 31.9 PG (ref 26.5–33)
MCHC RBC AUTO-ENTMCNC: 34.5 G/DL (ref 31.5–36.5)
MCV RBC AUTO: 93 FL (ref 78–100)
MONOCYTES # BLD AUTO: 0.4 10E3/UL (ref 0–1.3)
MONOCYTES NFR BLD AUTO: 10 %
NEUTROPHILS # BLD AUTO: 1.7 10E3/UL (ref 1.6–8.3)
NEUTROPHILS NFR BLD AUTO: 46 %
NRBC # BLD AUTO: 0 10E3/UL
NRBC BLD AUTO-RTO: 0 /100
PLATELET # BLD AUTO: 209 10E3/UL (ref 150–450)
POTASSIUM SERPL-SCNC: 4.6 MMOL/L (ref 3.4–5.3)
PROT SERPL-MCNC: 6.9 G/DL (ref 6.4–8.3)
RBC # BLD AUTO: 4.39 10E6/UL (ref 3.8–5.2)
SODIUM SERPL-SCNC: 141 MMOL/L (ref 135–145)
WBC # BLD AUTO: 3.7 10E3/UL (ref 4–11)

## 2025-07-23 PROCEDURE — 99213 OFFICE O/P EST LOW 20 MIN: CPT | Performed by: INTERNAL MEDICINE

## 2025-07-23 PROCEDURE — 99207 PR NO CHARGE LOS: CPT

## 2025-07-23 PROCEDURE — 85004 AUTOMATED DIFF WBC COUNT: CPT

## 2025-07-23 PROCEDURE — 250N000011 HC RX IP 250 OP 636: Mod: JZ | Performed by: INTERNAL MEDICINE

## 2025-07-23 PROCEDURE — 36415 COLL VENOUS BLD VENIPUNCTURE: CPT

## 2025-07-23 PROCEDURE — 99214 OFFICE O/P EST MOD 30 MIN: CPT | Performed by: INTERNAL MEDICINE

## 2025-07-23 PROCEDURE — 84155 ASSAY OF PROTEIN SERUM: CPT

## 2025-07-23 PROCEDURE — 96372 THER/PROPH/DIAG INJ SC/IM: CPT | Mod: XS | Performed by: INTERNAL MEDICINE

## 2025-07-23 PROCEDURE — G2211 COMPLEX E/M VISIT ADD ON: HCPCS | Performed by: INTERNAL MEDICINE

## 2025-07-23 RX ORDER — EPINEPHRINE 1 MG/ML
0.3 INJECTION, SOLUTION INTRAMUSCULAR; SUBCUTANEOUS EVERY 5 MIN PRN
OUTPATIENT
Start: 2026-07-18

## 2025-07-23 RX ORDER — ALBUTEROL SULFATE 0.83 MG/ML
2.5 SOLUTION RESPIRATORY (INHALATION)
OUTPATIENT
Start: 2026-07-18

## 2025-07-23 RX ORDER — MEPERIDINE HYDROCHLORIDE 25 MG/ML
25 INJECTION INTRAMUSCULAR; INTRAVENOUS; SUBCUTANEOUS EVERY 30 MIN PRN
OUTPATIENT
Start: 2026-07-18

## 2025-07-23 RX ORDER — ALBUTEROL SULFATE 90 UG/1
1-2 INHALANT RESPIRATORY (INHALATION)
Start: 2026-07-18

## 2025-07-23 RX ORDER — DIPHENHYDRAMINE HYDROCHLORIDE 50 MG/ML
50 INJECTION, SOLUTION INTRAMUSCULAR; INTRAVENOUS
Start: 2026-07-18

## 2025-07-23 RX ORDER — METHYLPREDNISOLONE SODIUM SUCCINATE 125 MG/2ML
125 INJECTION INTRAMUSCULAR; INTRAVENOUS
Start: 2026-07-18

## 2025-07-23 RX ADMIN — DENOSUMAB 60 MG: 60 INJECTION SUBCUTANEOUS at 08:36

## 2025-07-23 ASSESSMENT — PAIN SCALES - GENERAL
PAINLEVEL_OUTOF10: NO PAIN (0)
PAINLEVEL_OUTOF10: NO PAIN (0)

## 2025-07-23 NOTE — LETTER
2025      Betty Rogers  [address]  [facility] [address]      Dear Colleague,    Thank you for referring your patient, Betty Rogers, to the [facility]. Please see a copy of my visit note below.    [facility] Hematology / Oncology  Progress Note  Name: Betty Rogers  :  1975  MRN:  [id number]    --------------------    Subjective:  Betty returns for follow-up of breast cancer accompanied.  Since our last visit, Betty has remained well from an overall health standpoint.  No new or mounting concerns from a breast cancer or treatment standpoint.  She completed Verzenio in  with improvement in bowels since that time.  Great trip to [place].    --------------------    Objective:  VS: /79   Pulse 61   Resp 16   Wt 63.5 kg (140 lb)   LMP 09/10/2022   SpO2 98%   BMI 24.80 kg/m    Gen: Well-appearing.    We reviewed CBC, CMP.  We reviewed DEXA.    --------------------    Assessment / Plan:  Multifocal, locally advanced, hormone positive, HER2 negative left breast ductal carcinoma (ypT1a ypN2a):  # Neoadjuvant Oncotype Dx score 28.  # Neoadjuvant BRCA testing negative.   # Neoadjuvant taxol x 12 weeks; partial response.  # Neoadjuvant dose dense AC x 4 cycle; RCB2 present.  # Bilateral breast mastectomy w/ sentinel node / axillary node and tissue expander recon; path w/ gr 2, 4 mm tumor, 8 mm margin, 7/13 nodes involved (1 macromet, 3 micromet, 21 mm largest, pos KAMERON).  # Left breast tissue expander removal 2/2 infection.  # Adjuvant tamoxifen changed to AI w/ LAILA/BSO  # Status post adjuvant radiation ongoing.  # Status post LAILA/BSO.  # Adjuvant letrozole / abemaciclib ongoing.    Continue letrozole; planning 10 years.  Continue Prolia every 6 months times minimum 2 years; dose today.  Stable counts on abemaciclib; no dose reductions required.  Mild transaminitis (some wine last PM); will recheck.  DEXA normal bone health; stable on AI /  Prolia.  Colonoscopy ordered for RHM.  Can discuss MMR vaccine or measles ab w/ PCP; can resume blood donation again.    Patient Instructions   1) Hepatic panel recheck 1 week.  2) DONNELL 6 months w/ Prolia and labs (CMP).  3) BJT 12 months w/ Prolia and labs (CBC, CMP, vitamin D).    Hoang Franco MD.    Again, thank you for allowing me to participate in the care of your patient.        Sincerely,        Hoang Franco MD    Electronically signed

## 2025-07-23 NOTE — PATIENT INSTRUCTIONS
1) Hepatic panel recheck 1 week.  2) DONNELL 6 months w/ Prolia and labs (CMP).  3) BJT 12 months w/ Prolia and labs (CBC, CMP, vitamin D).    Hoang Franco MD.

## 2025-07-23 NOTE — PROGRESS NOTES
[facility] Hematology / Oncology  Progress Note  Name: Betty Rogers  :  1975  MRN:  [id number]    --------------------    Subjective:  Betty returns for follow-up of breast cancer accompanied.  Since our last visit, Betty has remained well from an overall health standpoint.  No new or mounting concerns from a breast cancer or treatment standpoint.  She completed Verzenio in  with improvement in bowels since that time.  Great trip to [place].    --------------------    Objective:  VS: /79   Pulse 61   Resp 16   Wt 63.5 kg (140 lb)   LMP 09/10/2022   SpO2 98%   BMI 24.80 kg/m    Gen: Well-appearing.    We reviewed CBC, CMP.  We reviewed DEXA.    --------------------    Assessment / Plan:  Multifocal, locally advanced, hormone positive, HER2 negative left breast ductal carcinoma (ypT1a ypN2a):  # Neoadjuvant Oncotype Dx score 28.  # Neoadjuvant BRCA testing negative.   # Neoadjuvant taxol x 12 weeks; partial response.  # Neoadjuvant dose dense AC x 4 cycle; RCB2 present.  # Bilateral breast mastectomy w/ sentinel node / axillary node and tissue expander recon; path w/ gr 2, 4 mm tumor, 8 mm margin, 7/13 nodes involved (1 macromet, 3 micromet, 21 mm largest, pos KAMERON).  # Left breast tissue expander removal 2/2 infection.  # Adjuvant tamoxifen changed to AI w/ LAILA/BSO  # Status post adjuvant radiation ongoing.  # Status post LAILA/BSO.  # Adjuvant letrozole / abemaciclib ongoing.    Continue letrozole; planning 10 years.  Continue Prolia every 6 months times minimum 2 years; dose today.  Stable counts on abemaciclib; no dose reductions required.  Mild transaminitis (some wine last PM); will recheck.  DEXA normal bone health; stable on AI / Prolia.  Colonoscopy ordered for RHM.  Can discuss MMR vaccine or measles ab w/ PCP; can resume blood donation again.    Patient Instructions   1) Hepatic panel recheck 1 week.  2) DONNELL 6 months w/ Prolia and labs (CMP).  3) BJT 12 months w/ Prolia and  labs (CBC, CMP, vitamin D).    Hoang Franco MD.

## 2025-07-23 NOTE — PROGRESS NOTES
Infusion Nursing Note:  Betty Rogers presents today for Prolia.    Patient seen by provider today: Yes: Salvador   present during visit today: Not Applicable.    Note: See RN's Notes .      Intravenous Access:  No Intravenous access/labs at this visit.    Treatment Conditions:  Lab Results   Component Value Date     07/23/2025    POTASSIUM 4.6 07/23/2025    CR 0.94 07/23/2025    ABEBA 10.1 07/23/2025    BILITOTAL 0.5 07/23/2025    ALBUMIN 4.4 07/23/2025     (H) 07/23/2025     (H) 07/23/2025       Results reviewed, labs MET treatment parameters, ok to proceed with treatment.      Post Infusion Assessment:  Patient tolerated injection without incident.  Site patent and intact, free from redness, edema or discomfort.       Discharge Plan:   Patient discharged in stable condition accompanied by: self.  Departure Mode: Ambulatory.      Jaimie Chavez MA

## 2025-07-23 NOTE — NURSING NOTE
Patient meets parameters for prolia today. Denies any dental issues or infection. No other concerns r/t injection. Medication released.    Last Comprehensive Metabolic Panel:  Sodium   Date Value Ref Range Status   07/23/2025 141 135 - 145 mmol/L Final     Potassium   Date Value Ref Range Status   07/23/2025 4.6 3.4 - 5.3 mmol/L Final   03/13/2023 4.4 3.4 - 5.3 mmol/L Final     Chloride   Date Value Ref Range Status   07/23/2025 104 98 - 107 mmol/L Final   03/13/2023 110 (H) 94 - 109 mmol/L Final     Carbon Dioxide (CO2)   Date Value Ref Range Status   07/23/2025 26 22 - 29 mmol/L Final   03/13/2023 26 20 - 32 mmol/L Final     Anion Gap   Date Value Ref Range Status   07/23/2025 11 7 - 15 mmol/L Final   03/13/2023 4 3 - 14 mmol/L Final     Glucose   Date Value Ref Range Status   07/23/2025 87 70 - 99 mg/dL Final   03/13/2023 80 70 - 99 mg/dL Final   12/24/2019 88 70 - 99 mg/dL Final     Comment:     Fasting specimen     GLUCOSE BY METER POCT   Date Value Ref Range Status   08/15/2023 118 (H) 70 - 99 mg/dL Final     Urea Nitrogen   Date Value Ref Range Status   07/23/2025 12.5 6.0 - 20.0 mg/dL Final   03/13/2023 15 7 - 30 mg/dL Final     Creatinine   Date Value Ref Range Status   07/23/2025 0.94 0.51 - 0.95 mg/dL Final     GFR Estimate   Date Value Ref Range Status   07/23/2025 73 >60 mL/min/1.73m2 Final     Comment:     eGFR calculated using 2021 CKD-EPI equation.     Calcium   Date Value Ref Range Status   07/23/2025 10.1 8.8 - 10.4 mg/dL Final     Bilirubin Total   Date Value Ref Range Status   07/23/2025 0.5 <=1.2 mg/dL Final     Alkaline Phosphatase   Date Value Ref Range Status   07/23/2025 57 40 - 150 U/L Final     ALT   Date Value Ref Range Status   07/23/2025 137 (H) 0 - 50 U/L Final     AST   Date Value Ref Range Status   07/23/2025 123 (H) 0 - 45 U/L Final     Blessing Eric, RN, BSN, PHN, OCN  [facility]

## 2025-07-23 NOTE — NURSING NOTE
"Oncology Rooming Note    July 23, 2025 8:09 AM   Betty Rogers is a 50 year old female who presents for:    Chief Complaint   Patient presents with    Oncology Clinic Visit     6 Mo Follow Up      Initial Vitals: /79   Pulse 61   Resp 16   Wt 63.5 kg (140 lb)   LMP 09/10/2022   SpO2 98%   BMI 24.80 kg/m   Estimated body mass index is 24.8 kg/m  as calculated from the following:    Height as of 1/22/25: 1.6 m (5' 3\").    Weight as of this encounter: 63.5 kg (140 lb). Body surface area is 1.68 meters squared.  No Pain (0) Comment: Data Unavailable   Patient's last menstrual period was 09/10/2022.  Allergies reviewed: Yes  Medications reviewed: Yes    Medications: Medication refills not needed today.  Pharmacy name entered into [facility]:    [id number] - [place], [place] - [address] [address] [place] [facility] [id number] [place] [address] SPECIALTY PH[facility]Y - [employer], [place] - [address]    PHQ9:  Did this patient require a PHQ9?: No      Clinical concerns: none noted       Jaimie Chavez MA            "
[phone]

## 2025-07-28 ENCOUNTER — MYC MEDICAL ADVICE (OUTPATIENT)
Dept: ONCOLOGY | Facility: CLINIC | Age: 50
End: 2025-07-28
Payer: COMMERCIAL

## 2025-07-28 DIAGNOSIS — Z12.11 COLON CANCER SCREENING: Primary | ICD-10-CM

## 2025-07-28 NOTE — TELEPHONE ENCOUNTER
Received [employer] message following up on colonoscopy reviewed during visit with Dr. Franco on 7/23. No order found through chart review. Verbal order received from Leydi FONG to place order. RNCC followed up with patient to provide update/information.     Lea Ott RN on 7/28/2025 at 11:33 AM

## 2025-07-31 ENCOUNTER — LAB (OUTPATIENT)
Dept: LAB | Facility: CLINIC | Age: 50
End: 2025-07-31
Payer: COMMERCIAL

## 2025-07-31 DIAGNOSIS — R74.01 TRANSAMINITIS: ICD-10-CM

## 2025-07-31 LAB
ALBUMIN SERPL BCG-MCNC: 4.5 G/DL (ref 3.5–5.2)
ALP SERPL-CCNC: 55 U/L (ref 40–150)
ALT SERPL W P-5'-P-CCNC: 66 U/L (ref 0–50)
AST SERPL W P-5'-P-CCNC: 47 U/L (ref 0–45)
BILIRUB SERPL-MCNC: 0.2 MG/DL
BILIRUBIN DIRECT (ROCHE PRO & PURE): 0.12 MG/DL (ref 0–0.45)
PROT SERPL-MCNC: 7 G/DL (ref 6.4–8.3)

## 2025-08-06 ENCOUNTER — TELEPHONE (OUTPATIENT)
Dept: GASTROENTEROLOGY | Facility: CLINIC | Age: 50
End: 2025-08-06
Payer: COMMERCIAL

## 2025-08-07 RX ORDER — ONDANSETRON 2 MG/ML
4 INJECTION INTRAMUSCULAR; INTRAVENOUS EVERY 6 HOURS PRN
Status: CANCELLED | OUTPATIENT
Start: 2025-08-07

## 2025-08-07 RX ORDER — PROCHLORPERAZINE MALEATE 10 MG
10 TABLET ORAL EVERY 6 HOURS PRN
Status: CANCELLED | OUTPATIENT
Start: 2025-08-07

## 2025-08-07 RX ORDER — NALOXONE HYDROCHLORIDE 0.4 MG/ML
0.4 INJECTION, SOLUTION INTRAMUSCULAR; INTRAVENOUS; SUBCUTANEOUS
Status: CANCELLED | OUTPATIENT
Start: 2025-08-07

## 2025-08-07 RX ORDER — NALOXONE HYDROCHLORIDE 0.4 MG/ML
0.2 INJECTION, SOLUTION INTRAMUSCULAR; INTRAVENOUS; SUBCUTANEOUS
Status: CANCELLED | OUTPATIENT
Start: 2025-08-07

## 2025-08-07 RX ORDER — FLUMAZENIL 0.1 MG/ML
0.2 INJECTION, SOLUTION INTRAVENOUS
Status: CANCELLED | OUTPATIENT
Start: 2025-08-07 | End: 2025-08-08

## 2025-08-07 RX ORDER — ONDANSETRON 4 MG/1
4 TABLET, ORALLY DISINTEGRATING ORAL EVERY 6 HOURS PRN
Status: CANCELLED | OUTPATIENT
Start: 2025-08-07

## 2025-08-08 ENCOUNTER — HOSPITAL ENCOUNTER (OUTPATIENT)
Facility: AMBULATORY SURGERY CENTER | Age: 50
Discharge: HOME | End: 2025-08-08
Attending: COLON & RECTAL SURGERY
Payer: COMMERCIAL

## 2025-08-08 VITALS
HEART RATE: 78 BPM | SYSTOLIC BLOOD PRESSURE: 96 MMHG | TEMPERATURE: 97.4 F | RESPIRATION RATE: 16 BRPM | DIASTOLIC BLOOD PRESSURE: 65 MMHG | OXYGEN SATURATION: 99 %

## 2025-08-08 LAB — COLONOSCOPY: NORMAL

## 2025-08-08 RX ORDER — ONDANSETRON 2 MG/ML
4 INJECTION INTRAMUSCULAR; INTRAVENOUS
Status: DISCONTINUED | OUTPATIENT
Start: 2025-08-08 | End: 2025-08-09 | Stop reason: HOSPADM

## 2025-08-08 RX ORDER — FENTANYL CITRATE 50 UG/ML
INJECTION, SOLUTION INTRAMUSCULAR; INTRAVENOUS PRN
Status: DISCONTINUED | OUTPATIENT
Start: 2025-08-08 | End: 2025-08-08 | Stop reason: HOSPADM

## 2025-08-08 RX ORDER — LIDOCAINE 40 MG/G
CREAM TOPICAL
Status: DISCONTINUED | OUTPATIENT
Start: 2025-08-08 | End: 2025-08-09 | Stop reason: HOSPADM

## 2025-08-11 DIAGNOSIS — Z12.11 COLON CANCER SCREENING: ICD-10-CM

## 2025-08-14 LAB
PATH REPORT.COMMENTS IMP SPEC: NORMAL
PATH REPORT.COMMENTS IMP SPEC: NORMAL
PATH REPORT.FINAL DX SPEC: NORMAL
PATH REPORT.GROSS SPEC: NORMAL
PATH REPORT.MICROSCOPIC SPEC OTHER STN: NORMAL
PHOTO IMAGE: NORMAL

## 2025-08-22 ENCOUNTER — TRANSFERRED RECORDS (OUTPATIENT)
Dept: HEALTH INFORMATION MANAGEMENT | Facility: CLINIC | Age: 50
End: 2025-08-22
Payer: COMMERCIAL

## 2025-09-04 ENCOUNTER — EXTERNAL ORDER RESULTS (OUTPATIENT)
Dept: GASTROENTEROLOGY | Facility: CLINIC | Age: 50
End: 2025-09-04
Payer: COMMERCIAL

## (undated) DEVICE — BNDG ELASTIC 6" DBL LENGTH UNSTERILE 6611-16

## (undated) DEVICE — LIFTER SURGICAL ASCENDO SUBMUCOSAL LIFT AGENT BX00712934

## (undated) DEVICE — SUTURE BOOTS 051003PBX

## (undated) DEVICE — PACK MAJOR SBA15MAFSI

## (undated) DEVICE — NDL 19GA 1.5"

## (undated) DEVICE — SU MONOCRYL 4-0 PS-2 18" UND Y496G

## (undated) DEVICE — LOCALIZER INSTRUMENT COVER KIT

## (undated) DEVICE — GLOVE BIOGEL PI MICRO INDICATOR UNDERGLOVE SZ 6.5 48965

## (undated) DEVICE — TUBING INFUSION INFILTRATION LIPOSUCTION 156" 24-6008

## (undated) DEVICE — PREP SKIN SCRUB TRAY 4461A

## (undated) DEVICE — DRAPE LAP TRANSVERSE 29421

## (undated) DEVICE — DRAIN JACKSON PRATT RESERVOIR 100ML SU130-1305

## (undated) DEVICE — ABDOMINAL PAD

## (undated) DEVICE — NEPTUNE SMOKE EVACUATION PENCIL

## (undated) DEVICE — DRSG TEGADERM 4X10" 1627

## (undated) DEVICE — SUCTION CANISTER MEDIVAC LINER 3000ML W/LID 65651-530

## (undated) DEVICE — DRAIN JACKSON PRATT 15FR ROUND SIL LF JP-2229

## (undated) DEVICE — ESU ELEC BLADE 2.75" COATED/INSULATED E1455

## (undated) DEVICE — ESU PENCIL W/SMOKE EVAC NEPTUNE STRYKER 0703-046-000

## (undated) DEVICE — PACK MINOR SBA15MIFSE

## (undated) DEVICE — SPONGE LAP 18X18" X8435

## (undated) DEVICE — LINEN TOWEL PACK X5 5464

## (undated) DEVICE — SU VICRYL 4-0 PS-2 18" UND J496H

## (undated) DEVICE — GLOVE PROTEXIS BLUE W/NEU-THERA 6.5  2D73EB65

## (undated) DEVICE — DECANTER VIAL 2006S

## (undated) DEVICE — SU VICRYL 3-0 SH 27" J316H

## (undated) DEVICE — PREP CHLORAPREP 26ML TINTED HI-LITE ORANGE 930815

## (undated) DEVICE — DRAPE COVER C-ARM SEAMLESS SNAP-KAP 03-KP26 LATEX FREE

## (undated) DEVICE — DRAPE BREAST/CHEST 29420

## (undated) DEVICE — DRSG STERI STRIP 1/2X4" R1547

## (undated) DEVICE — DECANTER BAG 2002S

## (undated) DEVICE — SU VICRYL 3-0 PS-1 18" UND J683

## (undated) DEVICE — PEN MARKING SKIN

## (undated) DEVICE — CLIP APPLIER 11" MED LIGACLIP MCM20

## (undated) DEVICE — PAD CHUX UNDERPAD 23X24" 7136

## (undated) DEVICE — NDL 22GA 1.5"

## (undated) DEVICE — COVER ULTRASOUND PROBE FLEXI-FEEL 6X48" LF 25-FF648

## (undated) DEVICE — SOL NACL 0.9% IRRIG 1000ML BOTTLE 2F7124

## (undated) DEVICE — ESU ELEC BLADE 6" COATED E1450-6

## (undated) DEVICE — GLOVE PROTEXIS MICRO 6.0  2D73PM60

## (undated) DEVICE — STPL SKIN 35W 6.9MM  PXW35

## (undated) DEVICE — DRSG KERLIX FLUFFS X5

## (undated) DEVICE — DRSG KERLIX 4 1/2"X4YDS ROLL 6715

## (undated) DEVICE — SU SILK 2-0 FSL 18" 677G

## (undated) DEVICE — SYR 10ML FINGER CONTROL W/O NDL 309695

## (undated) DEVICE — DRSG GAUZE 4X4" 3033

## (undated) DEVICE — SOL WATER IRRIG 1000ML BOTTLE 2F7114

## (undated) DEVICE — SU VICRYL 2-0 CT-1 27" J339H

## (undated) DEVICE — ADH SKIN CLOSURE PREMIERPRO EXOFIN 1.0ML 3470

## (undated) DEVICE — DRAIN JACKSON PRATT 15FR ROUND SU130-1323

## (undated) DEVICE — SU VICRYL 3-0 TIE 12X18" J904T

## (undated) DEVICE — SU VICRYL 2-0 CT-1 27" UND J259H

## (undated) DEVICE — KIT ENDO FIRST STEP DISINFECTANT 200ML W/POUCH EP-4

## (undated) DEVICE — ESU GROUND PAD UNIVERSAL W/O CORD

## (undated) DEVICE — GLOVE BIOGEL PI MICRO SZ 6.0 48560

## (undated) DEVICE — SOL NACL 0.9% INJ 1000ML BAG 2B1324X

## (undated) DEVICE — SYR 50ML LL W/O NDL 309653

## (undated) DEVICE — ESU ELEC BLADE NN-STCK PLUMEPEN PRO 360D 10FT PLPRO4020

## (undated) DEVICE — SOL NACL 0.9% INJ 250ML BAG 2B1322Q

## (undated) DEVICE — PREP CHLORAPREP 26ML TINTED ORANGE  260815

## (undated) DEVICE — SU VICRYL 3-0 PS-2 27" UND J427H

## (undated) DEVICE — WIPES FOLEY CARE SURESTEP PROVON DFC100

## (undated) DEVICE — SU VICRYL 2-0 CT-2 27" J333H

## (undated) DEVICE — BLADE KNIFE SURG 15 371115

## (undated) DEVICE — SU NUROLON 1 CTX CR 8X18" C550D

## (undated) DEVICE — BLADE KNIFE SURG 11 371111

## (undated) DEVICE — SU SILK 2-0 SH 30" K833H

## (undated) DEVICE — SYR 10ML LL W/O NDL

## (undated) DEVICE — ESU ELEC BLADE HEX-LOCKING 2.5" E1450X

## (undated) DEVICE — BNDG ELASTIC 6"X5YDS DOUBLE STERILE

## (undated) DEVICE — Device

## (undated) DEVICE — SU VICRYL 2-0 CT-2 27" UND J269H

## (undated) DEVICE — CATH TRAY FOLEY SURESTEP 16FR WDRAIN BAG STLK LATEX A300316A

## (undated) DEVICE — NDL SPINAL 22GA 3.5" QUINCKE 405181

## (undated) DEVICE — DRAPE SHEET REV FOLD 3/4 9349

## (undated) DEVICE — SYR 10ML SLIP TIP W/O NDL

## (undated) RX ORDER — LIDOCAINE HYDROCHLORIDE 10 MG/ML
INJECTION, SOLUTION EPIDURAL; INFILTRATION; INTRACAUDAL; PERINEURAL
Status: DISPENSED
Start: 2022-07-25

## (undated) RX ORDER — DEXAMETHASONE SODIUM PHOSPHATE 4 MG/ML
INJECTION, SOLUTION INTRA-ARTICULAR; INTRALESIONAL; INTRAMUSCULAR; INTRAVENOUS; SOFT TISSUE
Status: DISPENSED
Start: 2022-07-25

## (undated) RX ORDER — FENTANYL CITRATE 0.05 MG/ML
INJECTION, SOLUTION INTRAMUSCULAR; INTRAVENOUS
Status: DISPENSED
Start: 2023-08-14

## (undated) RX ORDER — FENTANYL CITRATE 50 UG/ML
INJECTION, SOLUTION INTRAMUSCULAR; INTRAVENOUS
Status: DISPENSED
Start: 2025-08-08

## (undated) RX ORDER — ONDANSETRON 2 MG/ML
INJECTION INTRAMUSCULAR; INTRAVENOUS
Status: DISPENSED
Start: 2023-02-18

## (undated) RX ORDER — METHOCARBAMOL 750 MG/1
TABLET, FILM COATED ORAL
Status: DISPENSED
Start: 2023-08-14

## (undated) RX ORDER — FENTANYL CITRATE 50 UG/ML
INJECTION, SOLUTION INTRAMUSCULAR; INTRAVENOUS
Status: DISPENSED
Start: 2023-02-18

## (undated) RX ORDER — CEFAZOLIN SODIUM 1 G/3ML
INJECTION, POWDER, FOR SOLUTION INTRAMUSCULAR; INTRAVENOUS
Status: DISPENSED
Start: 2023-02-18

## (undated) RX ORDER — BUPIVACAINE HYDROCHLORIDE AND EPINEPHRINE 5; 5 MG/ML; UG/ML
INJECTION, SOLUTION EPIDURAL; INTRACAUDAL; PERINEURAL
Status: DISPENSED
Start: 2023-02-18

## (undated) RX ORDER — FENTANYL CITRATE 50 UG/ML
INJECTION, SOLUTION INTRAMUSCULAR; INTRAVENOUS
Status: DISPENSED
Start: 2022-07-25

## (undated) RX ORDER — HYDROMORPHONE HYDROCHLORIDE 1 MG/ML
INJECTION, SOLUTION INTRAMUSCULAR; INTRAVENOUS; SUBCUTANEOUS
Status: DISPENSED
Start: 2023-08-14

## (undated) RX ORDER — PROPOFOL 10 MG/ML
INJECTION, EMULSION INTRAVENOUS
Status: DISPENSED
Start: 2023-08-14

## (undated) RX ORDER — HYDROMORPHONE HYDROCHLORIDE 1 MG/ML
INJECTION, SOLUTION INTRAMUSCULAR; INTRAVENOUS; SUBCUTANEOUS
Status: DISPENSED
Start: 2022-12-21

## (undated) RX ORDER — PROPOFOL 10 MG/ML
INJECTION, EMULSION INTRAVENOUS
Status: DISPENSED
Start: 2022-12-21

## (undated) RX ORDER — CEFAZOLIN SODIUM/WATER 2 G/20 ML
SYRINGE (ML) INTRAVENOUS
Status: DISPENSED
Start: 2023-08-14

## (undated) RX ORDER — WATER 10 ML/10ML
INJECTION INTRAMUSCULAR; INTRAVENOUS; SUBCUTANEOUS
Status: DISPENSED
Start: 2022-12-21

## (undated) RX ORDER — DEXAMETHASONE SODIUM PHOSPHATE 4 MG/ML
INJECTION, SOLUTION INTRA-ARTICULAR; INTRALESIONAL; INTRAMUSCULAR; INTRAVENOUS; SOFT TISSUE
Status: DISPENSED
Start: 2023-02-18

## (undated) RX ORDER — LIDOCAINE HYDROCHLORIDE 20 MG/ML
INJECTION, SOLUTION EPIDURAL; INFILTRATION; INTRACAUDAL; PERINEURAL
Status: DISPENSED
Start: 2022-07-25

## (undated) RX ORDER — PROPOFOL 10 MG/ML
INJECTION, EMULSION INTRAVENOUS
Status: DISPENSED
Start: 2023-02-18

## (undated) RX ORDER — FENTANYL CITRATE 50 UG/ML
INJECTION, SOLUTION INTRAMUSCULAR; INTRAVENOUS
Status: DISPENSED
Start: 2022-12-21

## (undated) RX ORDER — FENTANYL CITRATE 50 UG/ML
INJECTION, SOLUTION INTRAMUSCULAR; INTRAVENOUS
Status: DISPENSED
Start: 2023-08-14

## (undated) RX ORDER — PROPOFOL 10 MG/ML
INJECTION, EMULSION INTRAVENOUS
Status: DISPENSED
Start: 2022-07-25

## (undated) RX ORDER — CEFAZOLIN SODIUM 1 G/3ML
INJECTION, POWDER, FOR SOLUTION INTRAMUSCULAR; INTRAVENOUS
Status: DISPENSED
Start: 2022-12-21

## (undated) RX ORDER — CEFAZOLIN SODIUM/WATER 2 G/20 ML
SYRINGE (ML) INTRAVENOUS
Status: DISPENSED
Start: 2022-07-25

## (undated) RX ORDER — CEFAZOLIN SODIUM 1 G/3ML
INJECTION, POWDER, FOR SOLUTION INTRAMUSCULAR; INTRAVENOUS
Status: DISPENSED
Start: 2023-08-14

## (undated) RX ORDER — ONDANSETRON 2 MG/ML
INJECTION INTRAMUSCULAR; INTRAVENOUS
Status: DISPENSED
Start: 2022-07-25

## (undated) RX ORDER — BUPIVACAINE HYDROCHLORIDE 5 MG/ML
INJECTION, SOLUTION EPIDURAL; INTRACAUDAL
Status: DISPENSED
Start: 2022-07-25

## (undated) RX ORDER — HEPARIN SODIUM (PORCINE) LOCK FLUSH IV SOLN 100 UNIT/ML 100 UNIT/ML
SOLUTION INTRAVENOUS
Status: DISPENSED
Start: 2022-09-29